# Patient Record
Sex: FEMALE | Race: WHITE | HISPANIC OR LATINO | Employment: FULL TIME | URBAN - METROPOLITAN AREA
[De-identification: names, ages, dates, MRNs, and addresses within clinical notes are randomized per-mention and may not be internally consistent; named-entity substitution may affect disease eponyms.]

---

## 2017-01-06 ENCOUNTER — HOSPITAL ENCOUNTER (EMERGENCY)
Facility: MEDICAL CENTER | Age: 42
End: 2017-01-06
Attending: EMERGENCY MEDICINE
Payer: MEDICAID

## 2017-01-06 ENCOUNTER — APPOINTMENT (OUTPATIENT)
Dept: RADIOLOGY | Facility: MEDICAL CENTER | Age: 42
End: 2017-01-06
Attending: EMERGENCY MEDICINE
Payer: MEDICAID

## 2017-01-06 VITALS
OXYGEN SATURATION: 98 % | TEMPERATURE: 96.8 F | WEIGHT: 293 LBS | SYSTOLIC BLOOD PRESSURE: 125 MMHG | DIASTOLIC BLOOD PRESSURE: 67 MMHG | BODY MASS INDEX: 50.02 KG/M2 | RESPIRATION RATE: 16 BRPM | HEART RATE: 78 BPM | HEIGHT: 64 IN

## 2017-01-06 DIAGNOSIS — M54.42 CHRONIC BILATERAL LOW BACK PAIN WITH BILATERAL SCIATICA: ICD-10-CM

## 2017-01-06 DIAGNOSIS — R10.32 LEFT LOWER QUADRANT PAIN: ICD-10-CM

## 2017-01-06 DIAGNOSIS — M54.41 CHRONIC BILATERAL LOW BACK PAIN WITH BILATERAL SCIATICA: ICD-10-CM

## 2017-01-06 DIAGNOSIS — G89.29 CHRONIC BILATERAL LOW BACK PAIN WITH BILATERAL SCIATICA: ICD-10-CM

## 2017-01-06 LAB
ALBUMIN SERPL BCP-MCNC: 3.9 G/DL (ref 3.2–4.9)
ALBUMIN/GLOB SERPL: 1 G/DL
ALP SERPL-CCNC: 104 U/L (ref 30–99)
ALT SERPL-CCNC: 13 U/L (ref 2–50)
ANION GAP SERPL CALC-SCNC: 7 MMOL/L (ref 0–11.9)
APPEARANCE UR: CLEAR
AST SERPL-CCNC: 11 U/L (ref 12–45)
BACTERIA GENITAL QL WET PREP: NORMAL
BASOPHILS # BLD AUTO: 0.7 % (ref 0–1.8)
BASOPHILS # BLD: 0.08 K/UL (ref 0–0.12)
BILIRUB SERPL-MCNC: 0.5 MG/DL (ref 0.1–1.5)
BUN SERPL-MCNC: 10 MG/DL (ref 8–22)
CALCIUM SERPL-MCNC: 9.4 MG/DL (ref 8.5–10.5)
CHLORIDE SERPL-SCNC: 101 MMOL/L (ref 96–112)
CO2 SERPL-SCNC: 29 MMOL/L (ref 20–33)
COLOR UR AUTO: YELLOW
CREAT SERPL-MCNC: 0.59 MG/DL (ref 0.5–1.4)
EOSINOPHIL # BLD AUTO: 0.23 K/UL (ref 0–0.51)
EOSINOPHIL NFR BLD: 2 % (ref 0–6.9)
ERYTHROCYTE [DISTWIDTH] IN BLOOD BY AUTOMATED COUNT: 43.4 FL (ref 35.9–50)
GFR SERPL CREATININE-BSD FRML MDRD: >60 ML/MIN/1.73 M 2
GLOBULIN SER CALC-MCNC: 3.8 G/DL (ref 1.9–3.5)
GLUCOSE SERPL-MCNC: 161 MG/DL (ref 65–99)
GLUCOSE UR QL STRIP.AUTO: NEGATIVE MG/DL
HCG UR QL: NEGATIVE
HCT VFR BLD AUTO: 44.9 % (ref 37–47)
HGB BLD-MCNC: 14 G/DL (ref 12–16)
IMM GRANULOCYTES # BLD AUTO: 0.05 K/UL (ref 0–0.11)
IMM GRANULOCYTES NFR BLD AUTO: 0.4 % (ref 0–0.9)
KETONES UR QL STRIP.AUTO: NEGATIVE MG/DL
LEUKOCYTE ESTERASE UR QL STRIP.AUTO: NEGATIVE
LIPASE SERPL-CCNC: 21 U/L (ref 11–82)
LYMPHOCYTES # BLD AUTO: 2.33 K/UL (ref 1–4.8)
LYMPHOCYTES NFR BLD: 20 % (ref 22–41)
MCH RBC QN AUTO: 25.8 PG (ref 27–33)
MCHC RBC AUTO-ENTMCNC: 31.2 G/DL (ref 33.6–35)
MCV RBC AUTO: 82.8 FL (ref 81.4–97.8)
MONOCYTES # BLD AUTO: 0.55 K/UL (ref 0–0.85)
MONOCYTES NFR BLD AUTO: 4.7 % (ref 0–13.4)
NEUTROPHILS # BLD AUTO: 8.42 K/UL (ref 2–7.15)
NEUTROPHILS NFR BLD: 72.2 % (ref 44–72)
NITRITE UR QL STRIP.AUTO: NEGATIVE
NRBC # BLD AUTO: 0 K/UL
NRBC BLD AUTO-RTO: 0 /100 WBC
PH UR STRIP.AUTO: 6 [PH]
PLATELET # BLD AUTO: 226 K/UL (ref 164–446)
PMV BLD AUTO: 11.4 FL (ref 9–12.9)
POTASSIUM SERPL-SCNC: 4.2 MMOL/L (ref 3.6–5.5)
PROT SERPL-MCNC: 7.7 G/DL (ref 6–8.2)
PROT UR QL STRIP: NEGATIVE MG/DL
RBC # BLD AUTO: 5.42 M/UL (ref 4.2–5.4)
RBC UR QL AUTO: NEGATIVE
SIGNIFICANT IND 70042: NORMAL
SITE SITE: NORMAL
SODIUM SERPL-SCNC: 137 MMOL/L (ref 135–145)
SOURCE SOURCE: NORMAL
SP GR UR: 1.02
WBC # BLD AUTO: 11.7 K/UL (ref 4.8–10.8)

## 2017-01-06 PROCEDURE — 700105 HCHG RX REV CODE 258: Performed by: EMERGENCY MEDICINE

## 2017-01-06 PROCEDURE — 83690 ASSAY OF LIPASE: CPT

## 2017-01-06 PROCEDURE — 81002 URINALYSIS NONAUTO W/O SCOPE: CPT

## 2017-01-06 PROCEDURE — 700117 HCHG RX CONTRAST REV CODE 255: Performed by: EMERGENCY MEDICINE

## 2017-01-06 PROCEDURE — 36415 COLL VENOUS BLD VENIPUNCTURE: CPT

## 2017-01-06 PROCEDURE — 96376 TX/PRO/DX INJ SAME DRUG ADON: CPT

## 2017-01-06 PROCEDURE — 96374 THER/PROPH/DIAG INJ IV PUSH: CPT

## 2017-01-06 PROCEDURE — 87591 N.GONORRHOEAE DNA AMP PROB: CPT

## 2017-01-06 PROCEDURE — 85025 COMPLETE CBC W/AUTO DIFF WBC: CPT

## 2017-01-06 PROCEDURE — 96375 TX/PRO/DX INJ NEW DRUG ADDON: CPT

## 2017-01-06 PROCEDURE — 99284 EMERGENCY DEPT VISIT MOD MDM: CPT

## 2017-01-06 PROCEDURE — 700111 HCHG RX REV CODE 636 W/ 250 OVERRIDE (IP): Performed by: EMERGENCY MEDICINE

## 2017-01-06 PROCEDURE — 81025 URINE PREGNANCY TEST: CPT

## 2017-01-06 PROCEDURE — 87491 CHLMYD TRACH DNA AMP PROBE: CPT

## 2017-01-06 PROCEDURE — 74177 CT ABD & PELVIS W/CONTRAST: CPT

## 2017-01-06 PROCEDURE — 80053 COMPREHEN METABOLIC PANEL: CPT

## 2017-01-06 PROCEDURE — 96361 HYDRATE IV INFUSION ADD-ON: CPT

## 2017-01-06 RX ORDER — MORPHINE SULFATE 4 MG/ML
4 INJECTION, SOLUTION INTRAMUSCULAR; INTRAVENOUS ONCE
Status: COMPLETED | OUTPATIENT
Start: 2017-01-06 | End: 2017-01-06

## 2017-01-06 RX ORDER — LEVOTHYROXINE SODIUM 13 UG/1
150 CAPSULE ORAL DAILY
COMMUNITY
End: 2018-08-15

## 2017-01-06 RX ORDER — SODIUM CHLORIDE 9 MG/ML
INJECTION, SOLUTION INTRAVENOUS CONTINUOUS
Status: DISCONTINUED | OUTPATIENT
Start: 2017-01-06 | End: 2017-01-06 | Stop reason: HOSPADM

## 2017-01-06 RX ORDER — ONDANSETRON 4 MG/1
4 TABLET, FILM COATED ORAL EVERY 8 HOURS PRN
Qty: 6 EACH | Refills: 2 | Status: ON HOLD | OUTPATIENT
Start: 2017-01-06 | End: 2017-05-20

## 2017-01-06 RX ORDER — HYDROCODONE BITARTRATE AND ACETAMINOPHEN 5; 325 MG/1; MG/1
1-2 TABLET ORAL EVERY 6 HOURS PRN
Qty: 10 TAB | Refills: 0 | Status: ON HOLD | OUTPATIENT
Start: 2017-01-06 | End: 2017-05-20

## 2017-01-06 RX ORDER — GLIMEPIRIDE 4 MG/1
2 TABLET ORAL
COMMUNITY
End: 2018-08-15

## 2017-01-06 RX ORDER — ONDANSETRON 2 MG/ML
4 INJECTION INTRAMUSCULAR; INTRAVENOUS ONCE
Status: COMPLETED | OUTPATIENT
Start: 2017-01-06 | End: 2017-01-06

## 2017-01-06 RX ADMIN — MORPHINE SULFATE 4 MG: 4 INJECTION INTRAVENOUS at 09:14

## 2017-01-06 RX ADMIN — IOHEXOL 50 ML: 240 INJECTION, SOLUTION INTRATHECAL; INTRAVASCULAR; INTRAVENOUS; ORAL at 12:00

## 2017-01-06 RX ADMIN — ONDANSETRON 4 MG: 2 INJECTION, SOLUTION INTRAMUSCULAR; INTRAVENOUS at 10:16

## 2017-01-06 RX ADMIN — IOHEXOL 100 ML: 350 INJECTION, SOLUTION INTRAVENOUS at 11:41

## 2017-01-06 RX ADMIN — ONDANSETRON 4 MG: 2 INJECTION, SOLUTION INTRAMUSCULAR; INTRAVENOUS at 09:14

## 2017-01-06 RX ADMIN — SODIUM CHLORIDE: 9 INJECTION, SOLUTION INTRAVENOUS at 09:14

## 2017-01-06 ASSESSMENT — PAIN SCALES - GENERAL
PAINLEVEL_OUTOF10: 3
PAINLEVEL_OUTOF10: 6
PAINLEVEL_OUTOF10: 7

## 2017-01-06 NOTE — ED AVS SNAPSHOT
Avectra Access Code: [email or web address]  Expires: 2/5/2017 12:09 PM    Your email address is not on file at [facility].  Email Addresses are required for you to sign up for Avectra, please contact [phone] to verify your personal information and to provide your email address prior to attempting to register for Avectra.    Chanelle Ortiz  [address] [place] [address]    Fetchmobt  A secure, online tool to manage your health information     [employer]’s Avectra® is a secure, online tool that connects you to your personalized health information from the privacy of your home -- day or night - making it very easy for you to manage your healthcare. Once the activation process is completed, you can even access your medical information using the Avectra lauren, which is available for free in the Apple Lauren store or Google Play store.     To learn more about Avectra, visit [email or web address]    There are two levels of access available (as shown below):   My Chart Features  [facility] Primary Care Doctor [facility]  Specialists [facility]  Urgent  Care Non-[facility] Primary Care Doctor   Email your healthcare team securely and privately 24/7 X X X    Manage appointments: schedule your next appointment; view details of past/upcoming appointments X      Request prescription refills. X      View recent personal medical records, including lab and immunizations X X X X   View health record, including health history, allergies, medications X X X X   Read reports about your outpatient visits, procedures, consult and ER notes X X X X   See your discharge summary, which is a recap of your hospital and/or ER visit that includes your diagnosis, lab results, and care plan X X  X     How to register for Avectra:  Once your e-mail address has been verified, follow the following steps to sign up for Avectra.     1. Go to  [email or web address]  2. Click on the Sign Up Now box, which takes you to the New Member Sign Up page.  You will need to provide the following information:  a. Enter your [employer] Access Code exactly as it appears at the top of this page. (You will not need to use this code after you’ve completed the sign-up process. If you do not sign up before the expiration date, you must request a new code.)   b. Enter your date of birth.   c. Enter your home email address.   d. Click Submit, and follow the next screen’s instructions.  3. Create a [employer]Chimet ID. This will be your [employer] login ID and cannot be changed, so think of one that is secure and easy to remember.  4. Create a [employer] password. You can change your password at any time.  5. Enter your Password Reset Question and Answer. This can be used at a later time if you forget your password.   6. Enter your e-mail address. This allows you to receive e-mail notifications when new information is available in [employer].  7. Click Sign Up. You can now view your health information.    For assistance activating your [employer] account, call [phone]

## 2017-01-06 NOTE — ED NOTES
Patient given discharge instructions, follow up information, and prescriptions x 2, instructed not to drive home or while taking narcotics, verbalized understanding, discharged to father.

## 2017-01-06 NOTE — DISCHARGE INSTRUCTIONS
Abdominal Pain (Nonspecific)  Your exam might not show the exact reason you have abdominal pain. Since there are many different causes of abdominal pain, another checkup and more tests may be needed. It is very important to follow up for lasting (persistent) or worsening symptoms. A possible cause of abdominal pain in any person who still has his or her appendix is acute appendicitis. Appendicitis is often hard to diagnose. Normal blood tests, urine tests, ultrasound, and CT scans do not completely rule out early appendicitis or other causes of abdominal pain. Sometimes, only the changes that happen over time will allow appendicitis and other causes of abdominal pain to be determined. Other potential problems that may require surgery may also take time to become more apparent. Because of this, it is important that you follow all of the instructions below.  HOME CARE INSTRUCTIONS   · Rest as much as possible.   · Do not eat solid food until your pain is gone.   · While adults or children have pain: A diet of water, weak decaffeinated tea, broth or bouillon, gelatin, oral rehydration solutions (ORS), frozen ice pops, or ice chips may be helpful.   · When pain is gone in adults or children: Start a light diet (dry toast, crackers, applesauce, or white rice). Increase the diet slowly as long as it does not bother you. Eat no dairy products (including cheese and eggs) and no spicy, fatty, fried, or high-fiber foods.   · Use no alcohol, caffeine, or cigarettes.   · Take your regular medicines unless your caregiver told you not to.   · Take any prescribed medicine as directed.   · Only take over-the-counter or prescription medicines for pain, discomfort, or fever as directed by your caregiver. Do not give aspirin to children.   If your caregiver has given you a follow-up appointment, it is very important to keep that appointment. Not keeping the appointment could result in a permanent injury and/or lasting (chronic) pain  and/or disability. If there is any problem keeping the appointment, you must call to reschedule.   SEEK IMMEDIATE MEDICAL CARE IF:   · Your pain is not gone in 24 hours.   · Your pain becomes worse, changes location, or feels different.   · You or your child has an oral temperature above 102° F (38.9° C), not controlled by medicine.   · Your baby is older than 3 months with a rectal temperature of 102° F (38.9° C) or higher.   · Your baby is 3 months old or younger with a rectal temperature of 100.4° F (38° C) or higher.   · You have shaking chills.   · You keep throwing up (vomiting) or cannot drink liquids.   · There is blood in your vomit or you see blood in your bowel movements.   · Your bowel movements become dark or black.   · You have frequent bowel movements.   · Your bowel movements stop (become blocked) or you cannot pass gas.   · You have bloody, frequent, or painful urination.   · You have yellow discoloration in the skin or whites of the eyes.   · Your stomach becomes bloated or bigger.   · You have dizziness or fainting.   · You have chest or back pain.   MAKE SURE YOU:   · Understand these instructions.   · Will watch your condition.   · Will get help right away if you are not doing well or get worse.   Document Released: 12/18/2006 Document Revised: 03/11/2013 Document Reviewed: 11/15/2010  ExitCare® Patient Information ©2013 [employer].  Abdominal Pain, Women  Abdominal (stomach, pelvic, or belly) pain can be caused by many things. It is important to tell your doctor:  · The location of the pain.  · Does it come and go or is it present all the time?  · Are there things that start the pain (eating certain foods, exercise)?  · Are there other symptoms associated with the pain (fever, nausea, vomiting, diarrhea)?  All of this is helpful to know when trying to find the cause of the pain.  CAUSES   · Stomach: virus or bacteria infection, or ulcer.  · Intestine: appendicitis (inflamed appendix),  regional ileitis (Crohn's disease), ulcerative colitis (inflamed colon), irritable bowel syndrome, diverticulitis (inflamed diverticulum of the colon), or cancer of the stomach or intestine.  · Gallbladder disease or stones in the gallbladder.  · Kidney disease, kidney stones, or infection.  · Pancreas infection or cancer.  · Fibromyalgia (pain disorder).  · Diseases of the female organs:  · Uterus: fibroid (non-cancerous) tumors or infection.  · Fallopian tubes: infection or tubal pregnancy.  · Ovary: cysts or tumors.  · Pelvic adhesions (scar tissue).  · Endometriosis (uterus lining tissue growing in the pelvis and on the pelvic organs).  · Pelvic congestion syndrome (female organs filling up with blood just before the menstrual period).  · Pain with the menstrual period.  · Pain with ovulation (producing an egg).  · Pain with an IUD (intrauterine device, birth control) in the uterus.  · Cancer of the female organs.  · Functional pain (pain not caused by a disease, may improve without treatment).  · Psychological pain.  · Depression.  DIAGNOSIS   Your doctor will decide the seriousness of your pain by doing an examination.  · Blood tests.  · X-rays.  · Ultrasound.  · CT scan (computed tomography, special type of X-ray).  · MRI (magnetic resonance imaging).  · Cultures, for infection.  · Barium enema (dye inserted in the large intestine, to better view it with X-rays).  · Colonoscopy (looking in intestine with a lighted tube).  · Laparoscopy (minor surgery, looking in abdomen with a lighted tube).  · Major abdominal exploratory surgery (looking in abdomen with a large incision).  TREATMENT   The treatment will depend on the cause of the pain.   · Many cases can be observed and treated at home.  · Over-the-counter medicines recommended by your caregiver.  · Prescription medicine.  · Antibiotics, for infection.  · Birth control pills, for painful periods or for ovulation pain.  · Hormone treatment, for  endometriosis.  · Nerve blocking injections.  · Physical therapy.  · Antidepressants.  · Counseling with a psychologist or psychiatrist.  · Minor or major surgery.  HOME CARE INSTRUCTIONS   · Do not take laxatives, unless directed by your caregiver.  · Take over-the-counter pain medicine only if ordered by your caregiver. Do not take aspirin because it can cause an upset stomach or bleeding.  · Try a clear liquid diet (broth or water) as ordered by your caregiver. Slowly move to a bland diet, as tolerated, if the pain is related to the stomach or intestine.  · Have a thermometer and take your temperature several times a day, and record it.  · Bed rest and sleep, if it helps the pain.  · Avoid sexual intercourse, if it causes pain.  · Avoid stressful situations.  · Keep your follow-up appointments and tests, as your caregiver orders.  · If the pain does not go away with medicine or surgery, you may try:  · Acupuncture.  · Relaxation exercises (yoga, meditation).  · Group therapy.  · Counseling.  SEEK MEDICAL CARE IF:   · You notice certain foods cause stomach pain.  · Your home care treatment is not helping your pain.  · You need stronger pain medicine.  · You want your IUD removed.  · You feel faint or lightheaded.  · You develop nausea and vomiting.  · You develop a rash.  · You are having side effects or an allergy to your medicine.  SEEK IMMEDIATE MEDICAL CARE IF:   · Your pain does not go away or gets worse.  · You have a fever.  · Your pain is felt only in portions of the abdomen. The right side could possibly be appendicitis. The left lower portion of the abdomen could be colitis or diverticulitis.  · You are passing blood in your stools (bright red or black tarry stools, with or without vomiting).  · You have blood in your urine.  · You develop chills, with or without a fever.  · You pass out.  MAKE SURE YOU:   · Understand these instructions.  · Will watch your condition.  · Will get help right away if you  are not doing well or get worse.  Document Released: 10/14/2008 Document Revised: 03/11/2013 Document Reviewed: 11/04/2010  ExitCare® Patient Information ©2014 [employer].    Chronic Back Pain   When back pain lasts longer than 3 months, it is called chronic back pain. People with chronic back pain often go through certain periods that are more intense (flare-ups).   CAUSES  Chronic back pain can be caused by wear and tear (degeneration) on different structures in your back. These structures include:  · The bones of your spine (vertebrae) and the joints surrounding your spinal cord and nerve roots (facets).  · The strong, fibrous tissues that connect your vertebrae (ligaments).  Degeneration of these structures may result in pressure on your nerves. This can lead to constant pain.  HOME CARE INSTRUCTIONS  · Avoid bending, heavy lifting, prolonged sitting, and activities which make the problem worse.  · Take brief periods of rest throughout the day to reduce your pain. Lying down or standing usually is better than sitting while you are resting.  · Take over-the-counter or prescription medicines only as directed by your caregiver.  SEEK IMMEDIATE MEDICAL CARE IF:   · You have weakness or numbness in one of your legs or feet.  · You have trouble controlling your bladder or bowels.  · You have nausea, vomiting, abdominal pain, shortness of breath, or fainting.     This information is not intended to replace advice given to you by your health care provider. Make sure you discuss any questions you have with your health care provider.     Document Released: 01/25/2006 Document Revised: 03/11/2013 Document Reviewed: 12/01/2012  [employer] Interactive Patient Education ©2016 [employer]  Return if fever, vomiting or if no better in 12 hours.

## 2017-01-06 NOTE — ED PROVIDER NOTES
ED Provider Note    CHIEF COMPLAINT  Chief Complaint   Patient presents with   • Abdominal Pain     7/10 generalized abdominal pain x 3 weeks   • Back Pain     7/10 low back pain x 3 weeks   • Head Ache     5/10 Headache       HPI  Chanelle Ortiz is a 41 y.o. female who presents with low back pain, for the last 3 weeks, worse today, pain severe dull gradual in onset and radiates to both legs. No bowel or bladder problems no IV drug use no fever no chills. Pain severe worse with motion with radiation. Long history of low back pain. Evidently he has been in [facility] FOR THE SAME PROBLEM. TODAY SHE HAS AN ADDITIONAL COMPLAINT OF ABDOMINAL PAIN, LEFT LOWER QUADRANT PAIN FOR THE LAST WEEK, THOUGH MODERATE GRADUAL NO DIARRHEA NO FEVER NO CHILLS NO NAUSEA NO VOMITING NO DYSURIA URGENCY OR FREQUENCY NO OTHER MODIFIERS.    REVIEW OF SYSTEMS  See HPI for further details. History of diabetes and obesity depression cardiac arrhythmia and hypertension for thyroidism Denies other G.I., G.U.. endrocine, cardiovascular, respriatory or neurological problems. All other systems are negative.     PAST MEDICAL HISTORY  Past Medical History   Diagnosis Date   • Diabetes      5 YEARS AGO, DIET CONTROLLED   • Psychiatric problem    • Arrhythmia    • Bronchitis    • Hypertension    • Hyperthyroidism      Treated with Radioactive iodine last year       FAMILY HISTORY  Family History   Problem Relation Age of Onset   • Other Mother    • Heart Failure Sister    • Heart Attack Maternal Grandfather        SOCIAL HISTORY  Social History     Social History   • Marital Status: Legally      Spouse Name: N/A   • Number of Children: N/A   • Years of Education: N/A     Social History Main Topics   • Smoking status: Never Smoker    • Smokeless tobacco: None   • Alcohol Use: No   • Drug Use: No   • Sexual Activity: Not Asked     Other Topics Concern   • None     Social History Narrative       SURGICAL HISTORY  Past Surgical  "History   Procedure Laterality Date   • Other abdominal surgery  2002     cholesystectomy   • Gyn surgery       ovarian cyst and c sections   • Other       tonsillectomy       CURRENT MEDICATIONS  Home Medications     Reviewed by Toya Zapata R.N. (Registered Nurse) on 01/06/17 at 0813  Med List Status: Partial    Medication Last Dose Status    glimepiride (AMARYL) 4 MG Tab ran out Active    Levothyroxine Sodium 150 MCG Cap 1/5/2017 Active                ALLERGIES  No Known Allergies    PHYSICAL EXAM  VITAL SIGNS: /67 mmHg  Pulse 77  Temp(Src) 36 °C (96.8 °F)  Resp 16  Ht 1.626 m (5' 4\")  Wt 139.254 kg (307 lb)  BMI 52.67 kg/m2  SpO2 97%  LMP  (Within Weeks)  Constitutional: Well developed, Well nourished massively obese, No acute distress, Non-toxic appearance.   HENT: Normocephalic, Atraumatic, Bilateral external ears normal, Oropharynx moist, No oral exudates, Nose normal.   Eyes: PERRL, EOMI, Conjunctiva normal, No discharge.   Neck: Normal range of motion, No tenderness, Supple, No stridor.   Lymphatic: No lymphadenopathy noted.   Cardiovascular: Normal heart rate, Normal rhythm, No murmurs, No rubs, No gallops.   Thorax & Lungs: Normal breath sounds, No respiratory distress, No wheezing, No chest tenderness.   Abdomen: Tender left lower quadrant, no guarding no rigidity and the abdomen is soft.  No masses, No pulsatile masses.  Skin: Warm, Dry, No erythema, No rash.   Back: NExamination of the back reveals slight tenderness lower lumbar. Straight leg raise is positive bilateral 30°. Deep tendon reflexes equal bilaterally. Toe and heel flexion and extension are normal. Motor sensory exam of the lower legs is normal   Extremities: Intact distal pulses, No edema, No tenderness, No cyanosis, No clubbing.   Musculoskeletal: Good range of motion in all major joints. No tenderness to palpation or major deformities noted.   Neurologic: Alert & oriented x 3, Normal motor function, Normal sensory " function, No focal deficits noted.   Psychiatric: Affect normal, Judgment normal, Mood normal. Appears depressed  Pelvic exam:. White discharge no tenderness no bleeding no uterine enlargement no masses pelvic exam done by resident.    RADIOLOGY/PROCEDURES  CT-ABDOMEN-PELVIS WITH   Final Result      1.  Normal appendix.   2.  Minimal pelvic fluid, likely physiologic.   3.  No bowel obstruction or pneumoperitoneum.            COURSE & MEDICAL DECISION MAKING  Pertinent Labs & Imaging studies reviewed. (See chart for details) electrolytes normal, renal function, liver function normal lipase normal. Pregnancy test negative. White count elevated 11.7 hematocrit and platelets are normal there is no shift.        She is having multiple complaints, low back pain, left lower quadrant pain. Long history of low back problems in the past they've been evaluated at another hospital.. Here in the emergency department she was given IV normal saline and morphine and Zofran  She has had an extensive workup here including blood tests urine test pelvic exam CAT scan. At this point I am not finding any acute pathology.This patient understands that abdominal pain may be very elusive. At this point there is no evidence of an acute abdominal emergency. However if the pain returns or is no better in 12 hours or any vomiting they should return to the emergency room immediately. Just because the lab and x-rays are normal does not rule out a severe abdominal emergency  Discharge her with Norco. I have checked with PMD  FINAL IMPRESSION  1.   1. Left lower quadrant pain    2. Chronic bilateral low back pain with bilateral sciatica        2.   3.     Disposition  Discharge instructions are understood. This patient is to return if fever vomiting or no better in 12 hours. Follow up with the [facility] clinic or private physician. Information sheets on low back pain and abdominal pain Electronically signed by: Jonnie Ashton, 1/6/2017 8:28 AM

## 2017-01-06 NOTE — ED AVS SNAPSHOT
After Visit Summary                                                                                                                Chanelle Ortiz   MRN: [id number]    Department:  [facility], Emergency Dept   Date of Visit:  1/6/2017            [facility], Emergency Dept    [address] [address]    Phone:  [phone]      You were seen by     Jonnie Ashton M.D.      Your Diagnosis Was     Left lower quadrant pain     R10.32       These are the medications you received during your hospitalization from 01/06/2017 0802 to 01/06/2017 1210     Date/Time Order Dose Route Action    01/06/2017 0914 NS infusion   Intravenous New Bag    01/06/2017 0914 morphine (pf) 4 mg/ml injection 4 mg 4 mg Intravenous Given    01/06/2017 0914 ondansetron (ZOFRAN) syringe/vial injection 4 mg 4 mg Intravenous Given    01/06/2017 1016 ondansetron (ZOFRAN) syringe/vial injection 4 mg 4 mg Intravenous Given    01/06/2017 1141 iohexol (OMNIPAQUE) 350 mg/mL 100 mL Intravenous Given    01/06/2017 1200 iohexol (OMNIPAQUE) 240 mg/mL 50 mL Oral Given      Follow-up Information     1. Follow up with CHANCE Pate. Schedule an appointment as soon as possible for a visit today.    Specialty:  Family Medicine    Contact information    [facility] [address] [address] [phone]        Medication Information     Review all of your home medications and newly ordered medications with your primary doctor and/or pharmacist as soon as possible. Follow medication instructions as directed by your doctor and/or pharmacist.     Please keep your complete medication list with you and share with your physician. Update the information when medications are discontinued, doses are changed, or new medications (including over-the-counter products) are added; and carry medication information at all times in the event of emergency situations.               Medication List         START taking these medications        Instructions    ondansetron 4 MG Tabs tablet   Commonly known as:  ZOFRAN    Take 1 Tab by mouth every 8 hours as needed (FOR NAUSEA OR VIMITING) for up to 6 doses.   Dose:  4 mg         ASK your doctor about these medications        Instructions    glimepiride 4 MG Tabs   Commonly known as:  AMARYL    Take 4 mg by mouth every bedtime.   Dose:  4 mg       Levothyroxine Sodium 150 MCG Caps    Take  by mouth.               Procedures and tests performed during your visit     CBC WITH DIFFERENTIAL    CHLAMYDIA & GC BY PCR    COMP METABOLIC PANEL    CONSENT FOR CONTRAST INJECTION    CT-ABDOMEN-PELVIS WITH    ESTIMATED GFR    LIPASE    POC UA    POC URINE PREGNANCY    Set Up for Pelvic Exam    WET PREP        Discharge Instructions       Abdominal Pain (Nonspecific)  Your exam might not show the exact reason you have abdominal pain. Since there are many different causes of abdominal pain, another checkup and more tests may be needed. It is very important to follow up for lasting (persistent) or worsening symptoms. A possible cause of abdominal pain in any person who still has his or her appendix is acute appendicitis. Appendicitis is often hard to diagnose. Normal blood tests, urine tests, ultrasound, and CT scans do not completely rule out early appendicitis or other causes of abdominal pain. Sometimes, only the changes that happen over time will allow appendicitis and other causes of abdominal pain to be determined. Other potential problems that may require surgery may also take time to become more apparent. Because of this, it is important that you follow all of the instructions below.  HOME CARE INSTRUCTIONS   · Rest as much as possible.   · Do not eat solid food until your pain is gone.   · While adults or children have pain: A diet of water, weak decaffeinated tea, broth or bouillon, gelatin, oral rehydration solutions (ORS), frozen ice pops, or ice chips may be helpful.    · When pain is gone in adults or children: Start a light diet (dry toast, crackers, applesauce, or white rice). Increase the diet slowly as long as it does not bother you. Eat no dairy products (including cheese and eggs) and no spicy, fatty, fried, or high-fiber foods.   · Use no alcohol, caffeine, or cigarettes.   · Take your regular medicines unless your caregiver told you not to.   · Take any prescribed medicine as directed.   · Only take over-the-counter or prescription medicines for pain, discomfort, or fever as directed by your caregiver. Do not give aspirin to children.   If your caregiver has given you a follow-up appointment, it is very important to keep that appointment. Not keeping the appointment could result in a permanent injury and/or lasting (chronic) pain and/or disability. If there is any problem keeping the appointment, you must call to reschedule.   SEEK IMMEDIATE MEDICAL CARE IF:   · Your pain is not gone in 24 hours.   · Your pain becomes worse, changes location, or feels different.   · You or your child has an oral temperature above 102° F (38.9° C), not controlled by medicine.   · Your baby is older than 3 months with a rectal temperature of 102° F (38.9° C) or higher.   · Your baby is 3 months old or younger with a rectal temperature of 100.4° F (38° C) or higher.   · You have shaking chills.   · You keep throwing up (vomiting) or cannot drink liquids.   · There is blood in your vomit or you see blood in your bowel movements.   · Your bowel movements become dark or black.   · You have frequent bowel movements.   · Your bowel movements stop (become blocked) or you cannot pass gas.   · You have bloody, frequent, or painful urination.   · You have yellow discoloration in the skin or whites of the eyes.   · Your stomach becomes bloated or bigger.   · You have dizziness or fainting.   · You have chest or back pain.   MAKE SURE YOU:   · Understand these instructions.   · Will watch your  condition.   · Will get help right away if you are not doing well or get worse.   Document Released: 12/18/2006 Document Revised: 03/11/2013 Document Reviewed: 11/15/2010  [employer]® Patient Information ©2013 [employer].  Abdominal Pain, Women  Abdominal (stomach, pelvic, or belly) pain can be caused by many things. It is important to tell your doctor:  · The location of the pain.  · Does it come and go or is it present all the time?  · Are there things that start the pain (eating certain foods, exercise)?  · Are there other symptoms associated with the pain (fever, nausea, vomiting, diarrhea)?  All of this is helpful to know when trying to find the cause of the pain.  CAUSES   · Stomach: virus or bacteria infection, or ulcer.  · Intestine: appendicitis (inflamed appendix), regional ileitis (Crohn's disease), ulcerative colitis (inflamed colon), irritable bowel syndrome, diverticulitis (inflamed diverticulum of the colon), or cancer of the stomach or intestine.  · Gallbladder disease or stones in the gallbladder.  · Kidney disease, kidney stones, or infection.  · Pancreas infection or cancer.  · Fibromyalgia (pain disorder).  · Diseases of the female organs:  · Uterus: fibroid (non-cancerous) tumors or infection.  · Fallopian tubes: infection or tubal pregnancy.  · Ovary: cysts or tumors.  · Pelvic adhesions (scar tissue).  · Endometriosis (uterus lining tissue growing in the pelvis and on the pelvic organs).  · Pelvic congestion syndrome (female organs filling up with blood just before the menstrual period).  · Pain with the menstrual period.  · Pain with ovulation (producing an egg).  · Pain with an IUD (intrauterine device, birth control) in the uterus.  · Cancer of the female organs.  · Functional pain (pain not caused by a disease, may improve without treatment).  · Psychological pain.  · Depression.  DIAGNOSIS   Your doctor will decide the seriousness of your pain by doing an examination.  · Blood  tests.  · X-rays.  · Ultrasound.  · CT scan (computed tomography, special type of X-ray).  · MRI (magnetic resonance imaging).  · Cultures, for infection.  · Barium enema (dye inserted in the large intestine, to better view it with X-rays).  · Colonoscopy (looking in intestine with a lighted tube).  · Laparoscopy (minor surgery, looking in abdomen with a lighted tube).  · Major abdominal exploratory surgery (looking in abdomen with a large incision).  TREATMENT   The treatment will depend on the cause of the pain.   · Many cases can be observed and treated at home.  · Over-the-counter medicines recommended by your caregiver.  · Prescription medicine.  · Antibiotics, for infection.  · Birth control pills, for painful periods or for ovulation pain.  · Hormone treatment, for endometriosis.  · Nerve blocking injections.  · Physical therapy.  · Antidepressants.  · Counseling with a psychologist or psychiatrist.  · Minor or major surgery.  HOME CARE INSTRUCTIONS   · Do not take laxatives, unless directed by your caregiver.  · Take over-the-counter pain medicine only if ordered by your caregiver. Do not take aspirin because it can cause an upset stomach or bleeding.  · Try a clear liquid diet (broth or water) as ordered by your caregiver. Slowly move to a bland diet, as tolerated, if the pain is related to the stomach or intestine.  · Have a thermometer and take your temperature several times a day, and record it.  · Bed rest and sleep, if it helps the pain.  · Avoid sexual intercourse, if it causes pain.  · Avoid stressful situations.  · Keep your follow-up appointments and tests, as your caregiver orders.  · If the pain does not go away with medicine or surgery, you may try:  · Acupuncture.  · Relaxation exercises (yoga, meditation).  · Group therapy.  · Counseling.  SEEK MEDICAL CARE IF:   · You notice certain foods cause stomach pain.  · Your home care treatment is not helping your pain.  · You need stronger pain  medicine.  · You want your IUD removed.  · You feel faint or lightheaded.  · You develop nausea and vomiting.  · You develop a rash.  · You are having side effects or an allergy to your medicine.  SEEK IMMEDIATE MEDICAL CARE IF:   · Your pain does not go away or gets worse.  · You have a fever.  · Your pain is felt only in portions of the abdomen. The right side could possibly be appendicitis. The left lower portion of the abdomen could be colitis or diverticulitis.  · You are passing blood in your stools (bright red or black tarry stools, with or without vomiting).  · You have blood in your urine.  · You develop chills, with or without a fever.  · You pass out.  MAKE SURE YOU:   · Understand these instructions.  · Will watch your condition.  · Will get help right away if you are not doing well or get worse.  Document Released: 10/14/2008 Document Revised: 03/11/2013 Document Reviewed: 11/04/2010  [employer]® Patient Information ©2014 [employer].    Chronic Back Pain   When back pain lasts longer than 3 months, it is called chronic back pain. People with chronic back pain often go through certain periods that are more intense (flare-ups).   CAUSES  Chronic back pain can be caused by wear and tear (degeneration) on different structures in your back. These structures include:  · The bones of your spine (vertebrae) and the joints surrounding your spinal cord and nerve roots (facets).  · The strong, fibrous tissues that connect your vertebrae (ligaments).  Degeneration of these structures may result in pressure on your nerves. This can lead to constant pain.  HOME CARE INSTRUCTIONS  · Avoid bending, heavy lifting, prolonged sitting, and activities which make the problem worse.  · Take brief periods of rest throughout the day to reduce your pain. Lying down or standing usually is better than sitting while you are resting.  · Take over-the-counter or prescription medicines only as directed by your caregiver.  SEEK  IMMEDIATE MEDICAL CARE IF:   · You have weakness or numbness in one of your legs or feet.  · You have trouble controlling your bladder or bowels.  · You have nausea, vomiting, abdominal pain, shortness of breath, or fainting.     This information is not intended to replace advice given to you by your health care provider. Make sure you discuss any questions you have with your health care provider.     Document Released: 01/25/2006 Document Revised: 03/11/2013 Document Reviewed: 12/01/2012  [employer] Interactive Patient Education ©2016 [employer]  Return if fever, vomiting or if no better in 12 hours.          Patient Information     Patient Information    Following emergency treatment: all patient requiring follow-up care must return either to a private physician or a clinic if your condition worsens before you are able to obtain further medical attention, please return to the emergency room.     Billing Information    At [facility], we work to make the billing process streamlined for our patients.  Our Representatives are here to answer any questions you may have regarding your hospital bill.  If you have insurance coverage and have supplied your insurance information to us, we will submit a claim to your insurer on your behalf.  Should you have any questions regarding your bill, we can be reached online or by phone as follows:  Online: You are able pay your bills online or live chat with our representatives about any billing questions you may have. We are here to help Monday - Friday from 8:00am to 7:30pm and 9:00am - 12:00pm on Saturdays.  Please visit [email or web address]  for more information.   Phone:  [phone] or [phone]    Please note that your emergency physician, surgeon, pathologist, radiologist, anesthesiologist, and other specialists are not employed by [facility] and will therefore bill separately for their services.  Please contact them directly for any  questions concerning their bills at the numbers below:     Emergency Physician Services:  [phone]  [facility]:  [phone]  [facility]:  [phone]  [facility]:  [phone]    1. Your final bill may vary from the amount quoted upon discharge if all procedures are not complete at that time, or if your doctor has additional procedures of which we are not aware. You will receive an additional bill if you return to the Emergency Department at [facility] for suture removal regardless of the facility of which the sutures were placed.     2. Please arrange for settlement of this account at the emergency registration.    3. All self-pay accounts are due in full at the time of treatment.  If you are unable to meet this obligation then payment is expected within 4-5 days.     4. If you have had radiology studies (CT, X-ray, Ultrasound, MRI), you have received a preliminary result during your emergency department visit. Please contact the radiology department [phone] to receive a copy of your final result. Please discuss the Final result with your primary physician or with the follow up physician provided.     Crisis Hotline:  [place] Crisis Hotline:  9-563-QIRNUHH or 1-771.589.6963  [place] Crisis Hotline:    1-307.259.4658 or 148-414-7785         ED Discharge Follow Up Questions    1. In order to provide you with very good care, we would like to follow up with a phone call in the next few days.  May we have your permission to contact you?     YES /  NO    2. What is the best phone number to call you? (       )_____-__________    3. What is the best time to call you?      Morning  /  Afternoon  /  Evening                   Patient Signature:  ____________________________________________________________    Date:  ____________________________________________________________

## 2017-01-06 NOTE — ED AVS SNAPSHOT
1/6/2017          Chanelle Ortiz  [address]  [place] [address]    Dear Chanelle:    [facility] wants to ensure your discharge home is safe and you or your loved ones have had all your questions answered regarding your care after you leave the hospital.    You may receive a telephone call within two days of your discharge.  This call is to make certain you understand your discharge instructions as well as ensure we provided you with the best care possible during your stay with us.     The call will only last approximately 3-5 minutes and will be done by a nurse.    Once again, we want to ensure your discharge home is safe and that you have a clear understanding of any next steps in your care.  If you have any questions or concerns, please do not hesitate to contact us, we are here for you.  Thank you for choosing [facility] for your healthcare needs.    Sincerely,    Matias Anderson    [facility]

## 2017-01-06 NOTE — ED NOTES
".  Chief Complaint   Patient presents with   • Abdominal Pain     7/10 generalized abdominal pain x 3 weeks   • Back Pain     7/10 low back pain x 3 weeks   • Head Ache     5/10 Headache     ./67 mmHg  Pulse 76  Temp(Src) 36 °C (96.8 °F)  Resp 16  Ht 1.626 m (5' 4\")  Wt 139.254 kg (307 lb)  BMI 52.67 kg/m2    BIB EMS with above complaints x 3 weeks  "

## 2017-01-07 LAB
C TRACH DNA SPEC QL NAA+PROBE: NEGATIVE
N GONORRHOEA DNA SPEC QL NAA+PROBE: NEGATIVE
SPECIMEN SOURCE: NORMAL

## 2017-05-19 ENCOUNTER — HOSPITAL ENCOUNTER (OUTPATIENT)
Facility: MEDICAL CENTER | Age: 42
End: 2017-05-20
Attending: EMERGENCY MEDICINE | Admitting: INTERNAL MEDICINE
Payer: MEDICAID

## 2017-05-19 DIAGNOSIS — R07.9 CHEST PAIN IN ADULT: ICD-10-CM

## 2017-05-19 LAB
BASOPHILS # BLD AUTO: 0.9 % (ref 0–1.8)
BASOPHILS # BLD: 0.09 K/UL (ref 0–0.12)
EOSINOPHIL # BLD AUTO: 0.14 K/UL (ref 0–0.51)
EOSINOPHIL NFR BLD: 1.4 % (ref 0–6.9)
ERYTHROCYTE [DISTWIDTH] IN BLOOD BY AUTOMATED COUNT: 42.4 FL (ref 35.9–50)
HCT VFR BLD AUTO: 38.6 % (ref 37–47)
HGB BLD-MCNC: 11.9 G/DL (ref 12–16)
IMM GRANULOCYTES # BLD AUTO: 0.04 K/UL (ref 0–0.11)
IMM GRANULOCYTES NFR BLD AUTO: 0.4 % (ref 0–0.9)
LYMPHOCYTES # BLD AUTO: 2.88 K/UL (ref 1–4.8)
LYMPHOCYTES NFR BLD: 28 % (ref 22–41)
MCH RBC QN AUTO: 25.3 PG (ref 27–33)
MCHC RBC AUTO-ENTMCNC: 30.8 G/DL (ref 33.6–35)
MCV RBC AUTO: 82.1 FL (ref 81.4–97.8)
MONOCYTES # BLD AUTO: 0.66 K/UL (ref 0–0.85)
MONOCYTES NFR BLD AUTO: 6.4 % (ref 0–13.4)
NEUTROPHILS # BLD AUTO: 6.46 K/UL (ref 2–7.15)
NEUTROPHILS NFR BLD: 62.9 % (ref 44–72)
NRBC # BLD AUTO: 0 K/UL
NRBC BLD AUTO-RTO: 0 /100 WBC
PLATELET # BLD AUTO: 219 K/UL (ref 164–446)
PMV BLD AUTO: 11.2 FL (ref 9–12.9)
RBC # BLD AUTO: 4.7 M/UL (ref 4.2–5.4)
WBC # BLD AUTO: 10.3 K/UL (ref 4.8–10.8)

## 2017-05-19 PROCEDURE — 85025 COMPLETE CBC W/AUTO DIFF WBC: CPT

## 2017-05-19 PROCEDURE — 85379 FIBRIN DEGRADATION QUANT: CPT

## 2017-05-19 PROCEDURE — 80053 COMPREHEN METABOLIC PANEL: CPT

## 2017-05-19 PROCEDURE — 84484 ASSAY OF TROPONIN QUANT: CPT

## 2017-05-19 PROCEDURE — 700102 HCHG RX REV CODE 250 W/ 637 OVERRIDE(OP): Performed by: EMERGENCY MEDICINE

## 2017-05-19 PROCEDURE — A9270 NON-COVERED ITEM OR SERVICE: HCPCS | Performed by: EMERGENCY MEDICINE

## 2017-05-19 PROCEDURE — 99285 EMERGENCY DEPT VISIT HI MDM: CPT

## 2017-05-19 PROCEDURE — 93005 ELECTROCARDIOGRAM TRACING: CPT | Performed by: EMERGENCY MEDICINE

## 2017-05-19 RX ORDER — ASPIRIN 81 MG/1
324 TABLET, CHEWABLE ORAL ONCE
Status: DISCONTINUED | OUTPATIENT
Start: 2017-05-20 | End: 2017-05-20 | Stop reason: HOSPADM

## 2017-05-19 ASSESSMENT — PAIN SCALES - GENERAL: PAINLEVEL_OUTOF10: 5

## 2017-05-19 ASSESSMENT — LIFESTYLE VARIABLES: DO YOU DRINK ALCOHOL: NO

## 2017-05-19 NOTE — IP AVS SNAPSHOT
" Home Care Instructions                                                                                                                  Name:Chanelle Ortiz  Medical Record Number:[id number]  CSN: [id number]    YOB: 1975   Age: 41 y.o.  Sex: female  HT:1.626 m (5' 4\") WT: 134.8 kg (297 lb 2.9 oz)          Admit Date: 5/19/2017     Discharge Date:   Today's Date: 5/20/2017  Attending Doctor:  Robles Holman D.O.                  Allergies:  Review of patient's allergies indicates no known allergies.            Discharge Instructions       Nonspecific Chest Pain   Chest pain can be caused by many different conditions. There is always a chance that your pain could be related to something serious, such as a heart attack or a blood clot in your lungs. Chest pain can also be caused by conditions that are not life-threatening. If you have chest pain, it is very important to follow up with your health care provider.  CAUSES   Chest pain can be caused by:  · Heartburn.  · Pneumonia or bronchitis.  · Anxiety or stress.  · Inflammation around your heart (pericarditis) or lung (pleuritis or pleurisy).  · A blood clot in your lung.  · A collapsed lung (pneumothorax). It can develop suddenly on its own (spontaneous pneumothorax) or from trauma to the chest.  · Shingles infection (varicella-zoster virus).  · Heart attack.  · Damage to the bones, muscles, and cartilage that make up your chest wall. This can include:  ¨ Bruised bones due to injury.  ¨ Strained muscles or cartilage due to frequent or repeated coughing or overwork.  ¨ Fracture to one or more ribs.  ¨ Sore cartilage due to inflammation (costochondritis).  RISK FACTORS   Risk factors for chest pain may include:  1. Activities that increase your risk for trauma or injury to your chest.  2. Respiratory infections or conditions that cause frequent coughing.  3. Medical conditions or overeating that can cause heartburn.  4. Heart disease or family history " of heart disease.  5. Conditions or health behaviors that increase your risk of developing a blood clot.  6. Having had chicken pox (varicella zoster).  SIGNS AND SYMPTOMS  Chest pain can feel like:  1. Burning or tingling on the surface of your chest or deep in your chest.  2. Crushing, pressure, aching, or squeezing pain.  3. Dull or sharp pain that is worse when you move, cough, or take a deep breath.  4. Pain that is also felt in your back, neck, shoulder, or arm, or pain that spreads to any of these areas.  Your chest pain may come and go, or it may stay constant.  DIAGNOSIS  Lab tests or other studies may be needed to find the cause of your pain. Your health care provider may have you take a test called an ambulatory ECG (electrocardiogram). An ECG records your heartbeat patterns at the time the test is performed. You may also have other tests, such as:  1. Transthoracic echocardiogram (TTE). During echocardiography, sound waves are used to create a picture of all of the heart structures and to look at how blood flows through your heart.  2. Transesophageal echocardiogram (ROYER). This is a more advanced imaging test that obtains images from inside your body. It allows your health care provider to see your heart in finer detail.  3. Cardiac monitoring. This allows your health care provider to monitor your heart rate and rhythm in real time.  4. Holter monitor. This is a portable device that records your heartbeat and can help to diagnose abnormal heartbeats. It allows your health care provider to track your heart activity for several days, if needed.  5. Stress tests. These can be done through exercise or by taking medicine that makes your heart beat more quickly.  6. Blood tests.  7. Imaging tests.  TREATMENT   Your treatment depends on what is causing your chest pain. Treatment may include:  1. Medicines. These may include:  1. Acid blockers for heartburn.  2. Anti-inflammatory medicine.  3. Pain medicine for  inflammatory conditions.  4. Antibiotic medicine, if an infection is present.  5. Medicines to dissolve blood clots.  6. Medicines to treat coronary artery disease.  2. Supportive care for conditions that do not require medicines. This may include:  1. Resting.  2. Applying heat or cold packs to injured areas.  3. Limiting activities until pain decreases.  HOME CARE INSTRUCTIONS  · If you were prescribed an antibiotic medicine, finish it all even if you start to feel better.  · Avoid any activities that bring on chest pain.  · Do not use any tobacco products, including cigarettes, chewing tobacco, or electronic cigarettes. If you need help quitting, ask your health care provider.  · Do not drink alcohol.  · Take medicines only as directed by your health care provider.  · Keep all follow-up visits as directed by your health care provider. This is important. This includes any further testing if your chest pain does not go away.  · If heartburn is the cause for your chest pain, you may be told to keep your head raised (elevated) while sleeping. This reduces the chance that acid will go from your stomach into your esophagus.  · Make lifestyle changes as directed by your health care provider. These may include:  ¨ Getting regular exercise. Ask your health care provider to suggest some activities that are safe for you.  ¨ Eating a heart-healthy diet. A registered dietitian can help you to learn healthy eating options.  ¨ Maintaining a healthy weight.  ¨ Managing diabetes, if necessary.  ¨ Reducing stress.  SEEK MEDICAL CARE IF:  · Your chest pain does not go away after treatment.  · You have a rash with blisters on your chest.  · You have a fever.  SEEK IMMEDIATE MEDICAL CARE IF:   · Your chest pain is worse.  · You have an increasing cough, or you cough up blood.  · You have severe abdominal pain.  · You have severe weakness.  · You faint.  · You have chills.  · You have sudden, unexplained chest discomfort.  · You have  sudden, unexplained discomfort in your arms, back, neck, or jaw.  · You have shortness of breath at any time.  · You suddenly start to sweat, or your skin gets clammy.  · You feel nauseous or you vomit.  · You suddenly feel light-headed or dizzy.  · Your heart begins to beat quickly, or it feels like it is skipping beats.  These symptoms may represent a serious problem that is an emergency. Do not wait to see if the symptoms will go away. Get medical help right away. Call your local emergency services (911 in the [place] Do not drive yourself to the hospital.     This information is not intended to replace advice given to you by your health care provider. Make sure you discuss any questions you have with your health care provider.     Document Released: 09/27/2006 Document Revised: 01/08/2016 Document Reviewed: 07/24/2015  [employer] Interactive Patient Education ©2016 [employer]    Discharge Instructions    Discharged to home by car with relative. Discharged via walking, hospital escort: Yes.  Special equipment needed: Not Applicable    Be sure to schedule a follow-up appointment with your primary care doctor or any specialists as instructed.     Discharge Plan:   Diet Plan: Discussed  Activity Level: Discussed  Confirmed Follow up Appointment: Patient to Call and Schedule Appointment  Confirmed Symptoms Management: Discussed  Medication Reconciliation Updated: Yes  Influenza Vaccine Indication: Indicated: Not available from distributor/    I understand that a diet low in cholesterol, fat, and sodium is recommended for good health. Unless I have been given specific instructions below for another diet, I accept this instruction as my diet prescription.   Other diet: Regular    Special Instructions: None    · Is patient discharged on Warfarin / Coumadin?   No     · Is patient Post Blood Transfusion?  No    Depression / Suicide Risk    As you are discharged from this [facility] facility, it is important  to learn how to keep safe from harming yourself.    Recognize the warning signs:  · Abrupt changes in personality, positive or negative- including increase in energy   · Giving away possessions  · Change in eating patterns- significant weight changes-  positive or negative  · Change in sleeping patterns- unable to sleep or sleeping all the time   · Unwillingness or inability to communicate  · Depression  · Unusual sadness, discouragement and loneliness  · Talk of wanting to die  · Neglect of personal appearance   · Rebelliousness- reckless behavior  · Withdrawal from people/activities they love  · Confusion- inability to concentrate     If you or a loved one observes any of these behaviors or has concerns about self-harm, here's what you can do:  · Talk about it- your feelings and reasons for harming yourself  · Remove any means that you might use to hurt yourself (examples: pills, rope, extension cords, firearm)  · Get professional help from the community (Mental Health, Substance Abuse, psychological counseling)  · Do not be alone:Call your Safe Contact- someone whom you trust who will be there for you.  · Call your local CRISIS HOTLINE 119-9350 or 304-622-6253  · Call your local Children's Mobile Crisis Response Team [place] (511) 540-0755 or www.[employer]  · Call the toll free National Suicide Prevention Hotlines   · National Suicide Prevention Lifeline 870-138-QRNR (3480)  · National Hope Line Network 800-SUICIDE (780-6570)           Discharge Medication Instructions:    Below are the medications your physician expects you to take upon discharge:    Review all your home medications and newly ordered medications with your doctor and/or pharmacist. Follow medication instructions as directed by your doctor and/or pharmacist.    Please keep your medication list with you and share with your physician.               Medication List      START taking these medications        Instructions    Morning  Afternoon Evening Bedtime    alprazolam 0.25 MG Tabs   Commonly known as:  XANAX        Take 1 Tab by mouth 2 times a day as needed for Anxiety.   Dose:  0.25 mg                        omeprazole 20 MG delayed-release capsule   Last time this was given:  20 mg on 5/20/2017  8:47 AM   Commonly known as:  PRILOSEC        Take 1 Cap by mouth 2 Times a Day.   Dose:  20 mg                          CONTINUE taking these medications        Instructions    Morning Afternoon Evening Bedtime    glimepiride 4 MG Tabs   Commonly known as:  AMARYL        Take 4 mg by mouth every bedtime.   Dose:  4 mg                        hydrocodone-acetaminophen 5-325 MG Tabs per tablet   Commonly known as:  NORCO        Take 1-2 Tabs by mouth every 6 hours as needed.   Dose:  1-2 Tab                        Levothyroxine Sodium 150 MCG Caps        Take  by mouth.                        ondansetron 4 MG Tabs tablet   Commonly known as:  ZOFRAN        Take 1 Tab by mouth every 8 hours as needed (FOR NAUSEA OR VIMITING) for up to 6 doses.   Dose:  4 mg                             Where to Get Your Medications      Information about where to get these medications is not yet available     ! Ask your nurse or doctor about these medications    - alprazolam 0.25 MG Tabs  - omeprazole 20 MG delayed-release capsule            Instructions           Diet / Nutrition:    Follow any diet instructions given to you by your doctor or the dietician, including how much salt (sodium) you are allowed each day.    If you are overweight, talk to your doctor about a weight reduction plan.    Activity:    Remain physically active following your doctor's instructions about exercise and activity.    Rest often.     Any time you become even a little tired or short of breath, SIT DOWN and rest.    Worsening Symptoms:    Report any of the following signs and symptoms to the doctor's office immediately:    *Pain of jaw, arm, or neck  *Chest pain not relieved by  medication                               *Dizziness or loss of consciousness  *Difficulty breathing even when at rest   *More tired than usual                                       *Bleeding drainage or swelling of surgical site  *Swelling of feet, ankles, legs or stomach                 *Fever (>100ºF)  *Pink or blood tinged sputum  *Weight gain (3lbs/day or 5lbs /week)           *Shock from internal defibrillator (if applicable)  *Palpitations or irregular heartbeats                *Cool and/or numb extremities    Stroke Awareness    Common Risk Factors for Stroke include:    Age  Atrial Fibrillation  Carotid Artery Stenosis  Diabetes Mellitus  Excessive alcohol consumption  High blood pressure  Overweight   Physical inactivity  Smoking    Warning signs and symptoms of a stroke include:    *Sudden numbness or weakness of the face, arm or leg (especially on one side of the body).  *Sudden confusion, trouble speaking or understanding.  *Sudden trouble seeing in one or both eyes.  *Sudden trouble walking, dizziness, loss of balance or coordination.Sudden severe headache with no known cause.    It is very important to get treatment quickly when a stroke occurs. If you experience any of the above warning signs, call 911 immediately.                   Disclaimer         Quit Smoking / Tobacco Use:    I understand the use of any tobacco products increases my chance of suffering from future heart disease or stroke and could cause other illnesses which may shorten my life. Quitting the use of tobacco products is the single most important thing I can do to improve my health. For further information on smoking / tobacco cessation call a Toll Free Quit Line at 1-316.570.4400 (*[employer] [place]) or 1-171.583.9407 (American Lung Association) or you can access the web based program at www.lungusa.org.    [place] Tobacco Users Help Line:  (948) 675-4900       Toll Free: 1-332.910.5929  Quit Tobacco Program Critical access hospital  Management Services (986)517-3918    Crisis Hotline:    [facility] Crisis Hotline:  6-309-MYLSHFH or 1-708.133.4581    [place] Crisis Hotline:    1-481.614.3723 or 058-016-1045    Discharge Survey:   Thank you for choosing [facility]. We hope we did everything we could to make your hospital stay a pleasant one. You may be receiving a phone survey and we would appreciate your time and participation in answering the questions. Your input is very valuable to us in our efforts to improve our service to our patients and their families.        My signature on this form indicates that:    1. I have reviewed and understand the above information.  2. My questions regarding this information have been answered to my satisfaction.  3. I have formulated a plan with my discharge nurse to obtain my prescribed medications for home.                  Disclaimer         __________________________________                     __________       ________                       Patient Signature                                                 Date                    Time

## 2017-05-20 ENCOUNTER — HOSPITAL ENCOUNTER (OUTPATIENT)
Dept: RADIOLOGY | Facility: MEDICAL CENTER | Age: 42
End: 2017-05-20
Attending: EMERGENCY MEDICINE
Payer: MEDICAID

## 2017-05-20 ENCOUNTER — RESOLUTE PROFESSIONAL BILLING HOSPITAL PROF FEE (OUTPATIENT)
Dept: HOSPITALIST | Facility: MEDICAL CENTER | Age: 42
End: 2017-05-20
Payer: MEDICAID

## 2017-05-20 ENCOUNTER — PATIENT OUTREACH (OUTPATIENT)
Dept: HEALTH INFORMATION MANAGEMENT | Facility: OTHER | Age: 42
End: 2017-05-20

## 2017-05-20 ENCOUNTER — APPOINTMENT (OUTPATIENT)
Dept: RADIOLOGY | Facility: MEDICAL CENTER | Age: 42
End: 2017-05-20
Attending: INTERNAL MEDICINE
Payer: MEDICAID

## 2017-05-20 VITALS
HEART RATE: 65 BPM | TEMPERATURE: 97.8 F | BODY MASS INDEX: 50.02 KG/M2 | DIASTOLIC BLOOD PRESSURE: 74 MMHG | RESPIRATION RATE: 15 BRPM | HEIGHT: 64 IN | OXYGEN SATURATION: 99 % | WEIGHT: 293 LBS | SYSTOLIC BLOOD PRESSURE: 130 MMHG

## 2017-05-20 PROBLEM — E03.9 HYPOTHYROID: Status: ACTIVE | Noted: 2017-05-20

## 2017-05-20 PROBLEM — E11.9 DIABETES (HCC): Status: ACTIVE | Noted: 2017-05-20

## 2017-05-20 PROBLEM — R07.9 CHEST PAIN: Status: RESOLVED | Noted: 2017-05-20 | Resolved: 2017-05-20

## 2017-05-20 PROBLEM — R07.9 CHEST PAIN: Status: ACTIVE | Noted: 2017-05-20

## 2017-05-20 LAB
ALBUMIN SERPL BCP-MCNC: 3.4 G/DL (ref 3.2–4.9)
ALBUMIN/GLOB SERPL: 0.9 G/DL
ALP SERPL-CCNC: 91 U/L (ref 30–99)
ALT SERPL-CCNC: 22 U/L (ref 2–50)
ANION GAP SERPL CALC-SCNC: 6 MMOL/L (ref 0–11.9)
AST SERPL-CCNC: 17 U/L (ref 12–45)
BILIRUB SERPL-MCNC: 0.3 MG/DL (ref 0.1–1.5)
BUN SERPL-MCNC: 15 MG/DL (ref 8–22)
CALCIUM SERPL-MCNC: 8.5 MG/DL (ref 8.5–10.5)
CHLORIDE SERPL-SCNC: 103 MMOL/L (ref 96–112)
CO2 SERPL-SCNC: 28 MMOL/L (ref 20–33)
CREAT SERPL-MCNC: 0.84 MG/DL (ref 0.5–1.4)
DEPRECATED D DIMER PPP IA-ACNC: 202 NG/ML(D-DU)
EKG IMPRESSION: NORMAL
EKG IMPRESSION: NORMAL
EST. AVERAGE GLUCOSE BLD GHB EST-MCNC: 131 MG/DL
GFR SERPL CREATININE-BSD FRML MDRD: >60 ML/MIN/1.73 M 2
GLOBULIN SER CALC-MCNC: 3.6 G/DL (ref 1.9–3.5)
GLUCOSE BLD-MCNC: 107 MG/DL (ref 65–99)
GLUCOSE BLD-MCNC: 125 MG/DL (ref 65–99)
GLUCOSE SERPL-MCNC: 124 MG/DL (ref 65–99)
HBA1C MFR BLD: 6.2 % (ref 0–5.6)
LV EJECT FRACT  99904: 55
LV EJECT FRACT MOD 2C 99903: 68.93
LV EJECT FRACT MOD 4C 99902: 54.17
LV EJECT FRACT MOD BP 99901: 61.13
POTASSIUM SERPL-SCNC: 3.6 MMOL/L (ref 3.6–5.5)
PROT SERPL-MCNC: 7 G/DL (ref 6–8.2)
SODIUM SERPL-SCNC: 137 MMOL/L (ref 135–145)
TROPONIN I SERPL-MCNC: <0.01 NG/ML (ref 0–0.04)
TSH SERPL DL<=0.005 MIU/L-ACNC: 2.46 UIU/ML (ref 0.3–3.7)

## 2017-05-20 PROCEDURE — 96372 THER/PROPH/DIAG INJ SC/IM: CPT

## 2017-05-20 PROCEDURE — 700102 HCHG RX REV CODE 250 W/ 637 OVERRIDE(OP): Performed by: INTERNAL MEDICINE

## 2017-05-20 PROCEDURE — A9270 NON-COVERED ITEM OR SERVICE: HCPCS | Performed by: INTERNAL MEDICINE

## 2017-05-20 PROCEDURE — G0378 HOSPITAL OBSERVATION PER HR: HCPCS

## 2017-05-20 PROCEDURE — 700111 HCHG RX REV CODE 636 W/ 250 OVERRIDE (IP)

## 2017-05-20 PROCEDURE — 84443 ASSAY THYROID STIM HORMONE: CPT

## 2017-05-20 PROCEDURE — 99236 HOSP IP/OBS SAME DATE HI 85: CPT | Performed by: INTERNAL MEDICINE

## 2017-05-20 PROCEDURE — 71020 DX-CHEST-2 VIEWS: CPT

## 2017-05-20 PROCEDURE — 93306 TTE W/DOPPLER COMPLETE: CPT

## 2017-05-20 PROCEDURE — 700111 HCHG RX REV CODE 636 W/ 250 OVERRIDE (IP): Performed by: INTERNAL MEDICINE

## 2017-05-20 PROCEDURE — 83036 HEMOGLOBIN GLYCOSYLATED A1C: CPT

## 2017-05-20 PROCEDURE — 82962 GLUCOSE BLOOD TEST: CPT

## 2017-05-20 PROCEDURE — 93005 ELECTROCARDIOGRAM TRACING: CPT | Performed by: INTERNAL MEDICINE

## 2017-05-20 PROCEDURE — 93010 ELECTROCARDIOGRAM REPORT: CPT | Mod: 76 | Performed by: INTERNAL MEDICINE

## 2017-05-20 PROCEDURE — 84484 ASSAY OF TROPONIN QUANT: CPT

## 2017-05-20 PROCEDURE — 700105 HCHG RX REV CODE 258: Performed by: INTERNAL MEDICINE

## 2017-05-20 PROCEDURE — 93306 TTE W/DOPPLER COMPLETE: CPT | Mod: 26 | Performed by: INTERNAL MEDICINE

## 2017-05-20 PROCEDURE — 96361 HYDRATE IV INFUSION ADD-ON: CPT

## 2017-05-20 PROCEDURE — 96360 HYDRATION IV INFUSION INIT: CPT

## 2017-05-20 PROCEDURE — 93005 ELECTROCARDIOGRAM TRACING: CPT | Mod: XU | Performed by: INTERNAL MEDICINE

## 2017-05-20 PROCEDURE — 36415 COLL VENOUS BLD VENIPUNCTURE: CPT

## 2017-05-20 PROCEDURE — A9502 TC99M TETROFOSMIN: HCPCS

## 2017-05-20 RX ORDER — BISACODYL 10 MG
10 SUPPOSITORY, RECTAL RECTAL
Status: DISCONTINUED | OUTPATIENT
Start: 2017-05-20 | End: 2017-05-20 | Stop reason: HOSPADM

## 2017-05-20 RX ORDER — OMEPRAZOLE 20 MG/1
20 CAPSULE, DELAYED RELEASE ORAL 2 TIMES DAILY
Status: DISCONTINUED | OUTPATIENT
Start: 2017-05-20 | End: 2017-05-20 | Stop reason: HOSPADM

## 2017-05-20 RX ORDER — REGADENOSON 0.08 MG/ML
INJECTION, SOLUTION INTRAVENOUS
Status: COMPLETED
Start: 2017-05-20 | End: 2017-05-20

## 2017-05-20 RX ORDER — DEXTROSE MONOHYDRATE 25 G/50ML
25 INJECTION, SOLUTION INTRAVENOUS
Status: DISCONTINUED | OUTPATIENT
Start: 2017-05-20 | End: 2017-05-20 | Stop reason: HOSPADM

## 2017-05-20 RX ORDER — POLYETHYLENE GLYCOL 3350 17 G/17G
1 POWDER, FOR SOLUTION ORAL
Status: DISCONTINUED | OUTPATIENT
Start: 2017-05-20 | End: 2017-05-20 | Stop reason: HOSPADM

## 2017-05-20 RX ORDER — ONDANSETRON 2 MG/ML
4 INJECTION INTRAMUSCULAR; INTRAVENOUS EVERY 4 HOURS PRN
Status: DISCONTINUED | OUTPATIENT
Start: 2017-05-20 | End: 2017-05-20 | Stop reason: HOSPADM

## 2017-05-20 RX ORDER — ACETAMINOPHEN 325 MG/1
650 TABLET ORAL EVERY 6 HOURS PRN
Status: DISCONTINUED | OUTPATIENT
Start: 2017-05-20 | End: 2017-05-20 | Stop reason: HOSPADM

## 2017-05-20 RX ORDER — OXYCODONE HYDROCHLORIDE 5 MG/1
5 TABLET ORAL
Status: DISCONTINUED | OUTPATIENT
Start: 2017-05-20 | End: 2017-05-20 | Stop reason: HOSPADM

## 2017-05-20 RX ORDER — AMOXICILLIN 250 MG
2 CAPSULE ORAL 2 TIMES DAILY
Status: DISCONTINUED | OUTPATIENT
Start: 2017-05-20 | End: 2017-05-20 | Stop reason: HOSPADM

## 2017-05-20 RX ORDER — LEVOTHYROXINE SODIUM 0.15 MG/1
150 TABLET ORAL DAILY
Status: DISCONTINUED | OUTPATIENT
Start: 2017-05-20 | End: 2017-05-20 | Stop reason: HOSPADM

## 2017-05-20 RX ORDER — OMEPRAZOLE 20 MG/1
20 CAPSULE, DELAYED RELEASE ORAL 2 TIMES DAILY
Qty: 30 CAP | Refills: 0 | Status: SHIPPED | OUTPATIENT
Start: 2017-05-20 | End: 2017-12-22

## 2017-05-20 RX ORDER — ONDANSETRON 4 MG/1
4 TABLET, ORALLY DISINTEGRATING ORAL EVERY 4 HOURS PRN
Status: DISCONTINUED | OUTPATIENT
Start: 2017-05-20 | End: 2017-05-20 | Stop reason: HOSPADM

## 2017-05-20 RX ORDER — OXYCODONE HYDROCHLORIDE 10 MG/1
10 TABLET ORAL
Status: DISCONTINUED | OUTPATIENT
Start: 2017-05-20 | End: 2017-05-20 | Stop reason: HOSPADM

## 2017-05-20 RX ORDER — PROMETHAZINE HYDROCHLORIDE 25 MG/1
12.5-25 SUPPOSITORY RECTAL EVERY 4 HOURS PRN
Status: DISCONTINUED | OUTPATIENT
Start: 2017-05-20 | End: 2017-05-20 | Stop reason: HOSPADM

## 2017-05-20 RX ORDER — MORPHINE SULFATE 4 MG/ML
4 INJECTION, SOLUTION INTRAMUSCULAR; INTRAVENOUS
Status: DISCONTINUED | OUTPATIENT
Start: 2017-05-20 | End: 2017-05-20 | Stop reason: HOSPADM

## 2017-05-20 RX ORDER — PROMETHAZINE HYDROCHLORIDE 25 MG/1
12.5-25 TABLET ORAL EVERY 4 HOURS PRN
Status: DISCONTINUED | OUTPATIENT
Start: 2017-05-20 | End: 2017-05-20 | Stop reason: HOSPADM

## 2017-05-20 RX ORDER — ALPRAZOLAM 0.25 MG/1
0.25 TABLET ORAL 2 TIMES DAILY PRN
Qty: 15 TAB | Refills: 0 | Status: SHIPPED | OUTPATIENT
Start: 2017-05-20 | End: 2017-12-22

## 2017-05-20 RX ORDER — HYDROCODONE BITARTRATE AND ACETAMINOPHEN 5; 325 MG/1; MG/1
1-2 TABLET ORAL EVERY 6 HOURS PRN
Status: DISCONTINUED | OUTPATIENT
Start: 2017-05-20 | End: 2017-05-20

## 2017-05-20 RX ORDER — SODIUM CHLORIDE 9 MG/ML
INJECTION, SOLUTION INTRAVENOUS CONTINUOUS
Status: DISCONTINUED | OUTPATIENT
Start: 2017-05-20 | End: 2017-05-20 | Stop reason: HOSPADM

## 2017-05-20 RX ADMIN — ENOXAPARIN SODIUM 40 MG: 100 INJECTION SUBCUTANEOUS at 08:47

## 2017-05-20 RX ADMIN — OMEPRAZOLE 20 MG: 20 CAPSULE, DELAYED RELEASE ORAL at 08:47

## 2017-05-20 RX ADMIN — LEVOTHYROXINE SODIUM 150 MCG: 150 TABLET ORAL at 06:45

## 2017-05-20 RX ADMIN — OMEPRAZOLE 20 MG: 20 CAPSULE, DELAYED RELEASE ORAL at 03:43

## 2017-05-20 RX ADMIN — STANDARDIZED SENNA CONCENTRATE AND DOCUSATE SODIUM 2 TABLET: 8.6; 5 TABLET, FILM COATED ORAL at 08:47

## 2017-05-20 RX ADMIN — REGADENOSON 0.4 MG: 0.08 INJECTION, SOLUTION INTRAVENOUS at 12:27

## 2017-05-20 RX ADMIN — SODIUM CHLORIDE: 9 INJECTION, SOLUTION INTRAVENOUS at 03:43

## 2017-05-20 ASSESSMENT — LIFESTYLE VARIABLES
ALCOHOL_USE: NO
EVER_SMOKED: NEVER
REASON UNABLE TO ASSESS: N
EVER_SMOKED: NEVER

## 2017-05-20 ASSESSMENT — PAIN SCALES - GENERAL
PAINLEVEL_OUTOF10: 0
PAINLEVEL_OUTOF10: 0

## 2017-05-20 ASSESSMENT — COPD QUESTIONNAIRES
HAVE YOU SMOKED AT LEAST 100 CIGARETTES IN YOUR ENTIRE LIFE: NO/DON'T KNOW
DURING THE PAST 4 WEEKS HOW MUCH DID YOU FEEL SHORT OF BREATH: NONE/LITTLE OF THE TIME
DO YOU EVER COUGH UP ANY MUCUS OR PHLEGM?: NO/ONLY WITH OCCASIONAL COLDS OR INFECTIONS
COPD SCREENING SCORE: 0

## 2017-05-20 NOTE — H&P
DATE OF ADMISSION:  2017    PRIMARY CARE PHYSICIAN:  At the [facility].    CHIEF COMPLAINT ON ADMISSION:  Chest pain with palpitations.    HISTORY OF PRESENT ILLNESS:  Patient is a 41-year-old obese female with known   history of diabetes and hyperthyroidism status post radiation x1 year ago, now   is on Synthroid presents with complaints of chest discomfort describes   left-sided chest discomfort, worse with radiation down her right upper   extremity with associated numbness.  Patient denies any associated nausea,   vomiting or diaphoresis.  Patient denies any associated shortness of breath.    Patient does report the pain is worse when lying down, with palpitations.  The   patient reports these symptoms are worse over the last 3 weeks.  The patient   notes these symptoms are worse when the patient eats fatty food or fried food   or cheesy food.  Patient denies any prior history of acid reflux.  The patient   states that she was to follow up with her primary care provider and plan was   to have Holter monitoring; however, plan was to complete this in the next   week.  Patient was planned to get a prior approval; however, patient presented   to the emergency room for above symptoms.  She denied any prior history of   cardiac disease.  The patient reports ongoing chest discomfort.  Pain level is   about 5/10.    REVIEW OF SYSTEMS:  As per HPI.  All other systems are reviewed and negative.    PAST MEDICAL HISTORY:  Includes obesity, type 2 diabetes mellitus, on oral   hypoglycemics, hyperthyroidism status post radiation, now on Synthroid,   hypertension, history of arrhythmia, and psychiatric problem.    PAST SURGICAL HISTORY:  Total abdominal hysterectomy, bilateral   salpingo-oophorectomy, cholecystectomy and .    ALLERGIES:  No known drug allergies.    SOCIAL HISTORY:  Patient lives with her family, is currently not smoking.    Denies any tobacco, alcohol or drug use.  Patient is functional with  ADLs and   IADLs.    CODE STATUS:  Full code.    FAMILY HISTORY:  Significant for coronary artery disease as well as diabetes,   and hypertension.    CURRENT HOME MEDICATIONS:  Include glimepiride 4 mg daily, Norco p.r.n. pain,   Synthroid 150 mcg daily, Zofran p.r.n. nausea and vomiting.    PHYSICAL EXAMINATION:  VITAL SIGNS:  On admission, temperature is 36.7, pulse 69, respirations 16,   satting 96% on room air, blood pressure is 104/55.  GENERAL:  Patient is alert and oriented x4 in no acute distress.  HEENT:  Normocephalic, atraumatic.  Mucous membranes are moist.  Extraocular   muscles intact.  NECK:  No JVD.  No thyromegaly.  CARDIOVASCULAR:  S1, S2 is regular.  No murmurs noted.  RESPIRATORY:  Lungs are clear to auscultation bilaterally.  No crackles,   wheezes, or rales.  CHEST WALL:  Nontender to palpation.  BACK:  No flank tenderness to palpation.  ABDOMEN:  Bowel sounds are positive, soft, nontender and nondistended.  EXTREMITIES:  No lower extremity edema.  Distal pulses palpable bilaterally.    No calf tenderness to palpation.  SKIN:  No ecchymosis or purpura.  PSYCHIATRIC:  Does not appear anxious or depressed.  NEUROLOGIC:  Cranial nerves grossly intact.  Moving all extremities   spontaneously.  Muscle strength was 5/5 bilateral upper extremity and lower   extremity.  Sensation is intact.    LABORATORY DATA:  On admission, white count of 10.3, hemoglobin 11.9, MCV of   82%, platelet count of 219, segs of 62%.  Sodium 137, potassium 3.6, CO2 of   28, anion gap 6, glucose 124, BUN 15, creatinine 0.84.  LFTs are within normal   limits.  Troponins less than 0.01.  D-dimer of 202.  Chest x-ray shows no   acute cardiopulmonary disease.  EKG with sinus rhythm, nonspecific ST-T   changes.    ASSESSMENT AND PLAN:  The patient is a 41-year-old female with multiple   comorbidities presents with complaints of chest pain.  1.  Chest pain.  We will rule out acute coronary syndrome.  Patient does have   several  risk factors for cardiac disease.  We will admit to the telemetry   observation unit.  We will continue monitoring troponins.  Patient will be   kept n.p.o. for stress test in the a.m. as well as an echocardiogram.  We will   continue aspirin, blood pressure control.  We will check a lipid profile.  2.  Hyperglycemia.  3.  Type 2 diabetes mellitus.  We will hold glimepiride.  We will place her on   sliding scale insulin.  We will check an A1c.  4.  For #1, differential includes acute coronary syndrome versus   hyperthyroidism versus gastroesophageal reflux disease.  We will follow up TSH   as well as free T4.  5.  Gastroesophageal reflux disease.  We will place patient on Prilosec b.i.d.  6.  Obesity.  Advised diet and exercise.  Patient states that she did have a   50-pound weight loss in the last year secondary to diet and exercise.  We will   encourage ongoing diet and exercise.  Patient denies taking any additional   supplements for weight loss.  7.  History of hypertension.  Continue to monitor.  8.  History of psychiatric disorder.  9.  Gastrointestinal and deep venous thrombosis prophylaxis.  Patient is on   Prilosec as well as Lovenox.  10.  Code status:  Full code.    Patient will be admitted to the telemetry observation unit.  We anticipate   less than 2 midnight stay.    Total admission time is 50 minutes.       ____________________________________     JERRY KILLIAN DO    EN / [facility]    DD:  05/20/2017 02:07:11  DT:  05/20/2017 03:40:42    D#:  [id number]  Job#:  [id number]

## 2017-05-20 NOTE — DISCHARGE SUMMARY
CHIEF COMPLAINT ON ADMISSION  Chest pain.    HPI & HOSPITAL COURSE  For a complete history and physical, refer to the note posted by Dr. Hernandez on 05/20/2017.  In summary, this is a 41 year old female here with complaints of left-sided chest pain with right upper extremity numbness.  On admission, initial laboratory data was unremarkable, including a troponin less than 0.01.  Chest x-ray was negative for any acute cardiopulmonary processes.  EKG was negative for ST or T-wave changes.  Cardiac stress test was negative for infarction or inducible ischemia.  Echocardiogram revealed a left ventricular ejection fraction of 55% and was otherwise stable.  The patient's vital signs remained stable over admission and her chest pain completely resolved.  Her pain is likely originating from acid reflux.  As she has remained stable and there is no evidence for acute coronary syndrome, the patient will be discharged home at this time.    DISCHARGE PROBLEM LIST  1.  Chest pain - resolved.  2.  Hypothyroidism.  3.  Diabetes.  4.  Gastroesophageal reflux disease.  5.  Anxiety.    FOLLOW UP  The patient will follow up with her PCP in 1-2 weeks after discharge.    MEDICATIONS ON DISCHARGE   Chanelle Ortiz   Home Medication Instructions REEMA:[id number]    Printed on:05/20/17 1243   Medication Information                      glimepiride (AMARYL) 4 MG Tab  Take 4 mg by mouth every bedtime.             hydrocodone-acetaminophen (NORCO) 5-325 MG Tab per tablet  Take 1-2 Tabs by mouth every 6 hours as needed.             Levothyroxine Sodium 150 MCG Cap  Take  by mouth.             ondansetron (ZOFRAN) 4 MG Tab tablet  Take 1 Tab by mouth every 8 hours as needed (FOR NAUSEA OR VIMITING) for up to 6 doses.             Omeprazole (PRILOSEC) 20 mg Cap.  Take 1 cap by mouth 2 times daily.    Alprazolam (XANAX) 0.25 mg tab  Take 1 tab by mouth 2 times daily as needed for anxiety.    DIET  Diabetic diet.    ACTIVITY  As  tolerated.      LABORATORY  Lab Results   Component Value Date/Time    SODIUM 137 05/19/2017 11:48 PM    POTASSIUM 3.6 05/19/2017 11:48 PM    CHLORIDE 103 05/19/2017 11:48 PM    CO2 28 05/19/2017 11:48 PM    GLUCOSE 124* 05/19/2017 11:48 PM    BUN 15 05/19/2017 11:48 PM    CREATININE 0.84 05/19/2017 11:48 PM        Lab Results   Component Value Date/Time    WBC 10.3 05/19/2017 11:48 PM    HEMOGLOBIN 11.9* 05/19/2017 11:48 PM    HEMATOCRIT 38.6 05/19/2017 11:48 PM    PLATELET COUNT 219 05/19/2017 11:48 PM        Total time of the discharge process was 36 minutes.

## 2017-05-20 NOTE — PROGRESS NOTES
Pt up on the floor via robertrjessica, measured wt on scale.    Received report from ER Rn.  Reported to SANGEETA Myers

## 2017-05-20 NOTE — PROGRESS NOTES
Pt in bed,a&ox4,v/s wnl,on tele with SB,on oxygen 2l/nc,o2 sat 95%,no c/o chest pain but pt states she feels very weak,poc explained,blood collected and sent to lab.

## 2017-05-20 NOTE — DISCHARGE INSTRUCTIONS
Nonspecific Chest Pain   Chest pain can be caused by many different conditions. There is always a chance that your pain could be related to something serious, such as a heart attack or a blood clot in your lungs. Chest pain can also be caused by conditions that are not life-threatening. If you have chest pain, it is very important to follow up with your health care provider.  CAUSES   Chest pain can be caused by:  · Heartburn.  · Pneumonia or bronchitis.  · Anxiety or stress.  · Inflammation around your heart (pericarditis) or lung (pleuritis or pleurisy).  · A blood clot in your lung.  · A collapsed lung (pneumothorax). It can develop suddenly on its own (spontaneous pneumothorax) or from trauma to the chest.  · Shingles infection (varicella-zoster virus).  · Heart attack.  · Damage to the bones, muscles, and cartilage that make up your chest wall. This can include:  ¨ Bruised bones due to injury.  ¨ Strained muscles or cartilage due to frequent or repeated coughing or overwork.  ¨ Fracture to one or more ribs.  ¨ Sore cartilage due to inflammation (costochondritis).  RISK FACTORS   Risk factors for chest pain may include:  1. Activities that increase your risk for trauma or injury to your chest.  2. Respiratory infections or conditions that cause frequent coughing.  3. Medical conditions or overeating that can cause heartburn.  4. Heart disease or family history of heart disease.  5. Conditions or health behaviors that increase your risk of developing a blood clot.  6. Having had chicken pox (varicella zoster).  SIGNS AND SYMPTOMS  Chest pain can feel like:  1. Burning or tingling on the surface of your chest or deep in your chest.  2. Crushing, pressure, aching, or squeezing pain.  3. Dull or sharp pain that is worse when you move, cough, or take a deep breath.  4. Pain that is also felt in your back, neck, shoulder, or arm, or pain that spreads to any of these areas.  Your chest pain may come and go, or it may  stay constant.  DIAGNOSIS  Lab tests or other studies may be needed to find the cause of your pain. Your health care provider may have you take a test called an ambulatory ECG (electrocardiogram). An ECG records your heartbeat patterns at the time the test is performed. You may also have other tests, such as:  1. Transthoracic echocardiogram (TTE). During echocardiography, sound waves are used to create a picture of all of the heart structures and to look at how blood flows through your heart.  2. Transesophageal echocardiogram (ROYER). This is a more advanced imaging test that obtains images from inside your body. It allows your health care provider to see your heart in finer detail.  3. Cardiac monitoring. This allows your health care provider to monitor your heart rate and rhythm in real time.  4. Holter monitor. This is a portable device that records your heartbeat and can help to diagnose abnormal heartbeats. It allows your health care provider to track your heart activity for several days, if needed.  5. Stress tests. These can be done through exercise or by taking medicine that makes your heart beat more quickly.  6. Blood tests.  7. Imaging tests.  TREATMENT   Your treatment depends on what is causing your chest pain. Treatment may include:  1. Medicines. These may include:  1. Acid blockers for heartburn.  2. Anti-inflammatory medicine.  3. Pain medicine for inflammatory conditions.  4. Antibiotic medicine, if an infection is present.  5. Medicines to dissolve blood clots.  6. Medicines to treat coronary artery disease.  2. Supportive care for conditions that do not require medicines. This may include:  1. Resting.  2. Applying heat or cold packs to injured areas.  3. Limiting activities until pain decreases.  HOME CARE INSTRUCTIONS  · If you were prescribed an antibiotic medicine, finish it all even if you start to feel better.  · Avoid any activities that bring on chest pain.  · Do not use any tobacco  products, including cigarettes, chewing tobacco, or electronic cigarettes. If you need help quitting, ask your health care provider.  · Do not drink alcohol.  · Take medicines only as directed by your health care provider.  · Keep all follow-up visits as directed by your health care provider. This is important. This includes any further testing if your chest pain does not go away.  · If heartburn is the cause for your chest pain, you may be told to keep your head raised (elevated) while sleeping. This reduces the chance that acid will go from your stomach into your esophagus.  · Make lifestyle changes as directed by your health care provider. These may include:  ¨ Getting regular exercise. Ask your health care provider to suggest some activities that are safe for you.  ¨ Eating a heart-healthy diet. A registered dietitian can help you to learn healthy eating options.  ¨ Maintaining a healthy weight.  ¨ Managing diabetes, if necessary.  ¨ Reducing stress.  SEEK MEDICAL CARE IF:  · Your chest pain does not go away after treatment.  · You have a rash with blisters on your chest.  · You have a fever.  SEEK IMMEDIATE MEDICAL CARE IF:   · Your chest pain is worse.  · You have an increasing cough, or you cough up blood.  · You have severe abdominal pain.  · You have severe weakness.  · You faint.  · You have chills.  · You have sudden, unexplained chest discomfort.  · You have sudden, unexplained discomfort in your arms, back, neck, or jaw.  · You have shortness of breath at any time.  · You suddenly start to sweat, or your skin gets clammy.  · You feel nauseous or you vomit.  · You suddenly feel light-headed or dizzy.  · Your heart begins to beat quickly, or it feels like it is skipping beats.  These symptoms may represent a serious problem that is an emergency. Do not wait to see if the symptoms will go away. Get medical help right away. Call your local emergency services (911 in the [place] Do not drive yourself to the  \[facility] [place] [facility]\"".     This information is not intended to replace advice given to you by your health care provider. Make sure you discuss any questions you have with your health care provider.     Document Released: 09/27/2006 Document Revised: 01/08/2016 Document Reviewed: 07/24/2015  [employer] Interactive Patient Education ©2016 [employer].    Discharge Instructions    Discharged to home by car with relative. Discharged via walking, hospital escort: Yes.  Special equipment needed: Not Applicable    Be sure to schedule a follow-up appointment with your primary care doctor or any specialists as instructed.     Discharge Plan:   Diet Plan: Discussed  Activity Level: Discussed  Confirmed Follow up Appointment: Patient to Call and Schedule Appointment  Confirmed Symptoms Management: Discussed  Medication Reconciliation Updated: Yes  Influenza Vaccine Indication: Indicated: Not available from distributor/    I understand that a diet low in cholesterol, fat, and sodium is recommended for good health. Unless I have been given specific instructions below for another diet, I accept this instruction as my diet prescription.   Other diet: Regular    Special Instructions: None    · Is patient discharged on Warfarin / Coumadin?   No     · Is patient Post Blood Transfusion?  No     Depression / Suicide Risk    As you are discharged from this [facility] facility, it is important to learn how to keep safe from harming yourself.    Recognize the warning signs:  · Abrupt changes in personality, positive or negative- including increase in energy   · Giving away possessions  · Change in eating patterns- significant weight changes-  positive or negative  · Change in sleeping patterns- unable to sleep or sleeping all the time   · Unwillingness or inability to communicate  · Depression  · Unusual sadness, discouragement and loneliness  · Talk of wanting to die  · Neglect of personal appearance   · Rebelliousness- reckless  behavior  · Withdrawal from people/activities they love  · Confusion- inability to concentrate     If you or a loved one observes any of these behaviors or has concerns about self-harm, here's what you can do:  · Talk about it- your feelings and reasons for harming yourself  · Remove any means that you might use to hurt yourself (examples: pills, rope, extension cords, firearm)  · Get professional help from the community (Mental Health, Substance Abuse, psychological counseling)  · Do not be alone:Call your Safe Contact- someone whom you trust who will be there for you.  · Call your local CRISIS HOTLINE 784-0335 or 695-726-8149  · Call your local Children's Mobile Crisis Response Team [place] (042) 174-4404 or www.PicksPal  · Call the toll free National Suicide Prevention Hotlines   · National Suicide Prevention Lifeline 999-575-WKCU (1965)  · National Hope Line Network 800-SUICIDE (720-2861)

## 2017-05-20 NOTE — ED NOTES
Pt has c/o chest pain that started the past hour, sleeping when episode took place, pt has been seeing PCP for palpitations suppose to be wearing holter monitor but has not yet went back to start the process. EMS gave pt 324 of asa, 2nitro, 4 zofran, pain is now 6/10.  Substernal that radiates to left back, non tender to palp.  Denies n/v

## 2017-05-20 NOTE — PROGRESS NOTES
Report received from Tesha RUTHERFORD. Pt arrived to  T204. Able to ambulate to bed. Family at bedside. Assessed pt. A&O x4. C/o tingling in bilateral toes, and mile headache, denies chest pain at this time. Tele monitoring in place. VSS. Oriented to  and unit. POC discussed with pt. Bed in low position, call light within reach. Hourly rounding in place.

## 2017-05-20 NOTE — ED PROVIDER NOTES
"ED Provider Note    Scribed for Robert Cobb D.O. by Chayito Felix. 5/19/2017  11:48 PM    Primary care provider: CHANCE Pate   History obtained from: Patient   History limited by: None     CHIEF COMPLAINT  Chief Complaint   Patient presents with   • Chest Pain   • Palpitations        HPI    Chanelle Ortiz is a 41 y.o. female who presents to the ED for intermittent left sided chest pain onset 3 days. She describes her chest pain as sharp in nature and states that it lasts for approximately 15-20 minutes. Per patient, her chest pain is exacerbated when she sleeps. She notes whenever she goes to sleep she feels like her heart beats faster than normal and wakes her up. She believes her chest pain may be due to the way she sleeps stating \"I sleep on my arms and stomach.\" Per patient, she saw her PCP this morning for palpitations and was instructed to wear a holter monitor but was told she can't start until next week.  Associated symptoms include numbness to right arm and leg, headache described as feeling like \"there is a weight on my head\", slight cough, nausea, difficulty breathing. Denies taking any medication for her symptoms. Patient does not currently feel numb in her arms or legs, but slightly in her chest. She does currently have a slight headache. Denies past heart problems. Denies having these symptoms in the past. Denies pregnancy, fever, diarrhea, constipation, difficulty urinating, emesis, abdominal pain.         REVIEW OF SYSTEMS  Please see HPI for pertinent positives/negatives.  All other systems reviewed and are negative.     PAST MEDICAL HISTORY  Past Medical History   Diagnosis Date   • Diabetes      5 YEARS AGO, DIET CONTROLLED   • Psychiatric problem    • Arrhythmia    • Bronchitis    • Hypertension    • Hyperthyroidism      Treated with Radioactive iodine last year        SURGICAL HISTORY  Past Surgical History   Procedure Laterality Date   • Other abdominal surgery  2002     " "cholesystectomy   • Gyn surgery       ovarian cyst and c sections   • Other       tonsillectomy        SOCIAL HISTORY  Social History     Social History Main Topics   • Smoking status: Never Smoker    • Alcohol Use: No   • Drug Use: No        FAMILY HISTORY  Family History   Problem Relation Age of Onset   • Other Mother    • Heart Failure Sister    • Heart Attack Maternal Grandfather         CURRENT MEDICATIONS  Home Medications     Reviewed by Shanel Ramirez R.N. (Registered Nurse) on 05/19/17 at 2343  Med List Status: Partial    Medication Last Dose Status    glimepiride (AMARYL) 4 MG Tab 5/19/2017 Active    hydrocodone-acetaminophen (NORCO) 5-325 MG Tab per tablet  Active    Levothyroxine Sodium 150 MCG Cap 5/19/2017 Active    ondansetron (ZOFRAN) 4 MG Tab tablet  Active                 ALLERGIES  No Known Allergies     PHYSICAL EXAM  VITAL SIGNS: /55 mmHg  Pulse 71  Temp(Src) 36.7 °C (98.1 °F)  Resp 16  Ht 1.626 m (5' 4.02\")  Wt 132.904 kg (293 lb)  BMI 50.27 kg/m2  SpO2 97%  LMP 04/25/2017 @MARTY[[id number]::@     Pulse ox interpretation: 97% I interpret this pulse ox as normal     Constitutional: Well developed, well nourished, alert in no apparent distress, nontoxic appearance    HENT: No external signs of trauma, normocephalic, bilateral external ears normal, oropharynx moist and clear, nose normal    Eyes: PERRL, conjunctiva without erythema, no discharge, no icterus    Neck: Soft and supple, trachea midline, no stridor, no tenderness, no LAD, no JVD, good ROM    Cardiovascular: Regular rate and rhythm, no murmurs/rubs/gallops, strong distal pulses and good perfusion    Thorax & Lungs: No respiratory distress, CTAB, no chest tenderness    Abdomen: Soft, nontender, nondistended, no guarding, no rebound, normal BS    Back: No CVAT    Extremities: No clubbing, no cyanosis, no edema, no gross deformity, good ROM, no tenderness, intact distal pulses with brisk cap refill    Skin: Warm, dry, no " pallor/cyanosis, no rash noted    Lymphatic: No lymphadenopathy noted    Neuro: A/O times 3, no focal deficits noted    Psychiatric: Cooperative          DIAGNOSTIC STUDIES / PROCEDURES    EKG  12 Lead EKG obtained at 2336 and interpreted by me:   Rate: 71   Rhythm: Sinus rhythm   Ectopy: None  Intervals: Normal   Axis: Normal   Q Waves: None   QRS: Normal   ST segments: Normal  T Waves: Inverted T-wave anterior leads    Clinical Impression: Sinus rhythm with nonspecific T-wave changes      LABS  All labs reviewed by me.     Results for orders placed or performed during the hospital encounter of 05/19/17   CBC WITH DIFFERENTIAL   Result Value Ref Range    WBC 10.3 4.8 - 10.8 K/uL    RBC 4.70 4.20 - 5.40 M/uL    Hemoglobin 11.9 (L) 12.0 - 16.0 g/dL    Hematocrit 38.6 37.0 - 47.0 %    MCV 82.1 81.4 - 97.8 fL    MCH 25.3 (L) 27.0 - 33.0 pg    MCHC 30.8 (L) 33.6 - 35.0 g/dL    RDW 42.4 35.9 - 50.0 fL    Platelet Count 219 164 - 446 K/uL    MPV 11.2 9.0 - 12.9 fL    Neutrophils-Polys 62.90 44.00 - 72.00 %    Lymphocytes 28.00 22.00 - 41.00 %    Monocytes 6.40 0.00 - 13.40 %    Eosinophils 1.40 0.00 - 6.90 %    Basophils 0.90 0.00 - 1.80 %    Immature Granulocytes 0.40 0.00 - 0.90 %    Nucleated RBC 0.00 /100 WBC    Neutrophils (Absolute) 6.46 2.00 - 7.15 K/uL    Lymphs (Absolute) 2.88 1.00 - 4.80 K/uL    Monos (Absolute) 0.66 0.00 - 0.85 K/uL    Eos (Absolute) 0.14 0.00 - 0.51 K/uL    Baso (Absolute) 0.09 0.00 - 0.12 K/uL    Immature Granulocytes (abs) 0.04 0.00 - 0.11 K/uL    NRBC (Absolute) 0.00 K/uL   COMP METABOLIC PANEL   Result Value Ref Range    Sodium 137 135 - 145 mmol/L    Potassium 3.6 3.6 - 5.5 mmol/L    Chloride 103 96 - 112 mmol/L    Co2 28 20 - 33 mmol/L    Anion Gap 6.0 0.0 - 11.9    Glucose 124 (H) 65 - 99 mg/dL    Bun 15 8 - 22 mg/dL    Creatinine 0.84 0.50 - 1.40 mg/dL    Calcium 8.5 8.5 - 10.5 mg/dL    AST(SGOT) 17 12 - 45 U/L    ALT(SGPT) 22 2 - 50 U/L    Alkaline Phosphatase 91 30 - 99 U/L    Total  Bilirubin 0.3 0.1 - 1.5 mg/dL    Albumin 3.4 3.2 - 4.9 g/dL    Total Protein 7.0 6.0 - 8.2 g/dL    Globulin 3.6 (H) 1.9 - 3.5 g/dL    A-G Ratio 0.9 g/dL   TROPONIN   Result Value Ref Range    Troponin I <0.01 0.00 - 0.04 ng/mL   D-DIMER   Result Value Ref Range    D-Dimer Screen 202 <250 ng/mL(D-DU)   ESTIMATED GFR   Result Value Ref Range    GFR If African American >60 >60 mL/min/1.73 m 2    GFR If Non African American >60 >60 mL/min/1.73 m 2   EKG (NOW)   Result Value Ref Range    Report       [facility] Emergency Dept.    Test Date:  2017  Pt Name:    PRATIBHA CUELLAR              Department: ER  MRN:        [id number]                      Room:       BL 15  Gender:     F                            Technician: [id number]  :        1975                   Requested By:BOGDAN BONILLA  Order #:    [id number]                    Reading MD:    Measurements  Intervals                                Axis  Rate:       71                           P:          47  OR:         164                          QRS:        67  QRSD:       86                           T:          31  QT:         384  QTc:        418    Interpretive Statements  SINUS RHYTHM  BORDERLINE T ABNORMALITIES, ANTERIOR LEADS  Compared to ECG 2010 22:10:57  T-wave abnormality now present  Sinus tachycardia no longer present          RADIOLOGY  The radiologist's interpretation of all radiological studies have been reviewed by me.     DX-CHEST-2 VIEWS   Final Result      No active disease.             COURSE & MEDICAL DECISION MAKING  Nursing notes, VS, PMSFHx reviewed in chart.     Differential diagnoses considered include but are not limited to: AMI, dissection, PE, pneumothorax, pneumomediastinum, CHF/pulm edema, pericardial effusion/tamponade, pericarditis, pneumonia, pleural effusion, pleurisy, costochondritis, mediastinitis, esophageal rupture, esophageal spasm, GERD, gastritis, PUD, hiatal hernia, pancreatitis,  "cholecystitis/ascending cholangitis, gallstone/biliary colic, herpes zoster, muscle strain, neuropathy       11:50 PM - Patient was evaluated at bedside. DX-chest, CBC with differential, CMP, troponin, D-dimer, EKG was ordere.  Patient was treated with  mg.     Patient brought by EMS to the ED with above complaints. EKG, chest x-ray and labs were ordered and essentially unremarkable. Patient was hemodynamically stable during her ED stay. Findings discussed with the patient. She reports that she had a stress test \"many years ago\" that did not show anything abnormal. Patient agrees to admission. Discussed with Dr. Hernandez, hospitalist, who agreed to admit patient for further care.    0105: D/W Dr. Hernandez, hospitalist, who agreed to admit patient.      FINAL IMPRESSION  1. Chest pain in adult           DISPOSITION  Patient will be admitted to hospitalist for further care.       Chayito FRANCO (Dede), am scribing for, and in the presence of, Robert Cobb D.O..    Electronically signed by: Chayito Felix (Dede), 5/19/2017    Robert FRANCO D.O. personally performed the services described in this documentation, as scribed by Chayito Felix in my presence, and it is both accurate and complete.      Portions of this record were made with voice recognition software and by scribes.  Despite my review, spelling/grammar/context errors may still remain.  Interpretation of this chart should be taken in this context.    "

## 2017-07-10 ENCOUNTER — NON-PROVIDER VISIT (OUTPATIENT)
Dept: CARDIOLOGY | Facility: MEDICAL CENTER | Age: 42
End: 2017-07-10
Payer: MEDICAID

## 2017-07-10 DIAGNOSIS — I49.1 PREMATURE ATRIAL COMPLEX: ICD-10-CM

## 2017-07-10 DIAGNOSIS — I49.3 PVC (PREMATURE VENTRICULAR CONTRACTION): ICD-10-CM

## 2017-07-10 DIAGNOSIS — R00.2 PALPITATIONS: ICD-10-CM

## 2017-07-10 DIAGNOSIS — R00.0 SINUS TACHYCARDIA: ICD-10-CM

## 2017-07-12 DIAGNOSIS — R00.2 PALPITATIONS: ICD-10-CM

## 2017-07-12 LAB — EKG IMPRESSION: NORMAL

## 2017-07-12 PROCEDURE — 93224 XTRNL ECG REC UP TO 48 HRS: CPT | Performed by: INTERNAL MEDICINE

## 2017-08-14 ENCOUNTER — HOSPITAL ENCOUNTER (EMERGENCY)
Facility: MEDICAL CENTER | Age: 42
End: 2017-08-15
Attending: EMERGENCY MEDICINE
Payer: MEDICAID

## 2017-08-14 ENCOUNTER — APPOINTMENT (OUTPATIENT)
Dept: RADIOLOGY | Facility: MEDICAL CENTER | Age: 42
End: 2017-08-14
Attending: EMERGENCY MEDICINE
Payer: MEDICAID

## 2017-08-14 DIAGNOSIS — R53.83 FATIGUE, UNSPECIFIED TYPE: Primary | ICD-10-CM

## 2017-08-14 DIAGNOSIS — R42 LIGHTHEADEDNESS: ICD-10-CM

## 2017-08-14 LAB
ALBUMIN SERPL BCP-MCNC: 3.6 G/DL (ref 3.2–4.9)
ALBUMIN/GLOB SERPL: 0.9 G/DL
ALP SERPL-CCNC: 85 U/L (ref 30–99)
ALT SERPL-CCNC: 29 U/L (ref 2–50)
ANION GAP SERPL CALC-SCNC: 10 MMOL/L (ref 0–11.9)
AST SERPL-CCNC: 28 U/L (ref 12–45)
BILIRUB SERPL-MCNC: 0.3 MG/DL (ref 0.1–1.5)
BUN SERPL-MCNC: 15 MG/DL (ref 8–22)
CALCIUM SERPL-MCNC: 9.1 MG/DL (ref 8.5–10.5)
CHLORIDE SERPL-SCNC: 106 MMOL/L (ref 96–112)
CO2 SERPL-SCNC: 22 MMOL/L (ref 20–33)
CREAT SERPL-MCNC: 0.58 MG/DL (ref 0.5–1.4)
EKG IMPRESSION: NORMAL
GFR SERPL CREATININE-BSD FRML MDRD: >60 ML/MIN/1.73 M 2
GLOBULIN SER CALC-MCNC: 3.8 G/DL (ref 1.9–3.5)
GLUCOSE SERPL-MCNC: 91 MG/DL (ref 65–99)
LIPASE SERPL-CCNC: 24 U/L (ref 11–82)
POTASSIUM SERPL-SCNC: 3.9 MMOL/L (ref 3.6–5.5)
PROT SERPL-MCNC: 7.4 G/DL (ref 6–8.2)
SODIUM SERPL-SCNC: 138 MMOL/L (ref 135–145)
TROPONIN I SERPL-MCNC: <0.01 NG/ML (ref 0–0.04)

## 2017-08-14 PROCEDURE — 71010 DX-CHEST-LIMITED (1 VIEW): CPT

## 2017-08-14 PROCEDURE — 80053 COMPREHEN METABOLIC PANEL: CPT

## 2017-08-14 PROCEDURE — 93005 ELECTROCARDIOGRAM TRACING: CPT

## 2017-08-14 PROCEDURE — 36415 COLL VENOUS BLD VENIPUNCTURE: CPT

## 2017-08-14 PROCEDURE — 84484 ASSAY OF TROPONIN QUANT: CPT

## 2017-08-14 PROCEDURE — 83690 ASSAY OF LIPASE: CPT

## 2017-08-14 PROCEDURE — 99284 EMERGENCY DEPT VISIT MOD MDM: CPT

## 2017-08-14 ASSESSMENT — ENCOUNTER SYMPTOMS
NAUSEA: 0
FEVER: 0
CHILLS: 0
ABDOMINAL PAIN: 1
DIARRHEA: 0
DIZZINESS: 1
VOMITING: 0

## 2017-08-14 ASSESSMENT — LIFESTYLE VARIABLES: DO YOU DRINK ALCOHOL: NO

## 2017-08-14 ASSESSMENT — PAIN SCALES - GENERAL: PAINLEVEL_OUTOF10: 6

## 2017-08-14 NOTE — ED AVS SNAPSHOT
imageloop Access Code: [email or web address]  Expires: 9/14/2017  1:55 AM    Your email address is not on file at [facility].  Email Addresses are required for you to sign up for imageloop, please contact [phone] to verify your personal information and to provide your email address prior to attempting to register for imageloop.    Chanelle Ortiz  [address]  [place] [address]    imageloop  A secure, online tool to manage your health information     [facility]’s imageloop® is a secure, online tool that connects you to your personalized health information from the privacy of your home -- day or night - making it very easy for you to manage your healthcare. Once the activation process is completed, you can even access your medical information using the imageloop lauren, which is available for free in the Apple Lauren store or Google Play store.     To learn more about imageloop, visit [email or web address]    There are two levels of access available (as shown below):   My Chart Features  [facility] Primary Care Doctor [facility]  Specialists [facility]  Urgent  Care Non-[facility] Primary Care Doctor   Email your healthcare team securely and privately 24/7 X X X    Manage appointments: schedule your next appointment; view details of past/upcoming appointments X      Request prescription refills. X      View recent personal medical records, including lab and immunizations X X X X   View health record, including health history, allergies, medications X X X X   Read reports about your outpatient visits, procedures, consult and ER notes X X X X   See your discharge summary, which is a recap of your hospital and/or ER visit that includes your diagnosis, lab results, and care plan X X  X     How to register for imageloop:  Once your e-mail address has been verified, follow the following steps to sign up for imageloop.     1. Go to  [email or web address]  2. Click on the Sign Up Now box, which takes you to the New Member Sign Up page.  You will need to provide the following information:  a. Enter your [employer] Access Code exactly as it appears at the top of this page. (You will not need to use this code after you’ve completed the sign-up process. If you do not sign up before the expiration date, you must request a new code.)   b. Enter your date of birth.   c. Enter your home email address.   d. Click Submit, and follow the next screen’s instructions.  3. Create a Mapplast ID. This will be your [employer] login ID and cannot be changed, so think of one that is secure and easy to remember.  4. Create a [employer] password. You can change your password at any time.  5. Enter your Password Reset Question and Answer. This can be used at a later time if you forget your password.   6. Enter your e-mail address. This allows you to receive e-mail notifications when new information is available in [employer].  7. Click Sign Up. You can now view your health information.    For assistance activating your [employer] account, call [phone]

## 2017-08-14 NOTE — ED AVS SNAPSHOT
8/15/2017    Chanelle Ortiz  [address]  [place] [address]    Dear Chanelle:    [facility] wants to ensure your discharge home is safe and you or your loved ones have had all of your questions answered regarding your care after you leave the hospital.    Below is a list of resources and contact information should you have any questions regarding your hospital stay, follow-up instructions, or active medical symptoms.    Questions or Concerns Regarding… Contact   Medical Questions Related to Your Discharge  (7 days a week, 8am-5pm) Contact a Nurse Care Coordinator   [phone]   Medical Questions Not Related to Your Discharge  (24 hours a day / 7 days a week)  Contact the Nurse Health Line   [phone]    Medications or Discharge Instructions Refer to your discharge packet   or contact your [facility] Primary Care Provider   [phone]   Follow-up Appointment(s) Schedule your appointment via TeamSnap   or contact Scheduling [phone]   Billing Review your statement via TeamSnap  or contact Billing [phone]   Medical Records Review your records via TeamSnap   or contact Medical Records [phone]     You may receive a telephone call within two days of discharge. This call is to make certain you understand your discharge instructions and have the opportunity to have any questions answered. You can also easily access your medical information, test results and upcoming appointments via the TeamSnap free online health management tool. You can learn more and sign up at [employer] App/TeamSnap. For assistance setting up your TeamSnap account, please call [phone].    Once again, we want to ensure your discharge home is safe and that you have a clear understanding of any next steps in your care. If you have any questions or concerns, please do not hesitate to contact us, we are here for you. Thank you for choosing [facility] for your healthcare needs.    Sincerely,    Your [facility] Healthcare Team

## 2017-08-14 NOTE — ED AVS SNAPSHOT
Home Care Instructions                                                                                                                Chanelle Ortiz   MRN: [id number]    Department:  [facility], Emergency Dept   Date of Visit:  8/14/2017            [facility], Emergency Dept    [address] [place] [id number]    Phone:  [phone]      You were seen by     Jake Fortune II, M.D.      Your Diagnosis Was     Lightheadedness     R42       Follow-up Information     1. Follow up with [facility]. Call in 1 day.    Specialty:  Family Medicine    Why:  to obtain follow up ASAP for evaluation of symptoms, work up for sleep apnea    Contact information    [address]  [address]  [facility] [address]  [phone]          2. Follow up with [facility], Emergency Dept.    Specialty:  Emergency Medicine    Why:  If symptoms worsen, chest pain, shortness of breath    Contact information    [address] [facility] [address] [phone]      Medication Information     Review all of your home medications and newly ordered medications with your primary doctor and/or pharmacist as soon as possible. Follow medication instructions as directed by your doctor and/or pharmacist.     Please keep your complete medication list with you and share with your physician. Update the information when medications are discontinued, doses are changed, or new medications (including over-the-counter products) are added; and carry medication information at all times in the event of emergency situations.               Medication List      ASK your doctor about these medications        Instructions    Morning Afternoon Evening Bedtime    alprazolam 0.25 MG Tabs   Commonly known as:  XANAX        Take 1 Tab by mouth 2 times a day as needed for Anxiety.   Dose:  0.25 mg                        glimepiride 4 MG Tabs   Commonly known as:  AMARYL        Take  4 mg by mouth every bedtime.   Dose:  4 mg                        Levothyroxine Sodium 150 MCG Caps        Take 150 mcg by mouth every day.   Dose:  150 mcg                        omeprazole 20 MG delayed-release capsule   Commonly known as:  PRILOSEC        Take 1 Cap by mouth 2 Times a Day.   Dose:  20 mg                                Procedures and tests performed during your visit     Cardiac Monitoring    Complete Metabolic Panel (CMP)    DX-CHEST-LIMITED (1 VIEW)    EKG (ER)    ESTIMATED GFR    FREE THYROXINE    Lipase    Maintain O2 sats greater than 94%    Oxygen Therapy per Protocol    Saline Lock    TSH    Troponin        Discharge Instructions       Dizziness  Dizziness is a common problem. It is a feeling of unsteadiness or light-headedness. You may feel like you are about to faint. Dizziness can lead to injury if you stumble or fall. Anyone can become dizzy, but dizziness is more common in older adults. This condition can be caused by a number of things, including medicines, dehydration, or illness.  HOME CARE INSTRUCTIONS  Taking these steps may help with your condition:  Eating and Drinking  · Drink enough fluid to keep your urine clear or pale yellow. This helps to keep you from becoming dehydrated. Try to drink more clear fluids, such as water.  · Do not drink alcohol.  · Limit your caffeine intake if directed by your health care provider.  · Limit your salt intake if directed by your health care provider.  Activity  1. Avoid making quick movements.  1. Rise slowly from chairs and steady yourself until you feel okay.  2. In the morning, first sit up on the side of the bed. When you feel okay, stand slowly while you hold onto something until you know that your balance is fine.  2. Move your legs often if you need to  one place for a long time. Tighten and relax your muscles in your legs while you are standing.  3. Do not drive or operate heavy machinery if you feel dizzy.  4. Avoid bending  down if you feel dizzy. Place items in your home so that they are easy for you to reach without leaning over.  Lifestyle  · Do not use any tobacco products, including cigarettes, chewing tobacco, or electronic cigarettes. If you need help quitting, ask your health care provider.  · Try to reduce your stress level, such as with yoga or meditation. Talk with your health care provider if you need help.  General Instructions  · Watch your dizziness for any changes.  · Take medicines only as directed by your health care provider. Talk with your health care provider if you think that your dizziness is caused by a medicine that you are taking.  · Tell a friend or a family member that you are feeling dizzy. If he or she notices any changes in your behavior, have this person call your health care provider.  · Keep all follow-up visits as directed by your health care provider. This is important.  SEEK MEDICAL CARE IF:  · Your dizziness does not go away.  · Your dizziness or light-headedness gets worse.  · You feel nauseous.  · You have reduced hearing.  · You have new symptoms.  · You are unsteady on your feet or you feel like the room is spinning.  SEEK IMMEDIATE MEDICAL CARE IF:  · You vomit or have diarrhea and are unable to eat or drink anything.  · You have problems talking, walking, swallowing, or using your arms, hands, or legs.  · You feel generally weak.  · You are not thinking clearly or you have trouble forming sentences. It may take a friend or family member to notice this.  · You have chest pain, abdominal pain, shortness of breath, or sweating.  · Your vision changes.  · You notice any bleeding.  · You have a headache.  · You have neck pain or a stiff neck.  · You have a fever.     This information is not intended to replace advice given to you by your health care provider. Make sure you discuss any questions you have with your health care provider.     Document Released: 06/13/2002 Document Revised: 05/03/2016  Document Reviewed: 12/14/2015  [employer] Interactive Patient Education ©2016 [employer]  Sleep Apnea   Sleep apnea is a sleep disorder characterized by abnormal pauses in breathing while you sleep. When your breathing pauses, the level of oxygen in your blood decreases. This causes you to move out of deep sleep and into light sleep. As a result, your quality of sleep is poor, and the system that carries your blood throughout your body (cardiovascular system) experiences stress. If sleep apnea remains untreated, the following conditions can develop:  · High blood pressure (hypertension).  · Coronary artery disease.  · Inability to achieve or maintain an erection (impotence).  · Impairment of your thought process (cognitive dysfunction).  There are three types of sleep apnea:  5. Obstructive sleep apnea--Pauses in breathing during sleep because of a blocked airway.  6. Central sleep apnea--Pauses in breathing during sleep because the area of the brain that controls your breathing does not send the correct signals to the muscles that control breathing.  7. Mixed sleep apnea--A combination of both obstructive and central sleep apnea.  RISK FACTORS  The following risk factors can increase your risk of developing sleep apnea:  · Being overweight.  · Smoking.  · Having narrow passages in your nose and throat.  · Being of older age.  · Being male.  · Alcohol use.  · Sedative and tranquilizer use.  · Ethnicity. Among individuals younger than 35 years,  Americans are at increased risk of sleep apnea.  SYMPTOMS   · Difficulty staying asleep.  · Daytime sleepiness and fatigue.  · Loss of energy.  · Irritability.  · Loud, heavy snoring.  · Morning headaches.  · Trouble concentrating.  · Forgetfulness.  · Decreased interest in sex.  · Unexplained sleepiness.  DIAGNOSIS   In order to diagnose sleep apnea, your caregiver will perform a physical examination. A sleep study done in the comfort of your own home may be  "appropriate if you are otherwise healthy. Your caregiver may also recommend that you spend the night in a sleep lab. In the sleep lab, several monitors record information about your heart, lungs, and brain while you sleep. Your leg and arm movements and blood oxygen level are also recorded.  TREATMENT  The following actions may help to resolve mild sleep apnea:  · Sleeping on your side.    · Using a decongestant if you have nasal congestion.    · Avoiding the use of depressants, including alcohol, sedatives, and narcotics.    · Losing weight and modifying your diet if you are overweight.  There also are devices and treatments to help open your airway:  · Oral appliances. These are custom-made mouthpieces that shift your lower jaw forward and slightly open your bite. This opens your airway.  · Devices that create positive airway pressure. This positive pressure \"splints\" your airway open to help you breathe better during sleep. The following devices create positive airway pressure:  ¨ Continuous positive airway pressure (CPAP) device. The CPAP device creates a continuous level of air pressure with an air pump. The air is delivered to your airway through a mask while you sleep. This continuous pressure keeps your airway open.  ¨ Nasal expiratory positive airway pressure (EPAP) device. The EPAP device creates positive air pressure as you exhale. The device consists of single-use valves, which are inserted into each nostril and held in place by adhesive. The valves create very little resistance when you inhale but create much more resistance when you exhale. That increased resistance creates the positive airway pressure. This positive pressure while you exhale keeps your airway open, making it easier to breath when you inhale again.  ¨ Bilevel positive airway pressure (BPAP) device. The BPAP device is used mainly in patients with central sleep apnea. This device is similar to the CPAP device because it also uses an air " pump to deliver continuous air pressure through a mask. However, with the BPAP machine, the pressure is set at two different levels. The pressure when you exhale is lower than the pressure when you inhale.  · Surgery. Typically, surgery is only done if you cannot comply with less invasive treatments or if the less invasive treatments do not improve your condition. Surgery involves removing excess tissue in your airway to create a wider passage way.     This information is not intended to replace advice given to you by your health care provider. Make sure you discuss any questions you have with your health care provider.     Document Released: 12/08/2003 Document Revised: 01/08/2016 Document Reviewed: 04/25/2013  [employer] Interactive Patient Education ©2016 [employer]            Patient Information     Patient Information    Following emergency treatment: all patient requiring follow-up care must return either to a private physician or a clinic if your condition worsens before you are able to obtain further medical attention, please return to the emergency room.     Billing Information    At [facility], we work to make the billing process streamlined for our patients.  Our Representatives are here to answer any questions you may have regarding your hospital bill.  If you have insurance coverage and have supplied your insurance information to us, we will submit a claim to your insurer on your behalf.  Should you have any questions regarding your bill, we can be reached online or by phone as follows:  Online: You are able pay your bills online or live chat with our representatives about any billing questions you may have. We are here to help Monday - Friday from 8:00am to 7:30pm and 9:00am - 12:00pm on Saturdays.  Please visit [email or web address]  for more information.   Phone:  [phone] or [phone]    Please note that your emergency physician, surgeon, pathologist,  radiologist, anesthesiologist, and other specialists are not employed by [facility] and will therefore bill separately for their services.  Please contact them directly for any questions concerning their bills at the numbers below:     Emergency Physician Services:  [phone]  [facility]:  [phone]  [facility]:  [phone]  [facility]:  [phone]    1. Your final bill may vary from the amount quoted upon discharge if all procedures are not complete at that time, or if your doctor has additional procedures of which we are not aware. You will receive an additional bill if you return to the Emergency Department at [facility] for suture removal regardless of the facility of which the sutures were placed.     2. Please arrange for settlement of this account at the emergency registration.    3. All self-pay accounts are due in full at the time of treatment.  If you are unable to meet this obligation then payment is expected within 4-5 days.     4. If you have had radiology studies (CT, X-ray, Ultrasound, MRI), you have received a preliminary result during your emergency department visit. Please contact the radiology department [phone] to receive a copy of your final result. Please discuss the Final result with your primary physician or with the follow up physician provided.     Crisis Hotline:  [place] Crisis Hotline:  5-174-WYIQCUJ or 1-547.698.8949  [place] Crisis Hotline:    1-886.890.4088 or 756-591-3914         ED Discharge Follow Up Questions    1. In order to provide you with very good care, we would like to follow up with a phone call in the next few days.  May we have your permission to contact you?     YES /  NO    2. What is the best phone number to call you? (       )_____-__________    3. What is the best time to call you?      Morning  /  Afternoon  /  Evening                   Patient Signature:   ____________________________________________________________    Date:  ____________________________________________________________

## 2017-08-15 VITALS
RESPIRATION RATE: 16 BRPM | HEIGHT: 63 IN | SYSTOLIC BLOOD PRESSURE: 131 MMHG | DIASTOLIC BLOOD PRESSURE: 87 MMHG | BODY MASS INDEX: 50.7 KG/M2 | HEART RATE: 63 BPM | TEMPERATURE: 98.1 F | OXYGEN SATURATION: 97 % | WEIGHT: 286.16 LBS

## 2017-08-15 LAB
T4 FREE SERPL-MCNC: 1.04 NG/DL (ref 0.53–1.43)
TSH SERPL DL<=0.005 MIU/L-ACNC: 2.14 UIU/ML (ref 0.3–3.7)

## 2017-08-15 PROCEDURE — 84439 ASSAY OF FREE THYROXINE: CPT

## 2017-08-15 PROCEDURE — 84443 ASSAY THYROID STIM HORMONE: CPT

## 2017-08-15 ASSESSMENT — ENCOUNTER SYMPTOMS
WHEEZING: 0
LOSS OF CONSCIOUSNESS: 0
SEIZURES: 0
SPEECH CHANGE: 0
PHOTOPHOBIA: 0
BLURRED VISION: 0
DOUBLE VISION: 0
COUGH: 0
HEADACHES: 0
SENSORY CHANGE: 0
FOCAL WEAKNESS: 0
TINGLING: 0

## 2017-08-15 NOTE — ED NOTES
Pt lying in bed with significant other at bedside, monitors in place, VSS, call bell within reach, will continue to monitor.

## 2017-08-15 NOTE — DISCHARGE INSTRUCTIONS
Dizziness  Dizziness is a common problem. It is a feeling of unsteadiness or light-headedness. You may feel like you are about to faint. Dizziness can lead to injury if you stumble or fall. Anyone can become dizzy, but dizziness is more common in older adults. This condition can be caused by a number of things, including medicines, dehydration, or illness.  HOME CARE INSTRUCTIONS  Taking these steps may help with your condition:  Eating and Drinking  · Drink enough fluid to keep your urine clear or pale yellow. This helps to keep you from becoming dehydrated. Try to drink more clear fluids, such as water.  · Do not drink alcohol.  · Limit your caffeine intake if directed by your health care provider.  · Limit your salt intake if directed by your health care provider.  Activity  1. Avoid making quick movements.  1. Rise slowly from chairs and steady yourself until you feel okay.  2. In the morning, first sit up on the side of the bed. When you feel okay, stand slowly while you hold onto something until you know that your balance is fine.  2. Move your legs often if you need to  one place for a long time. Tighten and relax your muscles in your legs while you are standing.  3. Do not drive or operate heavy machinery if you feel dizzy.  4. Avoid bending down if you feel dizzy. Place items in your home so that they are easy for you to reach without leaning over.  Lifestyle  · Do not use any tobacco products, including cigarettes, chewing tobacco, or electronic cigarettes. If you need help quitting, ask your health care provider.  · Try to reduce your stress level, such as with yoga or meditation. Talk with your health care provider if you need help.  General Instructions  · Watch your dizziness for any changes.  · Take medicines only as directed by your health care provider. Talk with your health care provider if you think that your dizziness is caused by a medicine that you are taking.  · Tell a friend or a  family member that you are feeling dizzy. If he or she notices any changes in your behavior, have this person call your health care provider.  · Keep all follow-up visits as directed by your health care provider. This is important.  SEEK MEDICAL CARE IF:  · Your dizziness does not go away.  · Your dizziness or light-headedness gets worse.  · You feel nauseous.  · You have reduced hearing.  · You have new symptoms.  · You are unsteady on your feet or you feel like the room is spinning.  SEEK IMMEDIATE MEDICAL CARE IF:  · You vomit or have diarrhea and are unable to eat or drink anything.  · You have problems talking, walking, swallowing, or using your arms, hands, or legs.  · You feel generally weak.  · You are not thinking clearly or you have trouble forming sentences. It may take a friend or family member to notice this.  · You have chest pain, abdominal pain, shortness of breath, or sweating.  · Your vision changes.  · You notice any bleeding.  · You have a headache.  · You have neck pain or a stiff neck.  · You have a fever.     This information is not intended to replace advice given to you by your health care provider. Make sure you discuss any questions you have with your health care provider.     Document Released: 06/13/2002 Document Revised: 05/03/2016 Document Reviewed: 12/14/2015  [employer] Interactive Patient Education ©2016 [employer]  Sleep Apnea   Sleep apnea is a sleep disorder characterized by abnormal pauses in breathing while you sleep. When your breathing pauses, the level of oxygen in your blood decreases. This causes you to move out of deep sleep and into light sleep. As a result, your quality of sleep is poor, and the system that carries your blood throughout your body (cardiovascular system) experiences stress. If sleep apnea remains untreated, the following conditions can develop:  · High blood pressure (hypertension).  · Coronary artery disease.  · Inability to achieve or maintain an  erection (impotence).  · Impairment of your thought process (cognitive dysfunction).  There are three types of sleep apnea:  5. Obstructive sleep apnea--Pauses in breathing during sleep because of a blocked airway.  6. Central sleep apnea--Pauses in breathing during sleep because the area of the brain that controls your breathing does not send the correct signals to the muscles that control breathing.  7. Mixed sleep apnea--A combination of both obstructive and central sleep apnea.  RISK FACTORS  The following risk factors can increase your risk of developing sleep apnea:  · Being overweight.  · Smoking.  · Having narrow passages in your nose and throat.  · Being of older age.  · Being male.  · Alcohol use.  · Sedative and tranquilizer use.  · Ethnicity. Among individuals younger than 35 years,  Americans are at increased risk of sleep apnea.  SYMPTOMS   · Difficulty staying asleep.  · Daytime sleepiness and fatigue.  · Loss of energy.  · Irritability.  · Loud, heavy snoring.  · Morning headaches.  · Trouble concentrating.  · Forgetfulness.  · Decreased interest in sex.  · Unexplained sleepiness.  DIAGNOSIS   In order to diagnose sleep apnea, your caregiver will perform a physical examination. A sleep study done in the comfort of your own home may be appropriate if you are otherwise healthy. Your caregiver may also recommend that you spend the night in a sleep lab. In the sleep lab, several monitors record information about your heart, lungs, and brain while you sleep. Your leg and arm movements and blood oxygen level are also recorded.  TREATMENT  The following actions may help to resolve mild sleep apnea:  · Sleeping on your side.    · Using a decongestant if you have nasal congestion.    · Avoiding the use of depressants, including alcohol, sedatives, and narcotics.    · Losing weight and modifying your diet if you are overweight.  There also are devices and treatments to help open your airway:  · Oral  "appliances. These are custom-made mouthpieces that shift your lower jaw forward and slightly open your bite. This opens your airway.  · Devices that create positive airway pressure. This positive pressure \"splints\" your airway open to help you breathe better during sleep. The following devices create positive airway pressure:  ¨ Continuous positive airway pressure (CPAP) device. The CPAP device creates a continuous level of air pressure with an air pump. The air is delivered to your airway through a mask while you sleep. This continuous pressure keeps your airway open.  ¨ Nasal expiratory positive airway pressure (EPAP) device. The EPAP device creates positive air pressure as you exhale. The device consists of single-use valves, which are inserted into each nostril and held in place by adhesive. The valves create very little resistance when you inhale but create much more resistance when you exhale. That increased resistance creates the positive airway pressure. This positive pressure while you exhale keeps your airway open, making it easier to breath when you inhale again.  ¨ Bilevel positive airway pressure (BPAP) device. The BPAP device is used mainly in patients with central sleep apnea. This device is similar to the CPAP device because it also uses an air pump to deliver continuous air pressure through a mask. However, with the BPAP machine, the pressure is set at two different levels. The pressure when you exhale is lower than the pressure when you inhale.  · Surgery. Typically, surgery is only done if you cannot comply with less invasive treatments or if the less invasive treatments do not improve your condition. Surgery involves removing excess tissue in your airway to create a wider passage way.     This information is not intended to replace advice given to you by your health care provider. Make sure you discuss any questions you have with your health care provider.     Document Released: 12/08/2003 " Document Revised: 01/08/2016 Document Reviewed: 04/25/2013  [employer] Interactive Patient Education ©2016 [employer]

## 2017-08-15 NOTE — ED NOTES
"Patient bib EMS from home:  Chief Complaint   Patient presents with   • Dizziness     x3 weeks, intermittent   • Near Syncopal     \"barely ate today, and been doing too much exercise\".   • Nausea     x2 hours   • Chest Pain     x3 weeks while exercising only     Patient Explained wait time and triage process to pt. Pt placed back out in lobby, told to notify ED tech or triage RN of any changes, verbalized understanding.    "

## 2017-08-15 NOTE — ED NOTES
Pt Given discharge instructions/ home care instructions, Pt verbalized understanding of instructions given, pt ambulatory to [facility], given [employer] flyer for ride home. Pt called [employer] for a ride home and sat in [facility] awaiting its arrival.

## 2017-08-15 NOTE — ED NOTES
Pt resting comfortably in bed. Monitors in place VSS. No s/s of distress noted. Call bell within reach will continue to monitor.

## 2017-08-15 NOTE — ED NOTES
Pt alseep in bed, monitors in places, vss, no new needs identified at this time, will continue to monitor,

## 2017-08-15 NOTE — ED NOTES
Pt in room, monitors in place, VSS, Triage complete. No s/s of distress noted will continue to monitor.

## 2017-10-31 ENCOUNTER — HOSPITAL ENCOUNTER (EMERGENCY)
Facility: MEDICAL CENTER | Age: 42
End: 2017-10-31
Attending: EMERGENCY MEDICINE
Payer: MEDICAID

## 2017-10-31 ENCOUNTER — APPOINTMENT (OUTPATIENT)
Dept: RADIOLOGY | Facility: MEDICAL CENTER | Age: 42
End: 2017-10-31
Attending: EMERGENCY MEDICINE
Payer: MEDICAID

## 2017-10-31 VITALS
SYSTOLIC BLOOD PRESSURE: 128 MMHG | OXYGEN SATURATION: 100 % | BODY MASS INDEX: 50.66 KG/M2 | TEMPERATURE: 98.1 F | DIASTOLIC BLOOD PRESSURE: 80 MMHG | WEIGHT: 286 LBS | HEART RATE: 70 BPM | RESPIRATION RATE: 16 BRPM

## 2017-10-31 DIAGNOSIS — R10.30 LOWER ABDOMINAL PAIN: ICD-10-CM

## 2017-10-31 DIAGNOSIS — R19.7 DIARRHEA, UNSPECIFIED TYPE: ICD-10-CM

## 2017-10-31 DIAGNOSIS — R11.0 NAUSEA: ICD-10-CM

## 2017-10-31 LAB
ALBUMIN SERPL BCP-MCNC: 3.9 G/DL (ref 3.2–4.9)
ALBUMIN/GLOB SERPL: 1 G/DL
ALP SERPL-CCNC: 97 U/L (ref 30–99)
ALT SERPL-CCNC: 14 U/L (ref 2–50)
ANION GAP SERPL CALC-SCNC: 9 MMOL/L (ref 0–11.9)
APPEARANCE UR: CLEAR
AST SERPL-CCNC: 13 U/L (ref 12–45)
BASOPHILS # BLD AUTO: 0.7 % (ref 0–1.8)
BASOPHILS # BLD: 0.06 K/UL (ref 0–0.12)
BILIRUB SERPL-MCNC: 0.5 MG/DL (ref 0.1–1.5)
BUN SERPL-MCNC: 14 MG/DL (ref 8–22)
CALCIUM SERPL-MCNC: 8.7 MG/DL (ref 8.5–10.5)
CHLORIDE SERPL-SCNC: 103 MMOL/L (ref 96–112)
CO2 SERPL-SCNC: 24 MMOL/L (ref 20–33)
COLOR UR AUTO: YELLOW
CREAT SERPL-MCNC: 0.66 MG/DL (ref 0.5–1.4)
EOSINOPHIL # BLD AUTO: 0.1 K/UL (ref 0–0.51)
EOSINOPHIL NFR BLD: 1.1 % (ref 0–6.9)
ERYTHROCYTE [DISTWIDTH] IN BLOOD BY AUTOMATED COUNT: 43.6 FL (ref 35.9–50)
GFR SERPL CREATININE-BSD FRML MDRD: >60 ML/MIN/1.73 M 2
GLOBULIN SER CALC-MCNC: 4 G/DL (ref 1.9–3.5)
GLUCOSE SERPL-MCNC: 89 MG/DL (ref 65–99)
GLUCOSE UR QL STRIP.AUTO: NEGATIVE MG/DL
HCT VFR BLD AUTO: 44.5 % (ref 37–47)
HGB BLD-MCNC: 13.5 G/DL (ref 12–16)
IMM GRANULOCYTES # BLD AUTO: 0.03 K/UL (ref 0–0.11)
IMM GRANULOCYTES NFR BLD AUTO: 0.3 % (ref 0–0.9)
KETONES UR QL STRIP.AUTO: NEGATIVE MG/DL
LEUKOCYTE ESTERASE UR QL STRIP.AUTO: NEGATIVE
LIPASE SERPL-CCNC: 17 U/L (ref 11–82)
LYMPHOCYTES # BLD AUTO: 1.23 K/UL (ref 1–4.8)
LYMPHOCYTES NFR BLD: 13.5 % (ref 22–41)
MCH RBC QN AUTO: 23.7 PG (ref 27–33)
MCHC RBC AUTO-ENTMCNC: 30.3 G/DL (ref 33.6–35)
MCV RBC AUTO: 78.2 FL (ref 81.4–97.8)
MONOCYTES # BLD AUTO: 0.51 K/UL (ref 0–0.85)
MONOCYTES NFR BLD AUTO: 5.6 % (ref 0–13.4)
NEUTROPHILS # BLD AUTO: 7.21 K/UL (ref 2–7.15)
NEUTROPHILS NFR BLD: 78.8 % (ref 44–72)
NITRITE UR QL STRIP.AUTO: NEGATIVE
NRBC # BLD AUTO: 0 K/UL
NRBC BLD AUTO-RTO: 0 /100 WBC
PH UR STRIP.AUTO: 5 [PH]
PLATELET # BLD AUTO: 222 K/UL (ref 164–446)
PMV BLD AUTO: 10.8 FL (ref 9–12.9)
POTASSIUM SERPL-SCNC: 4 MMOL/L (ref 3.6–5.5)
PROT SERPL-MCNC: 7.9 G/DL (ref 6–8.2)
PROT UR QL STRIP: NEGATIVE MG/DL
RBC # BLD AUTO: 5.69 M/UL (ref 4.2–5.4)
RBC UR QL AUTO: NEGATIVE
SODIUM SERPL-SCNC: 136 MMOL/L (ref 135–145)
SP GR UR: 1.02
WBC # BLD AUTO: 9.1 K/UL (ref 4.8–10.8)

## 2017-10-31 PROCEDURE — 700111 HCHG RX REV CODE 636 W/ 250 OVERRIDE (IP): Performed by: EMERGENCY MEDICINE

## 2017-10-31 PROCEDURE — 36415 COLL VENOUS BLD VENIPUNCTURE: CPT

## 2017-10-31 PROCEDURE — 96375 TX/PRO/DX INJ NEW DRUG ADDON: CPT

## 2017-10-31 PROCEDURE — 81025 URINE PREGNANCY TEST: CPT

## 2017-10-31 PROCEDURE — 80053 COMPREHEN METABOLIC PANEL: CPT

## 2017-10-31 PROCEDURE — 96374 THER/PROPH/DIAG INJ IV PUSH: CPT

## 2017-10-31 PROCEDURE — 700117 HCHG RX CONTRAST REV CODE 255: Performed by: EMERGENCY MEDICINE

## 2017-10-31 PROCEDURE — 83690 ASSAY OF LIPASE: CPT

## 2017-10-31 PROCEDURE — 99285 EMERGENCY DEPT VISIT HI MDM: CPT

## 2017-10-31 PROCEDURE — 700105 HCHG RX REV CODE 258: Performed by: EMERGENCY MEDICINE

## 2017-10-31 PROCEDURE — 85025 COMPLETE CBC W/AUTO DIFF WBC: CPT

## 2017-10-31 PROCEDURE — 74177 CT ABD & PELVIS W/CONTRAST: CPT

## 2017-10-31 PROCEDURE — 96361 HYDRATE IV INFUSION ADD-ON: CPT

## 2017-10-31 PROCEDURE — 81002 URINALYSIS NONAUTO W/O SCOPE: CPT

## 2017-10-31 RX ORDER — SODIUM CHLORIDE 9 MG/ML
1000 INJECTION, SOLUTION INTRAVENOUS ONCE
Status: COMPLETED | OUTPATIENT
Start: 2017-10-31 | End: 2017-10-31

## 2017-10-31 RX ORDER — MORPHINE SULFATE 4 MG/ML
4 INJECTION, SOLUTION INTRAMUSCULAR; INTRAVENOUS ONCE
Status: COMPLETED | OUTPATIENT
Start: 2017-10-31 | End: 2017-10-31

## 2017-10-31 RX ORDER — ONDANSETRON 4 MG/1
4 TABLET, ORALLY DISINTEGRATING ORAL EVERY 8 HOURS PRN
Qty: 10 TAB | Refills: 0 | Status: SHIPPED | OUTPATIENT
Start: 2017-10-31 | End: 2017-12-22

## 2017-10-31 RX ORDER — ONDANSETRON 2 MG/ML
4 INJECTION INTRAMUSCULAR; INTRAVENOUS ONCE
Status: COMPLETED | OUTPATIENT
Start: 2017-10-31 | End: 2017-10-31

## 2017-10-31 RX ORDER — NAPROXEN 500 MG/1
500 TABLET ORAL 2 TIMES DAILY WITH MEALS
Qty: 20 TAB | Refills: 0 | Status: SHIPPED | OUTPATIENT
Start: 2017-10-31 | End: 2017-12-22

## 2017-10-31 RX ADMIN — IOHEXOL 100 ML: 350 INJECTION, SOLUTION INTRAVENOUS at 21:45

## 2017-10-31 RX ADMIN — ONDANSETRON 4 MG: 2 INJECTION, SOLUTION INTRAMUSCULAR; INTRAVENOUS at 20:06

## 2017-10-31 RX ADMIN — SODIUM CHLORIDE 1000 ML: 9 INJECTION, SOLUTION INTRAVENOUS at 20:40

## 2017-10-31 RX ADMIN — MORPHINE SULFATE 4 MG: 4 INJECTION INTRAVENOUS at 20:06

## 2017-10-31 ASSESSMENT — PAIN SCALES - GENERAL: PAINLEVEL_OUTOF10: 7

## 2017-11-01 LAB — HCG UR QL: NEGATIVE

## 2017-11-01 NOTE — DISCHARGE INSTRUCTIONS
Abdominal Pain (Nonspecific)  Your exam might not show the exact reason you have abdominal pain. Since there are many different causes of abdominal pain, another checkup and more tests may be needed. It is very important to follow up for lasting (persistent) or worsening symptoms. A possible cause of abdominal pain in any person who still has his or her appendix is acute appendicitis. Appendicitis is often hard to diagnose. Normal blood tests, urine tests, ultrasound, and CT scans do not completely rule out early appendicitis or other causes of abdominal pain. Sometimes, only the changes that happen over time will allow appendicitis and other causes of abdominal pain to be determined. Other potential problems that may require surgery may also take time to become more apparent. Because of this, it is important that you follow all of the instructions below.  HOME CARE INSTRUCTIONS   · Rest as much as possible.   · Do not eat solid food until your pain is gone.   · While adults or children have pain: A diet of water, weak decaffeinated tea, broth or bouillon, gelatin, oral rehydration solutions (ORS), frozen ice pops, or ice chips may be helpful.   · When pain is gone in adults or children: Start a light diet (dry toast, crackers, applesauce, or white rice). Increase the diet slowly as long as it does not bother you. Eat no dairy products (including cheese and eggs) and no spicy, fatty, fried, or high-fiber foods.   · Use no alcohol, caffeine, or cigarettes.   · Take your regular medicines unless your caregiver told you not to.   · Take any prescribed medicine as directed.   · Only take over-the-counter or prescription medicines for pain, discomfort, or fever as directed by your caregiver. Do not give aspirin to children.   If your caregiver has given you a follow-up appointment, it is very important to keep that appointment. Not keeping the appointment could result in a permanent injury and/or lasting (chronic) pain  and/or disability. If there is any problem keeping the appointment, you must call to reschedule.   SEEK IMMEDIATE MEDICAL CARE IF:   · Your pain is not gone in 24 hours.   · Your pain becomes worse, changes location, or feels different.   · You or your child has an oral temperature above 102° F (38.9° C), not controlled by medicine.   · Your baby is older than 3 months with a rectal temperature of 102° F (38.9° C) or higher.   · Your baby is 3 months old or younger with a rectal temperature of 100.4° F (38° C) or higher.   · You have shaking chills.   · You keep throwing up (vomiting) or cannot drink liquids.   · There is blood in your vomit or you see blood in your bowel movements.   · Your bowel movements become dark or black.   · You have frequent bowel movements.   · Your bowel movements stop (become blocked) or you cannot pass gas.   · You have bloody, frequent, or painful urination.   · You have yellow discoloration in the skin or whites of the eyes.   · Your stomach becomes bloated or bigger.   · You have dizziness or fainting.   · You have chest or back pain.   MAKE SURE YOU:   · Understand these instructions.   · Will watch your condition.   · Will get help right away if you are not doing well or get worse.   Document Released: 12/18/2006 Document Revised: 03/11/2013 Document Reviewed: 11/15/2010  [employer]® Patient Information ©2013 [employer].

## 2017-11-01 NOTE — ED NOTES
PIV established, blood drawn and sent to lab.   Pt w/c to restroom. Clean catch instructions given, pt verbalized understanding.

## 2017-11-01 NOTE — ED NOTES
Pt c/o increased pain after medications. Pt is noted to be diaphoretic and skin is cold to touch.   Dr Arguelles aware, NS bolus ordered.

## 2017-11-01 NOTE — ED NOTES
Pt discharged home. Explained discharge and medication instructions. Questions and comments addressed. Pt verbalized understanding of instructions. Pt advised to follow-up with PCP or return to ED for any new or worsening of symptoms. Pt is ambulating well and steady on feet. VS stable. Pt w/c to lobby now, pt will be using [employer] services for ride home.

## 2017-11-01 NOTE — ED PROVIDER NOTES
ED Provider Note    CHIEF COMPLAINT  Chief Complaint   Patient presents with   • Abdominal Pain       HPI  Chanelle Ortiz is a 42 y.o. female who presentsFor evaluation of abdominal pain. She is complaining of left lower quadrant abdominal pain. Symptoms started last night. She's had nausea but no vomiting. She's had no fevers. She states she has had diarrhea. She denies any urinary symptoms. Previous abdominal surgeries include  and cholecystectomy.    REVIEW OF SYSTEMS  See HPI for further details. All other systems are negative.     PAST MEDICAL HISTORY  Past Medical History:   Diagnosis Date   • Arrhythmia    • Bronchitis    • Diabetes     5 YEARS AGO, DIET CONTROLLED   • Hypertension    • Hyperthyroidism     Treated with Radioactive iodine last year   • Psychiatric problem        FAMILY HISTORY  Family History   Problem Relation Age of Onset   • Other Mother    • Heart Failure Sister    • Heart Attack Maternal Grandfather        SOCIAL HISTORY  Social History     Social History   • Marital status: Legally      Spouse name: N/A   • Number of children: N/A   • Years of education: N/A     Social History Main Topics   • Smoking status: Never Smoker   • Smokeless tobacco: Not on file   • Alcohol use No   • Drug use: No   • Sexual activity: Not on file     Other Topics Concern   • Not on file     Social History Narrative   • No narrative on file       SURGICAL HISTORY  Past Surgical History:   Procedure Laterality Date   • OTHER ABDOMINAL SURGERY      cholesystectomy   • GYN SURGERY      ovarian cyst and c sections   • OTHER      tonsillectomy       CURRENT MEDICATIONS  Home Medications    **Home medications have not yet been reviewed for this encounter**         ALLERGIES  No Known Allergies    PHYSICAL EXAM  VITAL SIGNS: /80   Pulse 94   Temp 36.7 °C (98.1 °F)   Resp 16   Wt (!) 129.7 kg (286 lb)   SpO2 98%   BMI 50.66 kg/m²     Constitutional: Well developed, Well  nourished, No acute distress, Non-toxic appearance.   HENT: Normocephalic, Atraumatic.   Eyes:  EOMI, Conjunctiva normal, No discharge.   Cardiovascular: Normal heart rate.   Thorax & Lungs:No respiratory distress.   Abdomen:  Soft, slight left lower quadrant tenderness, No masses, No guarding and no rebound.  Skin: Warm, Dry.   Musculoskeletal: Good range of motion in all major joints.  Neurologic: Awake alert.    RADIOLOGY/PROCEDURES  CT-ABDOMEN-PELVIS WITH   Final Result      1.  No secondary signs of acute appendicitis, however the appendix is partially visualized.   2.  No bowel obstruction or pneumoperitoneum.   3.  Prior cholecystectomy.   4.  No focal mesenteric inflammatory process.               COURSE & MEDICAL DECISION MAKING  Pertinent Labs & Imaging studies reviewed. (See chart for details)  This is a 42-year-old here for evaluation of abdominal pain and diarrhea. She is afebrile. She does have some mild diffuse lower abdominal tenderness without guarding or rebound. An IV is established and laboratories are obtained. This includes urinalysis which shows no evidence of infection. Her urine pregnancy test is negative. Chemistries are normal to include liver function studies and lipase. CBC shows normal white count with a differential of 78 polys and 13 lymphocytes. The patient was treated with 4 mg of morphine and 4 mg of Zofran. The patient did not tolerate the morphine and became mildly hypotensive therefore she is given a liter of IV fluid. CT scan of the abdomen and pelvis is obtained and it is an unremarkable study. Upon repeat evaluation the patient is sitting up and states she is feeling greatly improved at this time. Her blood pressures normalized. She states that she's had problems with pain medications in the past. At this point I do not think she requires acute hospitalization or further evaluation the emergency department. I will provide her a prescription for Zofran and Naprosyn. I will  have her contact her primary care provider for follow-up. If she isn't feeling greatly improved tomorrow I would like her to get reevaluated by her doctor or here in the emergency department. I've explained to her that I suspect she has a viral illness causing her symptoms and that there is no specific treatment. She is given a discharge instruction sheet on abdominal pain. She is discharged home in stable condition.    FINAL IMPRESSION  1. Abdominal pain  2. Diarrhea  3. Nausea         Electronically signed by: Demetrius Arguelles, 10/31/2017 7:07 PM

## 2017-11-10 LAB — EKG IMPRESSION: NORMAL

## 2017-12-22 ENCOUNTER — HOSPITAL ENCOUNTER (EMERGENCY)
Facility: MEDICAL CENTER | Age: 42
End: 2017-12-22
Attending: EMERGENCY MEDICINE
Payer: MEDICAID

## 2017-12-22 VITALS
TEMPERATURE: 97.5 F | HEIGHT: 64 IN | RESPIRATION RATE: 18 BRPM | OXYGEN SATURATION: 97 % | DIASTOLIC BLOOD PRESSURE: 67 MMHG | BODY MASS INDEX: 48.55 KG/M2 | HEART RATE: 71 BPM | WEIGHT: 284.39 LBS | SYSTOLIC BLOOD PRESSURE: 129 MMHG

## 2017-12-22 DIAGNOSIS — R09.81 NASAL CONGESTION: ICD-10-CM

## 2017-12-22 DIAGNOSIS — J10.1 INFLUENZA A: ICD-10-CM

## 2017-12-22 LAB
FLUAV RNA SPEC QL NAA+PROBE: POSITIVE
FLUBV RNA SPEC QL NAA+PROBE: NEGATIVE

## 2017-12-22 PROCEDURE — 99284 EMERGENCY DEPT VISIT MOD MDM: CPT

## 2017-12-22 PROCEDURE — 87502 INFLUENZA DNA AMP PROBE: CPT

## 2017-12-22 RX ORDER — OSELTAMIVIR PHOSPHATE 75 MG/1
75 CAPSULE ORAL 2 TIMES DAILY
Qty: 10 CAP | Refills: 0 | Status: SHIPPED | OUTPATIENT
Start: 2017-12-22 | End: 2017-12-27

## 2017-12-22 RX ORDER — OXYMETAZOLINE HYDROCHLORIDE 0.05 G/100ML
2 SPRAY NASAL 2 TIMES DAILY
Qty: 1 BOTTLE | Refills: 0 | Status: SHIPPED | OUTPATIENT
Start: 2017-12-22 | End: 2017-12-25

## 2017-12-22 RX ORDER — FEXOFENADINE HCL AND PSEUDOEPHEDRINE HCI 60; 120 MG/1; MG/1
1 TABLET, EXTENDED RELEASE ORAL 2 TIMES DAILY
Qty: 20 TAB | Refills: 0 | Status: SHIPPED | OUTPATIENT
Start: 2017-12-22 | End: 2018-08-15

## 2017-12-22 ASSESSMENT — PAIN SCALES - GENERAL: PAINLEVEL_OUTOF10: ASSUMED PAIN PRESENT

## 2017-12-22 ASSESSMENT — LIFESTYLE VARIABLES: DO YOU DRINK ALCOHOL: NO

## 2017-12-23 NOTE — ED NOTES
Discharge instructions given and understood by pt, all prescriptions given to pt, all questions answered, all belongings sent with pt, pt ambulates out of dept in stable condition.

## 2017-12-23 NOTE — ED NOTES
Pt comes in complaining of SOB, sore throat, bilateral ear pain, flu like symptoms since last night.

## 2017-12-23 NOTE — ED PROVIDER NOTES
"ED Provider Note    Scribed for Juan Galvez M.D. by Mattie Dietrich. 12/22/2017, 5:33 PM.    Primary care provider: CHANCE Pate  Means of arrival: walk in   History obtained from: Patient  History limited by: None    CHIEF COMPLAINT  Chief Complaint   Patient presents with   • Flu Like Symptoms   • Shortness of Breath     HPI  Chanelle Ortiz is a 42 y.o. female who presents to the Emergency Department for cough, shortness of breath, fevers, chills, sore throat, and body aches onset yesterday. The patient is experiencing bilateral ear pain as well.  She notes that laying flat exacerbates her shortness of breath.  She states that she \"feels miserable\". She denies any diarrhea. The patient denies anything that improves her symptoms. The patient did not receive a flu vaccine this year.     REVIEW OF SYSTEMS  Pertinent positives include cough, shortness of breath, fevers, chills, sore throat, body aches, ear pain.   Review of Systems   All other systems reviewed and are negative.      PAST MEDICAL HISTORY   has a past medical history of Arrhythmia; Bronchitis; Diabetes; Hypertension; Hyperthyroidism; and Psychiatric problem.    SURGICAL HISTORY   has a past surgical history that includes other abdominal surgery (2002); gyn surgery; and other.    SOCIAL HISTORY  Social History   Substance Use Topics   • Smoking status: Never Smoker   • Smokeless tobacco: Never Used   • Alcohol use No      History   Drug Use No     FAMILY HISTORY  Family History   Problem Relation Age of Onset   • Other Mother    • Heart Failure Sister    • Heart Attack Maternal Grandfather      CURRENT MEDICATIONS  Home Medications     Reviewed by Nelida Villalpando R.N. (Registered Nurse) on 12/22/17 at 9840  Med List Status: Complete   Medication Last Dose Status   glimepiride (AMARYL) 4 MG Tab 12/21/2017 Active   Levothyroxine Sodium 150 MCG Cap 12/22/2017 Active              ALLERGIES  Allergies   Allergen Reactions " "  • Morphine      \"I think\"     PHYSICAL EXAM  VITAL SIGNS: /68   Pulse 79   Temp 36.4 °C (97.5 °F)   Resp 16   Ht 1.626 m (5' 4\")   Wt (!) 129 kg (284 lb 6.3 oz)   LMP 12/20/2017   SpO2 97%   BMI 48.82 kg/m²     Nursing note and vitals reviewed.  Constitutional: No distress. morbidly obese  HENT: Head is atraumatic. Oropharynx is moist. Clear rhinorrhea   Eyes: Conjunctivae are normal. Pupils are equal, round, and reactive to light.   Cardiovascular: Normal peripheral perfusion. Regular rate and rhythm, no audible murmurs   Respiratory: No respiratory distress. Lungs are clear to auscultation bilaterally. No rales, rhonchi, or wheezing.  Abdomen, soft, nontender, morbidly obese.  Musculoskeletal: Normal range of motion.   Neurological: Alert. No focal deficits noted.    Skin: No rash.   Psych: Appropriate for clinical situation     DIAGNOSTIC STUDIES / PROCEDURES    LABS  Results for orders placed or performed during the hospital encounter of 10/31/17   CBC WITH DIFFERENTIAL   Result Value Ref Range    WBC 9.1 4.8 - 10.8 K/uL    RBC 5.69 (H) 4.20 - 5.40 M/uL    Hemoglobin 13.5 12.0 - 16.0 g/dL    Hematocrit 44.5 37.0 - 47.0 %    MCV 78.2 (L) 81.4 - 97.8 fL    MCH 23.7 (L) 27.0 - 33.0 pg    MCHC 30.3 (L) 33.6 - 35.0 g/dL    RDW 43.6 35.9 - 50.0 fL    Platelet Count 222 164 - 446 K/uL    MPV 10.8 9.0 - 12.9 fL    Neutrophils-Polys 78.80 (H) 44.00 - 72.00 %    Lymphocytes 13.50 (L) 22.00 - 41.00 %    Monocytes 5.60 0.00 - 13.40 %    Eosinophils 1.10 0.00 - 6.90 %    Basophils 0.70 0.00 - 1.80 %    Immature Granulocytes 0.30 0.00 - 0.90 %    Nucleated RBC 0.00 /100 WBC    Neutrophils (Absolute) 7.21 (H) 2.00 - 7.15 K/uL    Lymphs (Absolute) 1.23 1.00 - 4.80 K/uL    Monos (Absolute) 0.51 0.00 - 0.85 K/uL    Eos (Absolute) 0.10 0.00 - 0.51 K/uL    Baso (Absolute) 0.06 0.00 - 0.12 K/uL    Immature Granulocytes (abs) 0.03 0.00 - 0.11 K/uL    NRBC (Absolute) 0.00 K/uL   COMP METABOLIC PANEL   Result Value Ref " "Range    Sodium 136 135 - 145 mmol/L    Potassium 4.0 3.6 - 5.5 mmol/L    Chloride 103 96 - 112 mmol/L    Co2 24 20 - 33 mmol/L    Anion Gap 9.0 0.0 - 11.9    Glucose 89 65 - 99 mg/dL    Bun 14 8 - 22 mg/dL    Creatinine 0.66 0.50 - 1.40 mg/dL    Calcium 8.7 8.5 - 10.5 mg/dL    AST(SGOT) 13 12 - 45 U/L    ALT(SGPT) 14 2 - 50 U/L    Alkaline Phosphatase 97 30 - 99 U/L    Total Bilirubin 0.5 0.1 - 1.5 mg/dL    Albumin 3.9 3.2 - 4.9 g/dL    Total Protein 7.9 6.0 - 8.2 g/dL    Globulin 4.0 (H) 1.9 - 3.5 g/dL    A-G Ratio 1.0 g/dL   LIPASE   Result Value Ref Range    Lipase 17 11 - 82 U/L   ESTIMATED GFR   Result Value Ref Range    GFR If African American >60 >60 mL/min/1.73 m 2    GFR If Non African American >60 >60 mL/min/1.73 m 2   POC UA   Result Value Ref Range    POC Color Yellow     POC Appearance Clear     POC Glucose Negative Negative mg/dL    POC Ketones Negative Negative mg/dL    POC Specific Gravity 1.025 1.005 - 1.030    POC Blood Negative Negative    POC Urine PH 5.0 5.0 - 8.0    POC Protein Negative Negative mg/dL    POC Nitrites Negative Negative    POC Leukocyte Esterase Negative Negative   POC URINE PREGNANCY   Result Value Ref Range    POC Urine Pregnancy Test Negative Negative       All labs reviewed by me.    COURSE & MEDICAL DECISION MAKING  Nursing notes, VS, PMSFHx reviewed in chart.    Review of past medical records     5:33 PM - Patient seen and examined at bedside. Ordered influenza A/B by PCR to evaluate her symptoms.     6:43 PM  - Re-examined; The patient is resting in bed comfortbaly. I discussed her above findings and plans for discharge with a prescription for Tamiflu, afrin, allegra-d. She was given a referral to follow up with her primary care provider and instructed to return to the ED if her symptoms worsen. Patient understands and agrees. Her vitals prior to discharge are: /68   Pulse 79   Temp 36.4 °C (97.5 °F)   Resp 16   Ht 1.626 m (5' 4\")   Wt (!) 129 kg (284 lb 6.3 " oz)   LMP 12/20/2017   SpO2 97%   BMI 48.82 kg/m²      The patient presents today with signs and symptoms consistent with a viral upper respiratory infection. They have a normal pulse oximetry on room air and a normal pulmonary exam. Therefore, I feel that the likelihood of pneumonia is low. This patient does not demonstrate any clinical evidence of pneumonia, meningitis, appendicitis, or other acute medical emergency. Overall, the patient is very well appearing. I do not feel that this patient would benefit from antibiotics at this time. I have recommended Tylenol and/or ibuprofen for fever.    The patient will return for new or worsening symptoms and is stable at the time of discharge.    DISPOSITION:  Patient will be discharged home in stable condition.    FOLLOW UP:  [facility], Emergency Dept  [address] [address] [phone]    If symptoms worsen    CHANCE Pate  [address] [place] [id number]  [phone]    Schedule an appointment as soon as possible for a visit        OUTPATIENT MEDICATIONS:  New Prescriptions    FEXOFENADINE-PSEUDOEPHEDRINE (ALLEGRA-D)  MG PER TABLET    Take 1 Tab by mouth 2 times a day.    OSELTAMIVIR (TAMIFLU) 75 MG CAP    Take 1 Cap by mouth 2 times a day for 5 days.    OXYMETAZOLINE (AFRIN) 0.05 % SOLUTION    Spray 2 Sprays in nose 2 times a day for 3 days.       The patient was discharged home with an information sheet on influenza and told to return immediately for any signs or symptoms listed.  The patient agreed to the discharge precautions and follow-up plan which is documented in EPIC.    FINAL IMPRESSION  1. Nasal congestion    2. Influenza A          Mattie FRANCO (Dede), am scribing for, and in the presence of, Juan Galvez M.D..    Electronically signed by: Mattie Lyn), 12/22/2017    Juan FRANCO M.D. personally performed the services  described in this documentation, as scribed by Mattie Dietrich in my presence, and it is both accurate and complete.    The note accurately reflects work and decisions made by me.  Juan Galvez  12/22/2017  6:43 PM

## 2017-12-23 NOTE — DISCHARGE INSTRUCTIONS
"Viral Syndrome  You or your child has Viral Syndrome. It is the most common infection causing \"colds\" and infections in the nose, throat, sinuses, and breathing tubes. Sometimes the infection causes nausea, vomiting, or diarrhea. The germ that causes the infection is a virus. No antibiotic or other medicine will kill it. There are medicines that you can take to make you or your child more comfortable.   HOME CARE INSTRUCTIONS   · Rest in bed until you start to feel better.   · If you have diarrhea or vomiting, eat small amounts of crackers and toast. Soup is helpful.   · Do not give aspirin or medicine that contains aspirin to children.   · Only take over-the-counter or prescription medicines for pain, discomfort, or fever as directed by your caregiver.   SEEK IMMEDIATE MEDICAL CARE IF:   · You or your child has not improved within one week.   · You or your child has pain that is not at least partially relieved by over-the-counter medicine.   · Thick, colored mucus or blood is coughed up.   · Discharge from the nose becomes thick yellow or green.   · Diarrhea or vomiting gets worse.   · There is any major change in your or your child's condition.   · You or your child develops a skin rash, stiff neck, severe headache, or are unable to hold down food or fluid.   · You or your child has an oral temperature above 102° F (38.9° C), not controlled by medicine.   · Your baby is older than 3 months with a rectal temperature of 102° F (38.9° C) or higher.   · Your baby is 3 months old or younger with a rectal temperature of 100.4° F (38° C) or higher.   Document Released: 12/03/2007 Document Revised: 03/11/2013 Document Reviewed: 12/03/2008  [employer]® Patient Information ©2013 [employer].  "

## 2018-02-19 PROCEDURE — 99284 EMERGENCY DEPT VISIT MOD MDM: CPT

## 2018-02-19 ASSESSMENT — PAIN SCALES - GENERAL: PAINLEVEL_OUTOF10: 9

## 2018-02-20 ENCOUNTER — HOSPITAL ENCOUNTER (EMERGENCY)
Facility: MEDICAL CENTER | Age: 43
End: 2018-02-20
Attending: EMERGENCY MEDICINE
Payer: MEDICAID

## 2018-02-20 VITALS
BODY MASS INDEX: 49.8 KG/M2 | WEIGHT: 281.09 LBS | HEART RATE: 78 BPM | OXYGEN SATURATION: 99 % | SYSTOLIC BLOOD PRESSURE: 135 MMHG | TEMPERATURE: 98.6 F | RESPIRATION RATE: 20 BRPM | DIASTOLIC BLOOD PRESSURE: 78 MMHG | HEIGHT: 63 IN

## 2018-02-20 DIAGNOSIS — R10.30 LOWER ABDOMINAL PAIN: ICD-10-CM

## 2018-02-20 LAB
ALBUMIN SERPL BCP-MCNC: 4 G/DL (ref 3.2–4.9)
ALBUMIN/GLOB SERPL: 1 G/DL
ALP SERPL-CCNC: 95 U/L (ref 30–99)
ALT SERPL-CCNC: 12 U/L (ref 2–50)
ANION GAP SERPL CALC-SCNC: 7 MMOL/L (ref 0–11.9)
APPEARANCE UR: ABNORMAL
APPEARANCE UR: ABNORMAL
AST SERPL-CCNC: 11 U/L (ref 12–45)
BACTERIA #/AREA URNS HPF: NEGATIVE /HPF
BASOPHILS # BLD AUTO: 0.6 % (ref 0–1.8)
BASOPHILS # BLD: 0.07 K/UL (ref 0–0.12)
BILIRUB SERPL-MCNC: 0.2 MG/DL (ref 0.1–1.5)
BILIRUB UR QL STRIP.AUTO: NEGATIVE
BUN SERPL-MCNC: 13 MG/DL (ref 8–22)
CALCIUM SERPL-MCNC: 9.1 MG/DL (ref 8.5–10.5)
CHLORIDE SERPL-SCNC: 104 MMOL/L (ref 96–112)
CO2 SERPL-SCNC: 29 MMOL/L (ref 20–33)
COLOR UR AUTO: ABNORMAL
COLOR UR: YELLOW
CREAT SERPL-MCNC: 0.63 MG/DL (ref 0.5–1.4)
EOSINOPHIL # BLD AUTO: 0.32 K/UL (ref 0–0.51)
EOSINOPHIL NFR BLD: 2.7 % (ref 0–6.9)
EPI CELLS #/AREA URNS HPF: ABNORMAL /HPF
ERYTHROCYTE [DISTWIDTH] IN BLOOD BY AUTOMATED COUNT: 43.4 FL (ref 35.9–50)
GLOBULIN SER CALC-MCNC: 4 G/DL (ref 1.9–3.5)
GLUCOSE SERPL-MCNC: 121 MG/DL (ref 65–99)
GLUCOSE UR QL STRIP.AUTO: NEGATIVE MG/DL
GLUCOSE UR STRIP.AUTO-MCNC: NEGATIVE MG/DL
HCG UR QL: NEGATIVE
HCT VFR BLD AUTO: 40.8 % (ref 37–47)
HGB BLD-MCNC: 12.5 G/DL (ref 12–16)
HYALINE CASTS #/AREA URNS LPF: ABNORMAL /LPF
IMM GRANULOCYTES # BLD AUTO: 0.05 K/UL (ref 0–0.11)
IMM GRANULOCYTES NFR BLD AUTO: 0.4 % (ref 0–0.9)
KETONES UR QL STRIP.AUTO: NEGATIVE MG/DL
KETONES UR STRIP.AUTO-MCNC: NEGATIVE MG/DL
LEUKOCYTE ESTERASE UR QL STRIP.AUTO: NEGATIVE
LEUKOCYTE ESTERASE UR QL STRIP.AUTO: NEGATIVE
LIPASE SERPL-CCNC: 28 U/L (ref 11–82)
LYMPHOCYTES # BLD AUTO: 2.48 K/UL (ref 1–4.8)
LYMPHOCYTES NFR BLD: 20.8 % (ref 22–41)
MCH RBC QN AUTO: 24.1 PG (ref 27–33)
MCHC RBC AUTO-ENTMCNC: 30.6 G/DL (ref 33.6–35)
MCV RBC AUTO: 78.6 FL (ref 81.4–97.8)
MICRO URNS: ABNORMAL
MONOCYTES # BLD AUTO: 0.8 K/UL (ref 0–0.85)
MONOCYTES NFR BLD AUTO: 6.7 % (ref 0–13.4)
NEUTROPHILS # BLD AUTO: 8.2 K/UL (ref 2–7.15)
NEUTROPHILS NFR BLD: 68.8 % (ref 44–72)
NITRITE UR QL STRIP.AUTO: NEGATIVE
NITRITE UR QL STRIP.AUTO: NEGATIVE
NRBC # BLD AUTO: 0 K/UL
NRBC BLD-RTO: 0 /100 WBC
PH UR STRIP.AUTO: 5.5 [PH]
PH UR STRIP.AUTO: 6 [PH]
PLATELET # BLD AUTO: 242 K/UL (ref 164–446)
PMV BLD AUTO: 11.6 FL (ref 9–12.9)
POTASSIUM SERPL-SCNC: 3.9 MMOL/L (ref 3.6–5.5)
PROT SERPL-MCNC: 8 G/DL (ref 6–8.2)
PROT UR QL STRIP: NEGATIVE MG/DL
PROT UR QL STRIP: NEGATIVE MG/DL
RBC # BLD AUTO: 5.19 M/UL (ref 4.2–5.4)
RBC # URNS HPF: ABNORMAL /HPF
RBC UR QL AUTO: ABNORMAL
RBC UR QL AUTO: ABNORMAL
SODIUM SERPL-SCNC: 140 MMOL/L (ref 135–145)
SP GR UR STRIP.AUTO: 1.03
SP GR UR: >=1.03
UROBILINOGEN UR STRIP.AUTO-MCNC: 0.2 MG/DL
WBC # BLD AUTO: 11.9 K/UL (ref 4.8–10.8)
WBC #/AREA URNS HPF: ABNORMAL /HPF

## 2018-02-20 PROCEDURE — 80053 COMPREHEN METABOLIC PANEL: CPT

## 2018-02-20 PROCEDURE — 81001 URINALYSIS AUTO W/SCOPE: CPT

## 2018-02-20 PROCEDURE — 85025 COMPLETE CBC W/AUTO DIFF WBC: CPT

## 2018-02-20 PROCEDURE — 81025 URINE PREGNANCY TEST: CPT

## 2018-02-20 PROCEDURE — 81002 URINALYSIS NONAUTO W/O SCOPE: CPT

## 2018-02-20 PROCEDURE — 83690 ASSAY OF LIPASE: CPT

## 2018-02-20 ASSESSMENT — PAIN SCALES - GENERAL: PAINLEVEL_OUTOF10: 0

## 2018-02-20 NOTE — ED NOTES
Pt was straight cath as per mds orders. pts family was at bedside, as per pts wishes. Pt tolerated it well.

## 2018-02-20 NOTE — ED TRIAGE NOTES
"Chanelle Ortiz    Chief Complaint   Patient presents with   • LLQ Pain     radiates to LLE    • Dizziness       Pt biba,  ambulatory to triage with above complaint. Symptoms starting tonight. Pain described as pinching on LLQ radiating to leg.  Denies numbness/tingling, CMS intact, no pain with palpation. +dysuria  PMH: DM, bronchitis, HTN, hyperthyroidism, bladder leisons.   VSS.    Pt returned to lobby, educated on triage process, and to inform staff of any changes or concerns.    /79   Pulse 82   Temp 36.2 °C (97.2 °F)   Resp 18   Ht 1.6 m (5' 3\")   Wt (!) 127.5 kg (281 lb 1.4 oz)   SpO2 99%   BMI 49.79 kg/m²     "

## 2018-02-20 NOTE — ED NOTES
PT IS AAOX4, PT IS IN NAD,PT IS BEING D/C, PT WAS GIVEN D/C INSTRUCTIONS. PT LEFT WITH FAMILY, NAD.

## 2018-02-20 NOTE — ED PROVIDER NOTES
"ED Provider Note    CHIEF COMPLAINT  Chief Complaint   Patient presents with   • LLQ Pain     radiates to LLE    • Dizziness       HPI  Chanelle Ortiz is a 42 y.o. female who presents to the emergency department with left lower quadrant abdominal pain. The patient says she's had the pain over the last 24 hours. She states is the suprapubic and left lower quadrant region. She does have some slight dysuria. She states she is currently menstruating. She has not had any fevers or vomiting. She's had a  in the past but no other abdominal surgeries. She states the pain is mild to moderate in intensity with no known exacerbating or relieving factors. She says she is also some associated dizziness.    REVIEW OF SYSTEMS  See HPI for further details. All other systems are negative.     PAST MEDICAL HISTORY  Past Medical History:   Diagnosis Date   • Arrhythmia    • Bronchitis    • Diabetes     5 YEARS AGO, DIET CONTROLLED   • Hypertension    • Hyperthyroidism     Treated with Radioactive iodine last year   • Psychiatric problem        SOCIAL HISTORY  Social History     Social History   • Marital status: Legally      Spouse name: N/A   • Number of children: N/A   • Years of education: N/A     Social History Main Topics   • Smoking status: Never Smoker   • Smokeless tobacco: Never Used   • Alcohol use No   • Drug use: No   • Sexual activity: Not on file     Other Topics Concern   • Not on file     Social History Narrative   • No narrative on file           PHYSICAL EXAM  VITAL SIGNS: /84   Pulse 67   Temp 36.2 °C (97.2 °F)   Resp 18   Ht 1.6 m (5' 3\")   Wt (!) 127.5 kg (281 lb 1.4 oz)   SpO2 98%   BMI 49.79 kg/m²   Constitutional: Obese but no acute distress  HENT: Normocephalic, Atraumatic, tympanic membranes are intact and nonerythematous bilaterally, Oropharynx moist without exudates or erythema, Nose normal.   Eyes: PERRLA, EOMI, Conjunctiva normal.  Neck: Supple without " meningismus  Lymphatic: No lymphadenopathy noted.   Cardiovascular: Normal heart rate, Normal rhythm, No murmurs, No rubs, No gallops.   Thorax & Lungs: Normal breath sounds, No respiratory distress, No wheezing, No chest tenderness.   Abdomen: Obese, left lower quadrant tenderness to deep palpation, no rebound, no guarding, normal bowel sounds  Skin: Warm, Dry, No erythema, No rash.   Back: No tenderness, No CVA tenderness.   Extremities: Atraumatic with symmetric distal pulses, No edema, No tenderness, No cyanosis, No clubbing.   Neurologic: Alert & oriented x 3, cranial nerves II through XII are intact, Normal motor function, Normal sensory function, No focal deficits noted.   Psychiatric: Affect normal, Judgment normal, Mood normal.       COURSE & MEDICAL DECISION MAKING  Pertinent Labs & Imaging studies reviewed. (See chart for details)  This a 42-year-old female who presents with abdominal discomfort. At this time I don't have a clear source. Her abdomen is pretty much benign. Urinalysis does not show any evidence of an infectious process. She is currently menstruating and I suspect this is the reason for the blood in the urinalysis. The patient is not pregnant. She has a very slight leukocytosis but with a benign abdominal examination a surgical process would be unlikely. I cannot rule out an early surgical process while the patient return in 24-48 hours if she is not better. She'll be treated with Motrin. She is also instructed to follow with her primary care doctor in 48-72 hours for repeat examination. The patient's blood pressure slightly elevated and she'll follow up for recheck on an outpatient basis.    FINAL IMPRESSION  1. Abdominal pain  Disposition  The patient will be discharged in stable condition    Electronically signed by: Casa English, 2/20/2018 3:04 AM

## 2018-02-20 NOTE — DISCHARGE INSTRUCTIONS
Take Motrin as needed for discomfort  Follow-up with her primary care provider in 48-72 hours if not better  Return here if you're acutely worse

## 2018-05-05 ENCOUNTER — HOSPITAL ENCOUNTER (EMERGENCY)
Facility: MEDICAL CENTER | Age: 43
End: 2018-05-05
Attending: EMERGENCY MEDICINE
Payer: MEDICAID

## 2018-05-05 ENCOUNTER — APPOINTMENT (OUTPATIENT)
Dept: RADIOLOGY | Facility: MEDICAL CENTER | Age: 43
End: 2018-05-05
Attending: EMERGENCY MEDICINE
Payer: MEDICAID

## 2018-05-05 VITALS
HEART RATE: 60 BPM | HEIGHT: 63 IN | BODY MASS INDEX: 49.08 KG/M2 | WEIGHT: 277 LBS | DIASTOLIC BLOOD PRESSURE: 82 MMHG | OXYGEN SATURATION: 94 % | RESPIRATION RATE: 16 BRPM | TEMPERATURE: 97.7 F | SYSTOLIC BLOOD PRESSURE: 135 MMHG

## 2018-05-05 DIAGNOSIS — R07.89 OTHER CHEST PAIN: ICD-10-CM

## 2018-05-05 DIAGNOSIS — M54.5 ACUTE BILATERAL LOW BACK PAIN, WITH SCIATICA PRESENCE UNSPECIFIED: ICD-10-CM

## 2018-05-05 LAB
ALBUMIN SERPL BCP-MCNC: 3.6 G/DL (ref 3.2–4.9)
ALBUMIN/GLOB SERPL: 1.1 G/DL
ALP SERPL-CCNC: 80 U/L (ref 30–99)
ALT SERPL-CCNC: 11 U/L (ref 2–50)
ANION GAP SERPL CALC-SCNC: 6 MMOL/L (ref 0–11.9)
ANISOCYTOSIS BLD QL SMEAR: ABNORMAL
AST SERPL-CCNC: 12 U/L (ref 12–45)
BASOPHILS # BLD AUTO: 0 % (ref 0–1.8)
BASOPHILS # BLD: 0 K/UL (ref 0–0.12)
BILIRUB SERPL-MCNC: 0.2 MG/DL (ref 0.1–1.5)
BUN SERPL-MCNC: 18 MG/DL (ref 8–22)
CALCIUM SERPL-MCNC: 8.6 MG/DL (ref 8.5–10.5)
CHLORIDE SERPL-SCNC: 106 MMOL/L (ref 96–112)
CO2 SERPL-SCNC: 29 MMOL/L (ref 20–33)
CREAT SERPL-MCNC: 0.77 MG/DL (ref 0.5–1.4)
CRP SERPL HS-MCNC: 1.32 MG/DL (ref 0–0.75)
EKG IMPRESSION: NORMAL
EOSINOPHIL # BLD AUTO: 0.18 K/UL (ref 0–0.51)
EOSINOPHIL NFR BLD: 1.8 % (ref 0–6.9)
ERYTHROCYTE [DISTWIDTH] IN BLOOD BY AUTOMATED COUNT: 44.3 FL (ref 35.9–50)
GLOBULIN SER CALC-MCNC: 3.3 G/DL (ref 1.9–3.5)
GLUCOSE SERPL-MCNC: 89 MG/DL (ref 65–99)
HCT VFR BLD AUTO: 38 % (ref 37–47)
HGB BLD-MCNC: 11 G/DL (ref 12–16)
LYMPHOCYTES # BLD AUTO: 2.3 K/UL (ref 1–4.8)
LYMPHOCYTES NFR BLD: 22.8 % (ref 22–41)
MANUAL DIFF BLD: NORMAL
MCH RBC QN AUTO: 22.6 PG (ref 27–33)
MCHC RBC AUTO-ENTMCNC: 28.9 G/DL (ref 33.6–35)
MCV RBC AUTO: 78 FL (ref 81.4–97.8)
MICROCYTES BLD QL SMEAR: ABNORMAL
MONOCYTES # BLD AUTO: 0.62 K/UL (ref 0–0.85)
MONOCYTES NFR BLD AUTO: 6.1 % (ref 0–13.4)
MORPHOLOGY BLD-IMP: NORMAL
NEUTROPHILS # BLD AUTO: 7 K/UL (ref 2–7.15)
NEUTROPHILS NFR BLD: 68.4 % (ref 44–72)
NEUTS BAND NFR BLD MANUAL: 0.9 % (ref 0–10)
NRBC # BLD AUTO: 0 K/UL
NRBC BLD-RTO: 0 /100 WBC
OVALOCYTES BLD QL SMEAR: NORMAL
PLATELET # BLD AUTO: 216 K/UL (ref 164–446)
PLATELET BLD QL SMEAR: NORMAL
PMV BLD AUTO: 11.7 FL (ref 9–12.9)
POIKILOCYTOSIS BLD QL SMEAR: NORMAL
POTASSIUM SERPL-SCNC: 3.8 MMOL/L (ref 3.6–5.5)
PROT SERPL-MCNC: 6.9 G/DL (ref 6–8.2)
RBC # BLD AUTO: 4.87 M/UL (ref 4.2–5.4)
RBC BLD AUTO: PRESENT
SODIUM SERPL-SCNC: 141 MMOL/L (ref 135–145)
TROPONIN I SERPL-MCNC: <0.01 NG/ML (ref 0–0.04)
WBC # BLD AUTO: 10.1 K/UL (ref 4.8–10.8)

## 2018-05-05 PROCEDURE — 36415 COLL VENOUS BLD VENIPUNCTURE: CPT

## 2018-05-05 PROCEDURE — 71046 X-RAY EXAM CHEST 2 VIEWS: CPT

## 2018-05-05 PROCEDURE — 85027 COMPLETE CBC AUTOMATED: CPT

## 2018-05-05 PROCEDURE — 85007 BL SMEAR W/DIFF WBC COUNT: CPT

## 2018-05-05 PROCEDURE — 93005 ELECTROCARDIOGRAM TRACING: CPT | Performed by: EMERGENCY MEDICINE

## 2018-05-05 PROCEDURE — 84484 ASSAY OF TROPONIN QUANT: CPT

## 2018-05-05 PROCEDURE — 86140 C-REACTIVE PROTEIN: CPT

## 2018-05-05 PROCEDURE — 700102 HCHG RX REV CODE 250 W/ 637 OVERRIDE(OP): Performed by: EMERGENCY MEDICINE

## 2018-05-05 PROCEDURE — 99284 EMERGENCY DEPT VISIT MOD MDM: CPT

## 2018-05-05 PROCEDURE — 80053 COMPREHEN METABOLIC PANEL: CPT

## 2018-05-05 PROCEDURE — A9270 NON-COVERED ITEM OR SERVICE: HCPCS | Performed by: EMERGENCY MEDICINE

## 2018-05-05 RX ORDER — GABAPENTIN 300 MG/1
300 CAPSULE ORAL ONCE
Status: COMPLETED | OUTPATIENT
Start: 2018-05-05 | End: 2018-05-05

## 2018-05-05 RX ORDER — ACETAMINOPHEN 325 MG/1
650 TABLET ORAL ONCE
Status: COMPLETED | OUTPATIENT
Start: 2018-05-05 | End: 2018-05-05

## 2018-05-05 RX ORDER — METHOCARBAMOL 750 MG/1
1500 TABLET, FILM COATED ORAL ONCE
Status: COMPLETED | OUTPATIENT
Start: 2018-05-05 | End: 2018-05-05

## 2018-05-05 RX ORDER — METHOCARBAMOL 750 MG/1
1500 TABLET, FILM COATED ORAL 3 TIMES DAILY PRN
Qty: 30 TAB | Refills: 0 | Status: SHIPPED | OUTPATIENT
Start: 2018-05-05 | End: 2018-08-15

## 2018-05-05 RX ADMIN — GABAPENTIN 300 MG: 300 CAPSULE ORAL at 04:42

## 2018-05-05 RX ADMIN — ACETAMINOPHEN 650 MG: 325 TABLET, FILM COATED ORAL at 04:42

## 2018-05-05 RX ADMIN — METHOCARBAMOL 1500 MG: 750 TABLET ORAL at 04:42

## 2018-05-05 RX ADMIN — ANHYDROUS CITRIC ACID, SODIUM BICARBONATE, AND ASPIRIN 325 MG: 1000; 1985; 500 GRANULE, EFFERVESCENT ORAL at 04:42

## 2018-05-05 ASSESSMENT — ENCOUNTER SYMPTOMS
VOMITING: 0
BACK PAIN: 1
SHORTNESS OF BREATH: 0
NAUSEA: 0

## 2018-05-05 ASSESSMENT — PAIN SCALES - GENERAL
PAINLEVEL_OUTOF10: 9
PAINLEVEL_OUTOF10: 8
PAINLEVEL_OUTOF10: 7

## 2018-05-05 NOTE — DISCHARGE INSTRUCTIONS
Back Exercises  Back exercises help treat and prevent back injuries. The goal is to increase your strength in your belly (abdominal) and back muscles. These exercises can also help with flexibility. Start these exercises when told by your doctor.  HOME CARE  Back exercises include:  Pelvic Tilt.  · Lie on your back with your knees bent. Tilt your pelvis until the lower part of your back is against the floor. Hold this position 5 to 10 sec. Repeat this exercise 5 to 10 times.  Knee to Chest.  · Pull 1 knee up against your chest and hold for 20 to 30 seconds. Repeat this with the other knee. This may be done with the other leg straight or bent, whichever feels better. Then, pull both knees up against your chest.  Sit-Ups or Curl-Ups.  · Bend your knees 90 degrees. Start with tilting your pelvis, and do a partial, slow sit-up. Only lift your upper half 30 to 45 degrees off the floor. Take at least 2 to 3 seonds for each sit-up. Do not do sit-ups with your knees out straight. If partial sit-ups are difficult, simply do the above but with only tightening your belly (abdominal) muscles and holding it as told.  Hip-Lift.  · Lie on your back with your knees flexed 90 degrees. Push down with your feet and shoulders as you raise your hips 2 inches off the floor. Hold for 10 seconds, repeat 5 to 10 times.  Back Arches.  · Lie on your stomach. Prop yourself up on bent elbows. Slowly press on your hands, causing an arch in your low back. Repeat 3 to 5 times.  Shoulder-Lifts.  · Lie face down with arms beside your body. Keep hips and belly pressed to floor as you slowly lift your head and shoulders off the floor.  Do not overdo your exercises. Be careful in the beginning. Exercises may cause you some mild back discomfort. If the pain lasts for more than 15 minutes, stop the exercises until you see your doctor. Improvement with exercise for back problems is slow.      This information is not intended to replace advice given to you  by your health care provider. Make sure you discuss any questions you have with your health care provider.     Document Released: 01/20/2012 Document Revised: 03/11/2013 Document Reviewed: 02/11/2016  [employer] Interactive Patient Education ©2016 [employer]    Chest Pain, Nonspecific  It is often hard to give a specific diagnosis for the cause of chest pain. There is always a chance that your pain could be related to something serious, like a heart attack or a blood clot in the lungs. You need to follow up with your caregiver for further evaluation. More lab tests or other studies such as X-rays, electrocardiography, stress testing, or cardiac imaging may be needed to find the cause of your pain.  Most of the time, nonspecific chest pain improves within 2 to 3 days with rest and mild pain medicine. For the next few days, avoid physical exertion or activities that bring on pain. Do not smoke. Avoid drinking alcohol. Call your caregiver for routine follow-up as advised.   SEEK IMMEDIATE MEDICAL CARE IF:  · You develop increased chest pain or pain that radiates to the arm, neck, jaw, back, or abdomen.   · You develop shortness of breath, increased coughing, or you start coughing up blood.   · You have severe back or abdominal pain, nausea, or vomiting.   · You develop severe weakness, fainting, fever, or chills.   Document Released: 12/18/2006 Document Revised: 03/11/2013 Document Reviewed: 06/06/2008  [employer]® Patient Information ©2013 [employer].

## 2018-05-05 NOTE — ED TRIAGE NOTES
Chanelle Ortiz  Pt bib EMS. Pt changed into gown and placed on monitor.     Chief Complaint   Patient presents with   • Low Back Pain     Per EMS, pt c/o lower back pain that radiates to lower extremities, more severe on RLE. Pt also c/o radiation of pain to back of head, pt describes pain as burning.        Chart up and ready for ERP now.

## 2018-05-05 NOTE — ED PROVIDER NOTES
ED Provider Note    Scribed for YOSVANY Dixon II* by Abi Mancera. 5/5/2018  4:04 AM    [facility]   History obtained by: patient   Limitations: none     CHIEF COMPLAINT  Chief Complaint   Patient presents with   • Low Back Pain     Per EMS, pt c/o lower back pain that radiates to lower extremities, more severe on RLE. Pt also c/o radiation of pain to back of head, pt describes pain as burning.        HPI  Chanelle Ortiz is a 42 y.o. female with a history of diabetes and hyperthyroidism who presents via EMS for evaluation of worsening, sharp, low back pain which began over 2 days ago.  She reports associated chest pain located to the left side of her chest. Aching and non-radiating. Pain started same time as back pain. She reports recently doing some heaving lifting and states her pain began after that. Her back pain radiates to her lower extremities and is more severe in the right lower extremity. Shooting sharp pains. Chanelle's back pain is located more to the sides of her low back, as apposed to her mid back. Her pain is exacerbated with movement and palpation.  No complaints of vomiting, nausea, shortness of breath, dysuria, and bowel incontinence.     REVIEW OF SYSTEMS  Review of Systems   Respiratory: Negative for shortness of breath.    Cardiovascular: Positive for chest pain.   Gastrointestinal: Negative for nausea and vomiting.   Genitourinary: Negative for dysuria.        No bowel incontinence.    Musculoskeletal: Positive for back pain.        Lower extremity pain, worse on the right.    All other systems reviewed and are negative.  See HPI for further details.  C    PAST MEDICAL HISTORY   has a past medical history of Arrhythmia; Bronchitis; Diabetes; Hypertension; Hyperthyroidism; and Psychiatric problem.    SOCIAL HISTORY  Social History     Social History Main Topics   • Smoking status: Never Smoker   • Smokeless tobacco: Never Used   • Alcohol use No   • Drug use: No   •  "Sexual activity: None noted        SURGICAL HISTORY   has a past surgical history that includes other abdominal surgery (2002); gyn surgery; and other.    CURRENT MEDICATIONS  Current medications can be reviewed in the nurse's note.     ALLERGIES  Allergies   Allergen Reactions   • Morphine      \"I think\"       PHYSICAL EXAM  VITAL SIGNS: /82   Pulse 70   Temp 36.5 °C (97.7 °F)   Resp 16   Ht 1.6 m (5' 3\")   Wt (!) 125.6 kg (277 lb)   LMP 04/24/2018 (Exact Date)   SpO2 94%   BMI 49.07 kg/m²     Pulse ox interpretation: I interpret this pulse ox as normal.  Constitutional: Alert in no apparent distress. Overweight 42 year old female.   HENT: No signs of trauma, Bilateral external ears normal, Nose normal.   Eyes: Pupils are equal, Conjunctiva normal, Non-icteric.   Neck: Normal range of motion, No tenderness, Supple, No stridor.   Cardiovascular: Regular rate and rhythm, no murmurs. Symmetric distal pulses. No cyanosis of extremities. No peripheral edema of extremities.  Thorax & Lungs: Normal breath sounds, No respiratory distress, No wheezing, No chest tenderness.   Abdomen: Bowel sounds normal, Soft, No tenderness, No masses, No pulsatile masses. No peritoneal signs.  Skin: Warm, Dry, No erythema, No rash.   Back: No midline bony tenderness. Bilateral lumbar paraspinal tenderness. Lifting legs reproduces back pain.   Musculoskeletal: No major deformities noted. Bilateral lumbar paraspinal tenderness. Lifting legs reproduces back pain.   Neurologic: Alert , Normal motor function, Normal sensory function, No focal deficits noted. 5/5 strength at bilateral ankles.   Psychiatric: Affect normal, Judgment normal, Mood normal.       DIAGNOSTIC STUDIES / PROCEDURES    EKG  EKG Interpretation:  Interpreted by me    Rhythm:  Normal sinus rhythm   Rate: 62  Axis: normal  Ectopy: none  Conduction: normal  ST Segments: no acute change  T Waves: inversion in V1-V3  Q Waves: none  Clinical Impression: Normal EKG " with nonspecific twave changes    LABS    Pertinent Labs & Imaging studies reviewed. (See chart for details)    RADIOLOGY    Pertinent Labs & Imaging studies reviewed. (See chart for details)    COURSE & MEDICAL DECISION MAKING  Pertinent Labs & Imaging studies reviewed. (See chart for details)    4:04 AM This is a 42 y.o. female who presents with low back pain and chest pain and the differential diagnosis includes but is not limited to musculoskeletal pain (chest and back), radiculopathy, MI, spinal abscess. Not cauda equina. Ordered for DX chest, CBC, CMP, CRP quantitive, troponin, EKG to evaluate. Patient will be treated with Robaxin 1,500 mg, Neurontin 300 mg, aspirin 325 mg, Tylenol 350 mg for her symptoms.     5:23 AM- Reviewed the patient's lab and imaging results. No leukocytosis. CRP minimally elevated. Normal CMP. Troponin negative (given pain for 2 days, I do not think a second troponin is necessary, low suspicion for ACS). CXR normal. I suspect pain symptoms most likely musculoskeletal given recent physical activity. She has not only recently lifted heavy boxes but has also recently started going to the gym. On history and exam no red flag symptoms. She does have risk factors for ACS and occult infections with obesity and DM history. Pain has improved significantly since arrival. I went over results with her. No indication for further testing or treatment today. We went over reasons to return which includes weakness in lower legs, incontinence, urinary retention, fevers she should return to ER.     The patient will not drink alcohol nor drive with prescribed medications. The patient will return for worsening symptoms and is stable at the time of discharge. The patient verbalizes understanding and will comply.      FINAL IMPRESSION  1. Other chest pain    2. Acute bilateral low back pain, with sciatica presence unspecified            Abi FRANCO (Scribe), garland scribing for, and in the presence of,  EDDIE Dixon II.    Electronically signed by: Abi Mancera (Scribe), 5/5/2018    IJake II, M* personally performed the services described in this documentation, as scribed by Abi Mancera in my presence, and it is both accurate and complete.    The note accurately reflects work and decisions made by me.  Jake Fortune II  5/5/2018  6:13 AM

## 2018-05-05 NOTE — ED NOTES
D/C instructions provided to patient at bedside, verbalizes understanding and states plans for follow-up with PCP as recommended.   Scripts given.  IV removed.  All belongings accounted for, all questions answered at this time.   Tiarra called for patient.

## 2018-06-08 ENCOUNTER — HOSPITAL ENCOUNTER (EMERGENCY)
Facility: MEDICAL CENTER | Age: 43
End: 2018-06-08
Attending: EMERGENCY MEDICINE
Payer: MEDICAID

## 2018-06-08 VITALS
OXYGEN SATURATION: 96 % | HEIGHT: 63 IN | RESPIRATION RATE: 16 BRPM | HEART RATE: 68 BPM | BODY MASS INDEX: 48.55 KG/M2 | WEIGHT: 274 LBS

## 2018-06-08 DIAGNOSIS — R42 DIZZINESS: ICD-10-CM

## 2018-06-08 LAB
ALBUMIN SERPL BCP-MCNC: 3.5 G/DL (ref 3.2–4.9)
ALBUMIN/GLOB SERPL: 1.1 G/DL
ALP SERPL-CCNC: 69 U/L (ref 30–99)
ALT SERPL-CCNC: 10 U/L (ref 2–50)
ANION GAP SERPL CALC-SCNC: 5 MMOL/L (ref 0–11.9)
AST SERPL-CCNC: 12 U/L (ref 12–45)
BASOPHILS # BLD AUTO: 0.6 % (ref 0–1.8)
BASOPHILS # BLD: 0.06 K/UL (ref 0–0.12)
BILIRUB SERPL-MCNC: 0.3 MG/DL (ref 0.1–1.5)
BUN SERPL-MCNC: 12 MG/DL (ref 8–22)
CALCIUM SERPL-MCNC: 8.6 MG/DL (ref 8.5–10.5)
CHLORIDE SERPL-SCNC: 106 MMOL/L (ref 96–112)
CO2 SERPL-SCNC: 28 MMOL/L (ref 20–33)
CREAT SERPL-MCNC: 0.59 MG/DL (ref 0.5–1.4)
EOSINOPHIL # BLD AUTO: 0.4 K/UL (ref 0–0.51)
EOSINOPHIL NFR BLD: 4.3 % (ref 0–6.9)
ERYTHROCYTE [DISTWIDTH] IN BLOOD BY AUTOMATED COUNT: 42.6 FL (ref 35.9–50)
GLOBULIN SER CALC-MCNC: 3.3 G/DL (ref 1.9–3.5)
GLUCOSE SERPL-MCNC: 115 MG/DL (ref 65–99)
HCG SERPL QL: NEGATIVE
HCT VFR BLD AUTO: 35.2 % (ref 37–47)
HGB BLD-MCNC: 10.6 G/DL (ref 12–16)
IMM GRANULOCYTES # BLD AUTO: 0.03 K/UL (ref 0–0.11)
IMM GRANULOCYTES NFR BLD AUTO: 0.3 % (ref 0–0.9)
LYMPHOCYTES # BLD AUTO: 2.04 K/UL (ref 1–4.8)
LYMPHOCYTES NFR BLD: 22 % (ref 22–41)
MCH RBC QN AUTO: 23.2 PG (ref 27–33)
MCHC RBC AUTO-ENTMCNC: 30.1 G/DL (ref 33.6–35)
MCV RBC AUTO: 77 FL (ref 81.4–97.8)
MONOCYTES # BLD AUTO: 0.72 K/UL (ref 0–0.85)
MONOCYTES NFR BLD AUTO: 7.8 % (ref 0–13.4)
NEUTROPHILS # BLD AUTO: 6.02 K/UL (ref 2–7.15)
NEUTROPHILS NFR BLD: 65 % (ref 44–72)
NRBC # BLD AUTO: 0 K/UL
NRBC BLD-RTO: 0 /100 WBC
PLATELET # BLD AUTO: 194 K/UL (ref 164–446)
PMV BLD AUTO: 11.9 FL (ref 9–12.9)
POTASSIUM SERPL-SCNC: 3.8 MMOL/L (ref 3.6–5.5)
PROT SERPL-MCNC: 6.8 G/DL (ref 6–8.2)
RBC # BLD AUTO: 4.57 M/UL (ref 4.2–5.4)
SODIUM SERPL-SCNC: 139 MMOL/L (ref 135–145)
WBC # BLD AUTO: 9.3 K/UL (ref 4.8–10.8)

## 2018-06-08 PROCEDURE — 93005 ELECTROCARDIOGRAM TRACING: CPT | Performed by: EMERGENCY MEDICINE

## 2018-06-08 PROCEDURE — 85025 COMPLETE CBC W/AUTO DIFF WBC: CPT

## 2018-06-08 PROCEDURE — 80053 COMPREHEN METABOLIC PANEL: CPT

## 2018-06-08 PROCEDURE — 36415 COLL VENOUS BLD VENIPUNCTURE: CPT

## 2018-06-08 PROCEDURE — 99284 EMERGENCY DEPT VISIT MOD MDM: CPT

## 2018-06-08 PROCEDURE — 84703 CHORIONIC GONADOTROPIN ASSAY: CPT

## 2018-06-08 PROCEDURE — 82272 OCCULT BLD FECES 1-3 TESTS: CPT

## 2018-06-08 RX ORDER — PANTOPRAZOLE SODIUM 20 MG/1
20 TABLET, DELAYED RELEASE ORAL DAILY
Qty: 30 TAB | Refills: 0 | Status: SHIPPED | OUTPATIENT
Start: 2018-06-08 | End: 2018-08-15

## 2018-06-08 RX ORDER — LANOLIN ALCOHOL/MO/W.PET/CERES
325 CREAM (GRAM) TOPICAL
Qty: 90 TAB | Refills: 0 | Status: SHIPPED | OUTPATIENT
Start: 2018-06-08 | End: 2018-08-15

## 2018-06-08 ASSESSMENT — PAIN SCALES - GENERAL: PAINLEVEL_OUTOF10: 5

## 2018-06-08 NOTE — ED PROVIDER NOTES
ED Provider Note    Scribed for Malinda Combs M.D. by Zena Jacobsen. 2018, 10:23 AM.    Primary care provider: CHANCE Pate  Means of arrival: Ambulance  History obtained from: Patient  History limited by: None    CHIEF COMPLAINT  Chief Complaint   Patient presents with   • Dizziness       HPI  Chanelle Ortiz is a 42 y.o. female who presents to the Emergency Department for evaluation of dizziness from eating and drinking. She was doing chores this morning then ate half a cup of cottage cheese with a banana. She did have head aches, abdominal pain, and heart palpitations which have resolved here. No fevers or shortness of breath. Yesterday she had another bad spell with chills and diaphoresis after drinking a large bottle of water. She denies any RLQ abdominal pain. Patient has a history of cholecystectomy, abdominal cyst removal, and three  sections. She is just ending her menstrual period.    REVIEW OF SYSTEMS  Pertinent positives include dizziness, abdominal pain (resolved here), head aches (resolved here), heart palpitations (resolved here). Pertinent negatives include no fevers, shortness of breath. All other systems reviewed and negative. C.     PAST MEDICAL HISTORY   has a past medical history of Arrhythmia; Bronchitis; Diabetes; Hypertension; Hyperthyroidism; and Psychiatric problem.    SURGICAL HISTORY   has a past surgical history that includes other abdominal surgery (); gyn surgery; and other.    SOCIAL HISTORY  Social History   Substance Use Topics   • Smoking status: Never Smoker   • Smokeless tobacco: Never Used   • Alcohol use No      History   Drug Use No       FAMILY HISTORY  Family History   Problem Relation Age of Onset   • Other Mother    • Heart Failure Sister    • Heart Attack Maternal Grandfather        CURRENT MEDICATIONS  No current facility-administered medications for this encounter.     Current Outpatient Prescriptions:   •  methocarbamol  "(ROBAXIN) 750 MG Tab, Take 2 Tabs by mouth 3 times a day as needed (MUSCLE SPASM)., Disp: 30 Tab, Rfl: 0  •  fexofenadine-pseudoephedrine (ALLEGRA-D)  MG per tablet, Take 1 Tab by mouth 2 times a day., Disp: 20 Tab, Rfl: 0  •  Levothyroxine Sodium 150 MCG Cap, Take 150 mcg by mouth every day., Disp: , Rfl:   •  glimepiride (AMARYL) 4 MG Tab, Take 2 mg by mouth every bedtime., Disp: , Rfl:     ALLERGIES  Allergies   Allergen Reactions   • Morphine      \"I think\" \"I dunno what my reaction was, the nurse just said my oxygen levels were going way to low on it\"        PHYSICAL EXAM  VITAL SIGNS: Pulse 66   Resp 13   Ht 1.6 m (5' 3\")   Wt 124.3 kg (274 lb)   SpO2 97%   BMI 48.54 kg/m²     Constitutional:  Lying in bed, able to answer questions  HENT: Nose is normal in appearance without rhinorrhea, external ears are normal,  moist mucous membranes  Eyes: Anicteric,  pupils are equal round and reactive, there is no conjunctival drainage or pallor   Neck: The trachea is midline, there is no obvious mass or meningeal signs  Cardiovascular: Equal radial pulsation, regular rate and rhythm without murmurs gallops or rubs  Thorax & Lungs: Respiratory rate and effort are normal. There is normal chest excursion with respiration. No wheezes rhonchi or rales noted.  Abdomen: Abdomen is normal in appearance, normal bowel sounds, no pain with cough, nonpulsatile  Musculoskeletal: No deformities noted in all 4 extremities. Actively moves all 4 extremities  Skin: Visualized skin is warm, no erythema, no rash.  Rectal: Brown stool, hemoccult negative  Psychiatric: Normal mood and mentation      DIAGNOSTIC STUDIES / PROCEDURES    LABS  Results for orders placed or performed during the hospital encounter of 06/08/18   CBC WITH DIFFERENTIAL   Result Value Ref Range    WBC 9.3 4.8 - 10.8 K/uL    RBC 4.57 4.20 - 5.40 M/uL    Hemoglobin 10.6 (L) 12.0 - 16.0 g/dL    Hematocrit 35.2 (L) 37.0 - 47.0 %    MCV 77.0 (L) 81.4 - 97.8 fL    " MCH 23.2 (L) 27.0 - 33.0 pg    MCHC 30.1 (L) 33.6 - 35.0 g/dL    RDW 42.6 35.9 - 50.0 fL    Platelet Count 194 164 - 446 K/uL    MPV 11.9 9.0 - 12.9 fL    Neutrophils-Polys 65.00 44.00 - 72.00 %    Lymphocytes 22.00 22.00 - 41.00 %    Monocytes 7.80 0.00 - 13.40 %    Eosinophils 4.30 0.00 - 6.90 %    Basophils 0.60 0.00 - 1.80 %    Immature Granulocytes 0.30 0.00 - 0.90 %    Nucleated RBC 0.00 /100 WBC    Neutrophils (Absolute) 6.02 2.00 - 7.15 K/uL    Lymphs (Absolute) 2.04 1.00 - 4.80 K/uL    Monos (Absolute) 0.72 0.00 - 0.85 K/uL    Eos (Absolute) 0.40 0.00 - 0.51 K/uL    Baso (Absolute) 0.06 0.00 - 0.12 K/uL    Immature Granulocytes (abs) 0.03 0.00 - 0.11 K/uL    NRBC (Absolute) 0.00 K/uL   COMP METABOLIC PANEL   Result Value Ref Range    Sodium 139 135 - 145 mmol/L    Potassium 3.8 3.6 - 5.5 mmol/L    Chloride 106 96 - 112 mmol/L    Co2 28 20 - 33 mmol/L    Anion Gap 5.0 0.0 - 11.9    Glucose 115 (H) 65 - 99 mg/dL    Bun 12 8 - 22 mg/dL    Creatinine 0.59 0.50 - 1.40 mg/dL    Calcium 8.6 8.5 - 10.5 mg/dL    AST(SGOT) 12 12 - 45 U/L    ALT(SGPT) 10 2 - 50 U/L    Alkaline Phosphatase 69 30 - 99 U/L    Total Bilirubin 0.3 0.1 - 1.5 mg/dL    Albumin 3.5 3.2 - 4.9 g/dL    Total Protein 6.8 6.0 - 8.2 g/dL    Globulin 3.3 1.9 - 3.5 g/dL    A-G Ratio 1.1 g/dL   BETA-HCG QUALITATIVE SERUM   Result Value Ref Range    Beta-Hcg Qualitative Serum Negative Negative   ESTIMATED GFR   Result Value Ref Range    GFR If African American >60 >60 mL/min/1.73 m 2    GFR If Non African American >60 >60 mL/min/1.73 m 2   EKG (NOW)   Result Value Ref Range    Report       [facility] Emergency Dept.    Test Date:  2018  Pt Name:    PRATIBHA CUELLAR              Department: ER  MRN:        [id number]                      Room:       [facility]  Gender:     Female                       Technician: [id number]  :        1975                   Requested By:ER TRIAGE PROTOCOL  Order #:    [id number]                     "Reading MD:    Measurements  Intervals                                Axis  Rate:       68                           P:          89  LA:         180                          QRS:        81  QRSD:       98                           T:          36  QT:         400  QTc:        426    Interpretive Statements  SINUS RHYTHM  BORDERLINE R WAVE PROGRESSION, ANTERIOR LEADS  BORDERLINE T ABNORMALITIES, ANTERIOR LEADS  Compared to ECG 05/05/2018 05:06:05  No significant changes     All labs reviewed by me.    EKG  I interpreted this EKG myself.  This is a 12-lead study.  The rhythm is sinus rhythm with a rate of 68.  There are no ST segment nor T wave abnormalities.  Interpretation: No ST segment elevation myocardial infarction.    COURSE & MEDICAL DECISION MAKING  Nursing notes and vital signs were reviewed. (See chart for details)      The patient presents with dizziness from eating and drinking, and the differential diagnosis includes but is not limited to electrolyte abnormality hypoglycemia pregnancy dehydration or anemia.       10:23 AM Initial orders in the Emergency Department included Estimated GFR, CBC, CMP, Beta-HCG Qual, EKG.    11:48 AM Discussed lab results and EKG results as noted above. They are largely unremarkable. Patient requests evaluation and treatment of a \"metal thing\" she has had in her mouth for over 40 years. I recommended dental follow-up. Performed rectal exam. She has heavy menstrual periods, will prescribe iron supplements. Will also prescribe Protonix for postprandial discomfort.     The patient was discharged home with an information sheet on Dizziness and told to return immediately for any signs or symptoms listed.  The patient agreed to the discharge precautions and follow-up plan which is documented in EPIC. I find no acute emergent condition that I can admit her for at this time and have recommended she have ongoing workup and management by her primary care doctor    Patient has known " hypertension that is being followed by her primary care provider.        DISPOSITION:  Patient will be discharged home in stable condition.    FOLLOW UP:  CHANCE Pate  [facility] [address]  [phone]    Schedule an appointment as soon as possible for a visit in 2 days  return if blood in stool or vomit or any worsening symptosm      OUTPATIENT MEDICATIONS:  Discharge Medication List as of 6/8/2018 12:04 PM      START taking these medications    Details   ferrous sulfate 325 (65 Fe) MG EC tablet Take 1 Tab by mouth 3 times a day, with meals., Disp-90 Tab, R-0, Normal      pantoprazole (PROTONIX) 20 MG tablet Take 1 Tab by mouth every day., Disp-30 Tab, R-0, Normal             FINAL IMPRESSION  1. Dizziness          IZena (Sarahibana lilia), am scribing for, and in the presence of, Malinda Combs M.D..    Electronically signed by: Zena Jacobsen (Dede), 6/8/2018    IMalinda M.D. personally performed the services described in this documentation, as scribed by Zena Jacobsen in my presence, and it is both accurate and complete.    The note accurately reflects work and decisions made by me.  Malinda Combs  6/8/2018  4:16 PM

## 2018-06-08 NOTE — ED TRIAGE NOTES
Arrived via Ems from home reporting dizziness a 10/10. Pt reports eating food makes her dizzy. BG check 133. IV started 400mL's given in route.

## 2018-06-08 NOTE — DISCHARGE INSTRUCTIONS
Dizziness  Dizziness is a common problem. It is a feeling of unsteadiness or light-headedness. You may feel like you are about to faint. Dizziness can lead to injury if you stumble or fall. Anyone can become dizzy, but dizziness is more common in older adults. This condition can be caused by a number of things, including medicines, dehydration, or illness.  Follow these instructions at home:  Taking these steps may help with your condition:  Eating and drinking  · Drink enough fluid to keep your urine clear or pale yellow. This helps to keep you from becoming dehydrated. Try to drink more clear fluids, such as water.  · Do not drink alcohol.  · Limit your caffeine intake if directed by your health care provider.  · Limit your salt intake if directed by your health care provider.  Activity  · Avoid making quick movements.  ¨ Rise slowly from chairs and steady yourself until you feel okay.  ¨ In the morning, first sit up on the side of the bed. When you feel okay, stand slowly while you hold onto something until you know that your balance is fine.  · Move your legs often if you need to  one place for a long time. Tighten and relax your muscles in your legs while you are standing.  · Do not drive or operate heavy machinery if you feel dizzy.  · Avoid bending down if you feel dizzy. Place items in your home so that they are easy for you to reach without leaning over.  Lifestyle  · Do not use any tobacco products, including cigarettes, chewing tobacco, or electronic cigarettes. If you need help quitting, ask your health care provider.  · Try to reduce your stress level, such as with yoga or meditation. Talk with your health care provider if you need help.  General instructions  · Watch your dizziness for any changes.  · Take medicines only as directed by your health care provider. Talk with your health care provider if you think that your dizziness is caused by a medicine that you are taking.  · Tell a friend or  a family member that you are feeling dizzy. If he or she notices any changes in your behavior, have this person call your health care provider.  · Keep all follow-up visits as directed by your health care provider. This is important.  Contact a health care provider if:  · Your dizziness does not go away.  · Your dizziness or light-headedness gets worse.  · You feel nauseous.  · You have reduced hearing.  · You have new symptoms.  · You are unsteady on your feet or you feel like the room is spinning.  Get help right away if:  · You vomit or have diarrhea and are unable to eat or drink anything.  · You have problems talking, walking, swallowing, or using your arms, hands, or legs.  · You feel generally weak.  · You are not thinking clearly or you have trouble forming sentences. It may take a friend or family member to notice this.  · You have chest pain, abdominal pain, shortness of breath, or sweating.  · Your vision changes.  · You notice any bleeding.  · You have a headache.  · You have neck pain or a stiff neck.  · You have a fever.  This information is not intended to replace advice given to you by your health care provider. Make sure you discuss any questions you have with your health care provider.  Document Released: 06/13/2002 Document Revised: 05/25/2017 Document Reviewed: 12/14/2015  [employer] Interactive Patient Education © 2017 [employer]

## 2018-06-08 NOTE — ED NOTES
Discussed Discharge instructions, F/U instructions, and medication instructions with Pt. Rx given, questions answered. Pt escorted out of ED in stable condition.

## 2018-06-09 ENCOUNTER — HOSPITAL ENCOUNTER (EMERGENCY)
Facility: MEDICAL CENTER | Age: 43
End: 2018-06-09
Attending: EMERGENCY MEDICINE
Payer: MEDICAID

## 2018-06-09 VITALS
TEMPERATURE: 98 F | DIASTOLIC BLOOD PRESSURE: 63 MMHG | SYSTOLIC BLOOD PRESSURE: 116 MMHG | RESPIRATION RATE: 16 BRPM | WEIGHT: 277.78 LBS | OXYGEN SATURATION: 98 % | BODY MASS INDEX: 49.21 KG/M2 | HEART RATE: 80 BPM

## 2018-06-09 DIAGNOSIS — R00.2 PALPITATIONS: ICD-10-CM

## 2018-06-09 DIAGNOSIS — G44.209 TENSION HEADACHE: ICD-10-CM

## 2018-06-09 DIAGNOSIS — R55 NEAR SYNCOPE: ICD-10-CM

## 2018-06-09 LAB — EKG IMPRESSION: NORMAL

## 2018-06-09 PROCEDURE — 99283 EMERGENCY DEPT VISIT LOW MDM: CPT

## 2018-06-09 PROCEDURE — A9270 NON-COVERED ITEM OR SERVICE: HCPCS | Performed by: EMERGENCY MEDICINE

## 2018-06-09 PROCEDURE — 700102 HCHG RX REV CODE 250 W/ 637 OVERRIDE(OP): Performed by: EMERGENCY MEDICINE

## 2018-06-09 RX ORDER — ACETAMINOPHEN 325 MG/1
650 TABLET ORAL ONCE
Status: COMPLETED | OUTPATIENT
Start: 2018-06-09 | End: 2018-06-09

## 2018-06-09 RX ADMIN — ACETAMINOPHEN 650 MG: 325 TABLET, FILM COATED ORAL at 15:16

## 2018-06-09 ASSESSMENT — PAIN SCALES - GENERAL: PAINLEVEL_OUTOF10: 7

## 2018-06-09 ASSESSMENT — LIFESTYLE VARIABLES: DO YOU DRINK ALCOHOL: NO

## 2018-06-09 NOTE — ED TRIAGE NOTES
".  Chief Complaint   Patient presents with   • Headache   • Dizziness     reports after working out,    biba from home. Pt reports working out this am went home and ate lunch then took a nap,  \"woke with heart racing\" headache and dizziness. Dizziness has since resolved headache pain 7/10. fsbs 195 pt has lost glucometer.   Seen yesterday for similar event.   "

## 2018-06-09 NOTE — DISCHARGE INSTRUCTIONS
Tension Headache  A tension headache is a feeling of pain, pressure, or aching that is often felt over the front and sides of the head. The pain can be dull, or it can feel tight (constricting). Tension headaches are not normally associated with nausea or vomiting, and they do not get worse with physical activity. Tension headaches can last from 30 minutes to several days. This is the most common type of headache.  CAUSES  The exact cause of this condition is not known. Tension headaches often begin after stress, anxiety, or depression. Other triggers may include:  · Alcohol.  · Too much caffeine, or caffeine withdrawal.  · Respiratory infections, such as colds, flu, or sinus infections.  · Dental problems or teeth clenching.  · Fatigue.  · Holding your head and neck in the same position for a long period of time, such as while using a computer.  · Smoking.  SYMPTOMS  Symptoms of this condition include:  · A feeling of pressure around the head.  · Dull, aching head pain.  · Pain felt over the front and sides of the head.  · Tenderness in the muscles of the head, neck, and shoulders.  DIAGNOSIS  This condition may be diagnosed based on your symptoms and a physical exam. Tests may be done, such as a CT scan or an MRI of your head. These tests may be done if your symptoms are severe or unusual.  TREATMENT  This condition may be treated with lifestyle changes and medicines to help relieve symptoms.  HOME CARE INSTRUCTIONS  Managing Pain   · Take over-the-counter and prescription medicines only as told by your health care provider.  · Lie down in a dark, quiet room when you have a headache.  · If directed, apply ice to the head and neck area:  ¨ Put ice in a plastic bag.  ¨ Place a towel between your skin and the bag.  ¨ Leave the ice on for 20 minutes, 2-3 times per day.  · Use a heating pad or a hot shower to apply heat to the head and neck area as told by your health care provider.  Eating and Drinking   · Eat meals  on a regular schedule.  · Limit alcohol use.  · Decrease your caffeine intake, or stop using caffeine.  General Instructions   · Keep all follow-up visits as told by your health care provider. This is important.  · Keep a headache journal to help find out what may trigger your headaches. For example, write down:  ¨ What you eat and drink.  ¨ How much sleep you get.  ¨ Any change to your diet or medicines.  · Try massage or other relaxation techniques.  · Limit stress.  · Sit up straight, and avoid tensing your muscles.  · Do not use tobacco products, including cigarettes, chewing tobacco, or e-cigarettes. If you need help quitting, ask your health care provider.  · Exercise regularly as told by your health care provider.  · Get 7-9 hours of sleep, or the amount recommended by your health care provider.  SEEK MEDICAL CARE IF:  · Your symptoms are not helped by medicine.  · You have a headache that is different from what you normally experience.  · You have nausea or you vomit.  · You have a fever.  SEEK IMMEDIATE MEDICAL CARE IF:  · Your headache becomes severe.  · You have repeated vomiting.  · You have a stiff neck.  · You have a loss of vision.  · You have problems with speech.  · You have pain in your eye or ear.  · You have muscular weakness or loss of muscle control.  · You lose your balance or you have trouble walking.  · You feel faint or you pass out.  · You have confusion.  This information is not intended to replace advice given to you by your health care provider. Make sure you discuss any questions you have with your health care provider.  Document Released: 12/18/2006 Document Revised: 09/07/2016 Document Reviewed: 04/11/2016  [employer] Interactive Patient Education © 2017 [employer]      Palpitations  A palpitation is the feeling that your heartbeat is irregular or is faster than normal. It may feel like your heart is fluttering or skipping a beat. Palpitations are usually not a serious problem. They  may be caused by many things, including smoking, caffeine, alcohol, stress, and certain medicines. Although most causes of palpitations are not serious, palpitations can be a sign of a serious medical problem. In some cases, you may need further medical evaluation.  Follow these instructions at home:  Pay attention to any changes in your symptoms. Take these actions to help with your condition:  · Avoid the following:  ¨ Caffeinated coffee, tea, soft drinks, diet pills, and energy drinks.  ¨ Chocolate.  ¨ Alcohol.  · Do not use any tobacco products, such as cigarettes, chewing tobacco, and e-cigarettes. If you need help quitting, ask your health care provider.  · Try to reduce your stress and anxiety. Things that can help you relax include:  ¨ Yoga.  ¨ Meditation.  ¨ Physical activity, such as swimming, jogging, or walking.  ¨ Biofeedback. This is a method that helps you learn to use your mind to control things in your body, such as your heartbeats.  · Get plenty of rest and sleep.  · Take over-the-counter and prescription medicines only as told by your health care provider.  · Keep all follow-up visits as told by your health care provider. This is important.  Contact a health care provider if:  · You continue to have a fast or irregular heartbeat after 24 hours.  · Your palpitations occur more often.  Get help right away if:  · You have chest pain or shortness of breath.  · You have a severe headache.  · You feel dizzy or you faint.  This information is not intended to replace advice given to you by your health care provider. Make sure you discuss any questions you have with your health care provider.  Document Released: 12/15/2001 Document Revised: 05/22/2017 Document Reviewed: 09/01/2016  [employer] Interactive Patient Education © 2017 [employer]      Near-Syncope  Introduction  Near-syncope is when you suddenly become weak or dizzy, or you feel like you might pass out (faint). During an episode of near-syncope,  you may:  · Feel dizzy or light-headed.  · Feel nauseous.  · See all white or all black in your field of vision.  · Have cold, clammy skin.  This condition is caused by a sudden decrease in blood flow to the brain. This decrease can result from various causes, but most of those causes are not dangerous. However, near-syncope can be a sign of a serious medical problem, so it is important to seek medical care.  If you fainted, get medical help right away.Call your local emergency services (911 in the [place] Do not drive yourself to the hospital.  Follow these instructions at home:  Pay attention to any changes in your symptoms. Take these actions to help with your condition:  · Have someone stay with you until you feel stable.  · Do not drive, use machinery, or play sports until your health care provider says it is okay.  · Keep all follow-up visits as told by your health care provider. This is important.  · If you start to feel like you might faint, lie down right away and raise (elevate) your feet above the level of your heart. Breathe deeply and steadily. Wait until all of the symptoms have passed.  · Drink enough fluid to keep your urine clear or pale yellow.  · If you are taking blood pressure or heart medicine, get up slowly and take several minutes to sit and then stand. This can reduce dizziness.  · Take over-the-counter and prescription medicines only as told by your health care provider.  Get help right away if:  · You have a severe headache.  · You have unusual pain in your chest, abdomen, or back.  · You are bleeding from your mouth or rectum, or you have black or tarry stool.  · You have a very fast or irregular heartbeat (palpitations).  · You faint once or repeatedly.  · You have a seizure.  · You are confused.  · You have trouble walking.  · You have severe weakness.  · You have vision problems.  These symptoms may represent a serious problem that is an emergency. Do not wait to see if your symptoms  will go away. Get medical help right away. Call your local emergency services (911 in the [place] Do not drive yourself to the hospital.   This information is not intended to replace advice given to you by your health care provider. Make sure you discuss any questions you have with your health care provider.  Document Released: 12/18/2006 Document Revised: 05/25/2017 Document Reviewed: 08/31/2016  © 2017 [employer]vier

## 2018-06-09 NOTE — ED PROVIDER NOTES
ED Provider Note    CHIEF COMPLAINT  Chief Complaint   Patient presents with   • Headache   • Dizziness     reports after working out,          HPI  Chanelle Ortiz is a 42 y.o. female who presents to the ED with palpitations headache abdominal pain near syncope.  The patient states that she worked out, ate something, took a nap, woke up with her heart racing, she had a generalized headache, some abdominal pain.  Unknown how long the symptoms lasted and then now she is about back to normal.  She is concerned that maybe her sugar ambulance checked her sugar was 195.  Patient states she has a slight headache now, no abdominal pains, nausea vomiting, hematuria, dysuria.  The patient states that she is off all of her diabetes medicines.  The patient was seen and evaluated for similar symptoms yesterday and had a full workup including blood tests, EKG that were all unremarkable.  Patient denies any numbness, tingling, weakness    REVIEW OF SYSTEMS  See HPI for further details.      PAST MEDICAL HISTORY  Past Medical History:   Diagnosis Date   • Arrhythmia    • Bronchitis    • Diabetes     5 YEARS AGO, DIET CONTROLLED   • Hypertension    • Hyperthyroidism     Treated with Radioactive iodine last year   • Psychiatric problem        FAMILY HISTORY  Family History   Problem Relation Age of Onset   • Other Mother    • Heart Failure Sister    • Heart Attack Maternal Grandfather        SOCIAL HISTORY  Social History     Social History   • Marital status: Legally      Spouse name: N/A   • Number of children: N/A   • Years of education: N/A     Social History Main Topics   • Smoking status: Never Smoker   • Smokeless tobacco: Never Used   • Alcohol use No   • Drug use: No   • Sexual activity: Not on file     Other Topics Concern   • Not on file     Social History Narrative   • No narrative on file       SURGICAL HISTORY  Past Surgical History:   Procedure Laterality Date   • OTHER ABDOMINAL SURGERY  2002     "cholesystectomy   • GYN SURGERY      ovarian cyst and c sections   • OTHER      tonsillectomy       CURRENT MEDICATIONS  Home Medications     Reviewed by Greta Sorenson R.N. (Registered Nurse) on 06/09/18 at 1453  Med List Status: Partial   Medication Last Dose Status   ferrous sulfate 325 (65 Fe) MG EC tablet  Active   fexofenadine-pseudoephedrine (ALLEGRA-D)  MG per tablet  Active   glimepiride (AMARYL) 4 MG Tab 12/21/2017 Active   Levothyroxine Sodium 150 MCG Cap 12/22/2017 Active   methocarbamol (ROBAXIN) 750 MG Tab  Active   pantoprazole (PROTONIX) 20 MG tablet  Active                ALLERGIES  Allergies   Allergen Reactions   • Morphine      \"I think\" \"I dunno what my reaction was, the nurse just said my oxygen levels were going way to low on it\"        PHYSICAL EXAM  VITAL SIGNS: /60   Pulse 89   Temp 36.1 °C (97 °F) (Temporal)   Resp 16   Wt (!) 126 kg (277 lb 12.5 oz)   SpO2 96%   BMI 49.21 kg/m²   Constitutional: Well morbidly obese, mild distress, Non-toxic appearance.   HENT: Normocephalic, Atraumatic, Bilateral external ears normal, Oropharynx moist, No oral exudates, Nose normal.   Eyes: PERRLA, EOMI, Conjunctiva normal, No discharge.   Neck: Normal range of motion, mild paraspinal tenderness palpation, full range of motion, no nuchal rigidity  Cardiovascular: Regular rate and rhythm, no murmurs  Thorax & Lungs: Clear to auscultation bilaterally  Abdomen: Bowel sounds normal, Soft, No tenderness, No masses, No pulsatile masses.   Skin: Warm, Dry, No erythema, No rash.   Back: No tenderness, No CVA tenderness.   Extremities: Intact distal pulses, No tenderness, No cyanosis, No clubbing.  No edema  Neurologic: Alert & oriented x 3, Normal motor function, Normal sensory function, No focal deficits noted.     Results for orders placed or performed during the hospital encounter of 06/08/18   CBC WITH DIFFERENTIAL   Result Value Ref Range    WBC 9.3 4.8 - 10.8 K/uL    RBC 4.57 4.20 - 5.40 " M/uL    Hemoglobin 10.6 (L) 12.0 - 16.0 g/dL    Hematocrit 35.2 (L) 37.0 - 47.0 %    MCV 77.0 (L) 81.4 - 97.8 fL    MCH 23.2 (L) 27.0 - 33.0 pg    MCHC 30.1 (L) 33.6 - 35.0 g/dL    RDW 42.6 35.9 - 50.0 fL    Platelet Count 194 164 - 446 K/uL    MPV 11.9 9.0 - 12.9 fL    Neutrophils-Polys 65.00 44.00 - 72.00 %    Lymphocytes 22.00 22.00 - 41.00 %    Monocytes 7.80 0.00 - 13.40 %    Eosinophils 4.30 0.00 - 6.90 %    Basophils 0.60 0.00 - 1.80 %    Immature Granulocytes 0.30 0.00 - 0.90 %    Nucleated RBC 0.00 /100 WBC    Neutrophils (Absolute) 6.02 2.00 - 7.15 K/uL    Lymphs (Absolute) 2.04 1.00 - 4.80 K/uL    Monos (Absolute) 0.72 0.00 - 0.85 K/uL    Eos (Absolute) 0.40 0.00 - 0.51 K/uL    Baso (Absolute) 0.06 0.00 - 0.12 K/uL    Immature Granulocytes (abs) 0.03 0.00 - 0.11 K/uL    NRBC (Absolute) 0.00 K/uL   COMP METABOLIC PANEL   Result Value Ref Range    Sodium 139 135 - 145 mmol/L    Potassium 3.8 3.6 - 5.5 mmol/L    Chloride 106 96 - 112 mmol/L    Co2 28 20 - 33 mmol/L    Anion Gap 5.0 0.0 - 11.9    Glucose 115 (H) 65 - 99 mg/dL    Bun 12 8 - 22 mg/dL    Creatinine 0.59 0.50 - 1.40 mg/dL    Calcium 8.6 8.5 - 10.5 mg/dL    AST(SGOT) 12 12 - 45 U/L    ALT(SGPT) 10 2 - 50 U/L    Alkaline Phosphatase 69 30 - 99 U/L    Total Bilirubin 0.3 0.1 - 1.5 mg/dL    Albumin 3.5 3.2 - 4.9 g/dL    Total Protein 6.8 6.0 - 8.2 g/dL    Globulin 3.3 1.9 - 3.5 g/dL    A-G Ratio 1.1 g/dL   BETA-HCG QUALITATIVE SERUM   Result Value Ref Range    Beta-Hcg Qualitative Serum Negative Negative   ESTIMATED GFR   Result Value Ref Range    GFR If African American >60 >60 mL/min/1.73 m 2    GFR If Non African American >60 >60 mL/min/1.73 m 2   EKG (NOW)   Result Value Ref Range    Report       [facility] Emergency Dept.    Test Date:  2018-06-08  Pt Name:    PRATIBHA CUELLAR              Department: ER  MRN:        [id number]                      Room:       [facility]  Gender:     Female                       Technician:  [id number]  :        1975                   Requested By:ER TRIAGE PROTOCOL  Order #:    [id number]                    Reading MD: RICHARD OWUSU MD    Measurements  Intervals                                Axis  Rate:       68                           P:          89  ID:         [id number]                          QRS:        81  QRSD:       98                           T:          36  QT:         400  QTc:        426    Interpretive Statements  SINUS RHYTHM  BORDERLINE R WAVE PROGRESSION, ANTERIOR LEADS  BORDERLINE T ABNORMALITIES, ANTERIOR LEADS  Compared to ECG 2018 05:06:05  No significant changes    Electronically Signed On 2018 14:53:40 PDT by RICHARD OWUSU MD          COURSE & MEDICAL DECISION MAKING  Pertinent Labs & Imaging studies reviewed. (See chart for details)  Patient with palpitations, near syncope, headache that appears to be tension and abdominal pain.  The patient had a workup done here yesterday.  I do not feel the need to repeat these tests.  This does not appear to be acute coronary syndrome.  I have called the schedule to try to get the patient a follow-up appoint with cardiology due to the palpitations to determine if this is a cardiac arrhythmia driving her symptoms.  Patient's blood sugar was 195 here.  She will follow-up with her primary care physician, stay hydrated, return with any concerns.    FINAL IMPRESSION  1. Palpitations    2. Near syncope    3. Tension headache           This dictation was created using voice recognition software. The accuracy of the dictation is limited to the abilities of the software. I expect there may be some errors of grammar and possibly content. The nursing notes were reviewed and certain aspects of this information were incorporated into this note.    Electronically signed by: Richard Owusu 2018 3:07 PM

## 2018-06-30 ENCOUNTER — APPOINTMENT (OUTPATIENT)
Dept: RADIOLOGY | Facility: MEDICAL CENTER | Age: 43
End: 2018-06-30
Attending: EMERGENCY MEDICINE
Payer: MEDICAID

## 2018-06-30 ENCOUNTER — HOSPITAL ENCOUNTER (EMERGENCY)
Facility: MEDICAL CENTER | Age: 43
End: 2018-06-30
Attending: EMERGENCY MEDICINE
Payer: MEDICAID

## 2018-06-30 VITALS
HEIGHT: 63 IN | BODY MASS INDEX: 48.37 KG/M2 | TEMPERATURE: 97.9 F | OXYGEN SATURATION: 96 % | RESPIRATION RATE: 15 BRPM | DIASTOLIC BLOOD PRESSURE: 67 MMHG | WEIGHT: 273 LBS | HEART RATE: 67 BPM | SYSTOLIC BLOOD PRESSURE: 116 MMHG

## 2018-06-30 DIAGNOSIS — J18.9 PNEUMONIA OF LEFT LOWER LOBE DUE TO INFECTIOUS ORGANISM: ICD-10-CM

## 2018-06-30 LAB
ALBUMIN SERPL BCP-MCNC: 3.7 G/DL (ref 3.2–4.9)
ALBUMIN/GLOB SERPL: 1.1 G/DL
ALP SERPL-CCNC: 82 U/L (ref 30–99)
ALT SERPL-CCNC: 11 U/L (ref 2–50)
ANION GAP SERPL CALC-SCNC: 8 MMOL/L (ref 0–11.9)
ANISOCYTOSIS BLD QL SMEAR: ABNORMAL
AST SERPL-CCNC: 17 U/L (ref 12–45)
BASOPHILS # BLD AUTO: 2 % (ref 0–1.8)
BASOPHILS # BLD: 0.18 K/UL (ref 0–0.12)
BILIRUB SERPL-MCNC: 0.3 MG/DL (ref 0.1–1.5)
BUN SERPL-MCNC: 17 MG/DL (ref 8–22)
CALCIUM SERPL-MCNC: 8.9 MG/DL (ref 8.5–10.5)
CHLORIDE SERPL-SCNC: 105 MMOL/L (ref 96–112)
CO2 SERPL-SCNC: 28 MMOL/L (ref 20–33)
CREAT SERPL-MCNC: 0.75 MG/DL (ref 0.5–1.4)
DEPRECATED D DIMER PPP IA-ACNC: <200 NG/ML(D-DU)
EKG IMPRESSION: NORMAL
EOSINOPHIL # BLD AUTO: 0.46 K/UL (ref 0–0.51)
EOSINOPHIL NFR BLD: 5 % (ref 0–6.9)
ERYTHROCYTE [DISTWIDTH] IN BLOOD BY AUTOMATED COUNT: 47.6 FL (ref 35.9–50)
GLOBULIN SER CALC-MCNC: 3.5 G/DL (ref 1.9–3.5)
GLUCOSE SERPL-MCNC: 107 MG/DL (ref 65–99)
HCG SERPL QL: NEGATIVE
HCT VFR BLD AUTO: 37.4 % (ref 37–47)
HGB BLD-MCNC: 11.1 G/DL (ref 12–16)
LYMPHOCYTES # BLD AUTO: 3 K/UL (ref 1–4.8)
LYMPHOCYTES NFR BLD: 33 % (ref 22–41)
MANUAL DIFF BLD: NORMAL
MCH RBC QN AUTO: 23.4 PG (ref 27–33)
MCHC RBC AUTO-ENTMCNC: 29.7 G/DL (ref 33.6–35)
MCV RBC AUTO: 78.7 FL (ref 81.4–97.8)
MICROCYTES BLD QL SMEAR: ABNORMAL
MONOCYTES # BLD AUTO: 0.27 K/UL (ref 0–0.85)
MONOCYTES NFR BLD AUTO: 3 % (ref 0–13.4)
MORPHOLOGY BLD-IMP: NORMAL
NEUTROPHILS # BLD AUTO: 5.19 K/UL (ref 2–7.15)
NEUTROPHILS NFR BLD: 57 % (ref 44–72)
NRBC # BLD AUTO: 0 K/UL
NRBC BLD-RTO: 0 /100 WBC
PLATELET # BLD AUTO: 233 K/UL (ref 164–446)
PLATELET BLD QL SMEAR: NORMAL
PMV BLD AUTO: 11.9 FL (ref 9–12.9)
POTASSIUM SERPL-SCNC: 4.1 MMOL/L (ref 3.6–5.5)
PROT SERPL-MCNC: 7.2 G/DL (ref 6–8.2)
RBC # BLD AUTO: 4.75 M/UL (ref 4.2–5.4)
RBC BLD AUTO: PRESENT
SODIUM SERPL-SCNC: 141 MMOL/L (ref 135–145)
T4 FREE SERPL-MCNC: 0.68 NG/DL (ref 0.53–1.43)
TROPONIN I SERPL-MCNC: <0.01 NG/ML (ref 0–0.04)
TSH SERPL DL<=0.005 MIU/L-ACNC: 48.37 UIU/ML (ref 0.38–5.33)
WBC # BLD AUTO: 9.1 K/UL (ref 4.8–10.8)

## 2018-06-30 PROCEDURE — 85007 BL SMEAR W/DIFF WBC COUNT: CPT

## 2018-06-30 PROCEDURE — 84439 ASSAY OF FREE THYROXINE: CPT

## 2018-06-30 PROCEDURE — 85027 COMPLETE CBC AUTOMATED: CPT

## 2018-06-30 PROCEDURE — 93005 ELECTROCARDIOGRAM TRACING: CPT | Performed by: EMERGENCY MEDICINE

## 2018-06-30 PROCEDURE — 84484 ASSAY OF TROPONIN QUANT: CPT

## 2018-06-30 PROCEDURE — 36415 COLL VENOUS BLD VENIPUNCTURE: CPT

## 2018-06-30 PROCEDURE — 85379 FIBRIN DEGRADATION QUANT: CPT

## 2018-06-30 PROCEDURE — 84703 CHORIONIC GONADOTROPIN ASSAY: CPT

## 2018-06-30 PROCEDURE — 80053 COMPREHEN METABOLIC PANEL: CPT

## 2018-06-30 PROCEDURE — 700105 HCHG RX REV CODE 258: Performed by: EMERGENCY MEDICINE

## 2018-06-30 PROCEDURE — 99284 EMERGENCY DEPT VISIT MOD MDM: CPT

## 2018-06-30 PROCEDURE — 96374 THER/PROPH/DIAG INJ IV PUSH: CPT

## 2018-06-30 PROCEDURE — 84443 ASSAY THYROID STIM HORMONE: CPT

## 2018-06-30 PROCEDURE — 93005 ELECTROCARDIOGRAM TRACING: CPT

## 2018-06-30 PROCEDURE — 71045 X-RAY EXAM CHEST 1 VIEW: CPT

## 2018-06-30 RX ORDER — AZITHROMYCIN 250 MG/1
TABLET, FILM COATED ORAL
Qty: 6 TAB | Refills: 0 | Status: SHIPPED | OUTPATIENT
Start: 2018-06-30 | End: 2018-08-15

## 2018-06-30 RX ORDER — SODIUM CHLORIDE 9 MG/ML
1000 INJECTION, SOLUTION INTRAVENOUS ONCE
Status: COMPLETED | OUTPATIENT
Start: 2018-06-30 | End: 2018-06-30

## 2018-06-30 RX ADMIN — SODIUM CHLORIDE 1000 ML: 9 INJECTION, SOLUTION INTRAVENOUS at 02:24

## 2018-06-30 ASSESSMENT — PAIN SCALES - GENERAL: PAINLEVEL_OUTOF10: 7

## 2018-06-30 NOTE — ED TRIAGE NOTES
Pt a/o x4 arrives via [facility] for SOB/CP. Pt states she awoke from sleep abruptly to use the bathroom and noticed she was SOB and having some chest tightness that radiates to her back. Pt uses CPAP at home and was using it tonight. Pt denies N/V/D, fevers and chills. Denies any cardiac complaints in the past.

## 2018-06-30 NOTE — DISCHARGE INSTRUCTIONS
Community-Acquired Pneumonia, Adult  Pneumonia is an infection of the lungs. There are different types of pneumonia. One type can develop while a person is in a hospital. A different type, called community-acquired pneumonia, develops in people who are not, or have not recently been, in the hospital or other health care facility.  What are the causes?  Pneumonia may be caused by bacteria, viruses, or funguses. Community-acquired pneumonia is often caused by Streptococcus pneumonia bacteria. These bacteria are often passed from one person to another by breathing in droplets from the cough or sneeze of an infected person.  What increases the risk?  The condition is more likely to develop in:  · People who have chronic diseases, such as chronic obstructive pulmonary disease (COPD), asthma, congestive heart failure, cystic fibrosis, diabetes, or kidney disease.  · People who have early-stage or late-stage HIV.  · People who have sickle cell disease.  · People who have had their spleen removed (splenectomy).  · People who have poor dental hygiene.  · People who have medical conditions that increase the risk of breathing in (aspirating) secretions their own mouth and nose.  · People who have a weakened immune system (immunocompromised).  · People who smoke.  · People who travel to areas where pneumonia-causing germs commonly exist.  · People who are around animal habitats or animals that have pneumonia-causing germs, including birds, bats, rabbits, cats, and farm animals.  What are the signs or symptoms?  Symptoms of this condition include:  · A dry cough.  · A wet (productive) cough.  · Fever.  · Sweating.  · Chest pain, especially when breathing deeply or coughing.  · Rapid breathing or difficulty breathing.  · Shortness of breath.  · Shaking chills.  · Fatigue.  · Muscle aches.  How is this diagnosed?  Your health care provider will take a medical history and perform a physical exam. You may also have other tests,  including:  · Imaging studies of your chest, including X-rays.  · Tests to check your blood oxygen level and other blood gases.  · Other tests on blood, mucus (sputum), fluid around your lungs (pleural fluid), and urine.  If your pneumonia is severe, other tests may be done to identify the specific cause of your illness.  How is this treated?  The type of treatment that you receive depends on many factors, such as the cause of your pneumonia, the medicines you take, and other medical conditions that you have. For most adults, treatment and recovery from pneumonia may occur at home. In some cases, treatment must happen in a hospital. Treatment may include:  · Antibiotic medicines, if the pneumonia was caused by bacteria.  · Antiviral medicines, if the pneumonia was caused by a virus.  · Medicines that are given by mouth or through an IV tube.  · Oxygen.  · Respiratory therapy.  Although rare, treating severe pneumonia may include:  · Mechanical ventilation. This is done if you are not breathing well on your own and you cannot maintain a safe blood oxygen level.  · Thoracentesis. This procedure removes fluid around one lung or both lungs to help you breathe better.  Follow these instructions at home:  · Take over-the-counter and prescription medicines only as told by your health care provider.  ¨ Only take cough medicine if you are losing sleep. Understand that cough medicine can prevent your body’s natural ability to remove mucus from your lungs.  ¨ If you were prescribed an antibiotic medicine, take it as told by your health care provider. Do not stop taking the antibiotic even if you start to feel better.  · Sleep in a semi-upright position at night. Try sleeping in a reclining chair, or place a few pillows under your head.  · Do not use tobacco products, including cigarettes, chewing tobacco, and e-cigarettes. If you need help quitting, ask your health care provider.  · Drink enough water to keep your urine clear  or pale yellow. This will help to thin out mucus secretions in your lungs.  How is this prevented?  There are ways that you can decrease your risk of developing community-acquired pneumonia. Consider getting a pneumococcal vaccine if:  · You are older than 65 years of age.  · You are older than 19 years of age and are undergoing cancer treatment, have chronic lung disease, or have other medical conditions that affect your immune system. Ask your health care provider if this applies to you.  There are different types and schedules of pneumococcal vaccines. Ask your health care provider which vaccination option is best for you.  You may also prevent community-acquired pneumonia if you take these actions:  · Get an influenza vaccine every year. Ask your health care provider which type of influenza vaccine is best for you.  · Go to the dentist on a regular basis.  · Wash your hands often. Use hand  if soap and water are not available.  Contact a health care provider if:  · You have a fever.  · You are losing sleep because you cannot control your cough with cough medicine.  Get help right away if:  · You have worsening shortness of breath.  · You have increased chest pain.  · Your sickness becomes worse, especially if you are an older adult or have a weakened immune system.  · You cough up blood.  This information is not intended to replace advice given to you by your health care provider. Make sure you discuss any questions you have with your health care provider.  Document Released: 12/18/2006 Document Revised: 04/27/2017 Document Reviewed: 04/13/2016  [employer] Interactive Patient Education © 2017 [employer]

## 2018-06-30 NOTE — ED PROVIDER NOTES
ED Provider Note    Scribed for Ollie Mills M.D. by Jess Palmer. 6/30/2018, 1:58 AM.    Primary care provider: CHANCE Pate  Means of arrival: Ambulance  History obtained from: Patient  History limited by: None    CHIEF COMPLAINT  Chief Complaint   Patient presents with   • Shortness of Breath       HPI  Chnaelle Ortiz is a 42 y.o. female who presents to the Emergency Department for evaluation of acute shortness of breath with associated chest tightness that radiates to her back onset tonight after waking up to use the bathroom. The patient states prior to the incident she was using her CPAP machine to sleep as she typically does. She additionally endorses dizziness and generalized fatigue that has been ongoing for the past few years, but states it has increased the past few weeks upon exertion when she walks around outside. The patient claims she was initially evaluated at the [facility] clinic for similar symptoms and discharged home with the recommendation to continue taking iron supplements. She states she is currently dieting and in the process of loosing weight, but continues to regularly eat meals. The patient is negative for nausea, vomiting, diarrhea, fever, or chills. She reports no recent medication changes. No alleviating or exacerbating factors are identified at this time.     REVIEW OF SYSTEMS  See HPI for further details. All other systems are negative.  C.     PAST MEDICAL HISTORY   has a past medical history of Arrhythmia; Bronchitis; Diabetes; Hypertension; Hyperthyroidism; and Psychiatric problem.    SURGICAL HISTORY   has a past surgical history that includes other abdominal surgery (2002); gyn surgery; and other.    SOCIAL HISTORY  Social History   Substance Use Topics   • Smoking status: Never Smoker   • Smokeless tobacco: Never Used   • Alcohol use No      History   Drug Use No       FAMILY HISTORY  Family History   Problem Relation Age of Onset   • Other Mother    • Heart  "Failure Sister    • Heart Attack Maternal Grandfather        CURRENT MEDICATIONS  Reviewed.  See Encounter Summary.     ALLERGIES  Allergies   Allergen Reactions   • Morphine      \"I think\" \"I dunno what my reaction was, the nurse just said my oxygen levels were going way to low on it\"        PHYSICAL EXAM  VITAL SIGNS: /67   Pulse 64   Temp 36.6 °C (97.9 °F)   Resp 13   Ht 1.6 m (5' 3\")   Wt 123.8 kg (273 lb)   LMP 06/23/2018 (Approximate)   SpO2 91%   BMI 48.36 kg/m²   Constitutional: Alert in no apparent distress. Overweight.   HENT: No signs of trauma, Bilateral external ears normal, Nose normal.   Eyes: Pupils are equal and reactive, Conjunctiva normal, Non-icteric.   Neck: Normal range of motion, No tenderness, Supple, No stridor.   Lymphatic: No lymphadenopathy noted.   Cardiovascular: Regular rate and rhythm, no murmurs.   Thorax & Lungs: Normal breath sounds, No respiratory distress, No wheezing, No chest tenderness.   Abdomen: Bowel sounds normal, Soft, No tenderness, No masses, No pulsatile masses. No peritoneal signs.  Skin: Warm, Dry, No erythema, No rash.   Back: No bony tenderness, No CVA tenderness.   Extremities: Intact distal pulses, No edema, No tenderness, No cyanosis  Musculoskeletal: Good range of motion in all major joints. No tenderness to palpation or major deformities noted.   Neurologic: Alert , Normal motor function, Normal sensory function, No focal deficits noted.   Psychiatric: Affect normal, Judgment normal, Mood normal.     DIAGNOSTIC STUDIES / PROCEDURES     LABS  Results for orders placed or performed during the hospital encounter of 06/30/18   CBC w/ Differential   Result Value Ref Range    WBC 9.1 4.8 - 10.8 K/uL    RBC 4.75 4.20 - 5.40 M/uL    Hemoglobin 11.1 (L) 12.0 - 16.0 g/dL    Hematocrit 37.4 37.0 - 47.0 %    MCV 78.7 (L) 81.4 - 97.8 fL    MCH 23.4 (L) 27.0 - 33.0 pg    MCHC 29.7 (L) 33.6 - 35.0 g/dL    RDW 47.6 35.9 - 50.0 fL    Platelet Count 233 164 - 446 " K/uL    MPV 11.9 9.0 - 12.9 fL    Neutrophils-Polys 57.00 44.00 - 72.00 %    Lymphocytes 33.00 22.00 - 41.00 %    Monocytes 3.00 0.00 - 13.40 %    Eosinophils 5.00 0.00 - 6.90 %    Basophils 2.00 (H) 0.00 - 1.80 %    Nucleated RBC 0.00 /100 WBC    Neutrophils (Absolute) 5.19 2.00 - 7.15 K/uL    Lymphs (Absolute) 3.00 1.00 - 4.80 K/uL    Monos (Absolute) 0.27 0.00 - 0.85 K/uL    Eos (Absolute) 0.46 0.00 - 0.51 K/uL    Baso (Absolute) 0.18 (H) 0.00 - 0.12 K/uL    NRBC (Absolute) 0.00 K/uL    Anisocytosis 2+     Microcytosis 2+    Complete Metabolic Panel (CMP)   Result Value Ref Range    Sodium 141 135 - 145 mmol/L    Potassium 4.1 3.6 - 5.5 mmol/L    Chloride 105 96 - 112 mmol/L    Co2 28 20 - 33 mmol/L    Anion Gap 8.0 0.0 - 11.9    Glucose 107 (H) 65 - 99 mg/dL    Bun 17 8 - 22 mg/dL    Creatinine 0.75 0.50 - 1.40 mg/dL    Calcium 8.9 8.5 - 10.5 mg/dL    AST(SGOT) 17 12 - 45 U/L    ALT(SGPT) 11 2 - 50 U/L    Alkaline Phosphatase 82 30 - 99 U/L    Total Bilirubin 0.3 0.1 - 1.5 mg/dL    Albumin 3.7 3.2 - 4.9 g/dL    Total Protein 7.2 6.0 - 8.2 g/dL    Globulin 3.5 1.9 - 3.5 g/dL    A-G Ratio 1.1 g/dL   Troponin STAT   Result Value Ref Range    Troponin I <0.01 0.00 - 0.04 ng/mL   D-Dimer (only helpful in low pre-test probability wells critieria. Do not order if patient ruled out by PERC criteria. See Weblinks at top of Labs section)   Result Value Ref Range    D-Dimer Screen <200 <250 ng/mL(D-DU)   TSH (for screening thyroid dysfunction)   Result Value Ref Range    TSH 48.370 (H) 0.380 - 5.330 uIU/mL   Free Thyroxine (add to TSH for patients with existing thyroid dysfunction)   Result Value Ref Range    Free T-4 0.68 0.53 - 1.43 ng/dL   HCG Qual Serum   Result Value Ref Range    Beta-Hcg Qualitative Serum Negative Negative   ESTIMATED GFR   Result Value Ref Range    GFR If African American >60 >60 mL/min/1.73 m 2    GFR If Non African American >60 >60 mL/min/1.73 m 2   DIFFERENTIAL MANUAL   Result Value Ref Range     Manual Diff Status PERFORMED    PERIPHERAL SMEAR REVIEW   Result Value Ref Range    Peripheral Smear Review see below    PLATELET ESTIMATE   Result Value Ref Range    Plt Estimation Normal    MORPHOLOGY   Result Value Ref Range    RBC Morphology Present    EKG (ER)   Result Value Ref Range    Report       [facility] Emergency Dept.    Test Date:  2018  Pt Name:    PRATIBHA CUELLAR              Department: ER  MRN:        [id number]                      Room:       [facility]  Gender:     Female                       Technician: [id number]  :        1975                   Requested By:ER TRIAGE PROTOCOL  Order #:    [id number]                    Reading MD:    Measurements  Intervals                                Axis  Rate:       65                           P:  OK:                                      QRS:        45  QRSD:       90                           T:          18  QT:         424  QTc:        441    Interpretive Statements  ACCELERATED JUNCTIONAL ESCAPE RHYTHM  Compared to ECG 2018 10:25:46  Junctional rhythm now present  Accelerated junctional rhythm now present  Sinus rhythm no longer present  T-wave abnormality no longer present       All labs were reviewed by me.    EKG  Performed at 1:07 AM  12 Lead EKG interpreted by me to show:  Sinus rhythm  Rate 65  Axis: Normal  Intervals: Normal  No acute ST-T wave changes  Baseline artifact    RADIOLOGY  DX-CHEST-PORTABLE (1 VIEW)   Final Result         1.  Hazy left lung base density, could represent subtle infiltrates.        The radiologist's interpretation of all radiological studies and images have been reviewed by me.    COURSE & MEDICAL DECISION MAKING  Pertinent Labs & Imaging studies reviewed. (See chart for details)      1:58 AM - Patient seen and examined at bedside. Patient will be treated with 1 L NS for suspected dehydration. Ordered CBC, HCG Qual, Free Thyroxine, TSH, D-Dimer, Troponin STAT, CMP, DX-Chest, and EKG to  evaluate her symptoms.     3:50 AM - Reviewed patient's lab and radiology results as shown above.     3:58 AM - Patient was reevaluated at bedside. She is resting comfortably in bed and reports to be feeling better after IV fluids. Discussed lab and radiology results with the patient and her she will be prescribed Zithromax for possible pneumonia. Patient is agreeable to discharge.      Decision Making:  This is a 42 y.o. year old female who presents with above complaint. Ultimately it does show that a left lower lobe pneumonia may be the etiology of her generalized fatigue. With the patient's obesity considerations also included cardiac, PE, thyroid derangement, loculated derangement, dehydration. She is feeling slightly better after hydration here. She is understanding that she will need ongoing hydration at home and she will need to start and finish antibiotics as prescribed for the pneumonia. She is understanding and referral back to primary care physician for reevaluation and ongoing care.    The patient will return for new or worsening symptoms and is stable at the time of discharge.    DISPOSITION:  Patient will be discharged home in stable condition.    FOLLOW UP:  CHANCE Pate  [address] [place] [id number]  [phone]          [place] [address]  [phone]          OUTPATIENT MEDICATIONS:  Discharge Medication List as of 6/30/2018  3:56 AM      START taking these medications    Details   azithromycin (ZITHROMAX) 250 MG Tab Take two tabs by mouth on day one, then one tab by mouth daily on days 2-5., Disp-6 Tab, R-0, Print Rx Paper             FINAL IMPRESSION  1. Pneumonia of left lower lobe due to infectious organism (HCC)          Jess FRANCO (Dede), am scribing for, and in the presence of, Ollie Mills M.D..    Electronically signed by: Jess Lyn), 6/30/2018    Ollie FRANCO M.D. personally performed the  services described in this documentation, as scribed by Jess Palmer in my presence, and it is both accurate and complete.    The note accurately reflects work and decisions made by me.  Ollie Mills  7/1/2018  2:09 AM

## 2018-08-07 ENCOUNTER — HOSPITAL ENCOUNTER (EMERGENCY)
Facility: MEDICAL CENTER | Age: 43
End: 2018-08-08
Attending: EMERGENCY MEDICINE
Payer: MEDICAID

## 2018-08-07 DIAGNOSIS — R51.9 ACUTE NONINTRACTABLE HEADACHE, UNSPECIFIED HEADACHE TYPE: ICD-10-CM

## 2018-08-07 DIAGNOSIS — R10.33 PERIUMBILICAL ABDOMINAL PAIN: ICD-10-CM

## 2018-08-07 LAB
ALBUMIN SERPL BCP-MCNC: 3.9 G/DL (ref 3.2–4.9)
ALBUMIN/GLOB SERPL: 1.3 G/DL
ALP SERPL-CCNC: 76 U/L (ref 30–99)
ALT SERPL-CCNC: 15 U/L (ref 2–50)
ANION GAP SERPL CALC-SCNC: 7 MMOL/L (ref 0–11.9)
APPEARANCE UR: ABNORMAL
AST SERPL-CCNC: 13 U/L (ref 12–45)
BACTERIA #/AREA URNS HPF: ABNORMAL /HPF
BASOPHILS # BLD AUTO: 0.6 % (ref 0–1.8)
BASOPHILS # BLD: 0.06 K/UL (ref 0–0.12)
BILIRUB SERPL-MCNC: 0.2 MG/DL (ref 0.1–1.5)
BILIRUB UR QL STRIP.AUTO: NEGATIVE
BUN SERPL-MCNC: 15 MG/DL (ref 8–22)
CALCIUM SERPL-MCNC: 9 MG/DL (ref 8.5–10.5)
CHLORIDE SERPL-SCNC: 106 MMOL/L (ref 96–112)
CO2 SERPL-SCNC: 28 MMOL/L (ref 20–33)
COLOR UR: YELLOW
CREAT SERPL-MCNC: 0.6 MG/DL (ref 0.5–1.4)
EOSINOPHIL # BLD AUTO: 0.23 K/UL (ref 0–0.51)
EOSINOPHIL NFR BLD: 2.5 % (ref 0–6.9)
EPI CELLS #/AREA URNS HPF: ABNORMAL /HPF
ERYTHROCYTE [DISTWIDTH] IN BLOOD BY AUTOMATED COUNT: 53.6 FL (ref 35.9–50)
GLOBULIN SER CALC-MCNC: 3 G/DL (ref 1.9–3.5)
GLUCOSE SERPL-MCNC: 113 MG/DL (ref 65–99)
GLUCOSE UR STRIP.AUTO-MCNC: NEGATIVE MG/DL
HCG SERPL QL: NEGATIVE
HCT VFR BLD AUTO: 38.8 % (ref 37–47)
HGB BLD-MCNC: 12 G/DL (ref 12–16)
HYALINE CASTS #/AREA URNS LPF: ABNORMAL /LPF
IMM GRANULOCYTES # BLD AUTO: 0.02 K/UL (ref 0–0.11)
IMM GRANULOCYTES NFR BLD AUTO: 0.2 % (ref 0–0.9)
KETONES UR STRIP.AUTO-MCNC: NEGATIVE MG/DL
LEUKOCYTE ESTERASE UR QL STRIP.AUTO: NEGATIVE
LIPASE SERPL-CCNC: 33 U/L (ref 11–82)
LYMPHOCYTES # BLD AUTO: 3.17 K/UL (ref 1–4.8)
LYMPHOCYTES NFR BLD: 34 % (ref 22–41)
MCH RBC QN AUTO: 25.3 PG (ref 27–33)
MCHC RBC AUTO-ENTMCNC: 30.9 G/DL (ref 33.6–35)
MCV RBC AUTO: 81.7 FL (ref 81.4–97.8)
MICRO URNS: ABNORMAL
MONOCYTES # BLD AUTO: 0.74 K/UL (ref 0–0.85)
MONOCYTES NFR BLD AUTO: 7.9 % (ref 0–13.4)
NEUTROPHILS # BLD AUTO: 5.11 K/UL (ref 2–7.15)
NEUTROPHILS NFR BLD: 54.8 % (ref 44–72)
NITRITE UR QL STRIP.AUTO: NEGATIVE
NRBC # BLD AUTO: 0 K/UL
NRBC BLD-RTO: 0 /100 WBC
PH UR STRIP.AUTO: 5 [PH]
PLATELET # BLD AUTO: 201 K/UL (ref 164–446)
PMV BLD AUTO: 11.8 FL (ref 9–12.9)
POTASSIUM SERPL-SCNC: 3.8 MMOL/L (ref 3.6–5.5)
PROT SERPL-MCNC: 6.9 G/DL (ref 6–8.2)
PROT UR QL STRIP: NEGATIVE MG/DL
RBC # BLD AUTO: 4.75 M/UL (ref 4.2–5.4)
RBC # URNS HPF: ABNORMAL /HPF
RBC UR QL AUTO: NEGATIVE
SODIUM SERPL-SCNC: 141 MMOL/L (ref 135–145)
SP GR UR STRIP.AUTO: 1.03
UROBILINOGEN UR STRIP.AUTO-MCNC: 0.2 MG/DL
WBC # BLD AUTO: 9.3 K/UL (ref 4.8–10.8)
WBC #/AREA URNS HPF: ABNORMAL /HPF

## 2018-08-07 PROCEDURE — 99285 EMERGENCY DEPT VISIT HI MDM: CPT

## 2018-08-07 PROCEDURE — 96374 THER/PROPH/DIAG INJ IV PUSH: CPT

## 2018-08-07 PROCEDURE — 96375 TX/PRO/DX INJ NEW DRUG ADDON: CPT

## 2018-08-07 PROCEDURE — 700111 HCHG RX REV CODE 636 W/ 250 OVERRIDE (IP): Performed by: EMERGENCY MEDICINE

## 2018-08-07 PROCEDURE — 87086 URINE CULTURE/COLONY COUNT: CPT

## 2018-08-07 PROCEDURE — 80053 COMPREHEN METABOLIC PANEL: CPT

## 2018-08-07 PROCEDURE — 81001 URINALYSIS AUTO W/SCOPE: CPT

## 2018-08-07 PROCEDURE — 84703 CHORIONIC GONADOTROPIN ASSAY: CPT

## 2018-08-07 PROCEDURE — 93005 ELECTROCARDIOGRAM TRACING: CPT | Performed by: EMERGENCY MEDICINE

## 2018-08-07 PROCEDURE — 85025 COMPLETE CBC W/AUTO DIFF WBC: CPT

## 2018-08-07 PROCEDURE — 83690 ASSAY OF LIPASE: CPT

## 2018-08-07 RX ORDER — ONDANSETRON 2 MG/ML
4 INJECTION INTRAMUSCULAR; INTRAVENOUS ONCE
Status: COMPLETED | OUTPATIENT
Start: 2018-08-07 | End: 2018-08-07

## 2018-08-07 RX ADMIN — ONDANSETRON 4 MG: 2 INJECTION, SOLUTION INTRAMUSCULAR; INTRAVENOUS at 22:08

## 2018-08-07 RX ADMIN — FENTANYL CITRATE 50 MCG: 50 INJECTION INTRAMUSCULAR; INTRAVENOUS at 22:08

## 2018-08-07 ASSESSMENT — LIFESTYLE VARIABLES: DO YOU DRINK ALCOHOL: NO

## 2018-08-08 VITALS
BODY MASS INDEX: 42.75 KG/M2 | OXYGEN SATURATION: 96 % | SYSTOLIC BLOOD PRESSURE: 106 MMHG | DIASTOLIC BLOOD PRESSURE: 59 MMHG | RESPIRATION RATE: 16 BRPM | TEMPERATURE: 98.2 F | HEIGHT: 66 IN | WEIGHT: 266 LBS | HEART RATE: 67 BPM

## 2018-08-08 LAB — EKG IMPRESSION: NORMAL

## 2018-08-08 NOTE — DISCHARGE INSTRUCTIONS
Return if you have increasing pain, pain moves to the right lower quadrant, fever, vomiting, diarrhea, confusion, neck stiffness or vision changes.   Abdominal Pain (Nonspecific)  Your exam might not show the exact reason you have abdominal pain. Since there are many different causes of abdominal pain, another checkup and more tests may be needed. It is very important to follow up for lasting (persistent) or worsening symptoms. A possible cause of abdominal pain in any person who still has his or her appendix is acute appendicitis. Appendicitis is often hard to diagnose. Normal blood tests, urine tests, ultrasound, and CT scans do not completely rule out early appendicitis or other causes of abdominal pain. Sometimes, only the changes that happen over time will allow appendicitis and other causes of abdominal pain to be determined. Other potential problems that may require surgery may also take time to become more apparent. Because of this, it is important that you follow all of the instructions below.  HOME CARE INSTRUCTIONS   · Rest as much as possible.   · Do not eat solid food until your pain is gone.   · While adults or children have pain: A diet of water, weak decaffeinated tea, broth or bouillon, gelatin, oral rehydration solutions (ORS), frozen ice pops, or ice chips may be helpful.   · When pain is gone in adults or children: Start a light diet (dry toast, crackers, applesauce, or white rice). Increase the diet slowly as long as it does not bother you. Eat no dairy products (including cheese and eggs) and no spicy, fatty, fried, or high-fiber foods.   · Use no alcohol, caffeine, or cigarettes.   · Take your regular medicines unless your caregiver told you not to.   · Take any prescribed medicine as directed.   · Only take over-the-counter or prescription medicines for pain, discomfort, or fever as directed by your caregiver. Do not give aspirin to children.   If your caregiver has given you a follow-up  appointment, it is very important to keep that appointment. Not keeping the appointment could result in a permanent injury and/or lasting (chronic) pain and/or disability. If there is any problem keeping the appointment, you must call to reschedule.   SEEK IMMEDIATE MEDICAL CARE IF:   · Your pain is not gone in 24 hours.   · Your pain becomes worse, changes location, or feels different.   · You or your child has an oral temperature above 102° F (38.9° C), not controlled by medicine.   · Your baby is older than 3 months with a rectal temperature of 102° F (38.9° C) or higher.   · Your baby is 3 months old or younger with a rectal temperature of 100.4° F (38° C) or higher.   · You have shaking chills.   · You keep throwing up (vomiting) or cannot drink liquids.   · There is blood in your vomit or you see blood in your bowel movements.   · Your bowel movements become dark or black.   · You have frequent bowel movements.   · Your bowel movements stop (become blocked) or you cannot pass gas.   · You have bloody, frequent, or painful urination.   · You have yellow discoloration in the skin or whites of the eyes.   · Your stomach becomes bloated or bigger.   · You have dizziness or fainting.   · You have chest or back pain.   MAKE SURE YOU:   · Understand these instructions.   · Will watch your condition.   · Will get help right away if you are not doing well or get worse.   Document Released: 12/18/2006 Document Revised: 03/11/2013 Document Reviewed: 11/15/2010  [employer]® Patient Information ©2013 [employer].        General Headache Without Cause  Introduction  A headache is pain or discomfort felt around the head or neck area. There are many causes and types of headaches. In some cases, the cause may not be found.  Follow these instructions at home:  Managing pain  · Take over-the-counter and prescription medicines only as told by your doctor.  · Lie down in a dark, quiet room when you have a headache.  · If directed,  apply ice to the head and neck area:  ¨ Put ice in a plastic bag.  ¨ Place a towel between your skin and the bag.  ¨ Leave the ice on for 20 minutes, 2-3 times per day.  · Use a heating pad or hot shower to apply heat to the head and neck area as told by your doctor.  · Keep lights dim if bright lights bother you or make your headaches worse.  Eating and drinking  · Eat meals on a regular schedule.  · Lessen how much alcohol you drink.  · Lessen how much caffeine you drink, or stop drinking caffeine.  General instructions  · Keep all follow-up visits as told by your doctor. This is important.  · Keep a journal to find out if certain things bring on headaches. For example, write down:  ¨ What you eat and drink.  ¨ How much sleep you get.  ¨ Any change to your diet or medicines.  · Relax by getting a massage or doing other relaxing activities.  · Lessen stress.  · Sit up straight. Do not tighten (tense) your muscles.  · Do not use tobacco products. This includes cigarettes, chewing tobacco, or e-cigarettes. If you need help quitting, ask your doctor.  · Exercise regularly as told by your doctor.  · Get enough sleep. This often means 7-9 hours of sleep.  Contact a doctor if:  · Your symptoms are not helped by medicine.  · You have a headache that feels different than the other headaches.  · You feel sick to your stomach (nauseous) or you throw up (vomit).  · You have a fever.  Get help right away if:  · Your headache becomes really bad.  · You keep throwing up.  · You have a stiff neck.  · You have trouble seeing.  · You have trouble speaking.  · You have pain in the eye or ear.  · Your muscles are weak or you lose muscle control.  · You lose your balance or have trouble walking.  · You feel like you will pass out (faint) or you pass out.  · You have confusion.  This information is not intended to replace advice given to you by your health care provider. Make sure you discuss any questions you have with your health  care provider.  Document Released: 09/26/2009 Document Revised: 05/25/2017 Document Reviewed: 04/11/2016  © 2017 [employer]

## 2018-08-08 NOTE — ED TRIAGE NOTES
Pt biba for abdomen pain, 6/10 diffuse and increased with palpation for an hour.  Pt states normal bowel movement and urine, denies n/v/d

## 2018-08-08 NOTE — ED NOTES
D/C instructions provided to patient at bedside, verbalizes understanding and states plans for follow-up with PCP as recommended.   IV removed   All belongings accounted for, all questions answered at this time.   Pt provided MTM information and ambulated to phone in lobby.

## 2018-08-08 NOTE — ED PROVIDER NOTES
ED Provider Note    Scribed for Juan Hardin M.D. by Kishor Duenas. 8/7/2018, 9:32 PM.    Primary care provider: [facility]  Means of arrival: Ambulance  History obtained from: Patient  History limited by: None     CHIEF COMPLAINT  Chief Complaint   Patient presents with   • Abdominal Pain     HPI  Chanelle Ortiz is a 42 y.o. female who was transported via Ambulance to the Emergency Department for abdominal pain.   The patient complains of an acute onset of abdominal pain two hours prior to ED arrival at 7 PM tonight. She describes her abdominal pain is located in her mid abdomen, which is non radiating. She rates her current abdominal pain as 5/10 in severity. The patient has not treated her abdominal pain with any medications, and denies there are any alleviating factors of her pain. The patient denies any exacerbation of her abdominal pain after eating. The patient denies any history of similar abdominal pain.    The patient also complains of an intermittent headache for the last few weeks. The patient's headaches are gradual. She describes some lightheadedness associated with her headaches. The patient states her headaches have been associated with the start of her diet.  The patient has recently started a diet and has been eating a lot of whole grain foods.     The patient has an abdominal surgical history of cholecystectomy. No IV drug use or alcohol abuse. The patient denies any changes in bowel or bladder habits. She has been eating and drinking fluids well without difficulty.     The patient denies any fever, vomiting, diarrhea, constipation, chest pain, shortness of breath, dysuria, bowel or bladder incontinence.       REVIEW OF SYSTEMS  Pertinent positives include abdominal pain, headache, lightheadedness. Pertinent negatives include fever, vomiting, diarrhea, constipation, chest pain, shortness of breath, dysuria, bowel or bladder incontinence. All other systems  "negative.    PAST MEDICAL HISTORY   has a past medical history of Arrhythmia; Bronchitis; Diabetes; Hypertension; Hyperthyroidism; and Psychiatric problem.    SURGICAL HISTORY   has a past surgical history that includes other abdominal surgery (2002); gyn surgery; and other.    SOCIAL HISTORY  Social History   Substance Use Topics   • Smoking status: Never Smoker   • Smokeless tobacco: Never Used   • Alcohol use No      History   Drug Use No     FAMILY HISTORY  Family History   Problem Relation Age of Onset   • Other Mother    • Heart Failure Sister    • Heart Attack Maternal Grandfather      CURRENT MEDICATIONS  Home Medications     Reviewed by Shanel Ramirez R.N. (Registered Nurse) on 08/07/18 at 2118  Med List Status: Not Addressed   Medication Last Dose Status   azithromycin (ZITHROMAX) 250 MG Tab  Active   ferrous sulfate 325 (65 Fe) MG EC tablet  Active   fexofenadine-pseudoephedrine (ALLEGRA-D)  MG per tablet  Active   glimepiride (AMARYL) 4 MG Tab 12/21/2017 Active   Levothyroxine Sodium 150 MCG Cap 12/22/2017 Active   methocarbamol (ROBAXIN) 750 MG Tab  Active   pantoprazole (PROTONIX) 20 MG tablet  Active              ALLERGIES  Allergies   Allergen Reactions   • Morphine      \"I think\" \"I dunno what my reaction was, the nurse just said my oxygen levels were going way to low on it\"      PHYSICAL EXAM  VITAL SIGNS: /59   Pulse 70   Temp 36.8 °C (98.2 °F)   Resp 16   Ht 1.676 m (5' 6\")   Wt 120.7 kg (266 lb)   BMI 42.93 kg/m²     Constitutional: Well developed, Well nourished, mild distress.   HENT: Normocephalic, Atraumatic  Eyes: No sceral icterus.   Cardiovascular: Normal heart rate, Normal rhythm, No murmurs, equal pulses.   Pulmonary: Normal breath sounds, No respiratory distress, No wheezing, No rales, No rhonchi.  Abdomen:Obese, Soft, Pain upon palpation in mid abdomen and epigastric region. No rebound or guarding. No masses  Back: No CVA tenderness.   Skin: Warm, Dry, No erythema, " No rash.   Neurologic: Alert & oriented x 3, Normal finger to nose, Normal cranial nerves II-XII, No pronator drift. Equal strength in upper and lower extremities bilaterally.   Psychiatric: Affect normal, Judgment normal, Mood normal.      LABS  Results for orders placed or performed during the hospital encounter of 08/07/18   CBC WITH DIFFERENTIAL   Result Value Ref Range    WBC 9.3 4.8 - 10.8 K/uL    RBC 4.75 4.20 - 5.40 M/uL    Hemoglobin 12.0 12.0 - 16.0 g/dL    Hematocrit 38.8 37.0 - 47.0 %    MCV 81.7 81.4 - 97.8 fL    MCH 25.3 (L) 27.0 - 33.0 pg    MCHC 30.9 (L) 33.6 - 35.0 g/dL    RDW 53.6 (H) 35.9 - 50.0 fL    Platelet Count 201 164 - 446 K/uL    MPV 11.8 9.0 - 12.9 fL    Neutrophils-Polys 54.80 44.00 - 72.00 %    Lymphocytes 34.00 22.00 - 41.00 %    Monocytes 7.90 0.00 - 13.40 %    Eosinophils 2.50 0.00 - 6.90 %    Basophils 0.60 0.00 - 1.80 %    Immature Granulocytes 0.20 0.00 - 0.90 %    Nucleated RBC 0.00 /100 WBC    Neutrophils (Absolute) 5.11 2.00 - 7.15 K/uL    Lymphs (Absolute) 3.17 1.00 - 4.80 K/uL    Monos (Absolute) 0.74 0.00 - 0.85 K/uL    Eos (Absolute) 0.23 0.00 - 0.51 K/uL    Baso (Absolute) 0.06 0.00 - 0.12 K/uL    Immature Granulocytes (abs) 0.02 0.00 - 0.11 K/uL    NRBC (Absolute) 0.00 K/uL   CMP   Result Value Ref Range    Sodium 141 135 - 145 mmol/L    Potassium 3.8 3.6 - 5.5 mmol/L    Chloride 106 96 - 112 mmol/L    Co2 28 20 - 33 mmol/L    Anion Gap 7.0 0.0 - 11.9    Glucose 113 (H) 65 - 99 mg/dL    Bun 15 8 - 22 mg/dL    Creatinine 0.60 0.50 - 1.40 mg/dL    Calcium 9.0 8.5 - 10.5 mg/dL    AST(SGOT) 13 12 - 45 U/L    ALT(SGPT) 15 2 - 50 U/L    Alkaline Phosphatase 76 30 - 99 U/L    Total Bilirubin 0.2 0.1 - 1.5 mg/dL    Albumin 3.9 3.2 - 4.9 g/dL    Total Protein 6.9 6.0 - 8.2 g/dL    Globulin 3.0 1.9 - 3.5 g/dL    A-G Ratio 1.3 g/dL   URINALYSIS CULTURE, IF INDICATED   Result Value Ref Range    Color Yellow     Character Cloudy (A)     Specific Gravity 1.031 <1.035    Ph 5.0 5.0 -  8.0    Glucose Negative Negative mg/dL    Ketones Negative Negative mg/dL    Protein Negative Negative mg/dL    Bilirubin Negative Negative    Urobilinogen, Urine 0.2 Negative    Nitrite Negative Negative    Leukocyte Esterase Negative Negative    Occult Blood Negative Negative    Micro Urine Req Microscopic    HCG Qual Serum   Result Value Ref Range    Beta-Hcg Qualitative Serum Negative Negative   LIPASE   Result Value Ref Range    Lipase 33 11 - 82 U/L   URINE MICROSCOPIC (W/UA)   Result Value Ref Range    WBC 5-10 (A) /hpf    RBC 2-5 (A) /hpf    Bacteria Few (A) None /hpf    Epithelial Cells Few /hpf    Hyaline Cast 3-5 (A) /lpf   ESTIMATED GFR   Result Value Ref Range    GFR If African American >60 >60 mL/min/1.73 m 2    GFR If Non African American >60 >60 mL/min/1.73 m 2   EKG (ER)   Result Value Ref Range    Report       [facility] Emergency Dept.    Test Date:  2018  Pt Name:    PRATIBHA CUELLAR              Department: ER  MRN:        [id number]                      Room:       [facility]  Gender:     Female                       Technician: [id number]  :        1975                   Requested By:SERGIO URIBE  Order #:    [id number]                    Reading MD:    Measurements  Intervals                                Axis  Rate:       66                           P:          59  MS:         172                          QRS:        78  QRSD:       86                           T:          51  QT:         388  QTc:        407    Interpretive Statements  SINUS RHYTHM  BORDERLINE T ABNORMALITIES, ANTERIOR LEADS  BASELINE WANDER IN LEAD(S) V3,V5  Compared to ECG 2018 01:07:26  T-wave abnormality now present  Junctional rhythm no longer present  Accelerated junctional rhythm no longer present        All labs reviewed by me.    EKG  12 Lead EKG interpreted by me shows a normal sinus rhythm at a rate of 66. Axis normal. No ST elevations. T wave inversions in V1, V2, V3. MS  interval 172. QTc 407. Old EKG from 5/5/18 shows no significant changes. Final impression: Nonspecific T Wave abnormalities in anterior leads.      COURSE & MEDICAL DECISION MAKING  Pertinent Labs & Imaging studies reviewed. (See chart for details)    9:32 PM - Patient seen and examined at bedside. Patient ill be treated with Fentanyl 50 mcg IV, Zofran 4 mg IV. Ordered GFR, urine culture, HCG Qual, CBC, CMP, lipase, EKG to evaluate her symptoms. The differential diagnoses include but are not limited to: gastroenteritis, constipation, pancreatitis, headache      11:51 PM The patient is afebrile, and has not had any further episodes of vomiting. Headache is resolved, and the patient denies any current abdominal pain. She is feeling improved.    Discussed with the patient that at this time I am not able to identify a specific etiology of her abdominal pain.   We discussed discharge instructions, and the patient was given strict return precautions. The patient understands to return if she has increasing pain, pain moves to the right lower quadrant, fever, vomiting, diarrhea, confusion, neck stiffness or vision changes.   Advised close follow up with PCP in 3 days for recheck.           Medical Decision Making: At this point time I do not have a clear etiology for the patient's abdominal pain.  Her abdominal pain is gone away completely.  She has not had any pain or tenderness in the right lower quadrant I do not think she has acute appendicitis.  As far as the patient's headache was gradual in onset.  There is no next stiffness or fever I do not think this is meningitis.  It was not thunderclap in nature not the worst headache of her life I do not think this is subarachnoid hemorrhage.  At this point time patient will be discharged home.  She was given return guidelines for her headache as well as possible early appendicitis.    DISPOSITION:  Patient will be discharged home in stable condition.    FOLLOW UP:  Radha  CHANCE Barton  [address] [address]  [phone]    Schedule an appointment as soon as possible for a visit in 3 days        FINAL IMPRESSION  1. Periumbilical abdominal pain    2. Acute nonintractable headache, unspecified headache type        I, Kishor Duenas (Dede), am scribing for, and in the presence of, Juan Hardin M.D.    Electronically signed by: Kishor Duenas (Dede), 8/7/2018    IJuan M.D. personally performed the services described in this documentation, as scribed by Kishor Duenas in my presence, and it is both accurate and complete.    The note accurately reflects work and decisions made by me.  Juan Hardin  8/8/2018  2:55 AM

## 2018-08-10 LAB
BACTERIA UR CULT: NORMAL
SIGNIFICANT IND 70042: NORMAL
SITE SITE: NORMAL
SOURCE SOURCE: NORMAL

## 2018-08-15 ENCOUNTER — OFFICE VISIT (OUTPATIENT)
Dept: INTERNAL MEDICINE | Facility: MEDICAL CENTER | Age: 43
End: 2018-08-15
Payer: MEDICAID

## 2018-08-15 VITALS
BODY MASS INDEX: 47.77 KG/M2 | HEIGHT: 63 IN | RESPIRATION RATE: 16 BRPM | TEMPERATURE: 97.2 F | DIASTOLIC BLOOD PRESSURE: 74 MMHG | OXYGEN SATURATION: 94 % | WEIGHT: 269.6 LBS | SYSTOLIC BLOOD PRESSURE: 112 MMHG | HEART RATE: 82 BPM

## 2018-08-15 DIAGNOSIS — Z92.3 HX OF RADIOACTIVE IODINE THYROID ABLATION: ICD-10-CM

## 2018-08-15 DIAGNOSIS — Z00.00 GENERAL MEDICAL EXAMINATION: ICD-10-CM

## 2018-08-15 DIAGNOSIS — E66.01 MORBID OBESITY WITH BMI OF 45.0-49.9, ADULT (HCC): ICD-10-CM

## 2018-08-15 DIAGNOSIS — D50.9 IRON DEFICIENCY ANEMIA, UNSPECIFIED IRON DEFICIENCY ANEMIA TYPE: ICD-10-CM

## 2018-08-15 DIAGNOSIS — E11.9 TYPE 2 DIABETES MELLITUS WITHOUT COMPLICATION, WITHOUT LONG-TERM CURRENT USE OF INSULIN (HCC): ICD-10-CM

## 2018-08-15 DIAGNOSIS — E03.9 HYPOTHYROIDISM, UNSPECIFIED TYPE: ICD-10-CM

## 2018-08-15 DIAGNOSIS — G47.33 OSA ON CPAP: ICD-10-CM

## 2018-08-15 PROCEDURE — 99204 OFFICE O/P NEW MOD 45 MIN: CPT | Mod: GC | Performed by: INTERNAL MEDICINE

## 2018-08-15 RX ORDER — FERROUS GLUCONATE 324(38)MG
324 TABLET ORAL
COMMUNITY
End: 2018-08-27

## 2018-08-15 RX ORDER — LEVOTHYROXINE SODIUM 175 UG/1
175 TABLET ORAL
Qty: 90 TAB | Refills: 1 | Status: SHIPPED | OUTPATIENT
Start: 2018-08-15 | End: 2018-08-27 | Stop reason: SDUPTHER

## 2018-08-15 RX ORDER — LEVOTHYROXINE SODIUM 175 UG/1
175 TABLET ORAL
Refills: 0 | COMMUNITY
Start: 2018-07-18 | End: 2018-08-15 | Stop reason: SDUPTHER

## 2018-08-15 ASSESSMENT — PAIN SCALES - GENERAL: PAINLEVEL: NO PAIN

## 2018-08-15 NOTE — PROGRESS NOTES
New Patient    Reason to establish: New patient to establish    Chief Complaint   Patient presents with   • Thyroid Problem         HPI:   Chanelle Ortiz is a 42 y.o. female here for follow-up of Diabetes and Thyroid issues.     Thyroid.   Dx with hyperthyroid in 2007, and had radioactive iodine ablation in 2008.   Since then she has been on levothyroxine 150 mcg, since the ablation.   However, she was at the ER recently, and noted to have a TSH of 48.   She states that she has been compliant with the medication.  She notes fatigue and abdominal discomfort.  Also intolerant to cold.  Denies diarrhea or constipation, skin or hair or nail changes.       DM-2  Recent changes to meds, with an A1c - 6.2, from a year ago, but states was on meds at that time.  She had been losing weight (prior wt ~ 340 lb about 2 yrs ago, now ~ 269 lbs) and then was on glimeperide, when she decided to try diet control.  As a result, she has stopped the glimepiride.   She thinks her last A1c was about 3 months ago, that she states was about 5.4.  She states she is currently not on any medications.       Past chest pains and palpitations.   Patient has previously been evaluated in the ER for occasional chest pains, and palpitations. She notes occasional palpitations while sleeping or lying down. He does not note any active or recent chest pains. However, she has previously been evaluated for this in the ER. She has had past EKGs, noting a few mild/borderline T-wave inversions; however, these have been unchanged over several years.  She has previously had a nuclear med stress study; however, it was decided that it was unremarkable, except for artifact, secondary to body habitus.  Her echo, last year, noted normal function, without obvious valve defect or dysfunction, and EF ~ 55%.      Mental health  Patient states that she has a past history of anxiety and depression, for past couple years, after her ex went to jail.  She also notes  that she has one child with bipolar.  She denies any personal history of bipolar schizophrenia, but is very distracted / wandering in her speech and presentation.      Review of Symptoms  GEN/CONST:   Denies fever, fatigue, weakness, or significant weight change.   H/E:     Denies blurry vision or eye pain.  ENT:   Denies sinus pain, sore throat, ear pain, difficulty hearing, or tinnitus.  CARDIO:   + occasional palpitations (at night or nervous), and +2-pillow orthopnea.   Denies leg swelling.   RESP:   Denies shortness of breath, wheezing, or coughing.  +POLLY with CPAP at night.   GI:    Denies nausea, vomiting, diarrhea, constipation, or abdominal pain.   :   Denies dysuria, hematuria, hesitancy, or urgency.  HEME:  Denies easy bruising, bleeding, or problem with clots.   ALLG:  Denies allergies, asthma, or hives.    MSK:  Denies weakness, edema, joint pains or swellings, or muscle aches.   SKIN:  Denies rashes, itches, lumps/bumps, or sores.   NEURO:  Denies numbness or tingling, altered sensation, or focal weakness.    ENDO:  Denies heat intolerance, polyuria, or polydipsia.  Mild cold intolerance  PSYCH:  Denies  substance abuse, or insomnia.   + anxiety, depression,      Past Medical History:   Diagnosis Date   • Anxiety and depression 2016    after ex went to half-way   • Arrhythmia     notes occasional rapid or prominent heart beat, at night    • Diabetes 2008    on and off meds (due to concern about heart beat changes)   • H/O Hypertension - resolved     Patient states prior HTN, but resolved after changes in other medications (but off HTN meds).    • Hx of Bronchitis    • Hyperthyroidism 2008    Treated with Radioactive iodine, at [place], in 2008   • Hypothyroidism, after radioactive ablation of thyroid 2008   • POLLY on CPAP 2018    gets headaches, if not using CPAP.    • Psychiatric problem - unspecified     pt notes anxiety and depression, with counseller - pt unclear on details.         Past Surgical  "History:   Procedure Laterality Date   • GYN SURGERY      tuboligation, bilateral, during past .    • OTHER ABDOMINAL SURGERY      cholesystectomy   • OVARIAN CYSTECTOMY      unknown details or laterality.   • OTHER      tonsillectomy   • PRIMARY C SECTION       - , ,        Family History   Problem Relation Age of Onset   • Other Mother         sleep apnea   • Heart Failure Sister    • Diabetes Sister    • Heart Disease Sister    • Hypertension Sister    • Stroke Sister    • Hyperlipidemia Sister    • Heart Attack Maternal Grandfather    • Diabetes Daughter    • Psychiatry Daughter         bipolar   • Psychiatry Son         autism       Social History     Social History   • Marital status: Legally      Spouse name: N/A   • Number of children: N/A   • Years of education: N/A     Occupational History   • Not on file.     Social History Main Topics   • Smoking status: Never Smoker   • Smokeless tobacco: Never Used   • Alcohol use No   • Drug use: No   • Sexual activity: Not on file      Comment: .  Tuboligation in .      Other Topics Concern   • Not on file     Social History Narrative   • No narrative on file       Current Outpatient Prescriptions   Medication Sig Dispense Refill   • ferrous gluconate (FERGON) 324 (38 Fe) MG Tab Take 324 mg by mouth every morning with breakfast.     • levothyroxine (SYNTHROID) 175 MCG Tab Take 175 mcg by mouth every morning before breakfast. Take a half hour before breakfast.  0     No current facility-administered medications for this visit.        Allergies as of 08/15/2018 - Reviewed 08/15/2018   Allergen Reaction Noted   • Morphine  2017       Physical Exam  /74   Pulse 82   Temp 36.2 °C (97.2 °F)   Resp 16   Ht 1.59 m (5' 2.6\")   Wt 122.3 kg (269 lb 9.6 oz)   LMP 2018   SpO2 94%   Breastfeeding? No   BMI 48.37 kg/m²   General:  Alert and oriented, No apparent distress.   Morbidly " obese.  Eyes: Pupils equal and reactive. No scleral icterus.   Throat: Clear, no erythema or exudates noted.   Mallampati-3  Neck: Supple. No lymphadenopathy noted.   Obese neck, difficult to evaluate for thyroid or lymphadenopathy.  Lungs: Clear to auscultation bilaterally.  No wheezes or rales.  Cardiovascular: Regular rate and rhythm.  No murmurs, rubs or gallops.  Abdomen:  Soft.  Non-distended.  Non-tender.  No rebound or guarding noted.  Extremities: Obese legs, but No pedal edema.    Good general motion of all 4 extremities.    Neurological:  Motor function and sensation grossly intact and symmetrical.  DTR appeared somewhat decreased, bilaterally, both upper and lower extremity.   Psychological: Appears to have a somewhat wandering presentation of speech.  Appears to have a somewhat/slightly flat affect.      Labs:  Recent from ER:  Past borderline anemia with low mcv, mchc  Elevated glucose (113) on CMP.       Assessment & Plan    1. Hypothyroidism, unspecified type  2. Hx of radioactive iodine thyroid ablation  Lab from 6 weeks ago notes TSH was 48.  Patient states her Synthroid was increased from 150, to 175.  However, it's not entirely certain that she was compliant with medication at that time.  Given the time since the last TSH, will obtain new thyroid labs, for further evaluation.  Also refilling her Synthroid 175, to ensure she has it for future.  - TSH; Future  - FREE THYROXINE; Future  - TRIIDOTHYRONINE; Future  - levothyroxine (SYNTHROID) 175 MCG Tab; Take 1 Tab by mouth every morning before breakfast. Take a half hour before breakfast.  Dispense: 90 Tab; Refill: 1    3. DM-2   Last A1c in our system was 6.2, from 1 year ago (while on glimepiride).  Patient states more recent A1c was 5.4 (without medication - but, not in our computer).  Still had elevated glucose, and some past lab work from hospital (not certain if fasting).  Will obtain new A1c, for further evaluation.  Also get lipid panel,  refer to ophthalmology and diabetic education.  - LIPID PROFILE; Future  - HEMOGLOBIN A1C; Future  - REFERRAL TO OPHTHALMOLOGY  - REFERRAL TO DIABETIC EDUCATION Diabetes Self Management Education / Training (DSME/T) and Medical Nutrition Therapy (MNT): Initial Group DSME/MNT as authorized by payor, Follow-Up DSME/MNT as authorized by payor; DSME/T Content: Monitoring Diabete...    4. Iron deficiency anemia, (borderline) unspecified   Patient's recent hemoglobin was 12.0. However, his previous lower than this with clear anemia.  Has previously had low MCV, MCHC, and other signs for microcytic anemia.  Has a prior prescription for iron; however, only takes it periodically.  Has not had recent iron studies. - Will order.  - IRON/TOTAL IRON BIND; Future  - FERRITIN; Future  - TRANSFERRIN; Future    5. POLLY on CPAP  Patient states she is compliant with CPAP.    However, she also notes that when she does not use her CPAP, she gets a headache in the back of her head.  Patient recommended to continue to use CPAP regularly.    6. Morbid obesity with BMI of 45.0-49.9, adult (HCC)  Patient states she has already been dieting and exercising, with approximate 80 pound weight loss in the past 2 years.  Patient encouraged to continue with weight loss, and referred to diabetic education, for further education for diet (related to diabetes, as well as weight loss), especially considering her hypothyroidism.  - LIPID PROFILE; Future  - Patient identified as having weight management issue.  Appropriate orders and counseling given.  - REFERRAL TO DIABETIC EDUCATION Diabetes Self Management Education / Training (DSME/T) and Medical Nutrition Therapy (MNT): Initial Group DSME/MNT as authorized by payor, Follow-Up DSME/MNT as authorized by payor; DSME/T Content: Monitoring Diabete...      7. General medical examination  Patient has not had a recent Pap smear since 2004. She is agreeable to referral to GYN, for pelvic/Pap / routine  care.  She does not recall when her last eye exam was, and will be referred to ophthalmology, for eye and retinal exam, considering her diabetes.  - REFERRAL TO GYNECOLOGY  - REFERRAL TO OPHTHALMOLOGY      Health Maintenance  Dexa - N/A  Colonoscopy - done at [facility] (possibly 2017, does not think there was a problem).   Mammogram - none. (not yet required).   Pap - 2004 (told normal).  (Referred to GYN)  influ vaccine - last season.    Pneumo Vaccine -  PPSV-23 - 2009.   Tetanus - 2014  Labs < 12 mo / met screen - recent CBC/CMP  No recent lipids.  (Ordering new profile).      Patient to get new labs done, and return to clinic in 1-2 weeks.      A computerized dictation system may have been used for this note.    Despite review, there may be some spelling or grammatical errors.    Trenton Love M.D.  8/15/2018   Attending:  Abelardo Mae M.D.

## 2018-08-15 NOTE — LETTER
FINXI  Trenton Love M.D.  [address]  [place] [address]  Fax: [phone]   Authorization for Release/Disclosure of   Protected Health Information   Name: PRATIBHA CUELLAR : 1975 SSN: [id number]   Address: [address] [place] [address] [address]  [place] [address] Phone:    [phone] (home)    I authorize the entity listed below to release/disclose the PHI below to:   [facility]/Trenton Love M.D. and Trenton Love M.D.   Provider or Entity Name:   [facility]   Address   City, State, [facility]   Phone:      Fax:     Reason for request: continuity of care   Information to be released:    [  ] LAST COLONOSCOPY,  including any PATH REPORT and follow-up  [  ] LAST FIT/COLOGUARD RESULT [  ] LAST DEXA  [  ] LAST MAMMOGRAM  [  ] LAST PAP  [  ] LAST LABS [  ] RETINA EXAM REPORT  [  ] IMMUNIZATION RECORDS  [ x ] Release all info      [  ] Check here and initial the line next to each item to release ALL health information INCLUDING  _____ Care and treatment for drug and / or alcohol abuse  _____ HIV testing, infection status, or AIDS  _____ Genetic Testing    DATES OF SERVICE OR TIME PERIOD TO BE DISCLOSED: _2015 - 2018____  I understand and acknowledge that:  * This Authorization may be revoked at any time by you in writing, except if your health information has already been used or disclosed.  * Your health information that will be used or disclosed as a result of you signing this authorization could be re-disclosed by the recipient. If this occurs, your re-disclosed health information may no longer be protected by State or Federal laws.  * You may refuse to sign this Authorization. Your refusal will not affect your ability to obtain treatment.  * This Authorization becomes effective upon signing and will  on (date) __________.      If no date is indicated, this Authorization will  one (1) year from the signature date.    Name: Pratibha Cuellar    Signature:   Date:        8/15/2018       PLEASE FAX REQUESTED RECORDS BACK TO: [phone]

## 2018-08-19 PROCEDURE — 99284 EMERGENCY DEPT VISIT MOD MDM: CPT

## 2018-08-20 ENCOUNTER — HOSPITAL ENCOUNTER (EMERGENCY)
Facility: MEDICAL CENTER | Age: 43
End: 2018-08-20
Attending: EMERGENCY MEDICINE
Payer: MEDICAID

## 2018-08-20 VITALS
DIASTOLIC BLOOD PRESSURE: 83 MMHG | WEIGHT: 270.28 LBS | OXYGEN SATURATION: 89 % | TEMPERATURE: 97.8 F | BODY MASS INDEX: 48.5 KG/M2 | SYSTOLIC BLOOD PRESSURE: 131 MMHG | RESPIRATION RATE: 17 BRPM | HEART RATE: 71 BPM

## 2018-08-20 DIAGNOSIS — R21 RASH: ICD-10-CM

## 2018-08-20 PROCEDURE — 700102 HCHG RX REV CODE 250 W/ 637 OVERRIDE(OP): Performed by: EMERGENCY MEDICINE

## 2018-08-20 PROCEDURE — 96375 TX/PRO/DX INJ NEW DRUG ADDON: CPT

## 2018-08-20 PROCEDURE — 700111 HCHG RX REV CODE 636 W/ 250 OVERRIDE (IP): Performed by: EMERGENCY MEDICINE

## 2018-08-20 PROCEDURE — A9270 NON-COVERED ITEM OR SERVICE: HCPCS | Performed by: EMERGENCY MEDICINE

## 2018-08-20 PROCEDURE — 96374 THER/PROPH/DIAG INJ IV PUSH: CPT

## 2018-08-20 RX ORDER — METHYLPREDNISOLONE SODIUM SUCCINATE 125 MG/2ML
125 INJECTION, POWDER, LYOPHILIZED, FOR SOLUTION INTRAMUSCULAR; INTRAVENOUS ONCE
Status: COMPLETED | OUTPATIENT
Start: 2018-08-20 | End: 2018-08-20

## 2018-08-20 RX ORDER — DIPHENHYDRAMINE HYDROCHLORIDE 50 MG/ML
50 INJECTION INTRAMUSCULAR; INTRAVENOUS ONCE
Status: COMPLETED | OUTPATIENT
Start: 2018-08-20 | End: 2018-08-20

## 2018-08-20 RX ORDER — FAMOTIDINE 20 MG/1
20 TABLET, FILM COATED ORAL ONCE
Status: COMPLETED | OUTPATIENT
Start: 2018-08-20 | End: 2018-08-20

## 2018-08-20 RX ORDER — ONDANSETRON 2 MG/ML
4 INJECTION INTRAMUSCULAR; INTRAVENOUS ONCE
Status: COMPLETED | OUTPATIENT
Start: 2018-08-20 | End: 2018-08-20

## 2018-08-20 RX ADMIN — FAMOTIDINE 20 MG: 20 TABLET, FILM COATED ORAL at 01:05

## 2018-08-20 RX ADMIN — DIPHENHYDRAMINE HYDROCHLORIDE 50 MG: 50 INJECTION, SOLUTION INTRAMUSCULAR; INTRAVENOUS at 01:05

## 2018-08-20 RX ADMIN — METHYLPREDNISOLONE SODIUM SUCCINATE 125 MG: 125 INJECTION, POWDER, FOR SOLUTION INTRAMUSCULAR; INTRAVENOUS at 01:05

## 2018-08-20 RX ADMIN — ONDANSETRON 4 MG: 2 INJECTION, SOLUTION INTRAMUSCULAR; INTRAVENOUS at 01:24

## 2018-08-20 ASSESSMENT — LIFESTYLE VARIABLES: DO YOU DRINK ALCOHOL: NO

## 2018-08-20 NOTE — ED NOTES
Patient given DC paperwork and instructed to follow up with PCP. Patient given numbers to obtain a PCP. Patient A&O x4 and ambulatory. Patient verbalizes understanding. VSS. NAD.

## 2018-08-20 NOTE — DISCHARGE INSTRUCTIONS
You were seen in the ER for rash that looks like hives. You received some medications to help with the itching and rash and you are safe to go home. You can take Benadryl, 25-50 mg every 6-8 hours at home for the itching if needed. I would like you to follow up with a primary care physician within 48 hours for recheck. Return to the ER with new or worsening symptoms.    Hives  Introduction  Hives (urticaria) are itchy, red, swollen areas on your skin. Hives can show up on any part of your body, and they can vary in size. They can be as small as the tip of a pen or much larger. Hives often fade within 24 hours (acute hives). In other cases, new hives show up after old ones fade. This can continue for many days or weeks (chronic hives).  Hives are caused by your body's reaction to an irritant or to something that you are allergic to (trigger). You can get hives right after being around a trigger or hours later. Hives do not spread from person to person (are not contagious). Hives may get worse if you scratch them, if you exercise, or if you have worries (emotional stress).  Follow these instructions at home:  Medicines  · Take or apply over-the-counter and prescription medicines only as told by your doctor.  · If you were prescribed an antibiotic medicine, use it as told by your doctor. Do not stop taking the antibiotic even if you start to feel better.  Skin Care  · Apply cool, wet cloths (cool compresses) to the itchy, red, swollen areas.  · Do not scratch your skin. Do not rub your skin.  General instructions  · Do not take hot showers or baths. This can make itching worse.  · Do not wear tight clothes.  · Use sunscreen and wear clothing that covers your skin when you are outside.  · Avoid any triggers that cause your hives. Keep a journal to help you keep track of what causes your hives. Write down:  ¨ What medicines you take.  ¨ What you eat and drink.  ¨ What products you use on your skin.  · Keep all follow-up  visits as told by your doctor. This is important.  Contact a doctor if:  · Your symptoms are not better with medicine.  · Your joints are painful or swollen.  Get help right away if:  · You have a fever.  · You have belly pain.  · Your tongue or lips are swollen.  · Your eyelids are swollen.  · Your chest or throat feels tight.  · You have trouble breathing or swallowing.  These symptoms may be an emergency. Do not wait to see if the symptoms will go away. Get medical help right away. Call your local emergency services (911 in the [place] Do not drive yourself to the hospital.   This information is not intended to replace advice given to you by your health care provider. Make sure you discuss any questions you have with your health care provider.  Document Released: 09/26/2009 Document Revised: 05/25/2017 Document Reviewed: 10/05/2016  © 2017 [employer]

## 2018-08-20 NOTE — ED PROVIDER NOTES
"ED Provider Note    Scribed for Orlando Flood M.D. by Nate Bunn. 8/20/2018, 12:36 AM.    Primary care provider: Trenton Love M.D.  Means of arrival: Walk in  History obtained from: Patient  History limited by: None    CHIEF COMPLAINT  Chief Complaint   Patient presents with   • Rash     X \"coupla hours.\" Bilat temples       HPI  Chanelle Ortiz is a 42 y.o. female with a history of allergies who presents to the Emergency Department for evaluation of rash onset today, worsening in the last two hours. She confirms associated itchy tongue and lips. She denies any shortness of breath, difficulty swallowing, nausea, or vomiting. No new soaps or lotions. She has not taken any medications for her symptoms. No exacerbating or alleviating factors noted.    REVIEW OF SYSTEMS  Pertinent positives include rash, itchy tongue, and itchy lips. Pertinent negatives include no shortness of breath.   See HPI for further details. E    PAST MEDICAL HISTORY   has a past medical history of Anxiety and depression (2016); Arrhythmia; Diabetes (2008); H/O Hypertension - resolved; Hx of Bronchitis; Hyperthyroidism (2008); Hypothyroidism, after radioactive ablation of thyroid (2008); POLLY on CPAP (2018); and Psychiatric problem - unspecified.    SURGICAL HISTORY   has a past surgical history that includes other abdominal surgery (2002); gyn surgery (2004); other; primary c section; and ovarian cystectomy (1992).    SOCIAL HISTORY  Social History   Substance Use Topics   • Smoking status: Never Smoker   • Smokeless tobacco: Never Used   • Alcohol use No      History   Drug Use No       FAMILY HISTORY  Family History   Problem Relation Age of Onset   • Other Mother         sleep apnea   • Heart Failure Sister    • Diabetes Sister    • Heart Disease Sister    • Hypertension Sister    • Stroke Sister    • Hyperlipidemia Sister    • Heart Attack Maternal Grandfather    • Diabetes Daughter    • Psychiatry Daughter         " "bipolar   • Psychiatry Son         autism       CURRENT MEDICATIONS  Home Medications     Reviewed by Parmjit Esquivel R.N. (Registered Nurse) on 08/20/18 at 0035  Med List Status: Not Addressed   Medication Last Dose Status   ferrous gluconate (FERGON) 324 (38 Fe) MG Tab  Active   levothyroxine (SYNTHROID) 175 MCG Tab  Active                ALLERGIES  Allergies   Allergen Reactions   • Morphine      \"I think\" \"I dunno what my reaction was, the nurse just said my oxygen levels were going way to low on it\"        PHYSICAL EXAM  VITAL SIGNS: /83   Pulse 73   Temp 36.6 °C (97.8 °F)   Resp 18   Wt 122.6 kg (270 lb 4.5 oz)   SpO2 99%   BMI 48.50 kg/m²   Vitals reviewed.  Constitutional: Alert in no apparent distress.  HENT: No signs of trauma, Bilateral external ears normal, Nose normal. No posterior oropharyngeal edema. No lip or lingual edema.  Eyes: Pupils are equal and reactive, Conjunctiva normal, Non-icteric.   Neck: Normal range of motion, No tenderness, Supple, No stridor.   Lymphatic: No lymphadenopathy noted.   Cardiovascular: Regular rate and rhythm, no murmurs.   Thorax & Lungs: Normal breath sounds, No respiratory distress, No wheezing, No chest tenderness. No stridor.   Abdomen: Bowel sounds normal, Soft, No tenderness, No peritoneal signs, No masses, No pulsatile masses.   Skin: Warm, Dry, No erythema. Small hive-like rash over the volar aspect of the left wrist, hive-like rash over the bilateral temples.  Back: No bony tenderness, No CVA tenderness.   Extremities: Intact distal pulses, No edema, No tenderness, No cyanosis  Musculoskeletal: Good range of motion in all major joints. No tenderness to palpation or major deformities noted.   Neurologic: Alert , Normal motor function, Normal sensory function, No focal deficits noted.   Psychiatric: Affect normal, Judgment normal, Mood normal.     COURSE & MEDICAL DECISION MAKING  Nursing notes, VS, PMSFHx reviewed in chart.  Differential " diagnoses include but not limited to: Allergic reaction, anaphylaxis     12:36 AM Patient seen and examined at bedside. Patient arrives afebrile with normal vital signs. Patient appears well hydrated and non-toxic. The physical exam is remarkable for hive-like rash over bilateral temples and left wrist. No lip or lingual edema. No posterior oropharyngeal edema. No stridor or wheezing. Normal BP and pulse. Unlikely anaphylaxis; most likely allergy to food. Patient will be treated with Benadryl 50 mg, Pepcid 20 mg and Solu-Medrol 125 mg for her symptoms.      On reevaluation, patient is resting comfortably but does report some mild nausea. Given one dose of zofran.    Notified by nursing staff that patient feels improved and would like to be discharged. Upon final reevaluation, patient is sitting comfortably in bed. Rash is significantly improved. No new symptoms. VS remain normal and stable. Safe for d/c with close outpatient follow up. Discharged in improved condition with instructions to take benadryl as needed every 6 hours for itching. Follow up with PCP within 24 hours for recheck. Return to the ER with new or worsening symptoms.    FINAL IMPRESSION  1. Rash         Nate FRANCO (Sarahibana lilia), am scribing for, and in the presence of, Dr. Orlando Flood MD].     Electronically signed by: Nate Bunn (carroll), 8/20/18.     IDr. Orlando, personally performed the services described in this documentation as scribed by Nate Bunn in my presence, and it is both accurate and complete.    The note accurately reflects work and decisions made by me.  Orlando Flood  8/20/2018  5:57 PM

## 2018-08-20 NOTE — ED TRIAGE NOTES
"Chanelle Rain Ortiz  42 y.o. female  Chief Complaint   Patient presents with   • Rash     X \"coupla hours.\" Bilat temples       Pt amb to triage with steady gait for above complaint. Denies new soap, moisturizer, or lotion.  Pt is alert and oriented, speaking in full sentences, follows commands and responds appropriately to questions. NAD. Resp are even and unlabored.  Pt placed in lobby. Pt educated on triage process. Pt encouraged to alert staff for any changes.    "

## 2018-08-21 ENCOUNTER — PATIENT OUTREACH (OUTPATIENT)
Dept: HEALTH INFORMATION MANAGEMENT | Facility: OTHER | Age: 43
End: 2018-08-21

## 2018-08-25 LAB
CHOLEST SERPL-MCNC: 100 MG/DL (ref 100–199)
FERRITIN SERPL-MCNC: 10 NG/ML (ref 15–150)
HBA1C MFR BLD: 5.7 % (ref 4.8–5.6)
HDLC SERPL-MCNC: 29 MG/DL
IRON SATN MFR SERPL: 7 % (ref 15–55)
IRON SERPL-MCNC: 24 UG/DL (ref 27–159)
LABORATORY COMMENT REPORT: ABNORMAL
LDLC SERPL CALC-MCNC: 42 MG/DL (ref 0–99)
T3 SERPL-MCNC: 97 NG/DL (ref 71–180)
T4 FREE SERPL-MCNC: 1.47 NG/DL (ref 0.82–1.77)
TIBC SERPL-MCNC: 348 UG/DL (ref 250–450)
TRANSFERRIN SERPL-MCNC: 284 MG/DL (ref 200–370)
TRIGL SERPL-MCNC: 146 MG/DL (ref 0–149)
TSH SERPL DL<=0.005 MIU/L-ACNC: 0.87 UIU/ML (ref 0.45–4.5)
UIBC SERPL-MCNC: 324 UG/DL (ref 131–425)
VLDLC SERPL CALC-MCNC: 29 MG/DL (ref 5–40)

## 2018-08-27 ENCOUNTER — OFFICE VISIT (OUTPATIENT)
Dept: INTERNAL MEDICINE | Facility: MEDICAL CENTER | Age: 43
End: 2018-08-27
Payer: MEDICAID

## 2018-08-27 VITALS
OXYGEN SATURATION: 92 % | HEART RATE: 72 BPM | BODY MASS INDEX: 49.1 KG/M2 | RESPIRATION RATE: 18 BRPM | WEIGHT: 266.8 LBS | DIASTOLIC BLOOD PRESSURE: 68 MMHG | TEMPERATURE: 97.9 F | SYSTOLIC BLOOD PRESSURE: 114 MMHG | HEIGHT: 62 IN

## 2018-08-27 DIAGNOSIS — E11.9 TYPE 2 DIABETES MELLITUS WITHOUT COMPLICATION, WITHOUT LONG-TERM CURRENT USE OF INSULIN (HCC): ICD-10-CM

## 2018-08-27 DIAGNOSIS — E03.9 HYPOTHYROIDISM, UNSPECIFIED TYPE: ICD-10-CM

## 2018-08-27 DIAGNOSIS — G47.33 OSA ON CPAP: ICD-10-CM

## 2018-08-27 DIAGNOSIS — Z92.3 HX OF RADIOACTIVE IODINE THYROID ABLATION: ICD-10-CM

## 2018-08-27 DIAGNOSIS — D50.9 IRON DEFICIENCY ANEMIA, UNSPECIFIED IRON DEFICIENCY ANEMIA TYPE: ICD-10-CM

## 2018-08-27 DIAGNOSIS — I10 ESSENTIAL HYPERTENSION: ICD-10-CM

## 2018-08-27 DIAGNOSIS — E66.9 OBESITY, UNSPECIFIED CLASSIFICATION, UNSPECIFIED OBESITY TYPE, UNSPECIFIED WHETHER SERIOUS COMORBIDITY PRESENT: ICD-10-CM

## 2018-08-27 PROCEDURE — 99214 OFFICE O/P EST MOD 30 MIN: CPT | Mod: GC | Performed by: INTERNAL MEDICINE

## 2018-08-27 RX ORDER — LEVOTHYROXINE SODIUM 175 UG/1
175 TABLET ORAL
Qty: 30 TAB | Refills: 5 | Status: SHIPPED | OUTPATIENT
Start: 2018-08-27 | End: 2018-08-27 | Stop reason: SDUPTHER

## 2018-08-27 RX ORDER — LEVOTHYROXINE SODIUM 175 UG/1
175 TABLET ORAL
Qty: 30 TAB | Refills: 5 | Status: SHIPPED | OUTPATIENT
Start: 2018-08-27 | End: 2020-07-05

## 2018-08-27 RX ORDER — FERROUS SULFATE 325(65) MG
325 TABLET ORAL DAILY
Qty: 30 TAB | Refills: 4 | Status: SHIPPED | OUTPATIENT
Start: 2018-08-27 | End: 2019-03-13 | Stop reason: SDUPTHER

## 2018-08-27 RX ORDER — FERROUS SULFATE 325(65) MG
325 TABLET ORAL DAILY
Qty: 30 TAB | Refills: 4 | Status: SHIPPED | OUTPATIENT
Start: 2018-08-27 | End: 2018-08-27 | Stop reason: SDUPTHER

## 2018-08-27 ASSESSMENT — PAIN SCALES - GENERAL: PAINLEVEL: NO PAIN

## 2018-08-27 NOTE — PATIENT INSTRUCTIONS
Please drink more water (about 8-10 glasses a day).   Please take the iron supplements (once a day).   Please continue to take the levothyroxine every morning.     Please follow-up with the referral for pulmonology (for the CPAP equipment/monitoring).   Please keep a food journal, and write down if/when you have any odd-feelings (to see if there is a certain food, that you ate shortly before that).     Please return in about 4 months.   Thank you.

## 2018-08-27 NOTE — PROGRESS NOTES
"Established Patient     Chief Complaint   Patient presents with   • Thyroid Problem     labs and follow-up      HPI:  Chanelle Ortiz is a 42 y.o. female here for follow-up.    Thyroid  Patient has previously been seen in the ER, noted have a TSH value of 48.  At that time, she denied being off medications; however, her Synthroid was only increased. Slightly. Her TSH was retested recently, and came back at 0.868 / normal.  She denies any new hot or cold intolerance, constipation or diarrhea, or skin changes.    H/O DM-2 - resolved  Patient notes that she is gone off of the oral medications (glimepiride), after having previously lost weight (decreased from 340 lb to 269 lb) before. The last A1c, on her prior visit had an A1c of 6.2; however, on recent lab work, the new A1c was 5.7.  She states that she has still been watching her diet.  Unfortunately, she states that her referral to diabetic education, did not go through, due to insurance reasons (medicaid).     Iron deficiency Anemia  Patient previously noted to have borderline low hemoglobin at 12, as well as low MCV, MCHC, and previously recommend be on iron.  Recent iron panel shows she is low in iron, saturation, ferritin, but normal transferrin.  She notes that she has not been taking any iron on a regular basis, even though she is previously recommended to take it daily.    Additionally, patient notes that she was recently seen in the ER for rash. She states those covering parts of her face. However, she cannot clearly describe what the rash looked like (and not clearly described in the ER note), but she felt it was related to something she had previously eaten. In the ER, they given her Benadryl, and she improved.  She does not know if she has any food allergies, but states that she occasionally feels \"weird\" after eating fruit.    She additionally notes that she has previously noted some lightheadedness or fast heart rates (in the distant past), and " "has previously been advised by her prior PCP to drink more water (at least 8 glasses a day). However, she states she is still only drinking less than half of that (about 3 or 4 glasses per day - of fluids, in total).      She also notes that she is still compliant with her CPAP, for POLLY; however, she is requesting more information about his contact, for possible equipment changes/maintenance. She has previously seen someone through her prior PCP office and down \"towards [facility]\".      Review of Symptoms  GEN/CONST:   Denies fever, fatigue, weakness, or significant weight change.   H/E:     Denies blurry vision or eye pain.  ENT:   Denies  sore throat, ear pain, difficulty hearing, or tinnitus.  CARDIO:   Denies chest pain, palpitations, orthopnea, or edema.  RESP:   Denies shortness of breath, wheezing, or coughing.  GI:    Denies nausea, vomiting, diarrhea, constipation, or abdominal pain.   :   Denies dysuria, hematuria, hesitancy, or urgency.  HEME:  Denies easy bruising, bleeding,   ALLG:  Denies allergies, asthma, or hives.    MSK:  Denies weakness, edema, joint pains or swellings, or muscle aches.   SKIN:  Denies rashes, itches, or sores.   NEURO:  Denies numbness or tingling, altered sensation, or focal weakness.    ENDO:  Denies heat or cold intolerance, polyuria, or polydipsia.  PSYCH:  Denies anxiety, depression, substance abuse, or insomnia.     Past Medical History:   Diagnosis Date   • Anxiety and depression 2016    after ex went to care home   • Arrhythmia     notes occasional rapid or prominent heart beat, at night    • Diabetes 2008    on and off meds (due to concern about heart beat changes)   • H/O Hypertension - resolved     Patient states prior HTN, but resolved after changes in other medications (but off HTN meds).    • Hx of Bronchitis    • Hyperthyroidism 2008    Treated with Radioactive iodine, at [place], in 2008   • Hypothyroidism, after radioactive ablation of thyroid 2008   • POLLY " "on CPAP 2018    gets headaches, if not using CPAP.    • Psychiatric problem - unspecified     pt notes anxiety and depression, with counseller - pt unclear on details.       Past Surgical History:   Procedure Laterality Date   • GYN SURGERY      tuboligation, bilateral, during past .    • OTHER ABDOMINAL SURGERY      cholesystectomy   • OVARIAN CYSTECTOMY      unknown details or laterality.   • OTHER      tonsillectomy   • PRIMARY C SECTION       - , ,        Family History   Problem Relation Age of Onset   • Other Mother         sleep apnea   • Heart Failure Sister    • Diabetes Sister    • Heart Disease Sister    • Hypertension Sister    • Stroke Sister    • Hyperlipidemia Sister    • Heart Attack Maternal Grandfather    • Diabetes Daughter    • Psychiatry Daughter         bipolar   • Psychiatry Son         autism       Social History     Social History   • Marital status: Legally      Spouse name: N/A   • Number of children: N/A   • Years of education: N/A     Occupational History   • Not on file.     Social History Main Topics   • Smoking status: Never Smoker   • Smokeless tobacco: Never Used   • Alcohol use No   • Drug use: No   • Sexual activity: Not on file      Comment: .  Tuboligation in .      Other Topics Concern   • Not on file     Social History Narrative   • No narrative on file       Current Outpatient Prescriptions   Medication Sig Dispense Refill   • levothyroxine (SYNTHROID) 175 MCG Tab Take 1 Tab by mouth every morning before breakfast. Take a half hour before breakfast. 90 Tab 1     No current facility-administered medications for this visit.        Allergies   Allergen Reactions   • Morphine      \"I think\" \"I dunno what my reaction was, the nurse just said my oxygen levels were going way to low on it\"        Physical Exam  /68   Pulse 72   Temp 36.6 °C (97.9 °F)   Resp 18   Ht 1.585 m (5' 2.4\")   Wt 121 kg (266 lb 12.8 oz) "   LMP 08/25/2018   SpO2 92%   Breastfeeding? No   BMI 48.17 kg/m²   General:  Alert and oriented, No apparent distress.    Morbidly obese. Sitting comfortably; no acute distress.   HEENT:  PERRLA, EOMI, No icterus, No erythema or exudates.    Lungs: Clear to auscultation bilaterally.  No wheezes or rales.  Cardiovascular: Regular rate and rhythm.  No murmurs, rubs or gallops.  Abdomen:  Soft.  Non-distended.  Non-tender.  + BS.  Obese.   Extremities: No pedal edema.  Good general motion of all 4 extremities.   Neurological:  Motor function and sensation grossly intact and symmetrical.   Psychological: Appears to have normal mood, but a bit slow in speech.       Labs:  Recent labs with the following:  Hemoglobin 12.0, with low MCH and MCHC.  Low iron and saturation and ferritin;   normal transferrin.  Lipids-100, 146, 29, 42.  TSH-0.868, FT4-1.47, T3-9-7.  (Normal ranges)  A1c 5.7.        Assessment & Plan    1. Hypothyroidism, unspecified type  2. Hx of radioactive iodine thyroid ablation  Patient had previously elevated TSH (48), while in the ER on a prior visit.  Despite only minimal increase in dose, her current TSH is in low normal range of 0.868.  Although patient denies having not taken the medication before, noncompliance is a possibility.  Will continue on this dose for now.  Will need a recheck of TSH and several months, to evaluate response.  - levothyroxine (SYNTHROID) 175 MCG Tab; Take 1 Tab by mouth every morning before breakfast. Take a half hour before breakfast.  Dispense: 30 Tab; Refill: 5    3. Iron deficiency anemia  Patient has borderline low hemoglobin, but likely also dehydrated and would have lower hemoglobin levels if taking adequate fluid intake.  Low iron, MCH, MCHC, and iron profile, consistent with iron deficiency.  She notes she was previously prescribed iron supplements but has not been taking them recently.  Will prescribe iron supplements, and encourage patient to take them on a  daily basis.  She has also been advised to maintain proper fluid intake.  - ferrous sulfate 325 (65 Fe) MG tablet; Take 1 Tab by mouth every day.  Dispense: 30 Tab; Refill: 4    4. H/O hypertension - resolved.   Patient notes her blood pressure has continued to be resolved, since having lost weight.  In the office, her blood pressure is very good at 114/68.  Patient is not on any medications, and will not start.    5. POLLY on CPAP  Patient had previously received CPAP referral and supplies through prior PCP, and other location (that she is not certain of).  Will refer to pulmonology, to assist with CPAP monitoring, and equipment supply, if needed.  - REFERRAL TO PULMONOLOGY    6. Prior DM-2 - Resolved after weight loss  7. Obesity, BMI = 48  Patient continues to have an improved A1c, 5.7.  Unfortunately, she was declined for diabetic education, due to her insurance (Medicaid).  However, she has recently lost an additional 3 pounds in the past 2 weeks, through diet.  She was applauded for this, and encouraged to continue with diet and nutrition.    She has also advised to maintain proper fluid intake (at least 8 glasses a day).  She was also advised to keep a food journal: both for her diet, and to identify any possible food issues/allergens.        Instructions given to the patient:  Please drink more water (about 8-10 glasses a day).   Please take the iron supplements (once a day).   Please continue to take the levothyroxine every morning.     Please follow-up with the referral for pulmonology (for the CPAP equipment/monitoring).   Please keep a food journal, and write down if/when you have any odd-feelings (to see if there is a certain food, that you ate shortly before that).     Please return in about 4 months.   Thank you.       A computerized dictation system may have been used for this note.    Despite review, there may be some spelling or grammatical errors.    Trenton Love M.D.  8/27/2018   Attending:   Inga Fari M.D.

## 2018-08-27 NOTE — LETTER
[employer]  Trenton Love M.D.  [address]  [place] [address]  Fax: [phone]   Authorization for Release/Disclosure of   Protected Health Information   Name: PRATIBHA CUELLAR : 1975 SSN: [id number]   Address: [address] [place] [address] [address]  [place] [address] Phone:    [phone] (home)    I authorize the entity listed below to release/disclose the PHI below to:   [facility] [employer]/Trenton Love M.D. and Trenton Love M.D.   Provider or Entity Name:    [facility]      Address   City, [facility], Zip    [address] [facility] [address] ?[phone]    Phone:     [phone]     Fax:     Reason for request: continuity of care   Information to be released:    [  ] LAST COLONOSCOPY,  including any PATH REPORT and follow-up  [  ] LAST FIT/COLOGUARD RESULT [  ] LAST DEXA  [  ] LAST MAMMOGRAM  [  ] LAST PAP  [  ] LAST LABS [  ] RETINA EXAM REPORT  [  ] IMMUNIZATION RECORDS  [ x ] Release all info (for 3 yrs)        [  ] Check here and initial the line next to each item to release ALL health information INCLUDING  _____ Care and treatment for drug and / or alcohol abuse  _____ HIV testing, infection status, or AIDS  _____ Genetic Testing    DATES OF SERVICE OR TIME PERIOD TO BE DISCLOSED: _2015 - 2018______  I understand and acknowledge that:  * This Authorization may be revoked at any time by you in writing, except if your health information has already been used or disclosed.  * Your health information that will be used or disclosed as a result of you signing this authorization could be re-disclosed by the recipient. If this occurs, your re-disclosed health information may no longer be protected by State or Federal laws.  * You may refuse to sign this Authorization. Your refusal will not affect your ability to obtain treatment.  * This Authorization becomes effective upon signing and will  on (date) __________.      If no date is indicated, this Authorization will   one (1) year from the signature date.    Name: Chanelle Ortiz    Signature:   Date:     8/27/2018       PLEASE FAX REQUESTED RECORDS BACK TO: [phone]

## 2018-08-30 ENCOUNTER — TELEPHONE (OUTPATIENT)
Dept: INTERNAL MEDICINE | Facility: MEDICAL CENTER | Age: 43
End: 2018-08-30

## 2018-08-30 NOTE — TELEPHONE ENCOUNTER
Documentation Note.     (Not phone-call).   Received outside records, from [facility], [facility] [place].   Brief summary listed below.  (sent 186 pages)....   (grouped by time-frames, but with some subsequent note / comments included in intial timeframe listed).   ...  - 1/2015 - increased weight (332 lbs), from increased eating, with feeling bad. Had been started on glimepiride. Then restarted on metformin. But later notes state that she had not started metformin.  - Regular notes also suggest she has not followed up with diabetic education offered to her.  - Had been noted to be hypothyroid, and surgery on levothyroxine.  - . This notes also mention that she has been taking levothyroxine very late in the day.  - Had elevated TG started on omega-3 fatty acids.  - Also with history of vitamin D deficiency.  - Plantar fasciitis, stable. Recommended exercises.  - 8/2015, patient was seen by another provider ([facility]), and had medication changes. Apparently after stopping metformin, she noticed less GI discomfort.  .. At that time, was on Lantus 5 QHS.    - 9/2015, insulin was up to 10 units, daily at bedtime. Still with A1c 9.8. Weight had increased by that time - 344 lbs.  was also on tradjenta (linagliptan) and glimepiride.  - 2/2016, noted mild back pain, bilateral leg edema, and mild acid reflux (on ranitidine).    - 4/2016, prior complaints of arm pain and dysphagia, that she hasn't been seen at [facility] for) but no records from them yet- even then outside note).  Noted to have dysphagia, GERD, and obesity. Given trial of omeprazole.  - 5/2016 noted improving symptoms after omeprazole. (Also had some dysphagia with wheat bread; however, reports improvement after returning to white bread).  (wt - 327 lbs).   - 6/2016, again noted dysphagia, after stopping omeprazole (she didn't realize she was supposed to continue as at that time).  - Also treated, periodically, for UTIs.  - Was on Lantus  "15, glimepiride 4, levothyroxine 150, Tradjenta 5.  - 8/2016, urticaria and possible headache, and fullness tears, when exercising. Felt to be from inner ear etiology. (wt- 310 lbs).   - Also noted to have neuropathy, and using gabapentin. (wt - 302), stopped Tradjenta and continued glimepiride.  (No injectable insulins).    - recurrent UTI's.   - 6/2017, follow-up for chest pain, noted she was on Norco and Xanax, but not clearly stated why.  - 8/2017, wt - 285 lbs.  Dizzi/ightheadedness - they ordered some sort of apnea study. On glimepiride 2 mg, and levothyroxine 150 mg.   - 10/2017 - fatigue and depression.  - placed on wellbutrin , daily.  And glimepiride 2. - ordered sleep study. ... Stopped wellbutrin after 2 weeks, after making her feel dizzi. (A1c -  5.8).   - 12/2017, noted orthopnea and leg swelling (not yet started on CPAP - referred to [facility] for CPAP supplies).    - 5/2018, wt - 279 lbs.  CP- likely GERD (after food). And some allergic rhinosinusitus. ... Notes last eye exam in 11/2017.   - notes last Pneumo-vax / PPSV-23 in 7/30/2009.    - A1c - 5.4, on 5/21/2018.   - CP and LBP - f/u - with normal exam, but given methocarbamol and low dose aspirin.   - 6/2018, ER follow-up for dizziness.  Given referral for cardiology (for 9/2018), but not clearly stated why (ROS and PE were normal).  ... Also recommended iron, and Protonix.    - 7/2018, ER follow-up for PNA (LLL).  Notes having felt worse with CPAP.  (Notes pneumonia had resolved).  They also increased her levothyroxine from 150, to 175.    ... At that time, the only medication listed was levothyroxine 175.   ... Also with list of some labs.  Some recent results are listed below.  ...  5/21/2018- A1C - 5.4.  Urine-Alb(150 mg),Cre(300 mg), A:C().- \"abnormal\".   6/2/2018 -  Lipids - 102, 125, 29, 52.  BMP - 140, 4.4, 107, 29, 14, 0.65, 103.  AST, ALT, AlkPhos - 13, 11, 84.  TSH - 1.03.  ...  Trenton Love M.D.  8/30/2018 "

## 2018-09-05 ENCOUNTER — OFFICE VISIT (OUTPATIENT)
Dept: CARDIOLOGY | Facility: MEDICAL CENTER | Age: 43
End: 2018-09-05
Payer: MEDICAID

## 2018-09-05 ENCOUNTER — TELEPHONE (OUTPATIENT)
Dept: CARDIOLOGY | Facility: MEDICAL CENTER | Age: 43
End: 2018-09-05

## 2018-09-05 VITALS
SYSTOLIC BLOOD PRESSURE: 120 MMHG | OXYGEN SATURATION: 98 % | HEART RATE: 68 BPM | BODY MASS INDEX: 46.95 KG/M2 | WEIGHT: 265 LBS | DIASTOLIC BLOOD PRESSURE: 70 MMHG | HEIGHT: 63 IN

## 2018-09-05 DIAGNOSIS — R42 DIZZINESSES: ICD-10-CM

## 2018-09-05 PROCEDURE — 99242 OFF/OP CONSLTJ NEW/EST SF 20: CPT | Performed by: INTERNAL MEDICINE

## 2018-09-05 ASSESSMENT — ENCOUNTER SYMPTOMS
GASTROINTESTINAL NEGATIVE: 1
DEPRESSION: 1
PALPITATIONS: 1
RESPIRATORY NEGATIVE: 1
MUSCULOSKELETAL NEGATIVE: 1
DIZZINESS: 1
CONSTITUTIONAL NEGATIVE: 1

## 2018-09-05 NOTE — LETTER
[facility]   [address] [place] [address] [address] [address]  Phone: [phone]  Fax: [phone]              Chanelle Ortiz  1975    Encounter Date: 9/5/2018    Delta Terry M.D.          PROGRESS NOTE:  Chief Complaint   Patient presents with   • Dizziness     New patient       Subjective:   Chanelle Ortiz is a 42 y.o. female who presents today for evaluation of dizziness.  She was sent from the referral center for consultation but no referral doctor listed and she does not know who sent her here.   In reviewing the records, she was seen by Dr. Richard Zapata in the ER in June of this year after episode of palpitations and presyncope.    She has had multiple trips to the ER for a variety of complaints.  Currently she states that if she exercises she becomes a bit lightheaded and feels her heart race.  There is no history of syncope, presyncope or palpitations at rest.  She has a history of sleep apnea and states that she is diabetic.  She is also had radioactive iodine for hyperthyroidism and is now on thyroid replacement.  She was recently found to be iron deficient.  The patient is a cardiologist in 2012, for noncardiac chest pain  But has no recollection of that visit.  In reviewing her records, she has had myocardial perfusion scans in 2008, 2009, and 2017.  She also had an echo in May 2017, and a Holter performed that revealed only very rare PAC and PVC.    Past Medical History:   Diagnosis Date   • Anxiety and depression 2016    after ex went to [facility]   • Arrhythmia     notes occasional rapid or prominent heart beat, at night    • Diabetes 2008    on and off meds (due to concern about heart beat changes)   • H/O Hypertension - resolved     Patient states prior HTN, but resolved after changes in other medications (but off HTN meds).    • Hx of Bronchitis    • Hyperthyroidism 2008    Treated with Radioactive iodine, at [place], in 2008  "  • Hypothyroidism, after radioactive ablation of thyroid    • POLLY on CPAP     gets headaches, if not using CPAP.    • Psychiatric problem - unspecified     pt notes anxiety and depression, with counseller - pt unclear on details.       Past Surgical History:   Procedure Laterality Date   • GYN SURGERY      tuboligation, bilateral, during past .    • OTHER ABDOMINAL SURGERY      cholesystectomy   • OVARIAN CYSTECTOMY      unknown details or laterality.   • OTHER      tonsillectomy   • PRIMARY C SECTION       - , ,      Family History   Problem Relation Age of Onset   • Other Mother         sleep apnea   • Heart Failure Sister    • Diabetes Sister    • Heart Disease Sister    • Hypertension Sister    • Stroke Sister    • Hyperlipidemia Sister    • Heart Attack Maternal Grandfather    • Diabetes Daughter    • Psychiatry Daughter         bipolar   • Psychiatry Son         autism     Social History     Social History   • Marital status: Legally      Spouse name: N/A   • Number of children: N/A   • Years of education: N/A     Occupational History   • Not on file.     Social History Main Topics   • Smoking status: Never Smoker   • Smokeless tobacco: Never Used   • Alcohol use No   • Drug use: No   • Sexual activity: Not on file      Comment: .  Tuboligation in .      Other Topics Concern   • Not on file     Social History Narrative   • No narrative on file     Allergies   Allergen Reactions   • Morphine      \"I think\" \"I dunno what my reaction was, the nurse just said my oxygen levels were going way to low on it\"      Outpatient Encounter Prescriptions as of 2018   Medication Sig Dispense Refill   • levothyroxine (SYNTHROID) 175 MCG Tab Take 1 Tab by mouth every morning before breakfast. Take a half hour before breakfast. 30 Tab 5   • ferrous sulfate 325 (65 Fe) MG tablet Take 1 Tab by mouth every day. 30 Tab 4     No facility-administered " "encounter medications on file as of 9/5/2018.      Review of Systems   Constitutional: Negative.    HENT: Negative.    Respiratory: Negative.         History of sleep apnea.  Uses CPAP   Cardiovascular: Positive for palpitations. Negative for chest pain and leg swelling.   Gastrointestinal: Negative.    Musculoskeletal: Negative.    Skin: Negative.    Neurological: Positive for dizziness.   Endo/Heme/Allergies: Negative.    Psychiatric/Behavioral: Positive for depression.        Objective:   /70   Pulse 68   Ht 1.6 m (5' 3\")   Wt 120.2 kg (265 lb)   LMP 08/25/2018   SpO2 98%   BMI 46.94 kg/m²      Physical Exam   Constitutional: She is oriented to person, place, and time. No distress.   Obese   HENT:   Head: Normocephalic and atraumatic.   Eyes: Pupils are equal, round, and reactive to light. No scleral icterus.   Neck: No JVD present.   Cardiovascular: Normal rate and regular rhythm.    No murmur heard.  Pulmonary/Chest: No respiratory distress. She has no wheezes.   Abdominal: Soft. She exhibits no distension. There is no tenderness.   Obese   Musculoskeletal: She exhibits no edema.   Neurological: She is alert and oriented to person, place, and time.   Skin: Skin is warm and dry.   Psychiatric: Her affect is blunt. She is withdrawn.       Assessment:     [facility]. [facility]         Medical Decision Making:  Today's Assessment / Status / Plan:   The patient is an exceedingly poor historian and has a very blunted affect.  She is not sure why she is here today.  She has had multiple cardiac tests in the past and an unremarkable Holter about a year ago.  She has had approximately 10 EKGs over the last year.  I do not think further cardiac testing at this point is going to shed any light diagnostically.  She had a Holter monitor performed just over year ago that I personally reviewed.  According to the patient the Holter was placed because of the same symptoms that she had recently.  I do not think a repeat " Holter monitor is warranted at this time.  I suspect the patient has significant problems with somatization.  It appears that she is depressed.  I recommended that she increase her iron intake based on a recent lab results.      Trenton Love M.D.  [address] [place] [id number]  VIA In Basket

## 2018-09-05 NOTE — PROGRESS NOTES
Chief Complaint   Patient presents with   • Dizziness     New patient       Subjective:   Chanelle Ortiz is a 42 y.o. female who presents today for evaluation of dizziness.  She was sent from the referral center for consultation but no referral doctor listed and she does not know who sent her here.   In reviewing the records, she was seen by Dr. Richard Zapata in the ER in June of this year after episode of palpitations and presyncope.    She has had multiple trips to the ER for a variety of complaints.  Currently she states that if she exercises she becomes a bit lightheaded and feels her heart race.  There is no history of syncope, presyncope or palpitations at rest.  She has a history of sleep apnea and states that she is diabetic.  She is also had radioactive iodine for hyperthyroidism and is now on thyroid replacement.  She was recently found to be iron deficient.  The patient is a cardiologist in 2012, for noncardiac chest pain  But has no recollection of that visit.  In reviewing her records, she has had myocardial perfusion scans in 2008, 2009, and 2017.  She also had an echo in May 2017, and a Holter performed that revealed only very rare PAC and PVC.    Past Medical History:   Diagnosis Date   • Anxiety and depression 2016    [facility]   • Arrhythmia     notes occasional rapid or prominent heart beat, at night    • Diabetes 2008    on and off meds (due to concern about heart beat changes)   • H/O Hypertension - resolved     Patient states prior HTN, but resolved after changes in other medications (but off HTN meds).    • Hx of Bronchitis    • Hyperthyroidism 2008    Treated with Radioactive iodine, at [place], in 2008   • Hypothyroidism, after radioactive ablation of thyroid 2008   • POLLY on CPAP 2018    gets headaches, if not using CPAP.    • Psychiatric problem - unspecified     pt notes anxiety and depression, with counseller - pt unclear on details.       Past Surgical History:  "  Procedure Laterality Date   • GYN SURGERY      tuboligation, bilateral, during past .    • OTHER ABDOMINAL SURGERY      cholesystectomy   • OVARIAN CYSTECTOMY      unknown details or laterality.   • OTHER      tonsillectomy   • PRIMARY C SECTION       - , ,      Family History   Problem Relation Age of Onset   • Other Mother         sleep apnea   • Heart Failure Sister    • Diabetes Sister    • Heart Disease Sister    • Hypertension Sister    • Stroke Sister    • Hyperlipidemia Sister    • Heart Attack Maternal Grandfather    • Diabetes Daughter    • Psychiatry Daughter         bipolar   • Psychiatry Son         autism     Social History     Social History   • Marital status: Legally      Spouse name: N/A   • Number of children: N/A   • Years of education: N/A     Occupational History   • Not on file.     Social History Main Topics   • Smoking status: Never Smoker   • Smokeless tobacco: Never Used   • Alcohol use No   • Drug use: No   • Sexual activity: Not on file      Comment: .  Tuboligation in .      Other Topics Concern   • Not on file     Social History Narrative   • No narrative on file     Allergies   Allergen Reactions   • Morphine      \"I think\" \"I dunno what my reaction was, the nurse just said my oxygen levels were going way to low on it\"      Outpatient Encounter Prescriptions as of 2018   Medication Sig Dispense Refill   • levothyroxine (SYNTHROID) 175 MCG Tab Take 1 Tab by mouth every morning before breakfast. Take a half hour before breakfast. 30 Tab 5   • ferrous sulfate 325 (65 Fe) MG tablet Take 1 Tab by mouth every day. 30 Tab 4     No facility-administered encounter medications on file as of 2018.      Review of Systems   Constitutional: Negative.    HENT: Negative.    Respiratory: Negative.         History of sleep apnea.  Uses CPAP   Cardiovascular: Positive for palpitations. Negative for chest pain and leg swelling. " "  Gastrointestinal: Negative.    Musculoskeletal: Negative.    Skin: Negative.    Neurological: Positive for dizziness.   Endo/Heme/Allergies: Negative.    Psychiatric/Behavioral: Positive for depression.        Objective:   /70   Pulse 68   Ht 1.6 m (5' 3\")   Wt 120.2 kg (265 lb)   LMP 08/25/2018   SpO2 98%   BMI 46.94 kg/m²     Physical Exam   Constitutional: She is oriented to person, place, and time. No distress.   Obese   HENT:   Head: Normocephalic and atraumatic.   Eyes: Pupils are equal, round, and reactive to light. No scleral icterus.   Neck: No JVD present.   Cardiovascular: Normal rate and regular rhythm.    No murmur heard.  Pulmonary/Chest: No respiratory distress. She has no wheezes.   Abdominal: Soft. She exhibits no distension. There is no tenderness.   Obese   Musculoskeletal: She exhibits no edema.   Neurological: She is alert and oriented to person, place, and time.   Skin: Skin is warm and dry.   Psychiatric: Her affect is blunt. She is withdrawn.       Assessment:     1. [facility]         Medical Decision Making:  Today's Assessment / Status / Plan:   The patient is an exceedingly poor historian and has a very blunted affect.  She is not sure why she is here today.  She has had multiple cardiac tests in the past and an unremarkable Holter about a year ago.  She has had approximately 10 EKGs over the last year.  I do not think further cardiac testing at this point is going to shed any light diagnostically.  She had a Holter monitor performed just over year ago that I personally reviewed.  According to the patient the Holter was placed because of the same symptoms that she had recently.  I do not think a repeat Holter monitor is warranted at this time.  I suspect the patient has significant problems with somatization.  It appears that she is depressed.  I recommended that she increase her iron intake based on a recent lab results.  "

## 2018-09-19 ENCOUNTER — HOSPITAL ENCOUNTER (EMERGENCY)
Facility: MEDICAL CENTER | Age: 43
End: 2018-09-20
Attending: EMERGENCY MEDICINE
Payer: MEDICAID

## 2018-09-19 DIAGNOSIS — N39.0 ACUTE UTI: ICD-10-CM

## 2018-09-19 LAB
ALBUMIN SERPL BCP-MCNC: 4 G/DL (ref 3.2–4.9)
ALBUMIN/GLOB SERPL: 1.1 G/DL
ALP SERPL-CCNC: 97 U/L (ref 30–99)
ALT SERPL-CCNC: 25 U/L (ref 2–50)
ANION GAP SERPL CALC-SCNC: 7 MMOL/L (ref 0–11.9)
APPEARANCE UR: ABNORMAL
AST SERPL-CCNC: 16 U/L (ref 12–45)
BACTERIA #/AREA URNS HPF: ABNORMAL /HPF
BASOPHILS # BLD AUTO: 0.6 % (ref 0–1.8)
BASOPHILS # BLD: 0.04 K/UL (ref 0–0.12)
BILIRUB SERPL-MCNC: 0.3 MG/DL (ref 0.1–1.5)
BILIRUB UR QL STRIP.AUTO: NEGATIVE
BUN SERPL-MCNC: 12 MG/DL (ref 8–22)
CALCIUM SERPL-MCNC: 9.2 MG/DL (ref 8.5–10.5)
CHLORIDE SERPL-SCNC: 104 MMOL/L (ref 96–112)
CO2 SERPL-SCNC: 27 MMOL/L (ref 20–33)
COLOR UR: ABNORMAL
CREAT SERPL-MCNC: 0.7 MG/DL (ref 0.5–1.4)
EOSINOPHIL # BLD AUTO: 0.07 K/UL (ref 0–0.51)
EOSINOPHIL NFR BLD: 1 % (ref 0–6.9)
EPI CELLS #/AREA URNS HPF: ABNORMAL /HPF
ERYTHROCYTE [DISTWIDTH] IN BLOOD BY AUTOMATED COUNT: 50.9 FL (ref 35.9–50)
GLOBULIN SER CALC-MCNC: 3.8 G/DL (ref 1.9–3.5)
GLUCOSE SERPL-MCNC: 117 MG/DL (ref 65–99)
GLUCOSE UR STRIP.AUTO-MCNC: NEGATIVE MG/DL
HCG UR QL: NEGATIVE
HCT VFR BLD AUTO: 44.5 % (ref 37–47)
HGB BLD-MCNC: 14 G/DL (ref 12–16)
HYALINE CASTS #/AREA URNS LPF: ABNORMAL /LPF
IMM GRANULOCYTES # BLD AUTO: 0.02 K/UL (ref 0–0.11)
IMM GRANULOCYTES NFR BLD AUTO: 0.3 % (ref 0–0.9)
KETONES UR STRIP.AUTO-MCNC: NEGATIVE MG/DL
LEUKOCYTE ESTERASE UR QL STRIP.AUTO: ABNORMAL
LIPASE SERPL-CCNC: 27 U/L (ref 11–82)
LYMPHOCYTES # BLD AUTO: 1.88 K/UL (ref 1–4.8)
LYMPHOCYTES NFR BLD: 27 % (ref 22–41)
MCH RBC QN AUTO: 26.1 PG (ref 27–33)
MCHC RBC AUTO-ENTMCNC: 31.5 G/DL (ref 33.6–35)
MCV RBC AUTO: 83 FL (ref 81.4–97.8)
MICRO URNS: ABNORMAL
MONOCYTES # BLD AUTO: 0.7 K/UL (ref 0–0.85)
MONOCYTES NFR BLD AUTO: 10 % (ref 0–13.4)
NEUTROPHILS # BLD AUTO: 4.26 K/UL (ref 2–7.15)
NEUTROPHILS NFR BLD: 61.1 % (ref 44–72)
NITRITE UR QL STRIP.AUTO: NEGATIVE
NRBC # BLD AUTO: 0 K/UL
NRBC BLD-RTO: 0 /100 WBC
PH UR STRIP.AUTO: 5 [PH]
PLATELET # BLD AUTO: 211 K/UL (ref 164–446)
PMV BLD AUTO: 11.9 FL (ref 9–12.9)
POTASSIUM SERPL-SCNC: 3.7 MMOL/L (ref 3.6–5.5)
PROT SERPL-MCNC: 7.8 G/DL (ref 6–8.2)
PROT UR QL STRIP: 100 MG/DL
RBC # BLD AUTO: 5.36 M/UL (ref 4.2–5.4)
RBC # URNS HPF: >150 /HPF
RBC UR QL AUTO: ABNORMAL
SODIUM SERPL-SCNC: 138 MMOL/L (ref 135–145)
SP GR UR REFRACTOMETRY: 1.03
SP GR UR STRIP.AUTO: 1.03
UROBILINOGEN UR STRIP.AUTO-MCNC: 1 MG/DL
WBC # BLD AUTO: 7 K/UL (ref 4.8–10.8)
WBC #/AREA URNS HPF: ABNORMAL /HPF

## 2018-09-19 PROCEDURE — 96375 TX/PRO/DX INJ NEW DRUG ADDON: CPT

## 2018-09-19 PROCEDURE — 87086 URINE CULTURE/COLONY COUNT: CPT

## 2018-09-19 PROCEDURE — 36415 COLL VENOUS BLD VENIPUNCTURE: CPT

## 2018-09-19 PROCEDURE — 85025 COMPLETE CBC W/AUTO DIFF WBC: CPT

## 2018-09-19 PROCEDURE — 80053 COMPREHEN METABOLIC PANEL: CPT

## 2018-09-19 PROCEDURE — 81001 URINALYSIS AUTO W/SCOPE: CPT

## 2018-09-19 PROCEDURE — 81025 URINE PREGNANCY TEST: CPT

## 2018-09-19 PROCEDURE — 96372 THER/PROPH/DIAG INJ SC/IM: CPT

## 2018-09-19 PROCEDURE — 96374 THER/PROPH/DIAG INJ IV PUSH: CPT

## 2018-09-19 PROCEDURE — 700111 HCHG RX REV CODE 636 W/ 250 OVERRIDE (IP): Performed by: EMERGENCY MEDICINE

## 2018-09-19 PROCEDURE — 99285 EMERGENCY DEPT VISIT HI MDM: CPT

## 2018-09-19 PROCEDURE — 83690 ASSAY OF LIPASE: CPT

## 2018-09-19 RX ORDER — ONDANSETRON 2 MG/ML
4 INJECTION INTRAMUSCULAR; INTRAVENOUS ONCE
Status: COMPLETED | OUTPATIENT
Start: 2018-09-19 | End: 2018-09-19

## 2018-09-19 RX ORDER — DICYCLOMINE HYDROCHLORIDE 10 MG/ML
20 INJECTION INTRAMUSCULAR ONCE
Status: COMPLETED | OUTPATIENT
Start: 2018-09-19 | End: 2018-09-19

## 2018-09-19 RX ORDER — KETOROLAC TROMETHAMINE 30 MG/ML
15 INJECTION, SOLUTION INTRAMUSCULAR; INTRAVENOUS ONCE
Status: COMPLETED | OUTPATIENT
Start: 2018-09-19 | End: 2018-09-19

## 2018-09-19 RX ADMIN — DICYCLOMINE HYDROCHLORIDE 20 MG: 10 INJECTION INTRAMUSCULAR at 23:26

## 2018-09-19 RX ADMIN — KETOROLAC TROMETHAMINE 15 MG: 30 INJECTION INTRAMUSCULAR; INTRAVENOUS at 23:27

## 2018-09-19 RX ADMIN — ONDANSETRON 4 MG: 2 INJECTION INTRAMUSCULAR; INTRAVENOUS at 23:26

## 2018-09-19 ASSESSMENT — PAIN DESCRIPTION - DESCRIPTORS: DESCRIPTORS: BURNING;SHARP

## 2018-09-19 ASSESSMENT — LIFESTYLE VARIABLES: DO YOU DRINK ALCOHOL: NO

## 2018-09-19 ASSESSMENT — PAIN SCALES - GENERAL: PAINLEVEL_OUTOF10: 7

## 2018-09-20 VITALS
WEIGHT: 267.42 LBS | SYSTOLIC BLOOD PRESSURE: 141 MMHG | HEIGHT: 63 IN | HEART RATE: 66 BPM | OXYGEN SATURATION: 96 % | RESPIRATION RATE: 33 BRPM | DIASTOLIC BLOOD PRESSURE: 82 MMHG | TEMPERATURE: 96.8 F | BODY MASS INDEX: 47.38 KG/M2

## 2018-09-20 RX ORDER — NITROFURANTOIN 25; 75 MG/1; MG/1
100 CAPSULE ORAL 2 TIMES DAILY
Qty: 10 CAP | Refills: 0 | Status: SHIPPED | OUTPATIENT
Start: 2018-09-20 | End: 2018-09-25

## 2018-09-20 NOTE — ED NOTES
Pt provided cloudy dark gurwinder urine specimen  Saline lock initiated, labs drawn    Returned patient's call. She stated she has indigestion, bloating, and back pain. She would like to be seen to day. She also stated she has been taking her pain medication 3 times a day due to the pain. She has a few left a and needs a refill for the weekend.

## 2018-09-20 NOTE — ED TRIAGE NOTES
Pt reports lower abdominal pain x 1 day, pt has had 2 loose stools, mild nausea, no vomiting, pt reports starting her period today with normal amount of bleeding,  Pt describes the pain is dull with sharp episodes of pain

## 2018-09-20 NOTE — DISCHARGE INSTRUCTIONS
Usted tiene tenisha infección del tracto urinario. Esta puede ser la causa de tu dolor. Complete la receta de antibióticos que le hemos proporcionado. [place] todas las píldoras según lo indicado, hasta que se hayan uriah, incluso si se siente mejor angella.

## 2018-09-20 NOTE — ED PROVIDER NOTES
ED Provider Note    Scribed for Jose Jean M.D. by Jose Jean. 9/19/2018,  10:59 PM.    CHIEF COMPLAINT  Chief Complaint   Patient presents with   • Abdominal Pain   • Nausea       HPI  Chanelle Ortiz is a 42 y.o. female who presents to the Emergency Department complaining of one day of lower abdominal pain with 2 loose stools. Symptoms started yesterday at rest. She says that yesterday she thought it might be food poisoning. She reports mild nausea, no vomiting, reports that she started a normal menstrual cycle today. Her abdominal pain is described as moderate and dull, with sharp, non-sugared episodes of sharp pain with no obvious exacerbating or relieving factors. She says that sometimes the pain radiates to her low back. She is not sure whether or not this might be menstrual cramps. She denies fevers or chills, chest pain or shortness of breath.      REVIEW OF SYSTEMS  See HPI for further details. All other systems are negative.     PAST MEDICAL HISTORY   has a past medical history of Anxiety and depression (2016); Arrhythmia; Diabetes (2008); H/O Hypertension - resolved; Hx of Bronchitis; Hyperthyroidism (2008); Hypothyroidism, after radioactive ablation of thyroid (2008); POLLY on CPAP (2018); and Psychiatric problem - unspecified.    SOCIAL HISTORY  Social History     Social History Main Topics   • Smoking status: Never Smoker   • Smokeless tobacco: Never Used   • Alcohol use No   • Drug use: No   • Sexual activity: Not on file      Comment: .  Tuboligation in 2004.      History   Drug Use No       SURGICAL HISTORY   has a past surgical history that includes other abdominal surgery (2002); gyn surgery (2004); other; primary c section; ovarian cystectomy (1992); cholecystectomy; and primary c section.    CURRENT MEDICATIONS  Home Medications     Reviewed by Yamila Khan R.N. (Registered Nurse) on 09/19/18 at 2226  Med List Status: Not Addressed   Medication Last Dose Status  "  ferrous sulfate 325 (65 Fe) MG tablet 9/5/2018 Active   levothyroxine (SYNTHROID) 175 MCG Tab 9/5/2018 Active                ALLERGIES  Allergies   Allergen Reactions   • Morphine      \"I think\" \"I dunno what my reaction was, the nurse just said my oxygen levels were going way to low on it\"        PHYSICAL EXAM  VITAL SIGNS: /82   Pulse 68   Temp 36 °C (96.8 °F)   Resp (!) 28   Ht 1.6 m (5' 3\")   Wt 121.3 kg (267 lb 6.7 oz)   LMP 09/19/2018 (Exact Date)   SpO2 95%   Breastfeeding? No Comment: not on birthcontrol , states she is not sexually active  BMI 47.37 kg/m²   Pulse ox interpretation: I interpret this pulse ox as normal.  Constitutional: Alert in no apparent distress. Anxious.  HENT: No signs of trauma, Bilateral external ears normal, Nose normal.   Eyes: Conjunctiva normal, Non-icteric.   Neck: Normal range of motion, Supple, No stridor.   Lymphatic: No lymphadenopathy noted.   Cardiovascular: Regular rate and rhythm, no murmurs.   Thorax & Lungs: Normal breath sounds, No respiratory distress, No wheezing, No chest tenderness.   Abdomen: Morbidly obese, Bowel sounds normal, Soft, No tenderness, No masses, No pulsatile masses. No peritoneal signs.  Skin: Warm, Dry, No erythema, No rash.   Extremities: Intact distal pulses, No edema, No cyanosis.  Musculoskeletal: Good range of motion in all major joints. No or major deformities noted.   Neurologic: Alert , Normal motor function, Normal sensory function, No focal deficits noted.   Psychiatric: Affect normal, Judgment normal, Mood normal.     DIAGNOSTIC STUDIES / PROCEDURES      LABS  Labs Reviewed   CBC WITH DIFFERENTIAL - Abnormal; Notable for the following:        Result Value    MCH 26.1 (*)     MCHC 31.5 (*)     RDW 50.9 (*)     All other components within normal limits   COMP METABOLIC PANEL - Abnormal; Notable for the following:     Glucose 117 (*)     Globulin 3.8 (*)     All other components within normal limits   URINALYSIS,CULTURE IF " INDICATED - Abnormal; Notable for the following:     Character Cloudy (*)     Protein 100 (*)     Leukocyte Esterase Small (*)     Occult Blood Large (*)     All other components within normal limits   URINE MICROSCOPIC (W/UA) - Abnormal; Notable for the following:     WBC Packed (*)     RBC >150 (*)     Bacteria Few (*)     All other components within normal limits   LIPASE   HCG QUALITATIVE UR   REFRACTOMETER SG   ESTIMATED GFR   URINE CULTURE(NEW)     All labs reviewed by me.    RADIOLOGY  No orders to display     The radiologist's interpretation of all radiological studies have been reviewed by me.    COURSE & MEDICAL DECISION MAKING  Nursing notes, VS, PMSFHx reviewed in chart.     10:59 PM Patient seen and examined at bedside. Differential diagnosis includes but is not limited to food poisoning, viral gastroenteritis, urinary tract infection, menstrual cramps, pregnancy, ectopic pregnancy, less likely bacterial infection, given 24 hours of symptoms without any vital sign abnormalities or any evidence of peritoneal signs. Ordered for screening laboratory test with blood and urine to evaluate. Patient will be treated with Toradol, Bentyl, Zofran for her symptoms.     12:08 AM this patient's laboratory tests are unremarkable with the exception of strong evidence of urinary tract infection.  This may be the cause of her symptoms.  There is no evidence of any other cause.  She is discharged with a prescription for Macrobid.  I have given primary care follow-up options as well.       The patient will return for new or worsening symptoms and is stable at the time of discharge.    The patient is referred to a primary physician for blood pressure management, diabetic screening, and for all other preventative health concerns.      DISPOSITION:  Patient will be discharged home in stable condition.    FOLLOW UP:  [address] [address]  [phone]        [address]  [address]  [facility] [id number]        [address] [address] [phone]          OUTPATIENT MEDICATIONS:  New Prescriptions    NITROFURANTOIN MONOHYDR MACRO (MACROBID) 100 MG CAP    Take 1 Cap by mouth 2 times a day for 5 days.         FINAL IMPRESSION  1. Acute UTI Active

## 2018-09-20 NOTE — ED NOTES
Written discharge instructions and script given to pt she verbalizes understanding of medication, side effects and indication, pt left er ambulatory with her daughter

## 2018-09-22 LAB
BACTERIA UR CULT: NORMAL
SIGNIFICANT IND 70042: NORMAL
SITE SITE: NORMAL
SOURCE SOURCE: NORMAL

## 2018-10-01 ENCOUNTER — PATIENT MESSAGE (OUTPATIENT)
Dept: HEALTH INFORMATION MANAGEMENT | Facility: OTHER | Age: 43
End: 2018-10-01

## 2018-10-20 ENCOUNTER — HOSPITAL ENCOUNTER (EMERGENCY)
Facility: MEDICAL CENTER | Age: 43
End: 2018-10-21
Attending: EMERGENCY MEDICINE
Payer: MEDICAID

## 2018-10-20 ENCOUNTER — APPOINTMENT (OUTPATIENT)
Dept: RADIOLOGY | Facility: MEDICAL CENTER | Age: 43
End: 2018-10-20
Attending: EMERGENCY MEDICINE
Payer: MEDICAID

## 2018-10-20 DIAGNOSIS — R07.9 CHEST PAIN, UNSPECIFIED TYPE: ICD-10-CM

## 2018-10-20 DIAGNOSIS — G44.209 TENSION HEADACHE: ICD-10-CM

## 2018-10-20 LAB
ALBUMIN SERPL BCP-MCNC: 4 G/DL (ref 3.2–4.9)
ALBUMIN/GLOB SERPL: 1.1 G/DL
ALP SERPL-CCNC: 89 U/L (ref 30–99)
ALT SERPL-CCNC: 18 U/L (ref 2–50)
ANION GAP SERPL CALC-SCNC: 8 MMOL/L (ref 0–11.9)
APTT PPP: 30.2 SEC (ref 24.7–36)
AST SERPL-CCNC: 15 U/L (ref 12–45)
BASOPHILS # BLD AUTO: 0.7 % (ref 0–1.8)
BASOPHILS # BLD: 0.08 K/UL (ref 0–0.12)
BILIRUB SERPL-MCNC: 0.4 MG/DL (ref 0.1–1.5)
BNP SERPL-MCNC: 10 PG/ML (ref 0–100)
BUN SERPL-MCNC: 12 MG/DL (ref 8–22)
CALCIUM SERPL-MCNC: 9.3 MG/DL (ref 8.5–10.5)
CHLORIDE SERPL-SCNC: 102 MMOL/L (ref 96–112)
CO2 SERPL-SCNC: 26 MMOL/L (ref 20–33)
CREAT SERPL-MCNC: 0.67 MG/DL (ref 0.5–1.4)
EKG IMPRESSION: NORMAL
EOSINOPHIL # BLD AUTO: 0.2 K/UL (ref 0–0.51)
EOSINOPHIL NFR BLD: 1.7 % (ref 0–6.9)
ERYTHROCYTE [DISTWIDTH] IN BLOOD BY AUTOMATED COUNT: 45.1 FL (ref 35.9–50)
GLOBULIN SER CALC-MCNC: 3.8 G/DL (ref 1.9–3.5)
GLUCOSE SERPL-MCNC: 96 MG/DL (ref 65–99)
HCT VFR BLD AUTO: 43.6 % (ref 37–47)
HGB BLD-MCNC: 13.6 G/DL (ref 12–16)
IMM GRANULOCYTES # BLD AUTO: 0.04 K/UL (ref 0–0.11)
IMM GRANULOCYTES NFR BLD AUTO: 0.3 % (ref 0–0.9)
INR PPP: 1 (ref 0.87–1.13)
LIPASE SERPL-CCNC: 28 U/L (ref 11–82)
LYMPHOCYTES # BLD AUTO: 3.46 K/UL (ref 1–4.8)
LYMPHOCYTES NFR BLD: 29.6 % (ref 22–41)
MCH RBC QN AUTO: 26 PG (ref 27–33)
MCHC RBC AUTO-ENTMCNC: 31.2 G/DL (ref 33.6–35)
MCV RBC AUTO: 83.2 FL (ref 81.4–97.8)
MONOCYTES # BLD AUTO: 0.76 K/UL (ref 0–0.85)
MONOCYTES NFR BLD AUTO: 6.5 % (ref 0–13.4)
NEUTROPHILS # BLD AUTO: 7.13 K/UL (ref 2–7.15)
NEUTROPHILS NFR BLD: 61.2 % (ref 44–72)
NRBC # BLD AUTO: 0 K/UL
NRBC BLD-RTO: 0 /100 WBC
PLATELET # BLD AUTO: 235 K/UL (ref 164–446)
PMV BLD AUTO: 11.4 FL (ref 9–12.9)
POTASSIUM SERPL-SCNC: 3.5 MMOL/L (ref 3.6–5.5)
PROT SERPL-MCNC: 7.8 G/DL (ref 6–8.2)
PROTHROMBIN TIME: 13.3 SEC (ref 12–14.6)
RBC # BLD AUTO: 5.24 M/UL (ref 4.2–5.4)
SODIUM SERPL-SCNC: 136 MMOL/L (ref 135–145)
TROPONIN I SERPL-MCNC: <0.01 NG/ML (ref 0–0.04)
WBC # BLD AUTO: 11.7 K/UL (ref 4.8–10.8)

## 2018-10-20 PROCEDURE — 85730 THROMBOPLASTIN TIME PARTIAL: CPT

## 2018-10-20 PROCEDURE — 36415 COLL VENOUS BLD VENIPUNCTURE: CPT

## 2018-10-20 PROCEDURE — 85025 COMPLETE CBC W/AUTO DIFF WBC: CPT

## 2018-10-20 PROCEDURE — 96374 THER/PROPH/DIAG INJ IV PUSH: CPT

## 2018-10-20 PROCEDURE — 71045 X-RAY EXAM CHEST 1 VIEW: CPT

## 2018-10-20 PROCEDURE — 83880 ASSAY OF NATRIURETIC PEPTIDE: CPT

## 2018-10-20 PROCEDURE — 80053 COMPREHEN METABOLIC PANEL: CPT

## 2018-10-20 PROCEDURE — 83690 ASSAY OF LIPASE: CPT

## 2018-10-20 PROCEDURE — 700111 HCHG RX REV CODE 636 W/ 250 OVERRIDE (IP): Performed by: EMERGENCY MEDICINE

## 2018-10-20 PROCEDURE — 93005 ELECTROCARDIOGRAM TRACING: CPT

## 2018-10-20 PROCEDURE — 93005 ELECTROCARDIOGRAM TRACING: CPT | Performed by: EMERGENCY MEDICINE

## 2018-10-20 PROCEDURE — 99285 EMERGENCY DEPT VISIT HI MDM: CPT

## 2018-10-20 PROCEDURE — 84484 ASSAY OF TROPONIN QUANT: CPT

## 2018-10-20 PROCEDURE — 85610 PROTHROMBIN TIME: CPT

## 2018-10-20 RX ORDER — KETOROLAC TROMETHAMINE 30 MG/ML
15 INJECTION, SOLUTION INTRAMUSCULAR; INTRAVENOUS ONCE
Status: COMPLETED | OUTPATIENT
Start: 2018-10-20 | End: 2018-10-20

## 2018-10-20 RX ADMIN — KETOROLAC TROMETHAMINE 15 MG: 30 INJECTION, SOLUTION INTRAMUSCULAR at 23:45

## 2018-10-20 ASSESSMENT — PAIN DESCRIPTION - DESCRIPTORS: DESCRIPTORS: ACHING;TENDER

## 2018-10-20 ASSESSMENT — PAIN SCALES - GENERAL: PAINLEVEL_OUTOF10: 10

## 2018-10-21 VITALS
DIASTOLIC BLOOD PRESSURE: 80 MMHG | SYSTOLIC BLOOD PRESSURE: 148 MMHG | BODY MASS INDEX: 47.66 KG/M2 | WEIGHT: 268.96 LBS | RESPIRATION RATE: 1 BRPM | OXYGEN SATURATION: 98 % | HEART RATE: 60 BPM | HEIGHT: 63 IN | TEMPERATURE: 96.7 F

## 2018-10-21 LAB — TROPONIN I SERPL-MCNC: <0.01 NG/ML (ref 0–0.04)

## 2018-10-21 PROCEDURE — 84484 ASSAY OF TROPONIN QUANT: CPT

## 2018-10-21 PROCEDURE — 36415 COLL VENOUS BLD VENIPUNCTURE: CPT

## 2018-10-21 ASSESSMENT — PAIN SCALES - GENERAL: PAINLEVEL_OUTOF10: 6

## 2018-10-21 NOTE — ED PROVIDER NOTES
ED Provider Note    ED Provider Note      Primary care provider: Trenton Love M.D.    CHIEF COMPLAINT  Chief Complaint   Patient presents with   • Chest Pain   • Head Ache       HPI  Chanelle Ortiz is a 43 y.o. female who presents to the Emergency Department with chief complaint of chest pain.  Patient reports minimal chest pain over the last month is gotten more severe over the last 2 days.  Sharp stabbing pain substernally no radiation anywhere else in the body.  Secondarily patient reports that she has been suffering from a headache over the last few days.  Initially the headache was frontal she is had some radiation of pain occiput and somewhat into her neck.  No fevers no chills there is no exertional component to her pain no shortness of breath no lower extremity edema pain swelling.  No nausea no diaphoresis.  She had a minimal cough minimal congestion no other acute symptoms or concerns at this time.      REVIEW OF SYSTEMS  10 systems reviewed and otherwise negative, pertinent positives and negatives listed in the history of present illness        PAST MEDICAL HISTORY   has a past medical history of Anxiety and depression (2016); Arrhythmia; Diabetes (2008); H/O Hypertension - resolved; Hx of Bronchitis; Hyperthyroidism (2008); Hypothyroidism, after radioactive ablation of thyroid (2008); POLLY on CPAP (2018); and Psychiatric problem - unspecified.    SURGICAL HISTORY   has a past surgical history that includes other abdominal surgery (2002); gyn surgery (2004); other; primary c section; ovarian cystectomy (1992); cholecystectomy; and primary c section.    SOCIAL HISTORY  Social History   Substance Use Topics   • Smoking status: Never Smoker   • Smokeless tobacco: Never Used   • Alcohol use No      History   Drug Use No       FAMILY HISTORY  Non-Contributory    CURRENT MEDICATIONS  Home Medications     Reviewed by Edgardo Campos R.N. (Registered Nurse) on 10/20/18 at 1431  Med List Status:  "<None>   Medication Last Dose Status   ferrous sulfate 325 (65 Fe) MG tablet 9/5/2018 Active   levothyroxine (SYNTHROID) 175 MCG Tab 9/5/2018 Active                ALLERGIES  Allergies   Allergen Reactions   • Morphine      \"I think\" \"I dunno what my reaction was, the nurse just said my oxygen levels were going way to low on it\"        PHYSICAL EXAM  VITAL SIGNS: /80   Pulse 63   Temp 35.9 °C (96.7 °F)   Resp 16   Ht 1.6 m (5' 3\")   Wt 122 kg (268 lb 15.4 oz)   SpO2 98%   BMI 47.64 kg/m²   Pulse ox interpretation: I interpret this pulse ox as normal.  Constitutional: Alert and oriented x 3, no acute Distress  HEENT: Atraumatic normocephalic, pupils are equal round reactive to light extraocular movements are intact. The nares is clear, external ears are normal, mouth shows moist mucous membranes  Neck: Supple, no JVD no tracheal deviation  Cardiovascular: Regular rate and rhythm no murmur rub or gallop 2+ pulses peripherally x4  Thorax & Lungs: No respiratory distress, no wheezes rales or rhonchi, No chest tenderness.   GI: Soft nontender nondistended positive bowel sounds, no peritoneal signs  Skin: Warm dry no acute rash or lesion  Musculoskeletal: Moving all extremities with full range and 5 of 5 strength, no acute  deformity  Neurologic: Cranial nerves III through XII are grossly intact, no sensory deficit, no cerebellar dysfunction   Psychiatric: Appropriate affect for situation at this time      DIAGNOSTIC STUDIES / PROCEDURES  LABS      Results for orders placed or performed during the hospital encounter of 10/20/18   Troponin   Result Value Ref Range    Troponin I <0.01 0.00 - 0.04 ng/mL   Btype Natriuretic Peptide   Result Value Ref Range    B Natriuretic Peptide 10 0 - 100 pg/mL   CBC with Differential   Result Value Ref Range    WBC 11.7 (H) 4.8 - 10.8 K/uL    RBC 5.24 4.20 - 5.40 M/uL    Hemoglobin 13.6 12.0 - 16.0 g/dL    Hematocrit 43.6 37.0 - 47.0 %    MCV 83.2 81.4 - 97.8 fL    MCH 26.0 " (L) 27.0 - 33.0 pg    MCHC 31.2 (L) 33.6 - 35.0 g/dL    RDW 45.1 35.9 - 50.0 fL    Platelet Count 235 164 - 446 K/uL    MPV 11.4 9.0 - 12.9 fL    Neutrophils-Polys 61.20 44.00 - 72.00 %    Lymphocytes 29.60 22.00 - 41.00 %    Monocytes 6.50 0.00 - 13.40 %    Eosinophils 1.70 0.00 - 6.90 %    Basophils 0.70 0.00 - 1.80 %    Immature Granulocytes 0.30 0.00 - 0.90 %    Nucleated RBC 0.00 /100 WBC    Neutrophils (Absolute) 7.13 2.00 - 7.15 K/uL    Lymphs (Absolute) 3.46 1.00 - 4.80 K/uL    Monos (Absolute) 0.76 0.00 - 0.85 K/uL    Eos (Absolute) 0.20 0.00 - 0.51 K/uL    Baso (Absolute) 0.08 0.00 - 0.12 K/uL    Immature Granulocytes (abs) 0.04 0.00 - 0.11 K/uL    NRBC (Absolute) 0.00 K/uL   Complete Metabolic Panel (CMP)   Result Value Ref Range    Sodium 136 135 - 145 mmol/L    Potassium 3.5 (L) 3.6 - 5.5 mmol/L    Chloride 102 96 - 112 mmol/L    Co2 26 20 - 33 mmol/L    Anion Gap 8.0 0.0 - 11.9    Glucose 96 65 - 99 mg/dL    Bun 12 8 - 22 mg/dL    Creatinine 0.67 0.50 - 1.40 mg/dL    Calcium 9.3 8.5 - 10.5 mg/dL    AST(SGOT) 15 12 - 45 U/L    ALT(SGPT) 18 2 - 50 U/L    Alkaline Phosphatase 89 30 - 99 U/L    Total Bilirubin 0.4 0.1 - 1.5 mg/dL    Albumin 4.0 3.2 - 4.9 g/dL    Total Protein 7.8 6.0 - 8.2 g/dL    Globulin 3.8 (H) 1.9 - 3.5 g/dL    A-G Ratio 1.1 g/dL   Prothrombin Time   Result Value Ref Range    PT 13.3 12.0 - 14.6 sec    INR 1.00 0.87 - 1.13   APTT   Result Value Ref Range    APTT 30.2 24.7 - 36.0 sec   Lipase   Result Value Ref Range    Lipase 28 11 - 82 U/L   ESTIMATED GFR   Result Value Ref Range    GFR If African American >60 >60 mL/min/1.73 m 2    GFR If Non African American >60 >60 mL/min/1.73 m 2   TROPONIN   Result Value Ref Range    Troponin I <0.01 0.00 - 0.04 ng/mL   EKG (NOW)   Result Value Ref Range    Report       [facility] Emergency Dept.    Test Date:  2018-10-20  Pt Name:    PRATIBHA CUELLAR              Department: ER  MRN:        [id number]                      Room:        BL 16  Gender:     Female                       Technician: [id number]  :        1975                   Requested By:ER TRIAGE PROTOCOL  Order #:    [id number]                    Reading MD: DESTINY FROST MD    Measurements  Intervals                                Axis  Rate:       60                           P:          58  RI:         164                          QRS:        72  QRSD:       82                           T:          34  QT:         416  QTc:        416    Interpretive Statements    normal sinus rhythm at 60    , no ST elevation no ST depression, T-wave  inversion  in lead V2 only previous EKG demonstrated T wave inversion V2 and V3.  appropriate R-wave progression normal axis normal intervals no other ischemic  or  rhythmic features  Electronically Signed On 10- 22:46:52  PDT by DESTINY FROST MD         All labs reviewed by me.      RADIOLOGY  DX-CHEST-LIMITED (1 VIEW)   Final Result         1.  No acute cardiopulmonary disease.        The radiologist's interpretation of all radiological studies have been reviewed by me.    COURSE & MEDICAL DECISION MAKING  Pertinent Labs & Imaging studies reviewed. (See chart for details)    10:39 PM - Patient seen and examined at bedside.       Patient noted to have slightly elevated blood pressure likely circumstantial secondary to presenting complaint. Referred to primary care physician for further evaluation.      Medical Decision Making: Labs unremarkable including initial troponin and repeat 3 hours later.  EKG is unremarkable.  Chest x-ray unremarkable.  Chart reviewed patient's been seen here multiple times for similar presentation previous nuclear medicine stress test with 1 within 1 year unremarkable.  Patient's pain resolved after symptomatic management in the ER she is instructed follow-up with primary care at the next available time return here for any further chest pain shortness of breath worsening headache altered mental  "status dizziness fevers chills any other acute symptoms or concerns otherwise discharged in stable and improved condition.    /80   Pulse 63   Temp 35.9 °C (96.7 °F)   Resp 16   Ht 1.6 m (5' 3\")   Wt 122 kg (268 lb 15.4 oz)   SpO2 98%   BMI 47.64 kg/m²     [facility], Emergency Dept  [address] [address] [phone]    immediately if symptoms worsen    [address] [address]  [phone]  Schedule an appointment as soon as possible for a visit   for establishment of primary care, for blood pressure management          FINAL IMPRESSION  1. Chest pain, unspecified type Active   2. Tension headache Active         This dictation has been created using voice recognition software and/or scribes. The accuracy of the dictation is limited by the abilities of the software and the expertise of the scribes. I expect there may be some errors of grammar and possibly content. I made every attempt to manually correct the errors within my dictation. However, errors related to voice recognition software and/or scribes may still exist and should be interpreted within the appropriate context.            "

## 2018-10-21 NOTE — ED TRIAGE NOTES
"Chanelle Ortiz  43 y.o. female  Chief Complaint   Patient presents with   • Chest Pain   • Head Ache       Pt to triage by  for above complaint.  \"My head hurts when I move, and my chest hurts all the time\"  Pt is alert and oriented, speaking in full sentences, follows commands and responds appropriately to questions. NAD. Resp are even and unlabored.  Pt placed in lobby. Pt educated on triage process. Pt encouraged to alert staff for any changes.  /80   Pulse 63   Temp 35.9 °C (96.7 °F)   Resp 16   Ht 1.6 m (5' 3\")   Wt 122 kg (268 lb 15.4 oz)   SpO2 98%   BMI 47.64 kg/m²     "

## 2018-10-21 NOTE — ED NOTES
Hourly rounding performed. Assessed patient complaints and Bathroom/comfort needs.    Pt resting comfortably. Reports that HA is mildly improved.

## 2018-10-31 ENCOUNTER — OFFICE VISIT (OUTPATIENT)
Dept: INTERNAL MEDICINE | Facility: MEDICAL CENTER | Age: 43
End: 2018-10-31
Payer: MEDICAID

## 2018-10-31 VITALS
OXYGEN SATURATION: 95 % | TEMPERATURE: 97.6 F | DIASTOLIC BLOOD PRESSURE: 75 MMHG | BODY MASS INDEX: 47.84 KG/M2 | SYSTOLIC BLOOD PRESSURE: 124 MMHG | HEART RATE: 66 BPM | WEIGHT: 270 LBS | HEIGHT: 63 IN

## 2018-10-31 DIAGNOSIS — F32.A DEPRESSION, UNSPECIFIED DEPRESSION TYPE: ICD-10-CM

## 2018-10-31 DIAGNOSIS — R51.9 NONINTRACTABLE HEADACHE, UNSPECIFIED CHRONICITY PATTERN, UNSPECIFIED HEADACHE TYPE: ICD-10-CM

## 2018-10-31 DIAGNOSIS — R07.89 ATYPICAL CHEST PAIN: ICD-10-CM

## 2018-10-31 PROCEDURE — 99213 OFFICE O/P EST LOW 20 MIN: CPT | Mod: GE | Performed by: INTERNAL MEDICINE

## 2018-10-31 ASSESSMENT — PATIENT HEALTH QUESTIONNAIRE - PHQ9
3. TROUBLE FALLING OR STAYING ASLEEP OR SLEEPING TOO MUCH: NEARLY EVERY DAY
6. FEELING BAD ABOUT YOURSELF - OR THAT YOU ARE A FAILURE OR HAVE LET YOURSELF OR YOUR FAMILY DOWN: MORE THAN HALF THE DAYS
5. POOR APPETITE OR OVEREATING: NEARLY EVERY DAY
1. LITTLE INTEREST OR PLEASURE IN DOING THINGS: MORE THAN HALF THE DAYS
4. FEELING TIRED OR HAVING LITTLE ENERGY: NEARLY EVERY DAY
SUM OF ALL RESPONSES TO PHQ9 QUESTIONS 1 AND 2: 3
9. THOUGHTS THAT YOU WOULD BE BETTER OFF DEAD, OR OF HURTING YOURSELF: MORE THAN HALF THE DAYS
7. TROUBLE CONCENTRATING ON THINGS, SUCH AS READING THE NEWSPAPER OR WATCHING TELEVISION: NEARLY EVERY DAY
8. MOVING OR SPEAKING SO SLOWLY THAT OTHER PEOPLE COULD HAVE NOTICED. OR THE OPPOSITE, BEING SO FIGETY OR RESTLESS THAT YOU HAVE BEEN MOVING AROUND A LOT MORE THAN USUAL: NEARLY EVERY DAY
2. FEELING DOWN, DEPRESSED, IRRITABLE, OR HOPELESS: SEVERAL DAYS
SUM OF ALL RESPONSES TO PHQ QUESTIONS 1-9: 22

## 2018-10-31 ASSESSMENT — PAIN SCALES - GENERAL: PAINLEVEL: 7=MODERATE-SEVERE PAIN

## 2018-10-31 NOTE — PROGRESS NOTES
Established Patient    Chanelle presents today with the following:    CC: chest pressure    HPI: Chanelle Ortiz is a 43 y.o. female with a history of hypothyroidism, T2DM (now A1c <6.5), iron deficiency anemia, POLLY on CPAP, who presents today for the following concerns:    Recent ED visit for chest pain  Troponin and EKG unremarkable, as was CXR. States she was having chest pressure substernal while at rest sitting down. Has not had any further chest pain. Had negative stress test 5/20/17. Saw Cardiology Dr. Terry 2 months ago, notes negative cardiac workup including multiple EKGs, Holter, and doesn't recommended further testing.    Has Pulmonology appointment 12/5, Cardiology recommended seeing them.    Depression  PHQ-9 22, feels depressed because doesn't know why she is having symptoms. Not working, used to work in a warehouse but stopped in 2008. Daughter and sister have disability and food stamps, living with them, they support her. Denies passive or active suicidal or homicidal ideation.    Headaches  4 day a week, lasts the entire day unless takes Tylenol, which helps. Has headache from front center to back.    Patient Active Problem List    Diagnosis Date Noted   • Dizzinesses 09/05/2018   • Prior DM-2 - Resolved after weight loss 08/15/2018   • Hypothyroidism 08/15/2018   • Morbid obesity with BMI of 45.0-49.9, adult (HCC) 08/15/2018   • Hx of radioactive iodine thyroid ablation 08/15/2018   • Iron deficiency anemia 08/15/2018   • Psychiatric problem - unspecified    • H/O hypertension - resolved.     • POLLY on CPAP 01/01/2018   • Hypothyroid 05/20/2017   • Hx of Anxiety and depression 01/01/2016   • Obesity 04/05/2012       Current Outpatient Prescriptions   Medication Sig Dispense Refill   • levothyroxine (SYNTHROID) 175 MCG Tab Take 1 Tab by mouth every morning before breakfast. Take a half hour before breakfast. 30 Tab 5   • ferrous sulfate 325 (65 Fe) MG tablet Take 1 Tab by mouth every day. 30  "Tab 4     No current facility-administered medications for this visit.        ROS: As per HPI. Additional pertinent symptoms as noted below.    Constitutional: Denies weight loss, fever, chills, weakness, malaise.  Eyes: Denies blurred vision, double vision, yellow sclerae.  ENT: Denies hearing loss, congestion, runny nose, sore throat.  Cardiovascular: Denies chest pain, palpitations.  Respiratory: Denies dyspnea, productive cough.  GI: Denies nausea, vomiting, diarrhea, abdominal pain.  : Denies dysuria, urinary urgency or frequency.  Musculo-skeletal: Denies muscle spasms, joint stiffness.  Skin: Denies change in skin, hair, nails.  Neurological: Denies paresthesias, fasciculations, seizures, focal weakness.  Psychological: See HPI.  All others negative    /75 (BP Location: Right arm, Patient Position: Sitting)   Pulse 66   Temp 36.4 °C (97.6 °F) (Temporal)   Ht 1.6 m (5' 3\")   Wt 122.5 kg (270 lb)   SpO2 95%   BMI 47.83 kg/m²     Physical Exam   General: No apparent distress.  Eyes: Extraocular movements grossly intact. No scleral icterus.  Throat: No erythema or exudates noted.  Neck: Supple. No lymphadenopathy noted. Thyroid not enlarged.  Lungs: Clear to auscultation bilaterally.  Cardiovascular: Regular rate and rhythm.  Abdomen: Soft, non-tender, non-distended. Obese.  Extremities: No cyanosis or edema.  Skin: No rash or suspicious skin lesions noted on exposed skin.  Neurological: Alert and oriented.  Psychiatric: Depressed mood and blunted affect.    Note: I have reviewed all pertinent labs and diagnostic tests associated with this visit with specific comments listed under the assessment and plan below    Assessment and Plan    1. Depression, unspecified depression type  2. Atypical chest pain  Continued symptoms with extensive negative workups in the past. Rule out other underlying psychiatry etiologies.  - REFERRAL TO PSYCHIATRY    3. Nonintractable headache, unspecified chronicity " pattern, unspecified headache type  Controlled on medication below.  - Continue OTC Tylenol, advised not to take more than 3g/day    Followup: With PCP as scheduled 12/27/18.    Signed by: Jossue Metzger M.D.

## 2018-11-17 ENCOUNTER — HOSPITAL ENCOUNTER (EMERGENCY)
Facility: MEDICAL CENTER | Age: 43
End: 2018-11-18
Attending: EMERGENCY MEDICINE
Payer: MEDICAID

## 2018-11-17 DIAGNOSIS — R07.9 CHEST PAIN, UNSPECIFIED TYPE: ICD-10-CM

## 2018-11-17 LAB — EKG IMPRESSION: NORMAL

## 2018-11-17 PROCEDURE — 93005 ELECTROCARDIOGRAM TRACING: CPT | Performed by: EMERGENCY MEDICINE

## 2018-11-17 PROCEDURE — 99284 EMERGENCY DEPT VISIT MOD MDM: CPT

## 2018-11-17 PROCEDURE — 36415 COLL VENOUS BLD VENIPUNCTURE: CPT

## 2018-11-17 PROCEDURE — 93005 ELECTROCARDIOGRAM TRACING: CPT

## 2018-11-17 ASSESSMENT — PAIN SCALES - GENERAL
PAINLEVEL_OUTOF10: 4
PAINLEVEL_OUTOF10: 6

## 2018-11-17 ASSESSMENT — LIFESTYLE VARIABLES: DO YOU DRINK ALCOHOL: NO

## 2018-11-18 ENCOUNTER — APPOINTMENT (OUTPATIENT)
Dept: RADIOLOGY | Facility: MEDICAL CENTER | Age: 43
End: 2018-11-18
Attending: EMERGENCY MEDICINE
Payer: MEDICAID

## 2018-11-18 ENCOUNTER — HOSPITAL ENCOUNTER (EMERGENCY)
Facility: MEDICAL CENTER | Age: 43
End: 2018-11-18
Attending: EMERGENCY MEDICINE
Payer: MEDICAID

## 2018-11-18 VITALS
SYSTOLIC BLOOD PRESSURE: 148 MMHG | HEART RATE: 70 BPM | HEIGHT: 63 IN | BODY MASS INDEX: 47.93 KG/M2 | OXYGEN SATURATION: 94 % | WEIGHT: 270.5 LBS | DIASTOLIC BLOOD PRESSURE: 71 MMHG | TEMPERATURE: 97.1 F | RESPIRATION RATE: 15 BRPM

## 2018-11-18 VITALS
RESPIRATION RATE: 16 BRPM | HEIGHT: 63 IN | OXYGEN SATURATION: 95 % | TEMPERATURE: 97.5 F | WEIGHT: 271.83 LBS | BODY MASS INDEX: 48.16 KG/M2 | DIASTOLIC BLOOD PRESSURE: 74 MMHG | SYSTOLIC BLOOD PRESSURE: 132 MMHG | HEART RATE: 72 BPM

## 2018-11-18 DIAGNOSIS — F41.9 ANXIETY: ICD-10-CM

## 2018-11-18 LAB
ALBUMIN SERPL BCP-MCNC: 4 G/DL (ref 3.2–4.9)
ALBUMIN/GLOB SERPL: 1.1 G/DL
ALP SERPL-CCNC: 88 U/L (ref 30–99)
ALT SERPL-CCNC: 15 U/L (ref 2–50)
ANION GAP SERPL CALC-SCNC: 8 MMOL/L (ref 0–11.9)
APPEARANCE UR: CLEAR
APTT PPP: 31.2 SEC (ref 24.7–36)
AST SERPL-CCNC: 17 U/L (ref 12–45)
BASOPHILS # BLD AUTO: 0.8 % (ref 0–1.8)
BASOPHILS # BLD: 0.08 K/UL (ref 0–0.12)
BILIRUB SERPL-MCNC: 0.3 MG/DL (ref 0.1–1.5)
BILIRUB UR QL STRIP.AUTO: NEGATIVE
BUN SERPL-MCNC: 16 MG/DL (ref 8–22)
CALCIUM SERPL-MCNC: 9.5 MG/DL (ref 8.5–10.5)
CHLORIDE SERPL-SCNC: 102 MMOL/L (ref 96–112)
CO2 SERPL-SCNC: 28 MMOL/L (ref 20–33)
COLOR UR: YELLOW
CREAT SERPL-MCNC: 0.7 MG/DL (ref 0.5–1.4)
D DIMER PPP IA.FEU-MCNC: 0.64 UG/ML (FEU) (ref 0–0.5)
EKG IMPRESSION: NORMAL
EOSINOPHIL # BLD AUTO: 0.17 K/UL (ref 0–0.51)
EOSINOPHIL NFR BLD: 1.7 % (ref 0–6.9)
ERYTHROCYTE [DISTWIDTH] IN BLOOD BY AUTOMATED COUNT: 44.9 FL (ref 35.9–50)
GLOBULIN SER CALC-MCNC: 3.6 G/DL (ref 1.9–3.5)
GLUCOSE SERPL-MCNC: 125 MG/DL (ref 65–99)
GLUCOSE UR STRIP.AUTO-MCNC: NEGATIVE MG/DL
HCG UR QL: NEGATIVE
HCT VFR BLD AUTO: 44.5 % (ref 37–47)
HGB BLD-MCNC: 13.7 G/DL (ref 12–16)
IMM GRANULOCYTES # BLD AUTO: 0.03 K/UL (ref 0–0.11)
IMM GRANULOCYTES NFR BLD AUTO: 0.3 % (ref 0–0.9)
INR PPP: 0.98 (ref 0.87–1.13)
KETONES UR STRIP.AUTO-MCNC: NEGATIVE MG/DL
LEUKOCYTE ESTERASE UR QL STRIP.AUTO: NEGATIVE
LYMPHOCYTES # BLD AUTO: 2.83 K/UL (ref 1–4.8)
LYMPHOCYTES NFR BLD: 28.8 % (ref 22–41)
MCH RBC QN AUTO: 25.7 PG (ref 27–33)
MCHC RBC AUTO-ENTMCNC: 30.8 G/DL (ref 33.6–35)
MCV RBC AUTO: 83.5 FL (ref 81.4–97.8)
MICRO URNS: NORMAL
MONOCYTES # BLD AUTO: 0.65 K/UL (ref 0–0.85)
MONOCYTES NFR BLD AUTO: 6.6 % (ref 0–13.4)
NEUTROPHILS # BLD AUTO: 6.08 K/UL (ref 2–7.15)
NEUTROPHILS NFR BLD: 61.8 % (ref 44–72)
NITRITE UR QL STRIP.AUTO: NEGATIVE
NRBC # BLD AUTO: 0 K/UL
NRBC BLD-RTO: 0 /100 WBC
PH UR STRIP.AUTO: 5.5 [PH]
PLATELET # BLD AUTO: 221 K/UL (ref 164–446)
PMV BLD AUTO: 12 FL (ref 9–12.9)
POTASSIUM SERPL-SCNC: 3.7 MMOL/L (ref 3.6–5.5)
PROT SERPL-MCNC: 7.6 G/DL (ref 6–8.2)
PROT UR QL STRIP: NEGATIVE MG/DL
PROTHROMBIN TIME: 13 SEC (ref 12–14.6)
RBC # BLD AUTO: 5.33 M/UL (ref 4.2–5.4)
RBC UR QL AUTO: NEGATIVE
SODIUM SERPL-SCNC: 138 MMOL/L (ref 135–145)
SP GR UR REFRACTOMETRY: 1.03
SP GR UR STRIP.AUTO: 1.03
TROPONIN I SERPL-MCNC: <0.01 NG/ML (ref 0–0.04)
UROBILINOGEN UR STRIP.AUTO-MCNC: 0.2 MG/DL
WBC # BLD AUTO: 9.8 K/UL (ref 4.8–10.8)

## 2018-11-18 PROCEDURE — 84484 ASSAY OF TROPONIN QUANT: CPT

## 2018-11-18 PROCEDURE — 85610 PROTHROMBIN TIME: CPT

## 2018-11-18 PROCEDURE — 93005 ELECTROCARDIOGRAM TRACING: CPT

## 2018-11-18 PROCEDURE — 81025 URINE PREGNANCY TEST: CPT

## 2018-11-18 PROCEDURE — 99284 EMERGENCY DEPT VISIT MOD MDM: CPT

## 2018-11-18 PROCEDURE — 85379 FIBRIN DEGRADATION QUANT: CPT

## 2018-11-18 PROCEDURE — A9270 NON-COVERED ITEM OR SERVICE: HCPCS | Performed by: EMERGENCY MEDICINE

## 2018-11-18 PROCEDURE — 71046 X-RAY EXAM CHEST 2 VIEWS: CPT

## 2018-11-18 PROCEDURE — 81003 URINALYSIS AUTO W/O SCOPE: CPT

## 2018-11-18 PROCEDURE — 93005 ELECTROCARDIOGRAM TRACING: CPT | Performed by: EMERGENCY MEDICINE

## 2018-11-18 PROCEDURE — 700102 HCHG RX REV CODE 250 W/ 637 OVERRIDE(OP): Performed by: EMERGENCY MEDICINE

## 2018-11-18 PROCEDURE — 80053 COMPREHEN METABOLIC PANEL: CPT

## 2018-11-18 PROCEDURE — 85025 COMPLETE CBC W/AUTO DIFF WBC: CPT

## 2018-11-18 PROCEDURE — 85730 THROMBOPLASTIN TIME PARTIAL: CPT

## 2018-11-18 RX ORDER — DIPHENHYDRAMINE HCL 25 MG
25 TABLET ORAL ONCE
Status: COMPLETED | OUTPATIENT
Start: 2018-11-18 | End: 2018-11-18

## 2018-11-18 RX ADMIN — DIPHENHYDRAMINE HCL 25 MG: 25 TABLET ORAL at 21:08

## 2018-11-18 ASSESSMENT — PAIN DESCRIPTION - DESCRIPTORS: DESCRIPTORS: ACHING

## 2018-11-18 ASSESSMENT — PAIN SCALES - GENERAL
PAINLEVEL_OUTOF10: 7
PAINLEVEL_OUTOF10: 7

## 2018-11-18 ASSESSMENT — LIFESTYLE VARIABLES: DO YOU DRINK ALCOHOL: NO

## 2018-11-18 NOTE — ED TRIAGE NOTES
"Chanelle Ortiz  43 y.o. female  Chief Complaint   Patient presents with   • Anxiety     Pt. states this occurred while her children were fighting and thinks this may be related to anxiety.    • Chest Pain     Pt. states having streaks of pain up into her left arm and left side of her chest. Pt. states she is still having the chest and arm pain. Pt. denies that it radiates anywhere. Pt. denies SOB.     /74   Pulse 68   Temp 36.4 °C (97.5 °F) (Temporal)   Resp 16   Ht 1.6 m (5' 3\")   Wt 123.3 kg (271 lb 13.2 oz)   SpO2 100%   BMI 48.15 kg/m²     Pt amb to triage with steady gait for above complaint. EKG to be completed in triage. Pt. States hx of Type 2 DM. Per [facility], pt's FSBS is 136.  Pt is alert and oriented, speaking in full sentences, follows commands and responds appropriately to questions. NAD. Resp are even and unlabored.  Pt placed in lobby. Pt educated on triage process. Pt encouraged to alert staff for any changes.  "

## 2018-11-18 NOTE — ED TRIAGE NOTES
"Chief Complaint   Patient presents with   • Anxiety     Pt. states this occurred while her children were fighting and thinks this may be related to anxiety.    • Chest Pain     Pt. states having streaks of pain up into her left arm and left side of her chest. Pt. states she is still having the chest and arm pain. Pt. denies that it radiates anywhere. Pt. denies SOB.     Agree with triage note, pt ambulatory to yellow 62 with the above complaint. Pt reporting that after eating dinner tonight she developed left sided CP and left arm tingling, pt denies SOB, N/V but states her \"belly hurts all over,\" pt is unable to localize the pain in her abdomen. Pt placed on cardiac monitor, BP cuff and pulse ox, ekg performed in triage.     "

## 2018-11-18 NOTE — ED NOTES
Patient discharged with instructions for chest pain of unspecified type with prescriptions x0 signs and symptoms explained to patient for reasons to return to emergency department, Patient is understanding and has no further questions.

## 2018-11-18 NOTE — ED PROVIDER NOTES
"ED Provider Note    CHIEF COMPLAINT  Chief Complaint   Patient presents with   • Anxiety     Pt. states this occurred while her children were fighting and thinks this may be related to anxiety.    • Chest Pain     Pt. states having streaks of pain up into her left arm and left side of her chest. Pt. states she is still having the chest and arm pain. Pt. denies that it radiates anywhere. Pt. denies SOB.       HPI  Chanelle Ortiz is a 43 y.o. female who presents for evaluation of chest pain.  The pain started around 8:00 tonight during an argument with her children.  She stated the pain \"ran up and down my left arm\" for a few minutes and then she had a \"pinchy\" sensations in somewhat random areas around her anterior chest.  She currently has no symptoms.  During the episode, she did not have shortness of breath, sweating, or nausea.  She had no neck or jaw pain and no back pain.  She has no history of heart disease but does note hyperthyroidism.  She notes she is diabetic but is not currently being treated because it is diet controlled.  She notes no family history of early heart disease or any other cardiac issues.  She does not drink or smoke and uses no drugs.    REVIEW OF SYSTEMS  Constitutional: No fevers, weakness, or weight loss   Skin: No rashes, abrasions, lacerations, or pruritus  HEENT: No ear pain, ringing in ears, or decreased hearing. No sore throat, runny nose, sores, trouble swallowing, trouble speaking.  Neck: No neck pain, stiffness, or masses.  Chest: No current pain or rashes  Pulm: No shortness of breath, cough, wheezing, stridor, or pain with inspiration/expiration  Gastrointestinal: No nausea, vomiting, diarrhea, constipation, bloating, melena, hematochezia or pain.  Genitourinary: No dysuria or hematuria  Musculoskeletal: No recent trauma, current pain, swelling, weakness  Neurologic: No sensory or motor changes. No confusion or disorientation.  Heme: No bleeding or bruising problems. " "  Immuno: No hx of recurrent infections      PAST MEDICAL HISTORY   has a past medical history of Anxiety and depression (2016); Arrhythmia; Diabetes (2008); H/O Hypertension - resolved; Hx of Bronchitis; Hyperthyroidism (2008); Hypothyroidism, after radioactive ablation of thyroid (2008); POLLY on CPAP (2018); and Psychiatric problem - unspecified.    SOCIAL HISTORY  Social History     Social History Main Topics   • Smoking status: Never Smoker   • Smokeless tobacco: Never Used   • Alcohol use No   • Drug use: No   • Sexual activity: Not on file      Comment: .  Tuboligation in 2004.        SURGICAL HISTORY   has a past surgical history that includes other abdominal surgery (2002); gyn surgery (2004); other; primary c section; ovarian cystectomy (1992); cholecystectomy; and primary c section.    CURRENT MEDICATIONS  Home Medications     Reviewed by Bhargavi Govea R.N. (Registered Nurse) on 11/17/18 at 2211  Med List Status: Partial   Medication Last Dose Status   ferrous sulfate 325 (65 Fe) MG tablet Taking Active   levothyroxine (SYNTHROID) 175 MCG Tab Taking Active                ALLERGIES  Allergies   Allergen Reactions   • Morphine      \"I think\" \"I dunno what my reaction was, the nurse just said my oxygen levels were going way to low on it\"        PHYSICAL EXAM  VITAL SIGNS: /74   Pulse 73   Temp 36.4 °C (97.5 °F) (Temporal)   Resp 16   Ht 1.6 m (5' 3\")   Wt 123.3 kg (271 lb 13.2 oz)   SpO2 96%   BMI 48.15 kg/m²    Gen: Alert in no apparent distress.  HEENT: No signs of trauma, Bilateral external ears normal, Nose normal. Conjunctiva normal, Non-icteric.   Neck:  No tenderness, Supple, No masses  Lymphatic: No cervical lymphadenopathy noted.   Cardiovascular: Regular rate and rhythm, no murmurs.   Thorax & Lungs: Normal breath sounds, No respiratory distress, No wheezing bilateral chest rise  Abdomen: Bowel sounds normal, Soft, No tenderness, No masses, No pulsatile masses. No Guarding " or rebound  Skin: Warm, Dry, No erythema, No rash.    Extremities: Intact distal pulses, No edema  Neurologic: Alert , no facial droop, grossly normal coordination and strength  Psychiatric: Affect normal, Judgment normal, Mood normal.       INITIAL IMPRESSION  Patient has no exam findings to suggest an emergent etiology to explain her pain but I do feel it is reasonable to perform screening labs, EKG, and chest x-ray for possible cardiac or pulmonary causes.  Her symptoms were extremely atypical for ACS and, if her labs are normal, I very much doubt she will need to stay for an ACS rule out given her HEART score of 3.    LABS  Results for orders placed or performed during the hospital encounter of 11/17/18   CBC WITH DIFFERENTIAL   Result Value Ref Range    WBC 9.8 4.8 - 10.8 K/uL    RBC 5.33 4.20 - 5.40 M/uL    Hemoglobin 13.7 12.0 - 16.0 g/dL    Hematocrit 44.5 37.0 - 47.0 %    MCV 83.5 81.4 - 97.8 fL    MCH 25.7 (L) 27.0 - 33.0 pg    MCHC 30.8 (L) 33.6 - 35.0 g/dL    RDW 44.9 35.9 - 50.0 fL    Platelet Count 221 164 - 446 K/uL    MPV 12.0 9.0 - 12.9 fL    Neutrophils-Polys 61.80 44.00 - 72.00 %    Lymphocytes 28.80 22.00 - 41.00 %    Monocytes 6.60 0.00 - 13.40 %    Eosinophils 1.70 0.00 - 6.90 %    Basophils 0.80 0.00 - 1.80 %    Immature Granulocytes 0.30 0.00 - 0.90 %    Nucleated RBC 0.00 /100 WBC    Neutrophils (Absolute) 6.08 2.00 - 7.15 K/uL    Lymphs (Absolute) 2.83 1.00 - 4.80 K/uL    Monos (Absolute) 0.65 0.00 - 0.85 K/uL    Eos (Absolute) 0.17 0.00 - 0.51 K/uL    Baso (Absolute) 0.08 0.00 - 0.12 K/uL    Immature Granulocytes (abs) 0.03 0.00 - 0.11 K/uL    NRBC (Absolute) 0.00 K/uL   COMP METABOLIC PANEL   Result Value Ref Range    Sodium 138 135 - 145 mmol/L    Potassium 3.7 3.6 - 5.5 mmol/L    Chloride 102 96 - 112 mmol/L    Co2 28 20 - 33 mmol/L    Anion Gap 8.0 0.0 - 11.9    Glucose 125 (H) 65 - 99 mg/dL    Bun 16 8 - 22 mg/dL    Creatinine 0.70 0.50 - 1.40 mg/dL    Calcium 9.5 8.5 - 10.5 mg/dL     AST(SGOT) 17 12 - 45 U/L    ALT(SGPT) 15 2 - 50 U/L    Alkaline Phosphatase 88 30 - 99 U/L    Total Bilirubin 0.3 0.1 - 1.5 mg/dL    Albumin 4.0 3.2 - 4.9 g/dL    Total Protein 7.6 6.0 - 8.2 g/dL    Globulin 3.6 (H) 1.9 - 3.5 g/dL    A-G Ratio 1.1 g/dL   TROPONIN   Result Value Ref Range    Troponin I <0.01 0.00 - 0.04 ng/mL   URINALYSIS CULTURE, IF INDICATED   Result Value Ref Range    Color Yellow     Character Clear     Specific Gravity 1.027 <1.035    Ph 5.5 5.0 - 8.0    Glucose Negative Negative mg/dL    Ketones Negative Negative mg/dL    Protein Negative Negative mg/dL    Bilirubin Negative Negative    Urobilinogen, Urine 0.2 Negative    Nitrite Negative Negative    Leukocyte Esterase Negative Negative    Occult Blood Negative Negative    Micro Urine Req see below    HCG QUALITATIVE UR (Lab)   Result Value Ref Range    Beta-Hcg Urine Negative Negative   PROTHROMBIN TIME (INR)   Result Value Ref Range    PT 13.0 12.0 - 14.6 sec    INR 0.98 0.87 - 1.13   APTT   Result Value Ref Range    APTT 31.2 24.7 - 36.0 sec   D-DIMER   Result Value Ref Range    D-Dimer Screen 0.64 (H) 0.00 - 0.50 ug/mL (FEU)   REFRACTOMETER SG   Result Value Ref Range    Specific Gravity 1.027    ESTIMATED GFR   Result Value Ref Range    GFR If African American >60 >60 mL/min/1.73 m 2    GFR If Non African American >60 >60 mL/min/1.73 m 2   EKG   Result Value Ref Range    Report       [facility]    Test Date:  2018  Pt Name:    PRATIBHA CUELLAR              Department: ER  MRN:        [id number]                      Room:  Gender:     Female                       Technician: [id number]  :        [id number]                   Requested By:ER TRIAGE PROTOCOL  Order #:    [id number]                    Taryn PHILIP:    Measurements  Intervals                                Axis  Rate:       69                           P:          62  ME:         156                          QRS:        84  QRSD:       86                            T:          49  QT:         380  QTc:        407    Interpretive Statements  SINUS RHYTHM  Compared to ECG 10/20/2018 22:08:28  ST (T wave) deviation no longer present  T-wave abnormality no longer present  Possible ischemia no longer present         RADIOLOGY  DX-CHEST-2 VIEWS   Final Result      Normal chest.               INTERPRETING LOCATION: [address], [address]          Reevaluation   Time:2:58 AM  Vital signs:   Assessment: Evaluated patient at bedside, she appears calm and comfortable.  She has no symptoms at this time.    COURSE & MEDICAL DECISION MAKING  Pertinent Labs & Imaging studies reviewed. (See chart for details)  Patient arrives for evaluation of atypical chest pain which is most likely related to her emotional state or otherwise benign musculoskeletal cause.  I did note that her d-dimer is slightly elevated however given complete resolution of her symptoms and no other findings to suggest PE, I do not feel CTA is in the patient's best interest as it is unlikely to demonstrate any emergent pathology or .  Patient did state understanding the findings and the presumed diagnosis.  She felt comfortable with the plan for discharge and stated her intent to follow-up with her primary care physician regarding the episode as she may benefit from an outpatient stress test.  I do not feel this needs to happen urgently.  She also stated very clear understanding that if her symptoms worsen or change, she should return for immediate reevaluation and reconsideration of advanced imaging or inpatient admission.    FINAL IMPRESSION  1. Chest pain, unspecified type        Electronically signed by: Milan Avina, 11/17/2018 11:56 PM

## 2018-11-19 NOTE — DISCHARGE INSTRUCTIONS
Take Benadryl as needed for your symptoms    Follow-up with your primary care provider in 48-72 hours

## 2018-11-19 NOTE — ED TRIAGE NOTES
12 led ekg completed and evaluated by erp  Pt placed in er lobby, pt educated on triage process, pt instructed to return to triage nurse for concerns or changes in condition, pt verbalizes understanding of instructions.

## 2018-11-19 NOTE — ED NOTES
"Chief Complaint   Patient presents with   • Neck Pain   • Head Ache   • Irregular Heart Beat     Agree with triage note, pt ambulatory to yellow 55, pt does not appear to be in acute distress. Pt States she had a feeling of her \"heart racing\" and states the \"back of my head feels numb\" and \"I have a sharp pain in my neck.\"  Pt was seen yesterday at this ED for same complaints.  Pt states that after dc she went home and was unable to sleep using her CPAP and that the symptoms continued and she is unable to sleep due to symptoms.    "

## 2018-11-19 NOTE — ED NOTES
Patient discharged with instructions for anxiety with prescriptions x0 signs and symptoms explained to patient for reasons to return to emergency department, Patient is understanding and has no further questions. Pt ambulatory to the lobby with a steady gait.

## 2018-11-19 NOTE — ED PROVIDER NOTES
ED Provider Note    CHIEF COMPLAINT  Chief Complaint   Patient presents with   • Neck Pain   • Head Ache   • Irregular Heart Beat       HPI  Chanelle Ortiz is a 43 y.o. female who presents with chest pain and numbness throughout her scalp.  The patient states that she uses CPAP at night and she took off her CPAP this morning she states that she cannot feel her head.  She did not have a headache at that time but has developed a headache throughout the day.  She does not have any neck pain on my exam despite the triage note.  She states she is also had some palpitations and recurrent chest pain.  She states the chest pain is been ongoing for several months and she has been worked up several times with no clear source.  She has a difficult time characterizing the pain and states there is no known exacerbating or relieving factors.  She states she has been under a lot of stress at home.  She does not have any pain or swelling to her lower extremities nor does she have any risk factors from a pulmonary embolus standpoint.  From a cardiac standpoint she is very low risk as well with her only risk factor being diabetes.  She did have a nuclear stress test a year ago that did not show any evidence of ischemic changes.  With the chest pain she does have associated dyspnea but does not have any nausea nor diaphoresis.    REVIEW OF SYSTEMS  See HPI for further details. All other systems are negative.     PAST MEDICAL HISTORY  Past Medical History:   Diagnosis Date   • Anxiety and depression 2016    after ex went to residential   • Arrhythmia     notes occasional rapid or prominent heart beat, at night    • Diabetes 2008    on and off meds (due to concern about heart beat changes)   • H/O Hypertension - resolved     Patient states prior HTN, but resolved after changes in other medications (but off HTN meds).    • Hx of Bronchitis    • Hyperthyroidism 2008    Treated with Radioactive iodine, at [place], in 2008   •  "Hypothyroidism, after radioactive ablation of thyroid 2008   • POLLY on CPAP 2018    gets headaches, if not using CPAP.    • Psychiatric problem - unspecified     pt notes anxiety and depression, with counseller - pt unclear on details.         SOCIAL HISTORY  Social History     Social History   • Marital status: Legally      Spouse name: N/A   • Number of children: N/A   • Years of education: N/A     Social History Main Topics   • Smoking status: Never Smoker   • Smokeless tobacco: Never Used   • Alcohol use No   • Drug use: No   • Sexual activity: Not on file      Comment: .  Tuboligation in 2004.      Other Topics Concern   • Not on file     Social History Narrative   • No narrative on file           PHYSICAL EXAM  VITAL SIGNS: /71   Pulse 76   Temp 36 °C (96.8 °F) (Temporal)   Resp 15   Ht 1.6 m (5' 3\")   Wt 122.7 kg (270 lb 8.1 oz)   LMP  (Within Weeks)   SpO2 96%   BMI 47.92 kg/m²   Constitutional: Anxious  HENT: Normocephalic, Atraumatic, tympanic membranes are intact and nonerythematous bilaterally, Oropharynx moist without exudates or erythema, Nose normal.   Eyes: PERRLA, EOMI, Conjunctiva normal.  Neck: Supple without meningismus  Lymphatic: No lymphadenopathy noted.   Cardiovascular: Normal heart rate, Normal rhythm, No murmurs, No rubs, No gallops.   Thorax & Lungs: Normal breath sounds, No respiratory distress, No wheezing, No chest tenderness.   Abdomen: Bowel sounds normal, Soft, No tenderness, no rebound, no guarding, no distention, No masses, No pulsatile masses.   Skin: Warm, Dry, No erythema, No rash.   Back: No tenderness, No CVA tenderness.   Extremities: Atraumatic with symmetric distal pulses, No edema, No tenderness, No cyanosis, No clubbing.   Neurologic: Alert & oriented x 3, cranial nerves II through XII are intact, Normal motor function, Normal sensory function, No focal deficits noted.   Psychiatric: Anxious    EKG  Twelve-lead EKG interpreted by myself " shows a normal sinus rhythm with a ventricular rate of 70, normal QRS, normal intervals, no ST segment elevation or depression, T waves inverted in V1 and V2, no dynamic changes from prior      COURSE & MEDICAL DECISION MAKING  Pertinent Labs & Imaging studies reviewed. (See chart for details)  This a 43-year-old female who presents with a lot of nonspecific complaints.  The patient was seen here last night and had a thorough workup including cardiac markers and EKG.  Her EKG does not show any dynamic changes from prior and her history is not consistent with unstable angina.  Furthermore she has had a nuclear stress test within approximately a year that was negative for ischemic changes.  Based on her age and the stress test to be very unlikely that she is having angina.  The patient has been ruled out from a pulmonary illness via the PERC criteria.  Due to the diffuse numbness to the head and her presenting symptoms I suspect this is all from anxiety and possibly hyperventilation syndrome.  The patient has not had any ectopy on the monitor while she has been in the emerge department and her EKG does not show any arrhythmic changes.  Her blood work yesterday did not show any evidence of metabolic source and clinically I do not appreciate any evidence of an infection.  Her chest x-ray was clear yesterday.  Furthermore she did not have a leukocytosis to support an infection.  Her blood sugar appears to be controlled at 125.  I suspect this is all from anxiety.  The patient will take Benadryl as needed for the anxiety and if this is not effective she will follow-up with her primary care provider for further outpatient workup.    FINAL IMPRESSION  1.  Chest pain nonspecific   2.  Palpitations   3.  Scalp paresthesias  4.  Suspect secondary to anxiety     Disposition  The patient will be discharged in stable condition    Electronically signed by: Casa English, 11/18/2018 8:58 PM

## 2018-11-19 NOTE — ED TRIAGE NOTES
"Chief Complaint   Patient presents with   • Neck Pain   • Head Ache   • Irregular Heart Beat     Pt ambulatory to triage with above complaint.  Pt States she had a feeling of her \"heart racing\" and states the \"back of my head feels numb\" and \"I have a sharp pain in my neck.\"  Pt was seen yesterday at this ED for same complaints.  Pt states that after dc she went home and was unable to sleep using her CPAP and that the symptoms continued and she is unable to sleep due to symptoms.      EKG done in triage.      Pt returned to [facility], educated on triage process, and to inform staff of any changes or concerns.    Blood pressure 148/71, pulse 76, temperature 36 °C (96.8 °F), temperature source Temporal, resp. rate 15, height 1.6 m (5' 3\"), weight 122.7 kg (270 lb 8.1 oz), SpO2 96 %, not currently breastfeeding.        "

## 2018-12-18 ENCOUNTER — HOSPITAL ENCOUNTER (OUTPATIENT)
Dept: CARDIOLOGY | Facility: MEDICAL CENTER | Age: 43
End: 2018-12-18
Attending: NURSE PRACTITIONER
Payer: MEDICAID

## 2018-12-18 DIAGNOSIS — R06.02 SHORTNESS OF BREATH: ICD-10-CM

## 2018-12-18 LAB
LV EJECT FRACT  99904: 60
LV EJECT FRACT MOD 2C 99903: 68
LV EJECT FRACT MOD 4C 99902: 65.55
LV EJECT FRACT MOD BP 99901: 67.21

## 2018-12-18 PROCEDURE — 93306 TTE W/DOPPLER COMPLETE: CPT

## 2018-12-18 PROCEDURE — 93306 TTE W/DOPPLER COMPLETE: CPT | Mod: 26 | Performed by: INTERNAL MEDICINE

## 2018-12-22 ENCOUNTER — HOSPITAL ENCOUNTER (EMERGENCY)
Facility: MEDICAL CENTER | Age: 43
End: 2018-12-22
Attending: EMERGENCY MEDICINE
Payer: MEDICAID

## 2018-12-22 VITALS
TEMPERATURE: 97.3 F | WEIGHT: 270.06 LBS | HEART RATE: 91 BPM | BODY MASS INDEX: 47.85 KG/M2 | SYSTOLIC BLOOD PRESSURE: 138 MMHG | HEIGHT: 63 IN | OXYGEN SATURATION: 93 % | DIASTOLIC BLOOD PRESSURE: 89 MMHG | RESPIRATION RATE: 20 BRPM

## 2018-12-22 DIAGNOSIS — J06.9 VIRAL URI WITH COUGH: ICD-10-CM

## 2018-12-22 DIAGNOSIS — J02.9 VIRAL PHARYNGITIS: ICD-10-CM

## 2018-12-22 DIAGNOSIS — B34.9 VIRAL SYNDROME: ICD-10-CM

## 2018-12-22 LAB
S PYO AG THROAT QL: NORMAL
SIGNIFICANT IND 70042: NORMAL
SITE SITE: NORMAL
SOURCE SOURCE: NORMAL

## 2018-12-22 PROCEDURE — 700111 HCHG RX REV CODE 636 W/ 250 OVERRIDE (IP): Performed by: EMERGENCY MEDICINE

## 2018-12-22 PROCEDURE — 99284 EMERGENCY DEPT VISIT MOD MDM: CPT

## 2018-12-22 PROCEDURE — 87880 STREP A ASSAY W/OPTIC: CPT

## 2018-12-22 PROCEDURE — 87081 CULTURE SCREEN ONLY: CPT

## 2018-12-22 RX ORDER — BENZONATATE 100 MG/1
200 CAPSULE ORAL 3 TIMES DAILY PRN
Qty: 20 CAP | Refills: 0 | Status: SHIPPED | OUTPATIENT
Start: 2018-12-22 | End: 2018-12-27

## 2018-12-22 RX ORDER — PSEUDOEPHEDRINE HCL 120 MG/1
120 TABLET, FILM COATED, EXTENDED RELEASE ORAL 2 TIMES DAILY PRN
Qty: 30 TAB | Refills: 0 | Status: SHIPPED | OUTPATIENT
Start: 2018-12-22 | End: 2018-12-27

## 2018-12-22 RX ORDER — DEXAMETHASONE SODIUM PHOSPHATE 4 MG/ML
10 INJECTION, SOLUTION INTRA-ARTICULAR; INTRALESIONAL; INTRAMUSCULAR; INTRAVENOUS; SOFT TISSUE ONCE
Status: COMPLETED | OUTPATIENT
Start: 2018-12-22 | End: 2018-12-22

## 2018-12-22 RX ORDER — NAPROXEN 500 MG/1
500 TABLET ORAL 2 TIMES DAILY WITH MEALS
Qty: 20 TAB | Refills: 0 | Status: SHIPPED | OUTPATIENT
Start: 2018-12-22 | End: 2018-12-27

## 2018-12-22 RX ADMIN — DEXAMETHASONE SODIUM PHOSPHATE 10 MG: 4 INJECTION, SOLUTION INTRAMUSCULAR; INTRAVENOUS at 14:05

## 2018-12-22 ASSESSMENT — PAIN SCALES - GENERAL: PAINLEVEL_OUTOF10: 8

## 2018-12-22 NOTE — ED PROVIDER NOTES
"ED Provider Note    CHIEF COMPLAINT  Chief Complaint   Patient presents with   • Sore Throat     pt reports symptoms 3 days ago/ pt able to maintain own secretions.   • Difficulty Swallowing       HPI  Chanelle Ortiz is a 43 y.o. female who presents for evaluation of a sore throat associated with nasal congestion, rhinorrhea and a cough, this is been going on for 3 days, no fever, no chest pain or shortness of breath.  She states she has a history of diabetes but has not been on any medication for this, she also history of obstructive sleep apnea and obesity and she states that the CPAP machine makes her sore throat worse.    REVIEW OF SYSTEMS  Negative for fever, rash, chest pain, dyspnea, abdominal pain, back pain.     PAST MEDICAL HISTORY   has a past medical history of Anxiety and depression (2016); Arrhythmia; Diabetes (2008); H/O Hypertension - resolved; Hx of Bronchitis; Hyperthyroidism (2008); Hypothyroidism, after radioactive ablation of thyroid (2008); PLOLY on CPAP (2018); and Psychiatric problem - unspecified.    SOCIAL HISTORY  Social History     Social History Main Topics   • Smoking status: Never Smoker   • Smokeless tobacco: Never Used   • Alcohol use No   • Drug use: No   • Sexual activity: Not on file      Comment: .  Tuboligation in 2004.        SURGICAL HISTORY   has a past surgical history that includes other abdominal surgery (2002); gyn surgery (2004); other; primary c section; ovarian cystectomy (1992); cholecystectomy; and primary c section.    CURRENT MEDICATIONS  I personally reviewed the medication list in the charting documentation.     ALLERGIES  Allergies   Allergen Reactions   • Morphine      \"I think\" \"I dunno what my reaction was, the nurse just said my oxygen levels were going way to low on it\"        PHYSICAL EXAM  VITAL SIGNS: /90   Pulse 95   Temp 36.3 °C (97.3 °F) (Temporal)   Resp 20   Ht 1.6 m (5' 3\")   Wt 122.5 kg (270 lb 1 oz)   LMP 12/20/2018 " (Exact Date)   SpO2 93%   BMI 47.84 kg/m²   Constitutional: Alert in no apparent distress.  HENT: Nasal congestion and rhinorrhea are evident, generalized pharyngeal erythema with no asymmetry, no exudates, the uvula is midline.  Eyes: Conjunctiva normal, Non-icteric.   Chest: Normal nonlabored respirations  Skin: No erythema, No rash.   Musculoskeletal: Good range of motion in all major joints.   Neurologic: Alert, No focal deficits noted.   Psychiatric: Affect normal, Judgment normal.    DIAGNOSTIC STUDIES / PROCEDURES    LABS  Results for orders placed or performed during the hospital encounter of 12/22/18   RAPID STREP, CULT IF INDICATED (CULTURE IF NEGATIVE)   Result Value Ref Range    Significant Indicator NEG     Source THRT     Site THROAT     Rapid Strep Screen       Negative for Group A streptococcus.  A negative result may be obtained if the specimen is  inadequate or antigen concentration is below the  sensitivity of the test. This negative test will be followed  up with a culture as requested.          COURSE & MEDICAL DECISION MAKING  Pertinent Labs & Imaging studies reviewed. (See chart for details)    Encounter Summary: This is a 43 y.o. female with signs and symptoms consistent with a viral URI, no indication of pneumonia, patient does have a sore throat and is concerned about strep, a swab will be obtained, she will be treated with Decadron here in the emergency department.  In the absence of a positive strep swab, she will be treated with Tessalon for cough as well as pseudoephedrine for her congestion, strict return instructions will be discussed and she will be discharged home in stable condition.      DISPOSITION: Discharge Home      FINAL IMPRESSION  1. Viral URI with cough    2. Viral pharyngitis    3. Viral syndrome        This dictation was created using voice recognition software. The accuracy of the dictation is limited to the abilities of the software. I expect there may be some errors  of grammar and possibly content. The nursing notes were reviewed and certain aspects of this information were incorporated into this note.    Electronically signed by: Akash Miranda, 12/22/2018 1:53 PM

## 2018-12-22 NOTE — ED TRIAGE NOTES
Chief Complaint   Patient presents with   • Sore Throat     pt reports symptoms 3 days ago/ pt able to maintain own secretions.   • Difficulty Swallowing       Explained to pt triage process, made pt aware to tell this RN/staff of any changes/concerns, pt verbalized understanding of process and instructions given. Pt to RONALD rodriguez.

## 2018-12-22 NOTE — ED TRIAGE NOTES
.  Chief Complaint   Patient presents with   • Sore Throat     pt reports symptoms 3 days ago/ pt able to maintain own secretions.   • Difficulty Swallowing   Agree with triage assessment.  Pt reports no difficulty breathing.  No fever/chills.

## 2018-12-22 NOTE — ED NOTES
All lines and monitors disconnected.  Discharge instructions reviewed, questions answered.  Pt to [facility], escorted by RN.  Pt provided with prescriptions X 3.  Pt states all belongings in possession.

## 2018-12-24 LAB
S PYO SPEC QL CULT: NORMAL
SIGNIFICANT IND 70042: NORMAL
SITE SITE: NORMAL
SOURCE SOURCE: NORMAL

## 2018-12-27 ENCOUNTER — OFFICE VISIT (OUTPATIENT)
Dept: INTERNAL MEDICINE | Facility: MEDICAL CENTER | Age: 43
End: 2018-12-27
Payer: MEDICAID

## 2018-12-27 VITALS
DIASTOLIC BLOOD PRESSURE: 72 MMHG | TEMPERATURE: 97.8 F | HEIGHT: 63 IN | BODY MASS INDEX: 47.84 KG/M2 | WEIGHT: 270 LBS | HEART RATE: 76 BPM | SYSTOLIC BLOOD PRESSURE: 110 MMHG | OXYGEN SATURATION: 98 %

## 2018-12-27 DIAGNOSIS — R73.09 ELEVATED GLUCOSE LEVEL: ICD-10-CM

## 2018-12-27 DIAGNOSIS — E66.01 MORBID OBESITY WITH BMI OF 45.0-49.9, ADULT (HCC): ICD-10-CM

## 2018-12-27 DIAGNOSIS — K21.9 GASTROESOPHAGEAL REFLUX DISEASE, ESOPHAGITIS PRESENCE NOT SPECIFIED: ICD-10-CM

## 2018-12-27 DIAGNOSIS — E11.9 TYPE 2 DIABETES MELLITUS WITHOUT COMPLICATION, WITHOUT LONG-TERM CURRENT USE OF INSULIN (HCC): ICD-10-CM

## 2018-12-27 DIAGNOSIS — D50.9 IRON DEFICIENCY ANEMIA, UNSPECIFIED IRON DEFICIENCY ANEMIA TYPE: ICD-10-CM

## 2018-12-27 DIAGNOSIS — R79.89 LOW VITAMIN D LEVEL: ICD-10-CM

## 2018-12-27 DIAGNOSIS — E03.9 HYPOTHYROIDISM, UNSPECIFIED TYPE: ICD-10-CM

## 2018-12-27 DIAGNOSIS — Z92.3 HX OF RADIOACTIVE IODINE THYROID ABLATION: ICD-10-CM

## 2018-12-27 PROCEDURE — 99214 OFFICE O/P EST MOD 30 MIN: CPT | Mod: GC | Performed by: INTERNAL MEDICINE

## 2018-12-27 RX ORDER — FAMOTIDINE 20 MG/1
20 TABLET, FILM COATED ORAL 2 TIMES DAILY
Qty: 60 TAB | Refills: 2 | Status: SHIPPED | OUTPATIENT
Start: 2018-12-27 | End: 2019-03-21

## 2018-12-27 ASSESSMENT — PAIN SCALES - GENERAL: PAINLEVEL: 5=MODERATE PAIN

## 2018-12-27 NOTE — PROGRESS NOTES
Established Patient     Chief Complaint   Patient presents with   • Follow-Up        HPI:  Chanelle Ortiz is a 43 y.o. female here for follow-up.    Past hx of DM-2 (resolved), that improved after weight loss.  Was in ER recently, and had late-night labs with elevated glucose, but may not have been fasting.  Otherwise, no recent change in weight    Previously noted to have borderline low Hgb, and past hx of low iron.  Previously told to take iron supplements, but she has not been taking them.  She states she likely just forgot.     Thyroid. - Hx of having hyperthyroidism, followed by radioactive ablation, followed by low thyroid levels.   Several months ago, has elevation in TSH (48), and had dose increased.  Recently (3-4 months ago) her TSH was at the low end of the normal range (0.868).  Continues on synthroid 175.    Has not noted tremor, jitteriness, or diarrhea.  But ongoing (and preceding) anxiety.    Past issues with POLLY/CPAP.   Patient went to ER noting her head was numb from too much oxygen, but ER felt it was anxiety and hyperventilation.  She does not like the way the CPAP feels.  She has a follow-up appointment with sleep-studies, on 1/8/19.     HTN - resolved.  Still good BP in office today.     Outside record-review noted diagnosis of low vit d, but not provided a number. No recent lab-work for this.     Recently to ER with sore throat - likely viral URTI, per ER work-up.  She has finished tessalon and pseudofed. Now, only notes a little congestion, but significantly improved.       Review of Symptoms  GEN/CONST:   Denies fever, fatigue, weakness,   H/E:     Denies blurry vision or eye pain.  ENT:   Denies sinus pain, sore throat, difficulty hearing,   CARDIO:   Denies chest pain, palpitations, orthopnea,   RESP:   Denies shortness of breath, wheezing, or coughing.  Recent URTI, and congestion.   Recent irritation with CPAP  GI:    Denies nausea, vomiting, diarrhea, constipation, or abdominal  pain.   + occasional reflux feelings, in chest, when eating certain foods. (or eating quickly)  :   Denies dysuria, hematuria, hesitancy, or urgency.  HEME:  Denies easy bruising, bleeding,     ALLG:  Denies allergies, asthma, or hives.    MSK:  Denies weakness, edema, joint pains or swellings, or muscle aches.   SKIN:  Denies rashes, itches, or sores.   NEURO:  Denies numbness or tingling, or focal weakness.    ENDO:  Denies  polyuria, or polydipsia.  + difficulty losing weight  PSYCH:  Denies substance abuse, or insomnia.   + anxiety (common - prior referral to psychiatry, still in process).       Family History   Problem Relation Age of Onset   • Other Mother         sleep apnea   • Heart Failure Sister    • Diabetes Sister    • Heart Disease Sister    • Hypertension Sister    • Stroke Sister    • Hyperlipidemia Sister    • Heart Attack Maternal Grandfather    • Diabetes Daughter    • Psychiatry Daughter         bipolar   • Psychiatry Son         autism       Social History     Social History   • Marital status: Legally      Spouse name: N/A   • Number of children: N/A   • Years of education: N/A     Occupational History   • Not on file.     Social History Main Topics   • Smoking status: Never Smoker   • Smokeless tobacco: Never Used   • Alcohol use No   • Drug use: No   • Sexual activity: Not on file      Comment: .  Tuboligation in 2004.      Other Topics Concern   • Not on file     Social History Narrative   • No narrative on file       Current Outpatient Prescriptions   Medication Sig Dispense Refill   • levothyroxine (SYNTHROID) 175 MCG Tab Take 1 Tab by mouth every morning before breakfast. Take a half hour before breakfast. 30 Tab 5   • naproxen (NAPROSYN) 500 MG Tab Take 1 Tab by mouth 2 times a day, with meals. (Patient not taking: Reported on 12/27/2018) 20 Tab 0   • benzonatate (TESSALON) 100 MG Cap Take 2 Caps by mouth 3 times a day as needed for Cough. (Patient not taking: Reported  "on 12/27/2018) 20 Cap 0   • pseudoephedrine SR (SUDAFED SR) 120 MG TABLET SR 12 HR Take 1 Tab by mouth 2 times a day as needed for Congestion. (Patient not taking: Reported on 12/27/2018) 30 Tab 0   • ferrous sulfate 325 (65 Fe) MG tablet Take 1 Tab by mouth every day. (Patient not taking: Reported on 12/27/2018) 30 Tab 4     No current facility-administered medications for this visit.        Allergies   Allergen Reactions   • Morphine      \"I think\" \"I dunno what my reaction was, the nurse just said my oxygen levels were going way to low on it\"        Patient Active Problem List    Diagnosis Date Noted   • Headache 10/31/2018   • Dizzinesses 09/05/2018   • Prior DM-2 - Resolved after weight loss 08/15/2018   • Hypothyroidism 08/15/2018   • Morbid obesity with BMI of 45.0-49.9, adult (HCC) 08/15/2018   • Hx of radioactive iodine thyroid ablation 08/15/2018   • Iron deficiency anemia 08/15/2018   • Psychiatric problem - unspecified    • H/O hypertension - resolved.     • POLLY on CPAP 01/01/2018   • Hypothyroid 05/20/2017   • Atypical chest pain 05/20/2017   • Hx of Anxiety and depression 01/01/2016   • Obesity 04/05/2012       Physical Exam  /72 (BP Location: Right arm, Patient Position: Sitting, BP Cuff Size: Adult long)   Pulse 76   Temp 36.6 °C (97.8 °F) (Temporal)   Ht 1.6 m (5' 3\")   Wt 122.5 kg (270 lb)   LMP 12/20/2018 (Exact Date)   SpO2 98%   Breastfeeding? No   BMI 47.83 kg/m²   General:  Alert and oriented, No apparent distress.  Obese  HEENT:   EOMI, No icterus, No erythema or exudates.    Lungs: Clear to auscultation bilaterally.  No wheezes or rales.  Cardiovascular: Regular rate and rhythm.  No murmurs, rubs or gallops.  Abdomen:  Soft.  Non-distended.  Non-tender.    Normal bowel sounds.  No rebound or guarding noted.   Extremities: No pedal edema.  Good general motion of all 4 extremities.   Neurological:  Motor function grossly intact and symmetrical.   Psychological: Appears to have " decreased affect, and a bit tired.       Labs:  Reviewed last cbc and bmp   Also, see HPI comments.       Assessment & Plan    1. Hypothyroidism, unspecified type  2. Hx of radioactive iodine thyroid ablation  Last TSH in normal range (but low end of normal).   Significant decrease, from prior value.   Will obtain new lab, to check dosing.   For now, continue on synthroid 175.  - TSH WITH REFLEX TO FT4; Future    3. Elevated glucose level  4. Prior DM-2 - Resolved after weight loss  5. Morbid obesity with BMI of 45.0-49.9   Last lab with elevation in glucose while in ER, but likely not fasting.   Prior levels were acceptable.  Continues to be improved, since her past weight loss.   Finds it difficult to lose more weight.   Will refer to nutrition (per her request) for counseling on diet advise.  Will also get new labs, including fasting-glucose and A1c.  - REFERRAL TO NUTRITION SERVICES  - COMP METABOLIC PANEL; Future  - HEMOGLOBIN A1C; Future    6. Hx of Iron deficiency, with prior anemia   Previously had low Hgb and distant hx of low iron.   Had recommended iron supplement, but she has not been taking them.   Hgb has improved since then, but also with   hx of POLLY, and questionable compliance with CPAP.  (possibly false elevation in  Hgb).   Will get new lab, prior to next visit.   - CBC WITH DIFFERENTIAL; Future    7. Low vitamin D level  Noted in past (outside) chart review.   Numbers / level not specified.   Will obtain new lab.   - VITAMIN D,25 HYDROXY; Future    8. Acid Reflux  Noted more, after specific foods  (uncertain if worse if eating fast).   Advised to eat slowly, and monitor her foods.  Can try Pepcid, when eating foods that cause irritation.   Can follow / re-evaluate on future visit.   - COMP METABOLIC PANEL; Future  - famotidine (PEPCID) 20 MG Tab; Take 1 Tab by mouth 2 times a day.  Dispense: 60 Tab; Refill: 2      Instructions given to the patient:  Please follow-up with psychiatry.   It was sent  to:  [facility]   [address] [facility] [address]   Phone: [phone]    If they can not schedule you, please check with the referral center, at [facility], at [phone].     Please see if you can be seen by nutrition (the other referral).     Please get the labs done about 1 week before your next appointment (in about 3 months).   Please get them done while fasting (no food, coffee, or juices, for 8 hours - but water is ok).     Please follow-up in 3 months.   Thank you.       A computerized dictation system may have been used for this note.    Despite review, there may be some spelling or grammatical errors.    Trenton Love M.D.  12/27/2018   Attending:  Inga Fair M.D.

## 2018-12-27 NOTE — PATIENT INSTRUCTIONS
Please follow-up with psychiatry.   It was sent to:  [facility]   [address] [facility] [address]   Phone: [phone]    If they can not schedule you, please check with the referral center, at [facility], at [phone].     Please see if you can be seen by nutrition (the other referral).     Please get the labs done about 1 week before your next appointment (in about 3 months).   Please get them done while fasting (no food, coffee, or juices, for 8 hours - but water is ok).     Please follow-up in 3 months.   Thank you.

## 2019-02-11 ENCOUNTER — HOSPITAL ENCOUNTER (EMERGENCY)
Dept: HOSPITAL 8 - ED | Age: 44
Discharge: HOME | End: 2019-02-11
Payer: MEDICAID

## 2019-02-11 VITALS — DIASTOLIC BLOOD PRESSURE: 82 MMHG | SYSTOLIC BLOOD PRESSURE: 130 MMHG

## 2019-02-11 VITALS — WEIGHT: 283.29 LBS | HEIGHT: 63 IN | BODY MASS INDEX: 50.2 KG/M2

## 2019-02-11 DIAGNOSIS — E11.65: ICD-10-CM

## 2019-02-11 DIAGNOSIS — E03.9: ICD-10-CM

## 2019-02-11 DIAGNOSIS — K08.89: ICD-10-CM

## 2019-02-11 DIAGNOSIS — E66.01: ICD-10-CM

## 2019-02-11 DIAGNOSIS — H61.21: Primary | ICD-10-CM

## 2019-02-11 PROCEDURE — 99283 EMERGENCY DEPT VISIT LOW MDM: CPT

## 2019-02-27 ENCOUNTER — OFFICE VISIT (OUTPATIENT)
Dept: CARDIOLOGY | Facility: MEDICAL CENTER | Age: 44
End: 2019-02-27
Payer: MEDICAID

## 2019-02-27 VITALS
HEIGHT: 63 IN | WEIGHT: 281 LBS | BODY MASS INDEX: 49.79 KG/M2 | SYSTOLIC BLOOD PRESSURE: 132 MMHG | OXYGEN SATURATION: 95 % | HEART RATE: 90 BPM | DIASTOLIC BLOOD PRESSURE: 84 MMHG

## 2019-02-27 DIAGNOSIS — E66.01 MORBID OBESITY WITH BMI OF 45.0-49.9, ADULT (HCC): ICD-10-CM

## 2019-02-27 DIAGNOSIS — E66.01 CLASS 3 SEVERE OBESITY DUE TO EXCESS CALORIES IN ADULT, UNSPECIFIED BMI, UNSPECIFIED WHETHER SERIOUS COMORBIDITY PRESENT (HCC): ICD-10-CM

## 2019-02-27 DIAGNOSIS — R07.89 OTHER CHEST PAIN: ICD-10-CM

## 2019-02-27 DIAGNOSIS — E11.9 TYPE 2 DIABETES MELLITUS WITHOUT COMPLICATION, WITHOUT LONG-TERM CURRENT USE OF INSULIN (HCC): ICD-10-CM

## 2019-02-27 PROCEDURE — 99214 OFFICE O/P EST MOD 30 MIN: CPT | Performed by: INTERNAL MEDICINE

## 2019-02-27 RX ORDER — CHLORHEXIDINE GLUCONATE ORAL RINSE 1.2 MG/ML
SOLUTION DENTAL
Refills: 0 | COMMUNITY
Start: 2019-01-04 | End: 2019-03-21

## 2019-02-27 RX ORDER — IBUPROFEN 600 MG/1
TABLET ORAL
Refills: 0 | COMMUNITY
Start: 2019-02-15 | End: 2019-03-21

## 2019-02-27 RX ORDER — AMOXICILLIN 500 MG/1
CAPSULE ORAL
Refills: 0 | COMMUNITY
Start: 2019-02-15 | End: 2019-03-21

## 2019-02-27 NOTE — PROGRESS NOTES
Cardiology Initial Consultation Note    Date of note:    2019    Primary Care Provider: Trenton Love M.D.  Referring Provider: Faith Mcgovern A.P.N.     Patient Name: Chanelle Ortiz   YOB: 1975  MRN:              [id number]    Chief Complaint:  Chest pain    Chanelle Ortiz is a 43 y.o. female  patient presented today with complaints of exertional chest pain and dizziness.  She has headache associated with dizziness and chest pains occasionally.  She also feels chest pain like a little tingling sensation with exertion.    ROS    Positive for dizziness, headache, chest pain, fatigue, back pain.  all other systems reviewed and discussed using a comprehensive questionnaire and are negative.     Past medical history, family history, social history, allergies and labs are reviewed and updated as needed as documented below.    Past Medical History:   Diagnosis Date   • Anxiety and depression     after ex went to prison   • Arrhythmia     notes occasional rapid or prominent heart beat, at night    • Diabetes 2008    on and off meds (due to concern about heart beat changes)   • H/O Hypertension - resolved     Patient states prior HTN, but resolved after changes in other medications (but off HTN meds).    • Hx of Bronchitis    • Hyperthyroidism     Treated with Radioactive iodine, at [place], in    • Hypothyroidism, after radioactive ablation of thyroid    • POLLY on CPAP     gets headaches, if not using CPAP.    • Psychiatric problem - unspecified     pt notes anxiety and depression, with counseller - pt unclear on details.           Past Surgical History:   Procedure Laterality Date   • GYN SURGERY      tuboligation, bilateral, during past .    • OTHER ABDOMINAL SURGERY      cholesystectomy   • OVARIAN CYSTECTOMY      unknown details or laterality.   • CHOLECYSTECTOMY     • OTHER      tonsillectomy   • PRIMARY C SECTION        "- 2002, 2003, 2004   • PRIMARY C SECTION           Current Outpatient Prescriptions   Medication Sig Dispense Refill   • amoxicillin (AMOXIL) 500 MG Cap TAKE 1 CAPSULE BY MOUTH EVERY 8 HOURS FOR 10 DAYS  0   •  MG Tab TAKE 1 TABLET BY MOUTH EVERY 8 HOURS AS NEEDED FOR PAIN -  0   • levothyroxine (SYNTHROID) 175 MCG Tab Take 1 Tab by mouth every morning before breakfast. Take a half hour before breakfast. 30 Tab 5   • chlorhexidine (PERIDEX) 0.12 % Solution PLEASE SEE ATTACHED FOR DETAILED DIRECTIONS  0   • famotidine (PEPCID) 20 MG Tab Take 1 Tab by mouth 2 times a day. 60 Tab 2   • ferrous sulfate 325 (65 Fe) MG tablet Take 1 Tab by mouth every day. (Patient not taking: Reported on 12/27/2018) 30 Tab 4     No current facility-administered medications for this visit.          Allergies   Allergen Reactions   • Morphine      \"I think\" \"I dunno what my reaction was, the nurse just said my oxygen levels were going way to low on it\"          Family History   Problem Relation Age of Onset   • Other Mother         sleep apnea   • Heart Failure Sister    • Diabetes Sister    • Heart Disease Sister    • Hypertension Sister    • Stroke Sister    • Hyperlipidemia Sister    • Heart Attack Maternal Grandfather    • Diabetes Daughter    • Psychiatry Daughter         bipolar   • Psychiatry Son         autism         Social History     Social History   • Marital status: Legally      Spouse name: N/A   • Number of children: N/A   • Years of education: N/A     Occupational History   • Not on file.     Social History Main Topics   • Smoking status: Never Smoker   • Smokeless tobacco: Never Used   • Alcohol use No   • Drug use: No   • Sexual activity: Not on file      Comment: .  Tuboligation in 2004.      Other Topics Concern   • Not on file     Social History Narrative   • No narrative on file         Physical Exam:  Ambulatory Vitals  Blood pressure 132/84, pulse 90, height 1.6 m (5' 3\"), weight (!) 127.5 kg " (281 lb), SpO2 95 %, not currently breastfeeding.   Oxygen Therapy:  Pulse Oximetry: 95 %  BP Readings from Last 4 Encounters:   02/27/19 132/84   12/27/18 110/72   12/22/18 138/89   11/18/18 148/71       Weight/BMI: Body mass index is 49.78 kg/m².  Wt Readings from Last 4 Encounters:   02/27/19 (!) 127.5 kg (281 lb)   12/27/18 122.5 kg (270 lb)   12/22/18 122.5 kg (270 lb 1 oz)   11/18/18 122.7 kg (270 lb 8.1 oz)       General: obesity  Head: atrumatic  Eyes: No conjunctival pallor   ENT: normal external appearance of nose and ears  Neck: JVD absent, carotid bruits absent  Lungs: respiratory sounds  normal, additional breath sounds absent  Heart: Regular rhythm,   No palpable thrills on palpation, murmurs absent, no rubs,   Lower extremity edema absent.   Pedal pulses normal  Abdomen: soft, non tender, non distended.  Extremities/MSK: no clubbing, no cyanosis  Neurological: normal orientation, Gait normal   Psychiatric: Appropriate affect, intact judgement and insight  Skin: Warm extremities        Lab Data Review:  Lab Results   Component Value Date/Time    CHOLSTRLTOT 100 08/23/2018 07:14 AM    CHOLSTRLTOT 93 (L) 09/14/2009 01:05 AM    LDL 42 08/23/2018 07:14 AM    LDL 49 09/14/2009 01:05 AM    HDL 29 (L) 08/23/2018 07:14 AM    HDL 22 (L) 09/14/2009 01:05 AM    TRIGLYCERIDE 146 08/23/2018 07:14 AM    TRIGLYCERIDE 111 09/14/2009 01:05 AM       Lab Results   Component Value Date/Time    SODIUM 138 11/18/2018 12:07 AM    POTASSIUM 3.7 11/18/2018 12:07 AM    CHLORIDE 102 11/18/2018 12:07 AM    CO2 28 11/18/2018 12:07 AM    GLUCOSE 125 (H) 11/18/2018 12:07 AM    BUN 16 11/18/2018 12:07 AM    CREATININE 0.70 11/18/2018 12:07 AM    CREATININE 0.7 01/26/2009 05:15 PM     Lab Results   Component Value Date/Time    ALKPHOSPHAT 88 11/18/2018 12:07 AM    ASTSGOT 17 11/18/2018 12:07 AM    ALTSGPT 15 11/18/2018 12:07 AM    TBILIRUBIN 0.3 11/18/2018 12:07 AM      Lab Results   Component Value Date/Time    WBC 9.8 11/18/2018  12:07 AM     EKG 2018:  Normal sinus rhythm    Echo dated 2018:   CONCLUSIONS  Normal left ventricular chamber size.  Left ventricular ejection fraction is visually estimated to be 60%.  Mild concentric left ventricular hypertrophy.  No significant valve abnormalities.   Estimated right ventricular systolic pressure  is 30 mmHg.    Stress test/coronary angiograms reports any    Myocardial Perfusion   Report   NUCLEAR IMAGING INTERPRETATION   No evidence of significant jeopardized viable myocardium or prior myocardial    infarction.   Normal left ventricular size, ejection fraction, and wall motion.   ECG INTERPRETATION   Negative stress ECG for ischemia.    Medical Decision Makin.  Atypical chest pain  2.  Obesity  3.  Sleep apnea    Her pain is atypical, however given her risk factors and new onset of symptoms would rule out ischemia with cardiac PET.  Her estimated right ventricular systolic pressure was 30, which is in normal limits.  She has normal RV size and function on echocardiogram, I do not think she has pulmonary hypertension.  -Referral to weight management program.  -Encouraged regular exercise, compression stockings, weight loss    Return in about 1 year (around 2020).    This note was dictated using Dragon speech recognition software.    Preston CANALES  Interventional cardiologist  [facility]  [facility], [address]  [address] [address]  Phone: [phone]  Fax: [phone]

## 2019-02-27 NOTE — LETTER
[facility]   [address] [place] [address] [address]  [place] [address]  Phone: [phone]  Fax: [phone]              Chanelle Ortiz  1975    Encounter Date: 2/27/2019    Preston Witt M.D.          PROGRESS NOTE:      Cardiology Initial Consultation Note    Date of note:    2/27/2019    Primary Care Provider: Trenton Love M.D.  Referring Provider: Faith Mcgovern A.P.N.     Patient Name: Chanelle Ortiz   YOB: 1975  MRN:              [id number]    Chief Complaint:  Chest pain    Chanelle Ortiz is a 43 y.o. female  patient presented today with complaints of exertional chest pain and dizziness.  She has headache associated with dizziness and chest pains occasionally.  She also feels chest pain like a little tingling sensation with exertion.    ROS    Positive for dizziness, headache, chest pain, fatigue, back pain.  all other systems reviewed and discussed using a comprehensive questionnaire and are negative.     Past medical history, family history, social history, allergies and labs are reviewed and updated as needed as documented below.    Past Medical History:   Diagnosis Date   • Anxiety and depression 2016    after ex went to prison   • Arrhythmia     notes occasional rapid or prominent heart beat, at night    • Diabetes 2008    on and off meds (due to concern about heart beat changes)   • H/O Hypertension - resolved     Patient states prior HTN, but resolved after changes in other medications (but off HTN meds).    • Hx of Bronchitis    • Hyperthyroidism 2008    Treated with Radioactive iodine, at [employer], in 2008   • Hypothyroidism, after radioactive ablation of thyroid 2008   • POLLY on CPAP 2018    gets headaches, if not using CPAP.    • Psychiatric problem - unspecified     pt notes anxiety and depression, with counseller - pt unclear on details.           Past Surgical History:   Procedure Laterality Date   •  "GYN SURGERY      tuboligation, bilateral, during past .    • OTHER ABDOMINAL SURGERY      cholesystectomy   • OVARIAN CYSTECTOMY      unknown details or laterality.   • CHOLECYSTECTOMY     • OTHER      tonsillectomy   • PRIMARY C SECTION       - , ,    • PRIMARY C SECTION           Current Outpatient Prescriptions   Medication Sig Dispense Refill   • amoxicillin (AMOXIL) 500 MG Cap TAKE 1 CAPSULE BY MOUTH EVERY 8 HOURS FOR 10 DAYS  0   •  MG Tab TAKE 1 TABLET BY MOUTH EVERY 8 HOURS AS NEEDED FOR PAIN -  0   • levothyroxine (SYNTHROID) 175 MCG Tab Take 1 Tab by mouth every morning before breakfast. Take a half hour before breakfast. 30 Tab 5   • chlorhexidine (PERIDEX) 0.12 % Solution PLEASE SEE ATTACHED FOR DETAILED DIRECTIONS  0   • famotidine (PEPCID) 20 MG Tab Take 1 Tab by mouth 2 times a day. 60 Tab 2   • ferrous sulfate 325 (65 Fe) MG tablet Take 1 Tab by mouth every day. (Patient not taking: Reported on 2018) 30 Tab 4     No current facility-administered medications for this visit.          Allergies   Allergen Reactions   • Morphine      \"I think\" \"I dunno what my reaction was, the nurse just said my oxygen levels were going way to low on it\"          Family History   Problem Relation Age of Onset   • Other Mother         sleep apnea   • Heart Failure Sister    • Diabetes Sister    • Heart Disease Sister    • Hypertension Sister    • Stroke Sister    • Hyperlipidemia Sister    • Heart Attack Maternal Grandfather    • Diabetes Daughter    • Psychiatry Daughter         bipolar   • Psychiatry Son         autism         Social History     Social History   • Marital status: Legally      Spouse name: N/A   • Number of children: N/A   • Years of education: N/A     Occupational History   • Not on file.     Social History Main Topics   • Smoking status: Never Smoker   • Smokeless tobacco: Never Used   • Alcohol use No   • Drug use: No   • Sexual " "activity: Not on file      Comment: .  Tuboligation in 2004.      Other Topics Concern   • Not on file     Social History Narrative   • No narrative on file         Physical Exam:  Ambulatory Vitals  Blood pressure 132/84, pulse 90, height 1.6 m (5' 3\"), weight (!) 127.5 kg (281 lb), SpO2 95 %, not currently breastfeeding.   Oxygen Therapy:  Pulse Oximetry: 95 %  BP Readings from Last 4 Encounters:   02/27/19 132/84   12/27/18 110/72   12/22/18 138/89   11/18/18 148/71       Weight/BMI: Body mass index is 49.78 kg/m².  Wt Readings from Last 4 Encounters:   02/27/19 (!) 127.5 kg (281 lb)   12/27/18 122.5 kg (270 lb)   12/22/18 122.5 kg (270 lb 1 oz)   11/18/18 122.7 kg (270 lb 8.1 oz)       General: obesity  Head: atrumatic  Eyes: No conjunctival pallor   ENT: normal external appearance of nose and ears  Neck: JVD absent, carotid bruits absent  Lungs: respiratory sounds  normal, additional breath sounds absent  Heart: Regular rhythm,   No palpable thrills on palpation, murmurs absent, no rubs,   Lower extremity edema absent.   Pedal pulses normal  Abdomen: soft, non tender, non distended.  Extremities/MSK: no clubbing, no cyanosis  Neurological: normal orientation, Gait normal   Psychiatric: Appropriate affect, intact judgement and insight  Skin: Warm extremities        Lab Data Review:  Lab Results   Component Value Date/Time    CHOLSTRLTOT 100 08/23/2018 07:14 AM    CHOLSTRLTOT 93 (L) 09/14/2009 01:05 AM    LDL 42 08/23/2018 07:14 AM    LDL 49 09/14/2009 01:05 AM    HDL 29 (L) 08/23/2018 07:14 AM    HDL 22 (L) 09/14/2009 01:05 AM    TRIGLYCERIDE 146 08/23/2018 07:14 AM    TRIGLYCERIDE 111 09/14/2009 01:05 AM       Lab Results   Component Value Date/Time    SODIUM 138 11/18/2018 12:07 AM    POTASSIUM 3.7 11/18/2018 12:07 AM    CHLORIDE 102 11/18/2018 12:07 AM    CO2 28 11/18/2018 12:07 AM    GLUCOSE 125 (H) 11/18/2018 12:07 AM    BUN 16 11/18/2018 12:07 AM    CREATININE 0.70 11/18/2018 12:07 AM    CREATININE " 0.7 2009 05:15 PM     Lab Results   Component Value Date/Time    ALKPHOSPHAT 88 2018 12:07 AM    ASTSGOT 17 2018 12:07 AM    ALTSGPT 15 2018 12:07 AM    TBILIRUBIN 0.3 2018 12:07 AM      Lab Results   Component Value Date/Time    WBC 9.8 2018 12:07 AM     EKG 2018:  Normal sinus rhythm    Echo dated 2018:   CONCLUSIONS  Normal left ventricular chamber size.  Left ventricular ejection fraction is visually estimated to be 60%.  Mild concentric left ventricular hypertrophy.  No significant valve abnormalities.   Estimated right ventricular systolic pressure  is 30 mmHg.    Stress test/coronary angiograms reports any    Myocardial Perfusion   Report   NUCLEAR IMAGING INTERPRETATION   No evidence of significant jeopardized viable myocardium or prior myocardial    infarction.   Normal left ventricular size, ejection fraction, and wall motion.   ECG INTERPRETATION   Negative stress ECG for ischemia.    Medical Decision Makin.  Atypical chest pain  2.  Obesity  3.  Sleep apnea    Her pain is atypical, however given her risk factors and new onset of symptoms would rule out ischemia with cardiac PET.  Her estimated right ventricular systolic pressure was 30, which is in normal limits.  She has normal RV size and function on echocardiogram, I do not think she has pulmonary hypertension.  -Referral to weight management program.  -Encouraged regular exercise, compression stockings, weight loss    Return in about 1 year (around 2020).    This note was dictated using Dragon speech recognition software.    Preston CANALES  Interventional cardiologist  [facility], [address]  [address] [address]  [place] [address]  Phone: [phone]  Fax: [phone]                    No Recipients

## 2019-03-04 ENCOUNTER — OFFICE VISIT (OUTPATIENT)
Dept: INTERNAL MEDICINE | Facility: MEDICAL CENTER | Age: 44
End: 2019-03-04
Payer: MEDICAID

## 2019-03-04 VITALS
BODY MASS INDEX: 49.96 KG/M2 | TEMPERATURE: 96.8 F | DIASTOLIC BLOOD PRESSURE: 64 MMHG | WEIGHT: 282 LBS | HEART RATE: 79 BPM | OXYGEN SATURATION: 94 % | HEIGHT: 63 IN | SYSTOLIC BLOOD PRESSURE: 120 MMHG

## 2019-03-04 DIAGNOSIS — E11.9 TYPE 2 DIABETES MELLITUS WITHOUT COMPLICATION, WITHOUT LONG-TERM CURRENT USE OF INSULIN (HCC): ICD-10-CM

## 2019-03-04 DIAGNOSIS — R07.89 ATYPICAL CHEST PAIN: ICD-10-CM

## 2019-03-04 DIAGNOSIS — E66.01 CLASS 3 SEVERE OBESITY DUE TO EXCESS CALORIES IN ADULT, UNSPECIFIED BMI, UNSPECIFIED WHETHER SERIOUS COMORBIDITY PRESENT (HCC): ICD-10-CM

## 2019-03-04 DIAGNOSIS — R42 DIZZINESSES: ICD-10-CM

## 2019-03-04 PROCEDURE — 99214 OFFICE O/P EST MOD 30 MIN: CPT | Mod: GC | Performed by: INTERNAL MEDICINE

## 2019-03-04 RX ORDER — ASPIRIN 325 MG
325 TABLET ORAL EVERY 6 HOURS PRN
COMMUNITY
End: 2019-07-14

## 2019-03-04 NOTE — PROGRESS NOTES
Established Patient    Chanelle presents today with the following:    CC:   Presents to request referral for diabetes education and health improvement program    HPI:     Patient is a 43-year-old female, who has established patient of Dr. Love, who presents to request a referral for diabetes education and health improvement program classes.    States that she has not been on any diabetic medications and is concerned about diabetes, would like to participate in classes to help with her lifestyle management such as weight loss and improving her diet, in addition to health improvement program here at [facility] for further intervention.    In terms of her dizziness, chest pain and blurry vision symptoms, patient states she saw a cardiologist Dr. Cook on 2/27/2019, who stated that her chest pain was atypical and otherwise her recent echo was within normal limits, patient is to follow-up with a cardiac PET stress test and she is trying to schedule an appointment to get this done as soon as possible.      Patient Active Problem List    Diagnosis Date Noted   • Low vitamin D level 12/27/2018   • Headache 10/31/2018   • Dizzinesses 09/05/2018   • Prior DM-2 - Resolved after weight loss 08/15/2018   • Hypothyroidism 08/15/2018   • Morbid obesity with BMI of 45.0-49.9, adult (HCC) 08/15/2018   • Hx of radioactive iodine thyroid ablation 08/15/2018   • Iron deficiency anemia 08/15/2018   • Psychiatric problem - unspecified    • H/O hypertension - resolved.     • POLLY on CPAP 01/01/2018   • Hypothyroid 05/20/2017   • Atypical chest pain 05/20/2017   • Hx of Anxiety and depression 01/01/2016   • Obesity 04/05/2012       Current Outpatient Prescriptions   Medication Sig Dispense Refill   • aspirin (ASA) 325 MG Tab Take 325 mg by mouth every 6 hours as needed.     • levothyroxine (SYNTHROID) 175 MCG Tab Take 1 Tab by mouth every morning before breakfast. Take a half hour before breakfast. 30 Tab 5   • ferrous sulfate 325 (65  "Fe) MG tablet Take 1 Tab by mouth every day. 30 Tab 4   • amoxicillin (AMOXIL) 500 MG Cap TAKE 1 CAPSULE BY MOUTH EVERY 8 HOURS FOR 10 DAYS  0   • chlorhexidine (PERIDEX) 0.12 % Solution PLEASE SEE ATTACHED FOR DETAILED DIRECTIONS  0   •  MG Tab TAKE 1 TABLET BY MOUTH EVERY 8 HOURS AS NEEDED FOR PAIN -  0   • famotidine (PEPCID) 20 MG Tab Take 1 Tab by mouth 2 times a day. 60 Tab 2     No current facility-administered medications for this visit.        Social History     Social History   • Marital status: Legally      Spouse name: N/A   • Number of children: N/A   • Years of education: N/A     Occupational History   • Not on file.     Social History Main Topics   • Smoking status: Never Smoker   • Smokeless tobacco: Never Used   • Alcohol use No   • Drug use: No   • Sexual activity: Not on file      Comment: .  Tuboligation in 2004.      Other Topics Concern   • Not on file     Social History Narrative   • No narrative on file       Family History   Problem Relation Age of Onset   • Other Mother         sleep apnea   • Heart Failure Sister    • Diabetes Sister    • Heart Disease Sister    • Hypertension Sister    • Stroke Sister    • Hyperlipidemia Sister    • Heart Attack Maternal Grandfather    • Diabetes Daughter    • Psychiatry Daughter         bipolar   • Psychiatry Son         autism       ROS: As per HPI.  All other systems reviewed and were negative.  Additional pertinent symptoms as noted below.    All others negative    /64 (BP Location: Left arm, Patient Position: Sitting, BP Cuff Size: Large adult)   Pulse 79   Temp 36 °C (96.8 °F) (Temporal)   Ht 1.6 m (5' 3\")   Wt (!) 127.9 kg (282 lb)   SpO2 94%   BMI 49.95 kg/m²     Physical Exam  General:  Alert and oriented, No apparent distress.    Eyes: Pupils equal and reactive. No scleral icterus.    Throat: Clear no erythema or exudates noted.    Neck: Supple. No lymphadenopathy noted. Thyroid not enlarged.    Lungs: Clear " to auscultation and percussion bilaterally.  Normal effort of breathing.    Cardiovascular: Regular rate and rhythm. No murmurs, rubs or gallops.    Abdomen:  Benign. No rebound or guarding noted.    Extremities: No clubbing, cyanosis, edema.  Distal peripheral pulses 2+ bilaterally in all extremities.    Skin: Clear. No rash or suspicious skin lesions noted.          Assessment and Plan    1. Dizzinesses  2. Atypical chest pain  - In terms of her dizziness, chest pain and blurry vision symptoms, patient states she saw a cardiologist Dr. Cook on 2/27/2019, who stated that her chest pain was atypical and otherwise her recent echo was within normal limits  - Patient is to follow-up with a cardiac PET stress test and she is trying to schedule an appointment to get this done as soon as possible  - Will continue to monitor      3. Prior DM-2 - Resolved after weight loss  4. Class 3 severe obesity due to excess calories in adult, unspecified BMI, unspecified whether serious comorbidity present (HCC)  - Patient's BMI 49.95 at office visit today  - Significant time and counseling was spent on weight loss efforts, in addition to diet management and modifications  - Requesting referral to health improvement program and diabetes education  - Provided referral to health improvement program and diabetes education  - Hemoglobin A1c and TSH reflex T4 lab work renewed in addition to previous lab work provided by PCP for patient to get done as soon as possible  - Continue to monitor      Signed by: Woody Laguna M.D.

## 2019-03-04 NOTE — PATIENT INSTRUCTIONS
- Please get all lab work done  - Referral to Health Improvement Program provided  - referral to diabetes education program  - Please schedule appointment for Cardiac PET stress test per cardiology   - Follow-up with Dr Lind in 1 month      Diabetes Mellitus and Exercise  Exercising regularly is important for your overall health, especially when you have diabetes (diabetes mellitus). Exercising is not only about losing weight. It has many health benefits, such as increasing muscle strength and bone density and reducing body fat and stress. This leads to improved fitness, flexibility, and endurance, all of which result in better overall health.  Exercise has additional benefits for people with diabetes, including:  · Reducing appetite.  · Helping to lower and control blood glucose.  · Lowering blood pressure.  · Helping to control amounts of fatty substances (lipids) in the blood, such as cholesterol and triglycerides.  · Helping the body to respond better to insulin (improving insulin sensitivity).  · Reducing how much insulin the body needs.  · Decreasing the risk for heart disease by:  ¨ Lowering cholesterol and triglyceride levels.  ¨ Increasing the levels of good cholesterol.  ¨ Lowering blood glucose levels.  What is my activity plan?  Your health care provider or certified diabetes educator can help you make a plan for the type and frequency of exercise (activity plan) that works for you. Make sure that you:  · Do at least 150 minutes of moderate-intensity or vigorous-intensity exercise each week. This could be brisk walking, biking, or water aerobics.  ¨ Do stretching and strength exercises, such as yoga or weightlifting, at least 2 times a week.  ¨ Spread out your activity over at least 3 days of the week.  · Get some form of physical activity every day.  ¨ Do not go more than 2 days in a row without some kind of physical activity.  ¨ Avoid being inactive for more than 90 minutes at a time. Take frequent  breaks to walk or stretch.  · Choose a type of exercise or activity that you enjoy, and set realistic goals.  · Start slowly, and gradually increase the intensity of your exercise over time.  What do I need to know about managing my diabetes?  · Check your blood glucose before and after exercising.  ¨ If your blood glucose is higher than 240 mg/dL (13.3 mmol/L) before you exercise, check your urine for ketones. If you have ketones in your urine, do not exercise until your blood glucose returns to normal.  · Know the symptoms of low blood glucose (hypoglycemia) and how to treat it. Your risk for hypoglycemia increases during and after exercise. Common symptoms of hypoglycemia can include:  ¨ Hunger.  ¨ Anxiety.  ¨ Sweating and feeling clammy.  ¨ Confusion.  ¨ Dizziness or feeling light-headed.  ¨ Increased heart rate or palpitations.  ¨ Blurry vision.  ¨ Tingling or numbness around the mouth, lips, or tongue.  ¨ Tremors or shakes.  ¨ Irritability.  · Keep a rapid-acting carbohydrate snack available before, during, and after exercise to help prevent or treat hypoglycemia.  · Avoid injecting insulin into areas of the body that are going to be exercised. For example, avoid injecting insulin into:  ¨ The arms, when playing tennis.  ¨ The legs, when jogging.  · Keep records of your exercise habits. Doing this can help you and your health care provider adjust your diabetes management plan as needed. Write down:  ¨ Food that you eat before and after you exercise.  ¨ Blood glucose levels before and after you exercise.  ¨ The type and amount of exercise you have done.  ¨ When your insulin is expected to peak, if you use insulin. Avoid exercising at times when your insulin is peaking.  · When you start a new exercise or activity, work with your health care provider to make sure the activity is safe for you, and to adjust your insulin, medicines, or food intake as needed.  · Drink plenty of water while you exercise to prevent  dehydration or heat stroke. Drink enough fluid to keep your urine clear or pale yellow.  This information is not intended to replace advice given to you by your health care provider. Make sure you discuss any questions you have with your health care provider.  Document Released: 03/09/2005 Document Revised: 07/07/2017 Document Reviewed: 05/29/2017  [employer] Patient Education © 2017 [employer]

## 2019-03-06 ENCOUNTER — TELEPHONE (OUTPATIENT)
Dept: CARDIOLOGY | Facility: MEDICAL CENTER | Age: 44
End: 2019-03-06

## 2019-03-07 NOTE — TELEPHONE ENCOUNTER
medication question   Received: Today   Message Contents   ZAIDA Tapia/Alan       Patient is having a PET scan on Friday, she said Dr. Witt was supposed to call in a prescription of Zofran for her. She can be reached at [phone].      Pt is requesting a medication for claustrophobia and not Zofran as stated above.  She said that he called Dr. Love's office and they will not give her anything and referred back to us.      Asked pt why she feels that she needs anti-anxiety medications. Pt did not answer question and just said that she can deal with it if she has to.     To Dr. Witt: would you like to prescribe anything?

## 2019-03-08 ENCOUNTER — HOSPITAL ENCOUNTER (OUTPATIENT)
Dept: RADIOLOGY | Facility: MEDICAL CENTER | Age: 44
End: 2019-03-08
Attending: INTERNAL MEDICINE
Payer: MEDICAID

## 2019-03-08 DIAGNOSIS — R07.89 OTHER CHEST PAIN: ICD-10-CM

## 2019-03-08 NOTE — TELEPHONE ENCOUNTER
Message   Received: Today   Message Contents   SAGE Koehler R.N.   Caller: Unspecified (2 days ago,  5:06 PM)             I am not sure Zofran will help.   Please advise to not to use medications as it may interefere with our test results.      It appears that the pt cancelled the testing.

## 2019-03-11 NOTE — TELEPHONE ENCOUNTER
"Trent Bailon - REFERRAL TO [facility] HEALTH IMPROVEMENT PROGRAMS (HIP) << Less Detail',event)\" href=\"javascript:;\">More Detail >>   REFERRAL TO [facility] HEALTH IMPROVEMENT PROGRAMS (HIP)   Trent Bailon   Sent: Thu March 07, 2019  9:20 AM   To: ZAIDA Molina,   For this pt....   He has [place] Medicaid.   As of right now, [facility] only accepts Medicaid FFS.   Thank you,   Chip     Called pt and no answer. LM to call back.   "

## 2019-03-13 LAB
25(OH)D3+25(OH)D2 SERPL-MCNC: 20.3 NG/ML (ref 30–100)
ALBUMIN SERPL-MCNC: 3.9 G/DL (ref 3.5–5.5)
ALBUMIN/GLOB SERPL: 1.2 {RATIO} (ref 1.2–2.2)
ALP SERPL-CCNC: 107 IU/L (ref 39–117)
ALT SERPL-CCNC: 15 IU/L (ref 0–32)
AST SERPL-CCNC: 14 IU/L (ref 0–40)
BASOPHILS # BLD AUTO: 0.1 X10E3/UL (ref 0–0.2)
BASOPHILS NFR BLD AUTO: 1 %
BILIRUB SERPL-MCNC: 0.4 MG/DL (ref 0–1.2)
BUN SERPL-MCNC: 12 MG/DL (ref 6–24)
BUN/CREAT SERPL: 17 (ref 9–23)
CALCIUM SERPL-MCNC: 8.6 MG/DL (ref 8.7–10.2)
CHLORIDE SERPL-SCNC: 102 MMOL/L (ref 96–106)
CO2 SERPL-SCNC: 23 MMOL/L (ref 20–29)
CREAT SERPL-MCNC: 0.69 MG/DL (ref 0.57–1)
EOSINOPHIL # BLD AUTO: 0.2 X10E3/UL (ref 0–0.4)
EOSINOPHIL NFR BLD AUTO: 2 %
ERYTHROCYTE [DISTWIDTH] IN BLOOD BY AUTOMATED COUNT: 15.1 % (ref 12.3–15.4)
GLOBULIN SER CALC-MCNC: 3.2 G/DL (ref 1.5–4.5)
GLUCOSE SERPL-MCNC: 142 MG/DL (ref 65–99)
HBA1C MFR BLD: 6.4 % (ref 4.8–5.6)
HCT VFR BLD AUTO: 40 % (ref 34–46.6)
HGB BLD-MCNC: 12.9 G/DL (ref 11.1–15.9)
IMM GRANULOCYTES # BLD AUTO: 0.1 X10E3/UL (ref 0–0.1)
IMM GRANULOCYTES NFR BLD AUTO: 1 %
IMMATURE CELLS  115398: ABNORMAL
LYMPHOCYTES # BLD AUTO: 2.7 X10E3/UL (ref 0.7–3.1)
LYMPHOCYTES NFR BLD AUTO: 25 %
MCH RBC QN AUTO: 25.7 PG (ref 26.6–33)
MCHC RBC AUTO-ENTMCNC: 32.3 G/DL (ref 31.5–35.7)
MCV RBC AUTO: 80 FL (ref 79–97)
MONOCYTES # BLD AUTO: 0.7 X10E3/UL (ref 0.1–0.9)
MONOCYTES NFR BLD AUTO: 7 %
MORPHOLOGY BLD-IMP: ABNORMAL
NEUTROPHILS # BLD AUTO: 7.1 X10E3/UL (ref 1.4–7)
NEUTROPHILS NFR BLD AUTO: 64 %
NRBC BLD AUTO-RTO: ABNORMAL %
PLATELET # BLD AUTO: 230 X10E3/UL (ref 150–379)
POTASSIUM SERPL-SCNC: 4.5 MMOL/L (ref 3.5–5.2)
PROT SERPL-MCNC: 7.1 G/DL (ref 6–8.5)
RBC # BLD AUTO: 5.02 X10E6/UL (ref 3.77–5.28)
SODIUM SERPL-SCNC: 138 MMOL/L (ref 134–144)
TSH SERPL DL<=0.005 MIU/L-ACNC: 1.97 UIU/ML (ref 0.45–4.5)
WBC # BLD AUTO: 10.8 X10E3/UL (ref 3.4–10.8)

## 2019-03-21 ENCOUNTER — OFFICE VISIT (OUTPATIENT)
Dept: INTERNAL MEDICINE | Facility: MEDICAL CENTER | Age: 44
End: 2019-03-21
Payer: MEDICAID

## 2019-03-21 VITALS
HEART RATE: 64 BPM | TEMPERATURE: 97 F | BODY MASS INDEX: 50.89 KG/M2 | DIASTOLIC BLOOD PRESSURE: 60 MMHG | HEIGHT: 63 IN | OXYGEN SATURATION: 94 % | SYSTOLIC BLOOD PRESSURE: 114 MMHG | WEIGHT: 287.2 LBS

## 2019-03-21 DIAGNOSIS — E11.9 TYPE 2 DIABETES MELLITUS WITHOUT COMPLICATION, WITHOUT LONG-TERM CURRENT USE OF INSULIN (HCC): ICD-10-CM

## 2019-03-21 DIAGNOSIS — K21.9 GASTROESOPHAGEAL REFLUX DISEASE, ESOPHAGITIS PRESENCE NOT SPECIFIED: ICD-10-CM

## 2019-03-21 DIAGNOSIS — H92.01 DISCOMFORT OF RIGHT EAR: ICD-10-CM

## 2019-03-21 DIAGNOSIS — H61.21 CERUMINOSIS, RIGHT: ICD-10-CM

## 2019-03-21 DIAGNOSIS — E66.01 CLASS 3 SEVERE OBESITY DUE TO EXCESS CALORIES IN ADULT, UNSPECIFIED BMI, UNSPECIFIED WHETHER SERIOUS COMORBIDITY PRESENT (HCC): ICD-10-CM

## 2019-03-21 DIAGNOSIS — R79.89 LOW VITAMIN D LEVEL: ICD-10-CM

## 2019-03-21 DIAGNOSIS — R73.03 PREDIABETES: ICD-10-CM

## 2019-03-21 DIAGNOSIS — E03.9 HYPOTHYROIDISM, UNSPECIFIED TYPE: ICD-10-CM

## 2019-03-21 PROCEDURE — 99213 OFFICE O/P EST LOW 20 MIN: CPT | Mod: GE | Performed by: INTERNAL MEDICINE

## 2019-03-21 RX ORDER — MULTIVIT-MIN/IRON/FOLIC ACID/K 18-600-40
2 CAPSULE ORAL DAILY
Qty: 60 TAB | Refills: 3 | Status: SHIPPED | OUTPATIENT
Start: 2019-03-21 | End: 2019-10-16

## 2019-03-21 RX ORDER — FAMOTIDINE 20 MG/1
20 TABLET, FILM COATED ORAL 2 TIMES DAILY
Qty: 60 TAB | Refills: 2 | Status: SHIPPED | OUTPATIENT
Start: 2019-03-21 | End: 2019-07-14

## 2019-03-21 NOTE — PATIENT INSTRUCTIONS
"Please start taking the vitamin D.    Please use the Debrox (ear drops) in the right ear.  Use as instructed (for up to 4 days).  Can repeat in a couple weeks, if still needed.     Also, take the Pepcid (famotidine), to avoid the Reflux / heart-burn symptoms (especially if you know you are going to eat food that will be a problem).   ...   (sent to your pharmacy).       Please follow-up on the prior referrals:    - Psychiatry:  [facility]   [address]   [place] [address]   Phone: [phone]      You have a couple listed for \"diabetic education\", you can call [phone], and check with the Referral service, which one to follow-up on..... Or, you can call the offices (that you were referred to) to make an appointment.     - Nutrition services / dietary changes:    [facility]     [address]    [place] [address] [phone]    - Health improvement programs:    [facility]    [address] [place] [address] [phone]         "

## 2019-03-21 NOTE — PROGRESS NOTES
Established Patient     Chief Complaint   Patient presents with   • Follow-Up     hypothyroidism       HPI:  Chanelle Ortiz is a 43 y.o. female here for follow-up.     Thyroid  Patient has previously had low thyroid, and had recent TSH level at 1.97.   Currently denies any symptoms, primarily related to thyroid.    Prediabetes / obesity  Patient has previously been diagnosed with type 2 diabetes; however, this resolved after she lost some weight previously.  At one point, it was down to 5.7.  However, she has recently increasing her A1c, and it is up to 6.4.  She has previously been requesting diabetic education and nutrition classes.  However, she has had difficulty following up with scheduling these, through the referral service.    Ear discomfort  Right ear pain for the past month.  No change in hearing.   No tinnitus or buzzing.   Has noted a bit of sensation of being off-balance during that time.     Reflux  Notes occasional acid reflux symptoms,   after eating a variety of specific foods.  Has been off prilosec.   Had forgotten that we started it, for that purpose.  Willing to restart.     Anxiety   previously noted ongoing anxiety issues.   Has not followed-up on referral yet   (she did not know status of referral).   Reminded To schedule for psychiatry.      Review of Symptoms  GEN/CONST:   Denies fever, fatigue, weakness, or significant weight change.   H/E:     Denies blurry vision or eye pain.  ENT:   Denies sinus pain, sore throat, ear pain, difficulty hearing, or tinnitus.  CARDIO:   Denies chest pain, palpitations, orthopnea, or edema.  RESP:   Denies shortness of breath, wheezing, or coughing.  GI:    Denies nausea, vomiting, diarrhea, constipation, or abdominal pain.   + occasional reflux feelings, in chest, when eating certain foods. (or eating quickly)  :   Denies dysuria, hematuria, hesitancy, or urgency.  HEME:  Denies easy bruising, bleeding, or problem with clots.   ALLG:  Denies  allergies, asthma, or hives.    MSK:  Denies weakness, edema, joint pains or swellings, or muscle aches.   SKIN:  Denies rashes, itches, or sores.   NEURO:  Denies numbness or tingling, altered sensation, or focal weakness.    ENDO:  Denies heat or cold intolerance,   + difficulty losing weight  PSYCH: Denies substance abuse, or insomnia.   + anxiety (prior referral to psychiatry, still in process).       Past Medical History:   Diagnosis Date   • Anxiety and depression     after ex went to residential   • Arrhythmia     notes occasional rapid or prominent heart beat, at night    • Diabetes 2008    on and off meds (due to concern about heart beat changes)   • H/O Hypertension - resolved     Patient states prior HTN, but resolved after changes in other medications (but off HTN meds).    • Hx of Bronchitis    • Hyperthyroidism     Treated with Radioactive iodine, at [facility], in    • Hypothyroidism, after radioactive ablation of thyroid    • POLLY on CPAP     gets headaches, if not using CPAP.    • Psychiatric problem - unspecified     pt notes anxiety and depression, with counseller - pt unclear on details.       Past Surgical History:   Procedure Laterality Date   • GYN SURGERY      tuboligation, bilateral, during past .    • OTHER ABDOMINAL SURGERY      cholesystectomy   • OVARIAN CYSTECTOMY      unknown details or laterality.   • CHOLECYSTECTOMY     • OTHER      tonsillectomy   • PRIMARY C SECTION       - , ,    • PRIMARY C SECTION         Family History   Problem Relation Age of Onset   • Other Mother         sleep apnea   • Heart Failure Sister    • Diabetes Sister    • Heart Disease Sister    • Hypertension Sister    • Stroke Sister    • Hyperlipidemia Sister    • Heart Attack Maternal Grandfather    • Diabetes Daughter    • Psychiatry Daughter         bipolar   • Psychiatry Son         autism       Social History     Social History   • Marital status:  "Legally      Spouse name: N/A   • Number of children: N/A   • Years of education: N/A     Occupational History   • Not on file.     Social History Main Topics   • Smoking status: Never Smoker   • Smokeless tobacco: Never Used   • Alcohol use No   • Drug use: No   • Sexual activity: Not on file      Comment: .  Tuboligation in 2004.      Other Topics Concern   • Not on file     Social History Narrative   • No narrative on file       Current Outpatient Prescriptions   Medication Sig Dispense Refill   • Vitamin D, Cholecalciferol, 1000 units Tab Take 2 Tabs by mouth every day. 60 Tab 3   • ferrous sulfate 325 (65 Fe) MG tablet TAKE 1 TABLET BY MOUTH EVERY DAY 30 Tab 0   • aspirin (ASA) 325 MG Tab Take 325 mg by mouth every 6 hours as needed.     • levothyroxine (SYNTHROID) 175 MCG Tab Take 1 Tab by mouth every morning before breakfast. Take a half hour before breakfast. 30 Tab 5     No current facility-administered medications for this visit.        Allergies   Allergen Reactions   • Morphine      \"I think\" \"I dunno what my reaction was, the nurse just said my oxygen levels were going way to low on it\"        Patient Active Problem List    Diagnosis Date Noted   • Prediabetes 03/21/2019   • Low vitamin D level 12/27/2018   • Headache 10/31/2018   • Dizzinesses 09/05/2018   • Prior DM-2 - Resolved after weight loss 08/15/2018   • Hypothyroidism 08/15/2018   • Morbid obesity with BMI of 45.0-49.9, adult (HCC) 08/15/2018   • Hx of radioactive iodine thyroid ablation 08/15/2018   • Iron deficiency anemia 08/15/2018   • Psychiatric problem - unspecified    • H/O hypertension - resolved.     • POLLY on CPAP 01/01/2018   • Hypothyroid 05/20/2017   • Atypical chest pain 05/20/2017   • Hx of Anxiety and depression 01/01/2016   • Obesity 04/05/2012       Physical Exam  /60 (BP Location: Right arm, Patient Position: Sitting, BP Cuff Size: Large adult)   Pulse 64   Temp 36.1 °C (97 °F) (Temporal)   Ht 1.6 m " "(5' 3\")   Wt (!) 130.3 kg (287 lb 3.2 oz)   SpO2 94%   BMI 50.88 kg/m²   General:  Alert and oriented, No apparent distress.  Morbidly Obese.   HEENT:   EOMI, No icterus, No erythema or exudates.    Right ear, with significant cerumen impaction, appears very dry, dark brown, and appears hard.  Lungs: Clear to auscultation bilaterally.  No wheezes or rales.  Cardiovascular: Regular rate and rhythm.  No murmurs, rubs or gallops.  Abdomen:  Soft.  Large, but Non-distended.  Non-tender.    Normal bowel sounds.  No rebound or guarding noted.   Extremities: Large legs, but No pedal edema.    Good general motion of all 4 extremities.   Neurological:  Motor function and sensation grossly intact and symmetrical.   Psychological: Appears to have normal mood and affect.    Labs:    CBC and CMP reviewed.  Glucose-142.   A1c-6.4.  Vitamin D-20.3.  TSH-1.97.        Assessment & Plan    1. Prediabetes  (worsening A1c)  2. Prior DM-2 - Resolved / Improved after prior weight loss  3. Class 3 severe obesity   Patient is morbidly obese, and interested in improving her health and weight, through diet changes.  She has been referred to dietary/nutrition education (especially for diabetes), previously.  However, she has not followed up with phone calls to schedule.    - Patient will follow up on prior diabetic education referrals.  - Listing of scheduling / referral  locations / phone numbers, provided to patient in \"checkout\" / \"wrap-up\".      4. Hypothyroidism,    Patient has been on Synthroid 175, without side effects.  Patient had recent TSH of 1.97.  We will continue on same.    5. Low vitamin D level  Low vitamin D level of 20.3.  Given vitamin D supplementations (2000 units daily).  Can recheck in a few months  - Vitamin D, Cholecalciferol, 1000 units Tab; Take 2 Tabs by mouth every day.  Dispense: 60 Tab; Refill: 3    6. Reflux  Previously given famotidine, for acid reflux symptoms (occasional).  However patient had forgotten " "what the medication was for, and had not been using it.  She has been given new prescription for this, and new reminder in the \"checkout\" information.  - famotidine (PEPCID) 20 MG Tab; Take 1 Tab by mouth 2 times a day.  Dispense: 60 Tab; Refill: 2    7. Discomfort of right ear  8. Ceruminosis, right  Examination of right ear notes cerumen impaction.  Due to how hard and dark it appears, we will first try to soften up with Debrox, at home, prior to possible irrigation.  Patient is to follow-up in a few weeks, and have the ear reevaluated.  If there is still significant cerumen impaction, then lavage can be done in the office.  - carbamide peroxide (DEBROX) 6.5 % Solution; Place 6 Drops in ear 2 times a day. For up to 4 days (for right ear).  Dispense: 1 Bottle; Refill: 0        Instructions given to the patient:  Please start taking the vitamin D.    Please use the Debrox (ear drops) in the right ear.  Use as instructed (for up to 4 days).  Can repeat in a couple weeks, if still needed.     Also, take the Pepcid (famotidine), to avoid the Reflux / heart-burn symptoms (especially if you know you are going to eat food that will be a problem).   ...   (sent to your pharmacy).     Please follow-up on the prior referrals:  - Psychiatry:  [facility]   [address]   [facility] [address]   Phone: [phone]      You have a couple listed for \"diabetic education\", you can call [phone], and check with the Referral service, which one to follow-up on..... Or, you can call the offices (that you were referred to) to make an appointment.     - Nutrition services / dietary changes:    [facility]     [address]    [place] [address] [phone]    - Health improvement programs:    [facility]    [address]    [place] [address] [phone]      Additionally, patient will follow-up in a few weeks, for cerumen impaction, of right ear.  She is to use Debrox, at home initially.  And " then on future visit, can be checked for need for ear lavage (on next visit). .      A computerized dictation system may have been used for this note.    Despite review, there may be some spelling or grammatical errors.    Trenton Love M.D.  3/21/2019   Attending:  Hussain Osman M.D.

## 2019-04-02 ENCOUNTER — PATIENT MESSAGE (OUTPATIENT)
Dept: HEALTH INFORMATION MANAGEMENT | Facility: OTHER | Age: 44
End: 2019-04-02

## 2019-04-18 ENCOUNTER — OFFICE VISIT (OUTPATIENT)
Dept: INTERNAL MEDICINE | Facility: MEDICAL CENTER | Age: 44
End: 2019-04-18
Payer: MEDICAID

## 2019-04-18 VITALS
WEIGHT: 287.6 LBS | TEMPERATURE: 97.2 F | BODY MASS INDEX: 50.96 KG/M2 | HEIGHT: 63 IN | SYSTOLIC BLOOD PRESSURE: 112 MMHG | DIASTOLIC BLOOD PRESSURE: 72 MMHG | HEART RATE: 87 BPM | OXYGEN SATURATION: 94 %

## 2019-04-18 DIAGNOSIS — N30.00 ACUTE CYSTITIS WITHOUT HEMATURIA: ICD-10-CM

## 2019-04-18 DIAGNOSIS — H61.21 IMPACTED CERUMEN OF RIGHT EAR: ICD-10-CM

## 2019-04-18 LAB
APPEARANCE UR: CLEAR
BILIRUB UR STRIP-MCNC: NORMAL MG/DL
COLOR UR AUTO: YELLOW
GLUCOSE UR STRIP.AUTO-MCNC: NORMAL MG/DL
KETONES UR STRIP.AUTO-MCNC: NORMAL MG/DL
LEUKOCYTE ESTERASE UR QL STRIP.AUTO: NORMAL
NITRITE UR QL STRIP.AUTO: NORMAL
PH UR STRIP.AUTO: 5.5 [PH] (ref 5–8)
PROT UR QL STRIP: NORMAL MG/DL
RBC UR QL AUTO: NORMAL
SP GR UR STRIP.AUTO: 1.02
UROBILINOGEN UR STRIP-MCNC: 0.2 MG/DL

## 2019-04-18 PROCEDURE — 81002 URINALYSIS NONAUTO W/O SCOPE: CPT | Mod: GC | Performed by: INTERNAL MEDICINE

## 2019-04-18 PROCEDURE — 99214 OFFICE O/P EST MOD 30 MIN: CPT | Mod: GC | Performed by: INTERNAL MEDICINE

## 2019-04-18 NOTE — PROGRESS NOTES
Established Patient    Chanelle presents today with the following:    CC: Otalgia.  Pelvic pain.    HPI: Patient is a 43-year-old female here with complaint of otalgia.  As last visit, the patient was evaluated by Dr. Love and was noted to have cerumen impaction of the right ear.  At that time, the patient was advised to start using Debrox eardrops to help soften the earwax.  Patient reports continuation of that ear pain and that she has not been able to use the Debrox drops due to cost.  In the meantime, the patient reports that she has been pouring water in her ear to help soften the earwax.  Patient denies lightheadedness, dizziness, changes in hearing.    Patient also reports a concern for pelvic pain for the past 2 to 3 weeks.  States that she has frequent UTIs and is concerned that she might be having another one.  Patient reports some stinging on urination, changes in urinary smell, and some mild back pain.  Patient denies hematuria, fever, chills, other systemic symptoms.    Patient Active Problem List    Diagnosis Date Noted   • Prediabetes 03/21/2019   • Low vitamin D level 12/27/2018   • Headache 10/31/2018   • Dizzinesses 09/05/2018   • Prior DM-2 - Resolved after weight loss 08/15/2018   • Hypothyroidism 08/15/2018   • Morbid obesity with BMI of 45.0-49.9, adult (HCC) 08/15/2018   • Hx of radioactive iodine thyroid ablation 08/15/2018   • Iron deficiency anemia 08/15/2018   • Psychiatric problem - unspecified    • H/O hypertension - resolved.     • POLLY on CPAP 01/01/2018   • Hypothyroid 05/20/2017   • Atypical chest pain 05/20/2017   • Hx of Anxiety and depression 01/01/2016   • Obesity 04/05/2012       Current Outpatient Prescriptions   Medication Sig Dispense Refill   • ferrous sulfate 325 (65 Fe) MG tablet TAKE 1 TABLET BY MOUTH EVERY DAY 90 Tab 0   • Vitamin D, Cholecalciferol, 1000 units Tab Take 2 Tabs by mouth every day. 60 Tab 3   • famotidine (PEPCID) 20 MG Tab Take 1 Tab by mouth 2  "times a day. 60 Tab 2   • aspirin (ASA) 325 MG Tab Take 325 mg by mouth every 6 hours as needed.     • levothyroxine (SYNTHROID) 175 MCG Tab Take 1 Tab by mouth every morning before breakfast. Take a half hour before breakfast. 30 Tab 5     No current facility-administered medications for this visit.        ROS: As per HPI. Additional pertinent systems as noted below.  Constitutional: Negative for chills and fever.   HENT: Negative for sore throat.    Eyes: Negative for discharge and redness.   Respiratory: Negative for cough, hemoptysis, wheezing and stridor.    Cardiovascular: Negative for chest pain, palpitations and leg swelling.   Gastrointestinal: Negative for abdominal pain, constipation, diarrhea, heartburn, nausea and vomiting.   Genitourinary: Negative for dysuria, flank pain and hematuria.   Musculoskeletal: Negative for falls and myalgias.   Skin: Negative for itching and rash.   Neurological: Negative for dizziness, seizures, loss of consciousness and headaches.   Endo/Heme/Allergies: Negative for polydipsia. Does not bruise/bleed easily.   Psychiatric/Behavioral: Negative for substance abuse and suicidal ideas.       /72 (BP Location: Left arm, Patient Position: Sitting, BP Cuff Size: Large adult)   Pulse 87   Temp 36.2 °C (97.2 °F)   Ht 1.6 m (5' 3\")   Wt (!) 130.5 kg (287 lb 9.6 oz)   SpO2 94%   BMI 50.95 kg/m²     Physical Exam   Constitutional:  oriented to person, place, and time. No distress.   Eyes: Pupils are equal, round, and reactive to light. No scleral icterus.  Ears: copious amounts of cerumen bilaterally pre-irrigation. Post irrigation exam shows normal TM on the left and less cerumen on the right without visualization of the TM (irrigation stopped as the patient was experiencing dizziness).  Neck: Neck supple. No thyromegaly present.   Cardiovascular: Normal rate, regular rhythm and normal heart sounds.  Exam reveals no gallop and no friction rub.  No murmur " heard.  Pulmonary/Chest: Breath sounds normal. Chest wall is not dull to percussion.   Abdomen: Soft with large panus. Tender to palpation in suprapubic area.  Musculoskeletal:   no edema.   Lymphadenopathy: no cervical adenopathy  Neurological: alert and oriented to person, place, and time.   Skin: No cyanosis. Nails show no clubbing.    Note: I have reviewed all pertinent labs and diagnostic tests associated with this visit with specific comments listed under the assessment and plan below    Assessment and Plan    1. Impacted cerumen of right ear  Right otalgia with impacted cerumen that was cleared out with irrigation.  Patient given instruction on ear care.    2. Acute cystitis without hematuria  Patient with frequent UTIs and symptoms consistent with infection.  UA in clinic positive for trace blood.  Patient is likely recovering from acute cystitis without antibiotic intervention; will give short supply of pyridium for symptoms management.  Patient advised to return if she experiences worsening of symptoms or recurrence.      Followup: Return in about 3 months (around 7/18/2019).      Signed by: Aylin Odell M.D.

## 2019-05-20 ENCOUNTER — HOSPITAL ENCOUNTER (EMERGENCY)
Facility: MEDICAL CENTER | Age: 44
End: 2019-05-21
Attending: EMERGENCY MEDICINE
Payer: MEDICAID

## 2019-05-20 DIAGNOSIS — K08.89 PAIN, DENTAL: ICD-10-CM

## 2019-05-20 PROCEDURE — 64400 NJX AA&/STRD TRIGEMINAL NRV: CPT

## 2019-05-20 PROCEDURE — 700101 HCHG RX REV CODE 250: Performed by: EMERGENCY MEDICINE

## 2019-05-20 PROCEDURE — 99283 EMERGENCY DEPT VISIT LOW MDM: CPT

## 2019-05-20 RX ADMIN — BUPIVACAINE HYDROCHLORIDE AND EPINEPHRINE BITARTRATE 10 ML: 5; .005 INJECTION, SOLUTION EPIDURAL; INTRACAUDAL; PERINEURAL at 23:59

## 2019-05-21 VITALS
HEART RATE: 80 BPM | RESPIRATION RATE: 18 BRPM | HEIGHT: 63 IN | OXYGEN SATURATION: 96 % | DIASTOLIC BLOOD PRESSURE: 79 MMHG | WEIGHT: 288.58 LBS | BODY MASS INDEX: 51.13 KG/M2 | SYSTOLIC BLOOD PRESSURE: 142 MMHG | TEMPERATURE: 97.2 F

## 2019-05-21 PROCEDURE — 700102 HCHG RX REV CODE 250 W/ 637 OVERRIDE(OP): Performed by: EMERGENCY MEDICINE

## 2019-05-21 PROCEDURE — A9270 NON-COVERED ITEM OR SERVICE: HCPCS | Performed by: EMERGENCY MEDICINE

## 2019-05-21 RX ORDER — PENICILLIN V POTASSIUM 500 MG/1
500 TABLET ORAL ONCE
Status: COMPLETED | OUTPATIENT
Start: 2019-05-21 | End: 2019-05-21

## 2019-05-21 RX ORDER — PENICILLIN V POTASSIUM 500 MG/1
500 TABLET ORAL 4 TIMES DAILY
Qty: 40 TAB | Refills: 0 | Status: SHIPPED | OUTPATIENT
Start: 2019-05-21 | End: 2019-07-14

## 2019-05-21 RX ORDER — BUPIVACAINE HYDROCHLORIDE AND EPINEPHRINE 5; 5 MG/ML; UG/ML
10 INJECTION, SOLUTION EPIDURAL; INTRACAUDAL; PERINEURAL ONCE
Status: COMPLETED | OUTPATIENT
Start: 2019-05-21 | End: 2019-05-20

## 2019-05-21 RX ADMIN — PENICILLIN V POTASSIUM 500 MG: 500 TABLET ORAL at 00:16

## 2019-05-21 NOTE — ED PROVIDER NOTES
ED Provider Note    CHIEF COMPLAINT  Chief Complaint   Patient presents with   • Tooth Ache     x3 days right lower        HPI    Primary care provider: Trenton Love M.D.   History obtained from: Patient  History limited by: None     Chanelle Ortiz is a 43 y.o. female who presents to the ED complaining of right upper and lower dental pain for the past 3 days but significantly worse tonight after brushing her teeth and gargling water.  She felt a pop in the area.  She denies any fever/difficulty breathing/difficulty swallowing/nausea/vomiting.  She tried taking ibuprofen without improvement.  Patient states that it has been a long time since she last saw a dentist.    REVIEW OF SYSTEMS  Please see HPI for pertinent positives/negatives.     PAST MEDICAL HISTORY  Past Medical History:   Diagnosis Date   • POLLY on CPAP     gets headaches, if not using CPAP.    • Anxiety and depression     after ex went to assisted   • Diabetes     on and off meds (due to concern about heart beat changes)   • Hyperthyroidism     Treated with Radioactive iodine, at [facility] in    • Hypothyroidism, after radioactive ablation of thyroid    • Arrhythmia     notes occasional rapid or prominent heart beat, at night    • H/O Hypertension - resolved     Patient states prior HTN, but resolved after changes in other medications (but off HTN meds).    • Hx of Bronchitis    • Psychiatric problem - unspecified     pt notes anxiety and depression, with counseller - pt unclear on details.          SURGICAL HISTORY  Past Surgical History:   Procedure Laterality Date   • GYN SURGERY      tuboligation, bilateral, during past .    • OTHER ABDOMINAL SURGERY      cholesystectomy   • OVARIAN CYSTECTOMY      unknown details or laterality.   • CHOLECYSTECTOMY     • OTHER      tonsillectomy   • PRIMARY C SECTION       - , ,    • PRIMARY C SECTION          SOCIAL HISTORY  Social  "History     Social History Main Topics   • Smoking status: Never Smoker   • Smokeless tobacco: Never Used   • Alcohol use No   • Drug use: No   • Sexual activity: Not on file      Comment: .  Tuboligation in 2004.         FAMILY HISTORY  Family History   Problem Relation Age of Onset   • Other Mother         sleep apnea   • Heart Failure Sister    • Diabetes Sister    • Heart Disease Sister    • Hypertension Sister    • Stroke Sister    • Hyperlipidemia Sister    • Heart Attack Maternal Grandfather    • Diabetes Daughter    • Psychiatry Daughter         bipolar   • Psychiatry Son         autism        CURRENT MEDICATIONS  Home Medications     Reviewed by Marlys Galloway R.N. (Registered Nurse) on 05/20/19 at 2239  Med List Status: Complete   Medication Last Dose Status   aspirin (ASA) 325 MG Tab prn Active   famotidine (PEPCID) 20 MG Tab not taking Active   ferrous sulfate 325 (65 Fe) MG tablet 5/19/2019 Active   levothyroxine (SYNTHROID) 175 MCG Tab 5/20/2019 Active   Vitamin D, Cholecalciferol, 1000 units Tab 5/20/2019 Active                 ALLERGIES  Allergies   Allergen Reactions   • Morphine      \"I think\" \"I dunno what my reaction was, the nurse just said my oxygen levels were going way to low on it\"         PHYSICAL EXAM  VITAL SIGNS: /79   Pulse 80   Temp 36.2 °C (97.2 °F) (Temporal)   Resp 18   Ht 1.6 m (5' 3\")   Wt (!) 130.9 kg (288 lb 9.3 oz)   LMP 04/22/2019   SpO2 96%   Breastfeeding? No   BMI 51.12 kg/m²  @[id number]::@     Pulse ox interpretation: 94% I interpret this pulse ox as normal     Constitutional: Well developed, well nourished, alert in no apparent distress, nontoxic appearance    HENT: No external signs of trauma, normocephalic, EACs and TMs are clear bilaterally, oropharynx moist and clear, nose normal, uvula is midline, no stridor/trismus/drooling, patient speaking normal voice without difficulty, tenderness to palpation right upper and lower molars, no " gingival swelling/erythema/drainage  Eyes: PERRL, conjunctiva without erythema, no discharge, no icterus    Neck: Soft and supple, trachea midline, no stridor, no tenderness/fullness, no LAD, good ROM    Thorax & Lungs: No respiratory distress    Extremities: No cyanosis, no edema, no gross deformity, intact distal pulses with brisk cap refill    Skin: Warm, dry, no pallor/cyanosis, no rash noted    Lymphatic: No lymphadenopathy noted    Neuro: A/O times 3, no focal deficits noted    Psychiatric: Cooperative       DIAGNOSTIC STUDIES / PROCEDURES    Patient gave verbal consent for dental block after risks/benefits/indications explained.  Patient was positioned for optimal exposure.  Landmarks were identified.  Right superior and inferior posterior alveolar block using total of 10 mL of 0.5% bupivacaine with epinephrine was performed without complication.     LABS  All labs reviewed by me.     Results for orders placed or performed in visit on 04/18/19   POCT Urinalysis   Result Value Ref Range    POC Color yellow Negative    POC Appearance clear Negative    POC Leukocyte Esterase neg Negative    POC Nitrites neg Negative    POC Urobiligen 0.2 Negative (0.2) mg/dL    POC Protein neg Negative mg/dL    POC Urine PH 5.5 5.0 - 8.0    POC Blood TRACE Negative    POC Specific Gravity 1.025 <1.005 - >1.030    POC Ketones neg Negative mg/dL    POC Bilirubin neg Negative mg/dL    POC Glucose neg Negative mg/dL        RADIOLOGY  The radiologist's interpretation of all radiological studies have been reviewed by me.     No orders to display          COURSE & MEDICAL DECISION MAKING  Nursing notes, VS, PMSFHx reviewed in chart.     Review of past medical records shows the patient was last seen in this ED December 22, 2018 for sore throat and difficulty swallowing.      Differential diagnoses considered include but are not limited to: Dental caries, dental fracture/avulsion, abscess, gingivitis, periodontitis, stomatitis        History and physical exam as above.  Dental block was performed as above without complications and patient reports pain is much improved afterwards.  She will be started on penicillin.  She can use acetaminophen/ibuprofen as needed for pain.  Patient noted to be in no acute distress and nontoxic in appearance.  Low clinical suspicion at this time for more serious acute pathology such as sepsis, meningitis, pharyngeal abscess, epiglottitis, Ludwigs angina given the history/exam/findings.  Patient was advised on follow-up with dentist ASAP.  Return to ED precautions were given.  Patient also noted to have slightly elevated blood pressure and will need outpatient follow-up for further management.  Patient verbalized understanding and agreed with plan of care with no further questions or concerns.      The patient is referred to a primary physician for blood pressure management, diabetic screening, and for all other preventative health concerns.       FINAL IMPRESSION  1. Pain, dental Acute          DISPOSITION  Patient will be discharged home in stable condition.       FOLLOW UP  Trenton Love M.D.  [address] [id number] [phone]    Call today      Please follow-up with dentist ASAP          [facility], Emergency Dept  [facility] [facility] [id number] [phone]    If symptoms worsen         OUTPATIENT MEDICATIONS  Discharge Medication List as of 5/21/2019 12:23 AM      START taking these medications    Details   penicillin v potassium (VEETID) 500 MG Tab Take 1 Tab by mouth 4 times a day., Disp-40 Tab, R-0, Print Rx Paper                Electronically signed by: Robert Cobb, 5/20/2019 11:58 PM      Portions of this record were made with voice recognition software.  Despite my review, spelling/grammar/context errors may still remain.  Interpretation of this chart should be taken in this context.

## 2019-05-21 NOTE — ED TRIAGE NOTES
Chief Complaint   Patient presents with   • Tooth Ache     x3 days right lower     Explained wait time and triage process to pt. Pt placed back out in lobby, told to notify ED tech or triage RN of any changes, verbalized understanding.

## 2019-07-01 ENCOUNTER — APPOINTMENT (OUTPATIENT)
Dept: RADIOLOGY | Facility: MEDICAL CENTER | Age: 44
End: 2019-07-01
Attending: EMERGENCY MEDICINE
Payer: MEDICAID

## 2019-07-01 ENCOUNTER — HOSPITAL ENCOUNTER (EMERGENCY)
Facility: MEDICAL CENTER | Age: 44
End: 2019-07-01
Attending: EMERGENCY MEDICINE
Payer: MEDICAID

## 2019-07-01 VITALS
SYSTOLIC BLOOD PRESSURE: 173 MMHG | HEIGHT: 63 IN | RESPIRATION RATE: 20 BRPM | HEART RATE: 97 BPM | DIASTOLIC BLOOD PRESSURE: 100 MMHG | TEMPERATURE: 97.2 F | WEIGHT: 283.29 LBS | BODY MASS INDEX: 50.2 KG/M2 | OXYGEN SATURATION: 95 %

## 2019-07-01 DIAGNOSIS — R07.9 CHEST PAIN, UNSPECIFIED TYPE: ICD-10-CM

## 2019-07-01 DIAGNOSIS — G62.9 NEUROPATHY: ICD-10-CM

## 2019-07-01 LAB
ALBUMIN SERPL BCP-MCNC: 4.1 G/DL (ref 3.2–4.9)
ALBUMIN/GLOB SERPL: 1 G/DL
ALP SERPL-CCNC: 91 U/L (ref 30–99)
ALT SERPL-CCNC: 18 U/L (ref 2–50)
ANION GAP SERPL CALC-SCNC: 9 MMOL/L (ref 0–11.9)
ANISOCYTOSIS BLD QL SMEAR: ABNORMAL
AST SERPL-CCNC: 18 U/L (ref 12–45)
BASOPHILS # BLD AUTO: 0.9 % (ref 0–1.8)
BASOPHILS # BLD: 0.09 K/UL (ref 0–0.12)
BILIRUB SERPL-MCNC: 0.5 MG/DL (ref 0.1–1.5)
BUN SERPL-MCNC: 16 MG/DL (ref 8–22)
CALCIUM SERPL-MCNC: 9.1 MG/DL (ref 8.5–10.5)
CHLORIDE SERPL-SCNC: 105 MMOL/L (ref 96–112)
CO2 SERPL-SCNC: 25 MMOL/L (ref 20–33)
CREAT SERPL-MCNC: 0.7 MG/DL (ref 0.5–1.4)
EKG IMPRESSION: NORMAL
EOSINOPHIL # BLD AUTO: 0.09 K/UL (ref 0–0.51)
EOSINOPHIL NFR BLD: 0.9 % (ref 0–6.9)
ERYTHROCYTE [DISTWIDTH] IN BLOOD BY AUTOMATED COUNT: 43.9 FL (ref 35.9–50)
GIANT PLATELETS BLD QL SMEAR: NORMAL
GLOBULIN SER CALC-MCNC: 4 G/DL (ref 1.9–3.5)
GLUCOSE SERPL-MCNC: 164 MG/DL (ref 65–99)
HCT VFR BLD AUTO: 39.5 % (ref 37–47)
HGB BLD-MCNC: 11.4 G/DL (ref 12–16)
INR PPP: 0.99 (ref 0.87–1.13)
LYMPHOCYTES # BLD AUTO: 2.47 K/UL (ref 1–4.8)
LYMPHOCYTES NFR BLD: 23.5 % (ref 22–41)
MANUAL DIFF BLD: NORMAL
MCH RBC QN AUTO: 22.9 PG (ref 27–33)
MCHC RBC AUTO-ENTMCNC: 28.9 G/DL (ref 33.6–35)
MCV RBC AUTO: 79.5 FL (ref 81.4–97.8)
MICROCYTES BLD QL SMEAR: ABNORMAL
MONOCYTES # BLD AUTO: 0.72 K/UL (ref 0–0.85)
MONOCYTES NFR BLD AUTO: 6.9 % (ref 0–13.4)
MORPHOLOGY BLD-IMP: NORMAL
NEUTROPHILS # BLD AUTO: 7.12 K/UL (ref 2–7.15)
NEUTROPHILS NFR BLD: 67.8 % (ref 44–72)
NRBC # BLD AUTO: 0 K/UL
NRBC BLD-RTO: 0 /100 WBC
PLATELET # BLD AUTO: 233 K/UL (ref 164–446)
PLATELET BLD QL SMEAR: NORMAL
PMV BLD AUTO: 11.3 FL (ref 9–12.9)
POLYCHROMASIA BLD QL SMEAR: NORMAL
POTASSIUM SERPL-SCNC: 4 MMOL/L (ref 3.6–5.5)
PROT SERPL-MCNC: 8.1 G/DL (ref 6–8.2)
PROTHROMBIN TIME: 13.3 SEC (ref 12–14.6)
RBC # BLD AUTO: 4.97 M/UL (ref 4.2–5.4)
RBC BLD AUTO: PRESENT
SODIUM SERPL-SCNC: 139 MMOL/L (ref 135–145)
TROPONIN I SERPL-MCNC: <0.01 NG/ML (ref 0–0.04)
TROPONIN I SERPL-MCNC: <0.01 NG/ML (ref 0–0.04)
VARIANT LYMPHS BLD QL SMEAR: NORMAL
WBC # BLD AUTO: 10.5 K/UL (ref 4.8–10.8)

## 2019-07-01 PROCEDURE — 84484 ASSAY OF TROPONIN QUANT: CPT | Mod: 91

## 2019-07-01 PROCEDURE — 85610 PROTHROMBIN TIME: CPT

## 2019-07-01 PROCEDURE — 93005 ELECTROCARDIOGRAM TRACING: CPT | Performed by: EMERGENCY MEDICINE

## 2019-07-01 PROCEDURE — 99284 EMERGENCY DEPT VISIT MOD MDM: CPT

## 2019-07-01 PROCEDURE — 85007 BL SMEAR W/DIFF WBC COUNT: CPT

## 2019-07-01 PROCEDURE — 700111 HCHG RX REV CODE 636 W/ 250 OVERRIDE (IP): Performed by: EMERGENCY MEDICINE

## 2019-07-01 PROCEDURE — 93005 ELECTROCARDIOGRAM TRACING: CPT

## 2019-07-01 PROCEDURE — 96374 THER/PROPH/DIAG INJ IV PUSH: CPT

## 2019-07-01 PROCEDURE — 71045 X-RAY EXAM CHEST 1 VIEW: CPT

## 2019-07-01 PROCEDURE — 80053 COMPREHEN METABOLIC PANEL: CPT

## 2019-07-01 PROCEDURE — 85027 COMPLETE CBC AUTOMATED: CPT

## 2019-07-01 RX ORDER — KETOROLAC TROMETHAMINE 30 MG/ML
30 INJECTION, SOLUTION INTRAMUSCULAR; INTRAVENOUS ONCE
Status: COMPLETED | OUTPATIENT
Start: 2019-07-01 | End: 2019-07-01

## 2019-07-01 RX ORDER — GABAPENTIN 300 MG/1
300 CAPSULE ORAL 3 TIMES DAILY
Qty: 90 CAP | Refills: 0 | Status: SHIPPED | OUTPATIENT
Start: 2019-07-01 | End: 2020-06-07

## 2019-07-01 RX ORDER — OMEPRAZOLE 20 MG/1
20 CAPSULE, DELAYED RELEASE ORAL DAILY
Qty: 30 CAP | Refills: 0 | Status: SHIPPED | OUTPATIENT
Start: 2019-07-01 | End: 2019-10-16

## 2019-07-01 RX ADMIN — KETOROLAC TROMETHAMINE 30 MG: 30 INJECTION, SOLUTION INTRAMUSCULAR; INTRAVENOUS at 09:41

## 2019-07-01 NOTE — ED PROVIDER NOTES
ED Provider Note    CHIEF COMPLAINT  Chief Complaint   Patient presents with   • Foot Pain     bilateral, denies injury   • Leg Pain     with walking   • Palpitations     when walking for 3 days       HPI  Chanelle Ortiz is a 43 y.o. female who presents with multiple complaints.  She states over the past 3 days she has been having pain in her feet.  She describes it as a tingling and burning sensation bilaterally.  She denies any trauma.  Is not in any one particular spot it is her foot in general.  She also has had intermittent chest pain.  Last night at work she states she had onset of chest pain.  It sounds like a pressure crampy sensation with associated shortness of breath.  She is asymptomatic currently.  She does have a history of diabetes.  She denies fevers or chills.  She is had no cough or sputum production.  Cardiac risk factors are negative for tobacco, positive for diabetes, negative for hypertension, and negative for cholesterol.  She has no history of MIs in her parents.    REVIEW OF SYSTEMS  See HPI for further details. All other systems negative.    PAST MEDICAL HISTORY  Past Medical History:   Diagnosis Date   • Anxiety and depression 2016    after ex went to [facility]   • Arrhythmia     notes occasional rapid or prominent heart beat, at night    • Diabetes 2008    on and off meds (due to concern about heart beat changes)   • H/O Hypertension - resolved     Patient states prior HTN, but resolved after changes in other medications (but off HTN meds).    • Hx of Bronchitis    • Hyperthyroidism 2008    Treated with Radioactive iodine, at [place], in 2008   • Hypothyroidism, after radioactive ablation of thyroid 2008   • POLLY on CPAP 2018    gets headaches, if not using CPAP.    • Psychiatric problem - unspecified     pt notes anxiety and depression, with counseller - pt unclear on details.         FAMILY HISTORY  Family History   Problem Relation Age of Onset   • Other Mother         sleep  "apnea   • Heart Failure Sister    • Diabetes Sister    • Heart Disease Sister    • Hypertension Sister    • Stroke Sister    • Hyperlipidemia Sister    • Heart Attack Maternal Grandfather    • Diabetes Daughter    • Psychiatry Daughter         bipolar   • Psychiatry Son         autism       SOCIAL HISTORY  Social History     Social History   • Marital status: Legally      Spouse name: N/A   • Number of children: N/A   • Years of education: N/A     Social History Main Topics   • Smoking status: Never Smoker   • Smokeless tobacco: Never Used   • Alcohol use No   • Drug use: No   • Sexual activity: Not on file      Comment: .  Tuboligation in .      Other Topics Concern   • Not on file     Social History Narrative   • No narrative on file       SURGICAL HISTORY  Past Surgical History:   Procedure Laterality Date   • GYN SURGERY      tuboligation, bilateral, during past .    • OTHER ABDOMINAL SURGERY      cholesystectomy   • OVARIAN CYSTECTOMY      unknown details or laterality.   • CHOLECYSTECTOMY     • OTHER      tonsillectomy   • PRIMARY C SECTION       - , ,    • PRIMARY C SECTION         CURRENT MEDICATIONS  Home Medications    **Home medications have not yet been reviewed for this encounter**         ALLERGIES  Allergies   Allergen Reactions   • Morphine      \"I think\" \"I dunno what my reaction was, the nurse just said my oxygen levels were going way to low on it\"        PHYSICAL EXAM  VITAL SIGNS: BP (!) 173/100   Pulse 97   Temp 36.2 °C (97.2 °F) (Temporal)   Resp 18   Ht 1.6 m (5' 3\")   Wt (!) 128.5 kg (283 lb 4.7 oz)   SpO2 96%   BMI 50.18 kg/m²   Constitutional: Well developed, Well nourished, No acute distress, Non-toxic appearance.   HENT: Normocephalic, Atraumatic.  Eyes:  EOMI, Conjunctiva normal, No discharge.   Cardiovascular: Normal heart rate, Normal rhythm, No murmurs, No rubs, No gallops.   Thorax & Lungs: Clear to auscultation " without wheezes, rales, or rhonchi. No chest tenderness.   Abdomen:  Soft, No tenderness, No masses, No pulsatile masses.   Skin: Warm, Dry.  Extremities: No edema, no calf tenderness.  Musculoskeletal: Good range of motion in all major joints.   Neurologic: Awake and alert.      EKG  EKG Interpretation    Interpreted by emergency department physician    Rhythm: normal sinus   Rate: normal  Axis: normal  Ectopy: none  Conduction: normal  ST Segments: no acute change  T Waves: non specific changes  Q Waves: none    Clinical Impression: no acute changes        RADIOLOGY/PROCEDURES  DX-CHEST-PORTABLE (1 VIEW)   Final Result      No pulmonary consolidation.            COURSE & MEDICAL DECISION MAKING  Pertinent Labs & Imaging studies reviewed. (See chart for details)  This a 43-year-old here for multiple complaints.  Patient has history of diabetes.  She is complaining of burning and tingling in both feet over the past 3 or 4 days.  She denies any trauma.  She is able to ambulate.  The feet appear to be neurovascularly intact at this time.  She is also been having chest pain.  Her EKG on arrival shows no evidence of acute ischemic changes.  Her chest x-ray shows no acute cardiopulmonary process.  Her initial troponin I is negative.  CBC is unremarkable.  Chemistries are unremarkable except for a glucose of 164 and a known diabetic.  INR is normal.  Delta troponin I is negative.  Patient has a heart score of 1.  I discussed the results of all of her studies with her.  I explained that she does not require acute hospitalization at this time.  I requested she contact her primary care provider today for follow-up.  I will start her on Neurontin for what sounds like peripheral neuropathy.  I also put her on Prilosec because I think her chest discomfort actually may be GI in etiology.  I explained that she may at some point need a stress test.  At this point she is fine for discharge home.  She is given a discharge  instruction sheet on chest pain as well as neuropathy.    FINAL IMPRESSION  1.  Chest pain  2.  Peripheral neuropathy  3.  Hyperglycemia in a known diabetic         Electronically signed by: Demetrius Arguelles, 7/1/2019 8:56 AM

## 2019-07-01 NOTE — ED TRIAGE NOTES
Chief Complaint   Patient presents with   • Foot Pain     bilateral, denies injury   • Leg Pain     with walking   • Palpitations     when walking for 3 days     EKG done prior to triage

## 2019-07-01 NOTE — ED NOTES
C/o burning and numbness to both feet bilaterally. Dx with DMII, not currently taking medications for it.

## 2019-07-13 LAB
25(OH)D3+25(OH)D2 SERPL-MCNC: 27.7 NG/ML (ref 30–100)
ALBUMIN SERPL-MCNC: 3.9 G/DL (ref 3.5–5.5)
ALBUMIN/GLOB SERPL: 1.3 {RATIO} (ref 1.2–2.2)
ALP SERPL-CCNC: 90 IU/L (ref 39–117)
ALT SERPL-CCNC: 18 IU/L (ref 0–32)
AST SERPL-CCNC: 20 IU/L (ref 0–40)
BASOPHILS # BLD AUTO: 0 X10E3/UL (ref 0–0.2)
BASOPHILS NFR BLD AUTO: 0 %
BILIRUB SERPL-MCNC: 0.2 MG/DL (ref 0–1.2)
BUN SERPL-MCNC: 12 MG/DL (ref 6–24)
BUN/CREAT SERPL: 18 (ref 9–23)
CALCIUM SERPL-MCNC: 8.5 MG/DL (ref 8.7–10.2)
CHLORIDE SERPL-SCNC: 105 MMOL/L (ref 96–106)
CHOLEST SERPL-MCNC: 94 MG/DL (ref 100–199)
CO2 SERPL-SCNC: 23 MMOL/L (ref 20–29)
CREAT SERPL-MCNC: 0.65 MG/DL (ref 0.57–1)
EOSINOPHIL # BLD AUTO: 0.3 X10E3/UL (ref 0–0.4)
EOSINOPHIL NFR BLD AUTO: 4 %
ERYTHROCYTE [DISTWIDTH] IN BLOOD BY AUTOMATED COUNT: 15.9 % (ref 12.3–15.4)
FERRITIN SERPL-MCNC: 7 NG/ML (ref 15–150)
GLOBULIN SER CALC-MCNC: 3.1 G/DL (ref 1.5–4.5)
GLUCOSE SERPL-MCNC: 122 MG/DL (ref 65–99)
HBA1C MFR BLD: 6.9 % (ref 4.8–5.6)
HCT VFR BLD AUTO: 34 % (ref 34–46.6)
HDLC SERPL-MCNC: 34 MG/DL
HGB BLD-MCNC: 10.5 G/DL (ref 11.1–15.9)
IMM GRANULOCYTES # BLD AUTO: 0 X10E3/UL (ref 0–0.1)
IMM GRANULOCYTES NFR BLD AUTO: 0 %
IMMATURE CELLS  115398: ABNORMAL
IRON SATN MFR SERPL: 4 % (ref 15–55)
IRON SERPL-MCNC: 17 UG/DL (ref 27–159)
LABORATORY COMMENT REPORT: ABNORMAL
LDLC SERPL CALC-MCNC: 40 MG/DL (ref 0–99)
LYMPHOCYTES # BLD AUTO: 2 X10E3/UL (ref 0.7–3.1)
LYMPHOCYTES NFR BLD AUTO: 29 %
MCH RBC QN AUTO: 23 PG (ref 26.6–33)
MCHC RBC AUTO-ENTMCNC: 30.9 G/DL (ref 31.5–35.7)
MCV RBC AUTO: 75 FL (ref 79–97)
MONOCYTES # BLD AUTO: 0.6 X10E3/UL (ref 0.1–0.9)
MONOCYTES NFR BLD AUTO: 8 %
MORPHOLOGY BLD-IMP: ABNORMAL
NEUTROPHILS # BLD AUTO: 4 X10E3/UL (ref 1.4–7)
NEUTROPHILS NFR BLD AUTO: 59 %
NRBC BLD AUTO-RTO: ABNORMAL %
PLATELET # BLD AUTO: 190 X10E3/UL (ref 150–450)
POTASSIUM SERPL-SCNC: 3.9 MMOL/L (ref 3.5–5.2)
PROT SERPL-MCNC: 7 G/DL (ref 6–8.5)
RBC # BLD AUTO: 4.56 X10E6/UL (ref 3.77–5.28)
SODIUM SERPL-SCNC: 141 MMOL/L (ref 134–144)
TIBC SERPL-MCNC: 411 UG/DL (ref 250–450)
TRIGL SERPL-MCNC: 99 MG/DL (ref 0–149)
TSH SERPL DL<=0.005 MIU/L-ACNC: 1.49 UIU/ML (ref 0.45–4.5)
UIBC SERPL-MCNC: 394 UG/DL (ref 131–425)
VLDLC SERPL CALC-MCNC: 20 MG/DL (ref 5–40)
WBC # BLD AUTO: 6.9 X10E3/UL (ref 3.4–10.8)

## 2019-07-14 ENCOUNTER — APPOINTMENT (OUTPATIENT)
Dept: RADIOLOGY | Facility: MEDICAL CENTER | Age: 44
End: 2019-07-14
Attending: EMERGENCY MEDICINE
Payer: MEDICAID

## 2019-07-14 ENCOUNTER — HOSPITAL ENCOUNTER (EMERGENCY)
Facility: MEDICAL CENTER | Age: 44
End: 2019-07-14
Attending: EMERGENCY MEDICINE
Payer: MEDICAID

## 2019-07-14 VITALS
RESPIRATION RATE: 16 BRPM | HEART RATE: 78 BPM | DIASTOLIC BLOOD PRESSURE: 76 MMHG | OXYGEN SATURATION: 96 % | BODY MASS INDEX: 50.55 KG/M2 | HEIGHT: 63 IN | SYSTOLIC BLOOD PRESSURE: 141 MMHG | WEIGHT: 285.27 LBS | TEMPERATURE: 97.4 F

## 2019-07-14 DIAGNOSIS — M79.671 FOOT PAIN, RIGHT: ICD-10-CM

## 2019-07-14 PROCEDURE — 700102 HCHG RX REV CODE 250 W/ 637 OVERRIDE(OP): Performed by: EMERGENCY MEDICINE

## 2019-07-14 PROCEDURE — 99284 EMERGENCY DEPT VISIT MOD MDM: CPT

## 2019-07-14 PROCEDURE — A9270 NON-COVERED ITEM OR SERVICE: HCPCS | Performed by: EMERGENCY MEDICINE

## 2019-07-14 PROCEDURE — 73630 X-RAY EXAM OF FOOT: CPT | Mod: RT

## 2019-07-14 RX ORDER — ACETAMINOPHEN 500 MG
1000 TABLET ORAL ONCE
Status: COMPLETED | OUTPATIENT
Start: 2019-07-14 | End: 2019-07-14

## 2019-07-14 RX ORDER — IBUPROFEN 600 MG/1
600 TABLET ORAL ONCE
Status: COMPLETED | OUTPATIENT
Start: 2019-07-14 | End: 2019-07-14

## 2019-07-14 RX ADMIN — IBUPROFEN 600 MG: 600 TABLET ORAL at 20:30

## 2019-07-14 RX ADMIN — ACETAMINOPHEN 1000 MG: 500 TABLET ORAL at 20:30

## 2019-07-14 ASSESSMENT — LIFESTYLE VARIABLES: DO YOU DRINK ALCOHOL: NO

## 2019-07-15 NOTE — ED TRIAGE NOTES
"Chief Complaint   Patient presents with   • Foot Pain     right, gradual worsening pain x3 weeks, denies trauma.       Patient to triage via wheelchair; reports she is working a new job and is on her feet way too much now, since then, my foot is really hurting\".  CMS+.    Explained wait time and triage process to pt. Pt placed back out in lobby, told to notify ED tech or triage RN of any changes, verbalized understanding.    "

## 2019-07-15 NOTE — ED NOTES
Patient discharged with instructions for foot pain with prescriptions x0 signs and symptoms explained to patient for reasons to return to emergency department, Patient is understanding and has no further questions. Pt in wheelchair to lobby with daughter.

## 2019-07-15 NOTE — ED PROVIDER NOTES
ED Provider Note    Scribed for Nicola Gracia M.D. by Mely Sebastian. 7/14/2019,  7:57 PM.    Means of Arrival: Walk in  History obtained from: Patient  History limited by: None     CHIEF COMPLAINT  Chief Complaint   Patient presents with   • Foot Pain     right, gradual worsening pain x3 weeks, denies trauma.         HPI  Chanelle Ortiz is a 43 y.o. diabetic female who presents to the Emergency Department for right foot pain onset 3 weeks ago. The patient reports a sharp pain to the top of her right foot. She was started on Gabapentin 2 weeks ago.  She recently started a new job that requires her to be on her feet for long hours. She reports associated dizziness. The patient denies fever. She rates her pain as 5/10 in severity at the moment but states the pain is a 10/10 while she is standing.     REVIEW OF SYSTEMS  CONSTITUTIONAL:  No fever.  MUSCULOSKELETAL: Positive for right foot pain  See HPI for further details.     PAST MEDICAL HISTORY  Past Medical History:   Diagnosis Date   • Anxiety and depression 2016    [facility]   • Arrhythmia     notes occasional rapid or prominent heart beat, at night    • Diabetes 2008    on and off meds (due to concern about heart beat changes)   • H/O Hypertension - resolved     Patient states prior HTN, but resolved after changes in other medications (but off HTN meds).    • Hx of Bronchitis    • Hyperthyroidism 2008    Treated with Radioactive iodine, at [place], in 2008   • Hypothyroidism, after radioactive ablation of thyroid 2008   • POLLY on CPAP 2018    gets headaches, if not using CPAP.    • Psychiatric problem - unspecified     pt notes anxiety and depression, with counseller - pt unclear on details.         FAMILY HISTORY  Family History   Problem Relation Age of Onset   • Other Mother         sleep apnea   • Heart Failure Sister    • Diabetes Sister    • Heart Disease Sister    • Hypertension Sister    • Stroke Sister    • Hyperlipidemia Sister   "  • Heart Attack Maternal Grandfather    • Diabetes Daughter    • Psychiatry Daughter         bipolar   • Psychiatry Son         autism       SOCIAL HISTORY   reports that she has never smoked. She has never used smokeless tobacco. She reports that she does not drink alcohol or use drugs.    SURGICAL HISTORY  Past Surgical History:   Procedure Laterality Date   • GYN SURGERY      tuboligation, bilateral, during past .    • OTHER ABDOMINAL SURGERY      cholesystectomy   • OVARIAN CYSTECTOMY      unknown details or laterality.   • CHOLECYSTECTOMY     • OTHER      tonsillectomy   • PRIMARY C SECTION       - , ,    • PRIMARY C SECTION         CURRENT MEDICATIONS    No current facility-administered medications on file prior to encounter.      Current Outpatient Prescriptions on File Prior to Encounter   Medication Sig Dispense Refill   • gabapentin (NEURONTIN) 300 MG Cap Take 1 Cap by mouth 3 times a day. (Patient taking differently: Take 300 mg by mouth every day.) 90 Cap 0   • omeprazole (PRILOSEC) 20 MG delayed-release capsule Take 1 Cap by mouth every day. 30 Cap 0   • ferrous sulfate 325 (65 Fe) MG tablet TAKE 1 TABLET BY MOUTH EVERY DAY 90 Tab 0   • Vitamin D, Cholecalciferol, 1000 units Tab Take 2 Tabs by mouth every day. 60 Tab 3   • levothyroxine (SYNTHROID) 175 MCG Tab Take 1 Tab by mouth every morning before breakfast. Take a half hour before breakfast. 30 Tab 5       ALLERGIES  Allergies   Allergen Reactions   • Morphine      \"I think\" \"I dunno what my reaction was, the nurse just said my oxygen levels were going way to low on it\"          PHYSICAL EXAM  VITAL SIGNS: /75   Pulse 82   Temp 36.4 °C (97.5 °F) (Temporal)   Resp 14   Ht 1.6 m (5' 3\")   Wt (!) 129.4 kg (285 lb 4.4 oz)   LMP 2019   SpO2 95%   Breastfeeding? No   BMI 50.53 kg/m²    Gen: alert, no acute distress  HENT: ATNC  Eyes: normal conjuctiva  Resp: No resipiratory distress.   CV: No " JVD   Abd: Non-distended  Extremities: Tenderness to the posterior aspect of the medial malleolus. Tenderness to the dorsal portion of the right foot. Distal CSM intact. Tenderness reproduced with flexion of the ankle. No calf tenderness. No warmth or erythema. No deformity         DIAGNOSTIC STUDIES / PROCEDURES       RADIOLOGY  DX-FOOT-COMPLETE 3+ RIGHT   Final Result         1.  No acute traumatic bony injury.        The radiologist’s interpretation of all radiology studies have been reviewed by me.    COURSE & MEDICAL DECISION MAKING  Pertinent Labs & Imaging studies reviewed. (See chart for details)    7:57 PM Patient seen and examined at bedside. Ordered for imaging to evaluate. Patient will be treated with Motrin 600 mg and Tylenol 1000 mg for her symptoms.       Medical Decision Making:  Patient presents with atraumatic right foot pain that is been progressive working for the past few weeks in the setting of increased use at work.  There is no clinical evidence to suggest DVT, cellulitis.  She has no focal neurologic deficits to suggest neuropathic cause.  Patient has no history of trauma.  Will obtain x-rays to evaluate for possible occult fracture.  Possible stress fracture versus arthritis versus tendinitis.    X-rays reassuring, no acute findings.  Patient has reassuring vital signs.  Was provided with anticipatory guidance, referral to orthopedics, advised to follow-up with her regular doctor, provided with a work note.     The patient will return for new or worsening symptoms and is stable at the time of discharge.      DISPOSITION:  Patient will be discharged home in stable condition.    FOLLOW UP:  Trenton Love M.D.  [address]  [facility] [address]  [facility] [address]  [phone]    In 3 days      Conrado Pascual M.D.  [address]  [facility] [address]  [facility] [address]  [phone]    Schedule an appointment as soon as possible for a visit         OUTPATIENT MEDICATIONS:  Discharge Medication  List as of 7/14/2019  8:51 PM            FINAL IMPRESSION  1. Foot pain, right            I, Mely Sebastian (Scribe), am scribing for, and in the presence of, Nicola Gracia M.D..    Electronically signed by: Mely Sebastian (Scribe), 7/14/2019    I, Nicola Gracia M.D. personally performed the services described in this documentation, as scribed by Mely Sebastian in my presence, and it is both accurate and complete.  E  The note accurately reflects work and decisions made by me.  Nicola Gracia  7/14/2019  9:27 PM

## 2019-07-19 ENCOUNTER — HOSPITAL ENCOUNTER (EMERGENCY)
Facility: MEDICAL CENTER | Age: 44
End: 2019-07-20
Attending: EMERGENCY MEDICINE
Payer: MEDICAID

## 2019-07-19 DIAGNOSIS — E86.0 DEHYDRATION: ICD-10-CM

## 2019-07-19 LAB
EKG IMPRESSION: NORMAL
GLUCOSE BLD-MCNC: 127 MG/DL (ref 65–99)

## 2019-07-19 PROCEDURE — 80053 COMPREHEN METABOLIC PANEL: CPT

## 2019-07-19 PROCEDURE — 82962 GLUCOSE BLOOD TEST: CPT

## 2019-07-19 PROCEDURE — 99284 EMERGENCY DEPT VISIT MOD MDM: CPT

## 2019-07-19 PROCEDURE — 93005 ELECTROCARDIOGRAM TRACING: CPT | Performed by: EMERGENCY MEDICINE

## 2019-07-19 PROCEDURE — 93005 ELECTROCARDIOGRAM TRACING: CPT

## 2019-07-19 RX ORDER — SODIUM CHLORIDE 9 MG/ML
INJECTION, SOLUTION INTRAVENOUS CONTINUOUS
Status: DISCONTINUED | OUTPATIENT
Start: 2019-07-20 | End: 2019-07-20 | Stop reason: HOSPADM

## 2019-07-20 VITALS
HEART RATE: 63 BPM | BODY MASS INDEX: 50.38 KG/M2 | WEIGHT: 284.39 LBS | RESPIRATION RATE: 16 BRPM | OXYGEN SATURATION: 95 % | TEMPERATURE: 98.8 F | SYSTOLIC BLOOD PRESSURE: 133 MMHG | DIASTOLIC BLOOD PRESSURE: 83 MMHG

## 2019-07-20 LAB
ALBUMIN SERPL BCP-MCNC: 3.7 G/DL (ref 3.2–4.9)
ALBUMIN/GLOB SERPL: 1 G/DL
ALP SERPL-CCNC: 76 U/L (ref 30–99)
ALT SERPL-CCNC: 15 U/L (ref 2–50)
ANION GAP SERPL CALC-SCNC: 9 MMOL/L (ref 0–11.9)
ANISOCYTOSIS BLD QL SMEAR: ABNORMAL
AST SERPL-CCNC: 12 U/L (ref 12–45)
BASOPHILS # BLD AUTO: 0.8 % (ref 0–1.8)
BASOPHILS # BLD: 0.09 K/UL (ref 0–0.12)
BILIRUB SERPL-MCNC: 0.3 MG/DL (ref 0.1–1.5)
BUN SERPL-MCNC: 19 MG/DL (ref 8–22)
CALCIUM SERPL-MCNC: 8.8 MG/DL (ref 8.5–10.5)
CHLORIDE SERPL-SCNC: 105 MMOL/L (ref 96–112)
CO2 SERPL-SCNC: 25 MMOL/L (ref 20–33)
COMMENT 1642: NORMAL
CREAT SERPL-MCNC: 0.58 MG/DL (ref 0.5–1.4)
EOSINOPHIL # BLD AUTO: 0.3 K/UL (ref 0–0.51)
EOSINOPHIL NFR BLD: 2.8 % (ref 0–6.9)
ERYTHROCYTE [DISTWIDTH] IN BLOOD BY AUTOMATED COUNT: 42.8 FL (ref 35.9–50)
GLOBULIN SER CALC-MCNC: 3.7 G/DL (ref 1.9–3.5)
GLUCOSE SERPL-MCNC: 125 MG/DL (ref 65–99)
HCG UR QL: NEGATIVE
HCT VFR BLD AUTO: 37.9 % (ref 37–47)
HGB BLD-MCNC: 11 G/DL (ref 12–16)
IMM GRANULOCYTES # BLD AUTO: 0.03 K/UL (ref 0–0.11)
IMM GRANULOCYTES NFR BLD AUTO: 0.3 % (ref 0–0.9)
LYMPHOCYTES # BLD AUTO: 3.34 K/UL (ref 1–4.8)
LYMPHOCYTES NFR BLD: 30.7 % (ref 22–41)
MCH RBC QN AUTO: 22.5 PG (ref 27–33)
MCHC RBC AUTO-ENTMCNC: 29 G/DL (ref 33.6–35)
MCV RBC AUTO: 77.5 FL (ref 81.4–97.8)
MICROCYTES BLD QL SMEAR: ABNORMAL
MONOCYTES # BLD AUTO: 0.72 K/UL (ref 0–0.85)
MONOCYTES NFR BLD AUTO: 6.6 % (ref 0–13.4)
MORPHOLOGY BLD-IMP: NORMAL
NEUTROPHILS # BLD AUTO: 6.4 K/UL (ref 2–7.15)
NEUTROPHILS NFR BLD: 58.8 % (ref 44–72)
NRBC # BLD AUTO: 0 K/UL
NRBC BLD-RTO: 0 /100 WBC
PLATELET # BLD AUTO: 211 K/UL (ref 164–446)
PLATELET BLD QL SMEAR: NORMAL
PMV BLD AUTO: 12.1 FL (ref 9–12.9)
POIKILOCYTOSIS BLD QL SMEAR: NORMAL
POTASSIUM SERPL-SCNC: 3.4 MMOL/L (ref 3.6–5.5)
PROT SERPL-MCNC: 7.4 G/DL (ref 6–8.2)
RBC # BLD AUTO: 4.89 M/UL (ref 4.2–5.4)
RBC BLD AUTO: PRESENT
SODIUM SERPL-SCNC: 139 MMOL/L (ref 135–145)
SP GR UR REFRACTOMETRY: 1.01
WBC # BLD AUTO: 10.9 K/UL (ref 4.8–10.8)

## 2019-07-20 PROCEDURE — 700105 HCHG RX REV CODE 258: Performed by: STUDENT IN AN ORGANIZED HEALTH CARE EDUCATION/TRAINING PROGRAM

## 2019-07-20 PROCEDURE — 81025 URINE PREGNANCY TEST: CPT

## 2019-07-20 PROCEDURE — 85025 COMPLETE CBC W/AUTO DIFF WBC: CPT

## 2019-07-20 RX ADMIN — SODIUM CHLORIDE: 9 INJECTION, SOLUTION INTRAVENOUS at 00:28

## 2019-07-20 NOTE — ED NOTES
Pt takes Levothyroxine and an acid reducer (unsure of med). Pt reports dizziness worsens with physical exertion.

## 2019-07-20 NOTE — ED NOTES
Break RN note: First contact with pt for discharge. Discharge instructions provided, pt verbalizes understanding. Pt awake, alert, VSS. Pt ambulatory with steady gait out of ER.

## 2019-07-20 NOTE — ED PROVIDER NOTES
"CHIEF COMPLAINT(1/4)  Chief Complaint   Patient presents with   • Dizziness     \"I am feeling really off balance and really dizzy.\" x3 days.  in triage.    • Blurred Vision     \"Everything is foggy\" x3 days. SALCEDO. Smile equal. Sensation intact.   • Nausea     Pt denies emesis, denies abdominal pain/chest pain       HPI  Chanelle Ortiz is a 43 y.o. female who presents to the emergency department with chief complaint of nausea, dizziness and blurred vision for the past 3 days.  Patient reports she has not been drinking much water lately.  She denies any decreased exertional tolerance and reports she can walk as far she would like without any difficulty.  She recently started working at [employer] and she thinks she might be overdoing it.  No associated fever/chills or decreased appetite.  Patient denies sick contacts at home.  The patient has never had symptoms like this in the past.    Patient describes her dizziness as a feeling of \"being on a boat\".    Of note the patient is still having her periods and is sexually active with her .  Patient states she had a tubal ligation.  She does not think she could be pregnant because of this.    REVIEW OF SYSTEMS(1/10)  Pertinent positives include: Per HPI  Pertinent negatives include: Neurologic deficits, chest pain, shortness of breath, vomiting, abdominal pain, or diarrhea.   All other systems are negative.     PAST MEDICAL HISTORY(PFS1,2)  Past Medical History:   Diagnosis Date   • Anxiety and depression 2016    after ex went to longterm   • Arrhythmia     notes occasional rapid or prominent heart beat, at night    • Diabetes 2008    on and off meds (due to concern about heart beat changes)   • H/O Hypertension - resolved     Patient states prior HTN, but resolved after changes in other medications (but off HTN meds).    • Hx of Bronchitis    • Hyperthyroidism 2008    Treated with Radioactive iodine, at [place], in 2008   • Hypothyroidism, after " "radioactive ablation of thyroid    • POLLY on CPAP 2018    gets headaches, if not using CPAP.    • Psychiatric problem - unspecified     pt notes anxiety and depression, with counseller - pt unclear on details.         FAMILY HISTORY  Family History   Problem Relation Age of Onset   • Other Mother         sleep apnea   • Heart Failure Sister    • Diabetes Sister    • Heart Disease Sister    • Hypertension Sister    • Stroke Sister    • Hyperlipidemia Sister    • Heart Attack Maternal Grandfather    • Diabetes Daughter    • Psychiatry Daughter         bipolar   • Psychiatry Son         autism       SOCIAL HISTORY  Social History   Substance Use Topics   • Smoking status: Never Smoker   • Smokeless tobacco: Never Used   • Alcohol use No     History   Drug Use No       SURGICAL HISTORY  Past Surgical History:   Procedure Laterality Date   • GYN SURGERY      tuboligation, bilateral, during past .    • OTHER ABDOMINAL SURGERY      cholesystectomy   • OVARIAN CYSTECTOMY      unknown details or laterality.   • CHOLECYSTECTOMY     • OTHER      tonsillectomy   • PRIMARY C SECTION       - , ,    • PRIMARY C SECTION         CURRENT MEDICATIONS  Home Medications    **Home medications have not yet been reviewed for this encounter**         ALLERGIES  Allergies   Allergen Reactions   • Morphine      \"I think\" \"I dunno what my reaction was, the nurse just said my oxygen levels were going way to low on it\"        PHYSICAL EXAM  VITAL SIGNS: /83   Pulse 71   Temp 36.5 °C (97.7 °F) (Temporal)   Resp 16   Wt (!) 129 kg (284 lb 6.3 oz)   LMP 2019   SpO2 97%   BMI 50.38 kg/m²    Constitutional: Well developed, Well nourished,.  HENT: Normocephalic, atraumatic, bilateral external ears normal, oropharynx moist, No exudates or erythema.   Eyes: PERRLA, conjunctiva pink, no scleral icterus.   Cardiovascular: Regular rate and rhythm, heart sounds are distant secondary to body " habitus.  Respiratory: Breathing is nonlabored with no accessory muscle use.  Lungs are clear to auscultation bilaterally  Gastrointestinal: Normal bowel sounds, abdomen is soft and nontender to palpation.  Skin: No erythema, no rash.   Genitourinary:  No costovertebral angle tenderness.   Neurologic: Alert & oriented x 3, cranial nerves 2-12 intact by passive exam.  No focal deficit noted.  Psychiatric: Affect normal, Judgment normal, Mood normal.     DIFFERENTIAL DIAGNOSIS:  Viral infection (gastroenteritis), dehydration, benign positional vertigo, myocardial infarction    EKG  Sinus rhythm without ST abnormality    RADIOLOGY/PROCEDURES  No orders to display       LABORATORY: Reviewed as below.  Results for orders placed or performed during the hospital encounter of 07/19/19   CMP   Result Value Ref Range    Sodium 139 135 - 145 mmol/L    Potassium 3.4 (L) 3.6 - 5.5 mmol/L    Chloride 105 96 - 112 mmol/L    Co2 25 20 - 33 mmol/L    Anion Gap 9.0 0.0 - 11.9    Glucose 125 (H) 65 - 99 mg/dL    Bun 19 8 - 22 mg/dL    Creatinine 0.58 0.50 - 1.40 mg/dL    Calcium 8.8 8.5 - 10.5 mg/dL    AST(SGOT) 12 12 - 45 U/L    ALT(SGPT) 15 2 - 50 U/L    Alkaline Phosphatase 76 30 - 99 U/L    Total Bilirubin 0.3 0.1 - 1.5 mg/dL    Albumin 3.7 3.2 - 4.9 g/dL    Total Protein 7.4 6.0 - 8.2 g/dL    Globulin 3.7 (H) 1.9 - 3.5 g/dL    A-G Ratio 1.0 g/dL   CBC WITH DIFFERENTIAL   Result Value Ref Range    WBC 10.9 (H) 4.8 - 10.8 K/uL    RBC 4.89 4.20 - 5.40 M/uL    Hemoglobin 11.0 (L) 12.0 - 16.0 g/dL    Hematocrit 37.9 37.0 - 47.0 %    MCV 77.5 (L) 81.4 - 97.8 fL    MCH 22.5 (L) 27.0 - 33.0 pg    MCHC 29.0 (L) 33.6 - 35.0 g/dL    RDW 42.8 35.9 - 50.0 fL    Platelet Count 211 164 - 446 K/uL    MPV 12.1 9.0 - 12.9 fL    Nucleated RBC 0.00 /100 WBC    NRBC (Absolute) 0.00 K/uL   ESTIMATED GFR   Result Value Ref Range    GFR If African American >60 >60 mL/min/1.73 m 2    GFR If Non African American >60 >60 mL/min/1.73 m 2   ACCU-CHEK GLUCOSE    Result Value Ref Range    Glucose - Accu-Ck 127 (H) 65 - 99 mg/dL   EKG   Result Value Ref Range    Report       [facility] Emergency Dept.    Test Date:  2019  Pt Name:    PRATIBHA CUELLAR              Department: ER  MRN:        [id number]                      Room:  Gender:     Female                       Technician: [id number]  :        1975                   Requested By:ER TRIAGE PROTOCOL  Order #:    [id number]                    Reading MD:    Measurements  Intervals                                Axis  Rate:       75                           P:          51  MT:         148                          QRS:        49  QRSD:       88                           T:          24  QT:         364  QTc:        407    Interpretive Statements  SINUS RHYTHM  Compared to ECG 2019 08:21:34  T-wave abnormality no longer present         COURSE & MEDICAL DECISION MAKING  Patient symptoms are most consistent with gastroenteritis considering she works at [employer] where she could be exposed to multiple people with viral infections.  CBC and CMP were overall normal aside from a mild leukocytosis.  Patient was given IV fluids for dehydration.  Patient reported feeling better after receiving IV fluids.  Patient was cleared to be discharged home with close follow-up with her primary care provider.  Return precautions were given prior to discharge.    CONDITION: Stable    FINAL IMPRESSION  1. Dehydration      Patient here without any complaints of chest pain or shortness of breath, she reports some vague dizziness however her neurologic exam is entirely unremarkable on my exam.  Full strength in bilateral upper and lower extremities, no dysmetria, negative Romberg's.  Patient given IV fluids for treatment of dehydration.  Following this she felt considerably improved.  Her laboratory results are unremarkable      Electronically signed by: Mike Rascon, 2019 11:27 PM

## 2019-07-20 NOTE — ED TRIAGE NOTES
"Chanelle Ortiz  43 y.o. female  Chief Complaint   Patient presents with   • Dizziness     \"I am feeling really off balance and really dizzy.\" x3 days.  in triage.    • Blurred Vision     \"Everything is foggy\" x3 days. SALCEDO. Smile equal. Sensation intact.   • Nausea     Pt denies emesis, denies abdominal pain/chest pain       Pt amb to triage with steady gait for above complaint.  Pt is alert and oriented, speaking in full sentences, follows commands and responds appropriately to questions. Resp are even and unlabored. No behavioral indicators of pain.   Pt placed in triage 1 for EKG. Pt educated on triage process. Pt encouraged to alert staff for any changes.    "

## 2019-10-16 ENCOUNTER — APPOINTMENT (OUTPATIENT)
Dept: RADIOLOGY | Facility: MEDICAL CENTER | Age: 44
End: 2019-10-16
Attending: EMERGENCY MEDICINE
Payer: MEDICAID

## 2019-10-16 ENCOUNTER — HOSPITAL ENCOUNTER (EMERGENCY)
Facility: MEDICAL CENTER | Age: 44
End: 2019-10-17
Attending: EMERGENCY MEDICINE
Payer: MEDICAID

## 2019-10-16 DIAGNOSIS — E86.0 DEHYDRATION: ICD-10-CM

## 2019-10-16 DIAGNOSIS — R07.9 ACUTE CHEST PAIN: ICD-10-CM

## 2019-10-16 DIAGNOSIS — R52 BODY ACHES: ICD-10-CM

## 2019-10-16 DIAGNOSIS — J06.9 UPPER RESPIRATORY TRACT INFECTION, UNSPECIFIED TYPE: ICD-10-CM

## 2019-10-16 DIAGNOSIS — J45.21 MILD INTERMITTENT REACTIVE AIRWAY DISEASE WITH ACUTE EXACERBATION: ICD-10-CM

## 2019-10-16 DIAGNOSIS — J11.1 INFLUENZA-LIKE ILLNESS: ICD-10-CM

## 2019-10-16 DIAGNOSIS — F41.9 ANXIETY AND DEPRESSION: ICD-10-CM

## 2019-10-16 DIAGNOSIS — F32.A ANXIETY AND DEPRESSION: ICD-10-CM

## 2019-10-16 DIAGNOSIS — R06.02 SHORTNESS OF BREATH: ICD-10-CM

## 2019-10-16 DIAGNOSIS — E66.01 CLASS 3 SEVERE OBESITY IN ADULT, UNSPECIFIED BMI, UNSPECIFIED OBESITY TYPE, UNSPECIFIED WHETHER SERIOUS COMORBIDITY PRESENT (HCC): ICD-10-CM

## 2019-10-16 LAB
ALBUMIN SERPL BCP-MCNC: 3.5 G/DL (ref 3.2–4.9)
ALBUMIN/GLOB SERPL: 1 G/DL
ALP SERPL-CCNC: 109 U/L (ref 30–99)
ALT SERPL-CCNC: 17 U/L (ref 2–50)
ANION GAP SERPL CALC-SCNC: 6 MMOL/L (ref 0–11.9)
AST SERPL-CCNC: 13 U/L (ref 12–45)
BASOPHILS # BLD AUTO: 0.6 % (ref 0–1.8)
BASOPHILS # BLD: 0.06 K/UL (ref 0–0.12)
BILIRUB SERPL-MCNC: 0.3 MG/DL (ref 0.1–1.5)
BUN SERPL-MCNC: 16 MG/DL (ref 8–22)
CALCIUM SERPL-MCNC: 8.6 MG/DL (ref 8.5–10.5)
CHLORIDE SERPL-SCNC: 107 MMOL/L (ref 96–112)
CO2 SERPL-SCNC: 26 MMOL/L (ref 20–33)
COMMENT 1642: NORMAL
CREAT SERPL-MCNC: 0.63 MG/DL (ref 0.5–1.4)
EOSINOPHIL # BLD AUTO: 0.42 K/UL (ref 0–0.51)
EOSINOPHIL NFR BLD: 4.2 % (ref 0–6.9)
ERYTHROCYTE [DISTWIDTH] IN BLOOD BY AUTOMATED COUNT: 44.2 FL (ref 35.9–50)
GLOBULIN SER CALC-MCNC: 3.6 G/DL (ref 1.9–3.5)
GLUCOSE SERPL-MCNC: 248 MG/DL (ref 65–99)
HCT VFR BLD AUTO: 33.3 % (ref 37–47)
HGB BLD-MCNC: 9.7 G/DL (ref 12–16)
IMM GRANULOCYTES # BLD AUTO: 0.05 K/UL (ref 0–0.11)
IMM GRANULOCYTES NFR BLD AUTO: 0.5 % (ref 0–0.9)
LYMPHOCYTES # BLD AUTO: 2.43 K/UL (ref 1–4.8)
LYMPHOCYTES NFR BLD: 24.3 % (ref 22–41)
MCH RBC QN AUTO: 21.4 PG (ref 27–33)
MCHC RBC AUTO-ENTMCNC: 29.1 G/DL (ref 33.6–35)
MCV RBC AUTO: 73.5 FL (ref 81.4–97.8)
MICROCYTES BLD QL SMEAR: ABNORMAL
MONOCYTES # BLD AUTO: 0.92 K/UL (ref 0–0.85)
MONOCYTES NFR BLD AUTO: 9.2 % (ref 0–13.4)
MORPHOLOGY BLD-IMP: NORMAL
NEUTROPHILS # BLD AUTO: 6.14 K/UL (ref 2–7.15)
NEUTROPHILS NFR BLD: 61.2 % (ref 44–72)
NRBC # BLD AUTO: 0 K/UL
NRBC BLD-RTO: 0 /100 WBC
PLATELET # BLD AUTO: 205 K/UL (ref 164–446)
PLATELET BLD QL SMEAR: NORMAL
PMV BLD AUTO: 11.6 FL (ref 9–12.9)
POTASSIUM SERPL-SCNC: 3.7 MMOL/L (ref 3.6–5.5)
PROT SERPL-MCNC: 7.1 G/DL (ref 6–8.2)
RBC # BLD AUTO: 4.53 M/UL (ref 4.2–5.4)
RBC BLD AUTO: PRESENT
S PYO DNA SPEC NAA+PROBE: NOT DETECTED
SODIUM SERPL-SCNC: 139 MMOL/L (ref 135–145)
TROPONIN T SERPL-MCNC: <6 NG/L (ref 6–19)
WBC # BLD AUTO: 10 K/UL (ref 4.8–10.8)

## 2019-10-16 PROCEDURE — A9270 NON-COVERED ITEM OR SERVICE: HCPCS

## 2019-10-16 PROCEDURE — 700101 HCHG RX REV CODE 250: Performed by: EMERGENCY MEDICINE

## 2019-10-16 PROCEDURE — 71045 X-RAY EXAM CHEST 1 VIEW: CPT

## 2019-10-16 PROCEDURE — 96374 THER/PROPH/DIAG INJ IV PUSH: CPT

## 2019-10-16 PROCEDURE — 94640 AIRWAY INHALATION TREATMENT: CPT

## 2019-10-16 PROCEDURE — 36415 COLL VENOUS BLD VENIPUNCTURE: CPT

## 2019-10-16 PROCEDURE — 80053 COMPREHEN METABOLIC PANEL: CPT

## 2019-10-16 PROCEDURE — 93005 ELECTROCARDIOGRAM TRACING: CPT | Performed by: EMERGENCY MEDICINE

## 2019-10-16 PROCEDURE — 85025 COMPLETE CBC W/AUTO DIFF WBC: CPT

## 2019-10-16 PROCEDURE — 87651 STREP A DNA AMP PROBE: CPT

## 2019-10-16 PROCEDURE — 700111 HCHG RX REV CODE 636 W/ 250 OVERRIDE (IP): Performed by: EMERGENCY MEDICINE

## 2019-10-16 PROCEDURE — 700105 HCHG RX REV CODE 258: Performed by: EMERGENCY MEDICINE

## 2019-10-16 PROCEDURE — 700111 HCHG RX REV CODE 636 W/ 250 OVERRIDE (IP)

## 2019-10-16 PROCEDURE — 84484 ASSAY OF TROPONIN QUANT: CPT

## 2019-10-16 PROCEDURE — 93005 ELECTROCARDIOGRAM TRACING: CPT

## 2019-10-16 PROCEDURE — 700102 HCHG RX REV CODE 250 W/ 637 OVERRIDE(OP)

## 2019-10-16 PROCEDURE — 99285 EMERGENCY DEPT VISIT HI MDM: CPT

## 2019-10-16 PROCEDURE — 304561 HCHG STAT O2

## 2019-10-16 RX ORDER — DEXAMETHASONE SODIUM PHOSPHATE 4 MG/ML
10 INJECTION, SOLUTION INTRA-ARTICULAR; INTRALESIONAL; INTRAMUSCULAR; INTRAVENOUS; SOFT TISSUE ONCE
Status: COMPLETED | OUTPATIENT
Start: 2019-10-17 | End: 2019-10-16

## 2019-10-16 RX ORDER — AZITHROMYCIN 250 MG/1
TABLET, FILM COATED ORAL
Qty: 6 TAB | Refills: 0 | Status: SHIPPED | OUTPATIENT
Start: 2019-10-16 | End: 2019-10-16

## 2019-10-16 RX ORDER — ALBUTEROL SULFATE 90 UG/1
2 AEROSOL, METERED RESPIRATORY (INHALATION) EVERY 6 HOURS PRN
Qty: 8.5 G | Refills: 0 | Status: SHIPPED | OUTPATIENT
Start: 2019-10-16 | End: 2020-06-07

## 2019-10-16 RX ORDER — KETOROLAC TROMETHAMINE 30 MG/ML
15 INJECTION, SOLUTION INTRAMUSCULAR; INTRAVENOUS ONCE
Status: COMPLETED | OUTPATIENT
Start: 2019-10-16 | End: 2019-10-16

## 2019-10-16 RX ORDER — ACETAMINOPHEN 500 MG
1000 TABLET ORAL ONCE
Status: COMPLETED | OUTPATIENT
Start: 2019-10-17 | End: 2019-10-16

## 2019-10-16 RX ORDER — SODIUM CHLORIDE, SODIUM LACTATE, POTASSIUM CHLORIDE, CALCIUM CHLORIDE 600; 310; 30; 20 MG/100ML; MG/100ML; MG/100ML; MG/100ML
1000 INJECTION, SOLUTION INTRAVENOUS ONCE
Status: COMPLETED | OUTPATIENT
Start: 2019-10-16 | End: 2019-10-17

## 2019-10-16 RX ORDER — IPRATROPIUM BROMIDE AND ALBUTEROL SULFATE 2.5; .5 MG/3ML; MG/3ML
3 SOLUTION RESPIRATORY (INHALATION) ONCE
Status: COMPLETED | OUTPATIENT
Start: 2019-10-16 | End: 2019-10-16

## 2019-10-16 RX ORDER — DEXAMETHASONE SODIUM PHOSPHATE 10 MG/ML
10 INJECTION, SOLUTION INTRAMUSCULAR; INTRAVENOUS ONCE
Status: DISCONTINUED | OUTPATIENT
Start: 2019-10-16 | End: 2019-10-16

## 2019-10-16 RX ORDER — IBUPROFEN 600 MG/1
600 TABLET ORAL EVERY 8 HOURS PRN
Qty: 20 TAB | Refills: 0 | Status: SHIPPED | OUTPATIENT
Start: 2019-10-16 | End: 2020-06-07

## 2019-10-16 RX ADMIN — IPRATROPIUM BROMIDE AND ALBUTEROL SULFATE 3 ML: .5; 3 SOLUTION RESPIRATORY (INHALATION) at 23:09

## 2019-10-16 RX ADMIN — ACETAMINOPHEN 1000 MG: 500 TABLET ORAL at 23:45

## 2019-10-16 RX ADMIN — SODIUM CHLORIDE, POTASSIUM CHLORIDE, SODIUM LACTATE AND CALCIUM CHLORIDE 1000 ML: 600; 310; 30; 20 INJECTION, SOLUTION INTRAVENOUS at 22:35

## 2019-10-16 RX ADMIN — DEXAMETHASONE SODIUM PHOSPHATE 10 MG: 4 INJECTION, SOLUTION INTRA-ARTICULAR; INTRALESIONAL; INTRAMUSCULAR; INTRAVENOUS; SOFT TISSUE at 23:48

## 2019-10-16 RX ADMIN — KETOROLAC TROMETHAMINE 15 MG: 30 INJECTION, SOLUTION INTRAMUSCULAR at 22:35

## 2019-10-16 ASSESSMENT — LIFESTYLE VARIABLES: DO YOU DRINK ALCOHOL: NO

## 2019-10-17 VITALS
SYSTOLIC BLOOD PRESSURE: 134 MMHG | HEART RATE: 92 BPM | BODY MASS INDEX: 51.91 KG/M2 | RESPIRATION RATE: 20 BRPM | OXYGEN SATURATION: 94 % | TEMPERATURE: 98.2 F | HEIGHT: 63 IN | DIASTOLIC BLOOD PRESSURE: 72 MMHG | WEIGHT: 293 LBS

## 2019-10-17 LAB — EKG IMPRESSION: NORMAL

## 2019-10-17 NOTE — ED NOTES
D/C instructions provided to patient at bedside, verbalizes understanding and states plans for follow-up with PCP as recommended.   Scripts given and explained.   IV removed and dressed.   All belongings accounted for, all questions answered at this time.   Pt wheeled to lobby by RN. Pt to call a taxi ride home.

## 2019-10-17 NOTE — DISCHARGE INSTRUCTIONS
You were seen and evaluated in the Emergency Department at [facility] [place] for:     Cough and congestion and body aches and chest pain and shortness of breath    You had the following tests and studies:    Thankfully your EKG and chest x-ray and blood test today all look great.  You do not have strep throat.  You probably have a viral upper respiratory illness that should get better in the coming days.    You received the following medications:    IV fluids, dexamethasone, breathing treatments.    You received the following prescriptions:    Albuterol and ibuprofen, use as prescribed to help you feel better.  ----------------------------    Please make sure to follow up with:    Primary care provider in 1 to 2 days for recheck and routine health care, but if you have any new or worsening symptoms please return to the ER immediately.    Good luck, we hope you get better soon!  ----------------------------    We always encourage patients to return IMMEDIATELY if they have:  Increased or changing pain, passing out, fevers over 100.4 (taken in your mouth or rectally) for more than 2 days, redness or swelling of skin or tissues, feeling like your heart is beating fast, chest pain that is new or worsening, trouble breathing, feeling like your throat is closing up and can not breath, inability to walk, weakness of any part of your body, new dizziness, severe bleeding that won't stop from any part of your body, if you can't eat or drink, or if you have any other concerns.   If you feel worse, please know that you can always return with any questions, concerns, worse symptoms, or you are feeling unsafe. We certainly cannot say for sure that we have ruled out every illness or dangerous disease, but we feel that at this specific time, your exam, tests, and vital signs like heart rate and blood pressure are safe for discharge.

## 2019-10-17 NOTE — ED PROVIDER NOTES
"ED PROVIDER NOTE     Scribed for Norberto Blanco M.D. by Rakan Chaudhary. 10/16/2019, 9:46 PM.    CHIEF COMPLAINT  Chief Complaint   Patient presents with   • Chest Pain     x3 days   • Shortness of Breath     x3 days       HPI    Primary care provider: Trenton Love M.D.  Means of arrival: Walk-In  History obtained from: Patient   History limited by: None    Chanelle Ortiz is a 44 y.o. female who presents with waxing and waning chest pain described as achy and shortness of breath onset 3 days.  Her chest pain is anterior, nonradiating, worsened with coughing.  She notes that she had sick contacts at work and states that \"everyone is sick\". The patient reports associated dry cough, sore throat, muscle aches, subjective fever, and chills. Negative for vomiting. She took Tylenol-flu four hours ago with no alleviation. The patient reports a surgical history including a tonsillectomy and three  sections.  She has had similar episodes in the past.  This is day 3 of illness.  No flu shot this year.  No history of immunosuppression.  Symptoms of been constant and worsening, prompting her to seek emergent evaluation.    REVIEW OF SYSTEMS  Constitutional: Positive for subjective fever and chills.    HENT: Positive for sore throat. Negative for nosebleeds.   Eyes: Negative for vision changes or redness.   Respiratory: Shortness of breath. Dry cough.    Cardiovascular: Positive for chest pain. Negative for palpitations.   Gastrointestinal: Negative for nausea, vomiting, abdominal pain.   Genitourinary: Negative for dysuria or flank pain.   Musculoskeletal: Positive for muscle aches. Negative for back pain or joint pain.   Skin: Negative for itching or rash.   Neurological: Negative for sensory or motor changes.   All other systems reviewed and are negative.    PAST MEDICAL HISTORY   has a past medical history of Anxiety and depression (2016), Arrhythmia, Diabetes (2008), H/O Hypertension - resolved, Hx " "of Bronchitis, Hyperthyroidism (2008), Hypothyroidism, after radioactive ablation of thyroid (2008), POLLY on CPAP (2018), and Psychiatric problem - unspecified.    PAST FAMILY HISTORY  Family History   Problem Relation Age of Onset   • Other Mother         sleep apnea   • Heart Failure Sister    • Diabetes Sister    • Heart Disease Sister    • Hypertension Sister    • Stroke Sister    • Hyperlipidemia Sister    • Heart Attack Maternal Grandfather    • Diabetes Daughter    • Psychiatric Illness Daughter         bipolar   • Psychiatric Illness Son         autism       SOCIAL HISTORY  Social History     Tobacco Use   • Smoking status: Never Smoker   • Smokeless tobacco: Never Used   Substance and Sexual Activity   • Alcohol use: No   • Drug use: No   • Sexual activity: None reported     Comment: .  Tuboligation in 2004.        SURGICAL HISTORY   has a past surgical history that includes other abdominal surgery (2002); gyn surgery (2004); other; primary c section; ovarian cystectomy (1992); cholecystectomy; and primary c section.    CURRENT MEDICATIONS  Home Medications     Reviewed by Tiffany Lara R.N. (Registered Nurse) on 10/16/19 at 2039  Med List Status: Not Addressed   Medication Last Dose Status   gabapentin (NEURONTIN) 300 MG Cap  Active   levothyroxine (SYNTHROID) 175 MCG Tab 10/16/2019 Active   Vitamin D, Cholecalciferol, 1000 units Tab  Active                ALLERGIES  Allergies   Allergen Reactions   • Morphine      \"I think\" \"I dunno what my reaction was, the nurse just said my oxygen levels were going way to low on it\"        PHYSICAL EXAM  VITAL SIGNS: /74   Pulse 95   Temp 36.8 °C (98.2 °F) (Temporal)   Resp 18   Ht 1.6 m (5' 3\")   Wt (!) 134.5 kg (296 lb 8.3 oz)   SpO2 91%   BMI 52.53 kg/m²    Pulse ox interpretation: On room air, I interpret this pulse ox as normal  Constitutional: Sitting up in mild distress.   HEENT: Normocephalic, atraumatic.  Mucous membranes dry. " Posterior pharyngeal erythema, no exudate, uvula midline.  Eyes:  EOMI. Normal sclerae.  Neck: Supple, nontender.  Chest/Pulmonary: Lung sounds diminished bilaterally. No wheezes or rhonchi.  Cardiovascular: Regular rate and rhythm, no murmur.   Abdomen: Soft, nontender; no rebound, guarding, or masses.  Back: No CVA or midline tenderness.   Musculoskeletal: No deformity or edema.  Neuro: Clear speech, normal coordination, cranial nerves II-XII grossly intact, no focal asymmetry or sensory deficits.   Psych: Flat affect but cooperative.  Skin: No rashes, warm and dry.    DIAGNOSTIC STUDIES / PROCEDURES    LABS & EKG  Results for orders placed or performed during the hospital encounter of 10/16/19   CBC with Differential   Result Value Ref Range    WBC 10.0 4.8 - 10.8 K/uL    RBC 4.53 4.20 - 5.40 M/uL    Hemoglobin 9.7 (L) 12.0 - 16.0 g/dL    Hematocrit 33.3 (L) 37.0 - 47.0 %    MCV 73.5 (L) 81.4 - 97.8 fL    MCH 21.4 (L) 27.0 - 33.0 pg    MCHC 29.1 (L) 33.6 - 35.0 g/dL    RDW 44.2 35.9 - 50.0 fL    Platelet Count 205 164 - 446 K/uL    MPV 11.6 9.0 - 12.9 fL    Neutrophils-Polys 61.20 44.00 - 72.00 %    Lymphocytes 24.30 22.00 - 41.00 %    Monocytes 9.20 0.00 - 13.40 %    Eosinophils 4.20 0.00 - 6.90 %    Basophils 0.60 0.00 - 1.80 %    Immature Granulocytes 0.50 0.00 - 0.90 %    Nucleated RBC 0.00 /100 WBC    Neutrophils (Absolute) 6.14 2.00 - 7.15 K/uL    Lymphs (Absolute) 2.43 1.00 - 4.80 K/uL    Monos (Absolute) 0.92 (H) 0.00 - 0.85 K/uL    Eos (Absolute) 0.42 0.00 - 0.51 K/uL    Baso (Absolute) 0.06 0.00 - 0.12 K/uL    Immature Granulocytes (abs) 0.05 0.00 - 0.11 K/uL    NRBC (Absolute) 0.00 K/uL    Microcytosis 2+    Complete Metabolic Panel (CMP)   Result Value Ref Range    Sodium 139 135 - 145 mmol/L    Potassium 3.7 3.6 - 5.5 mmol/L    Chloride 107 96 - 112 mmol/L    Co2 26 20 - 33 mmol/L    Anion Gap 6.0 0.0 - 11.9    Glucose 248 (H) 65 - 99 mg/dL    Bun 16 8 - 22 mg/dL    Creatinine 0.63 0.50 - 1.40 mg/dL     Calcium 8.6 8.5 - 10.5 mg/dL    AST(SGOT) 13 12 - 45 U/L    ALT(SGPT) 17 2 - 50 U/L    Alkaline Phosphatase 109 (H) 30 - 99 U/L    Total Bilirubin 0.3 0.1 - 1.5 mg/dL    Albumin 3.5 3.2 - 4.9 g/dL    Total Protein 7.1 6.0 - 8.2 g/dL    Globulin 3.6 (H) 1.9 - 3.5 g/dL    A-G Ratio 1.0 g/dL   Troponin   Result Value Ref Range    Troponin T <6 6 - 19 ng/L   PERIPHERAL SMEAR REVIEW   Result Value Ref Range    Peripheral Smear Review see below    PLATELET ESTIMATE   Result Value Ref Range    Plt Estimation Normal    MORPHOLOGY   Result Value Ref Range    RBC Morphology Present    DIFFERENTIAL COMMENT   Result Value Ref Range    Comments-Diff see below    ESTIMATED GFR   Result Value Ref Range    GFR If African American >60 >60 mL/min/1.73 m 2    GFR If Non African American >60 >60 mL/min/1.73 m 2   Group A Strep by PCR   Result Value Ref Range    Group A Strep by PCR Not Detected Not Detected   EKG (NOW)   Result Value Ref Range    Report       [facility] Emergency Dept.    Test Date:  2019-10-16  Pt Name:    PRATIBHA CUELLAR              Department: ER  MRN:        [id number]                      Room:  Gender:     Female                       Technician: [id number]  :        1975                   Requested By:ER TRIAGE PROTOCOL  Order #:    [id number]                    Reading MD: Norberto Blanco MD    Measurements  Intervals                                Axis  Rate:       88                           P:          57  UT:         144                          QRS:        84  QRSD:       82                           T:          37  QT:         360  QTc:        436    Interpretive Statements  SINUS RHYTHM  Compared to ECG 2019 20:31:39  Stable EKG no STEMI or strain or dysrhythmia  Electronically Signed On 10- 12:01:39 PDT by Norberto Blanco MD         RADIOLOGY  DX-CHEST-PORTABLE (1 VIEW)   Final Result      No acute cardiopulmonary abnormality.          COURSE & MEDICAL DECISION  MAKING    This is a 44 y.o. female who presents with chest pain and shortness of breath.    Differential Diagnosis includes but is not limited to:  Flu, URI, strep throat, pneumonia, ACS, or PE.    ED Course:    9:47 PM - Patient seen at bedside. Ordered DX chest and labs to evaluate. The patient is agreeable and understanding of my plan of care.  EKG reviewed no STEMI or strain or dysrhythmia.    10: 11 PM - Treated patient with Toradol 15 mg and lactated ringers infusion.  I will keep her n.p.o. until surgical process is ruled out and she looks obviously dehydrated hence treatment with IV fluid bolus.    10: 47 PM - Treated patient with Decadron 10 mg and Duoneb nebulizer solution.     11:28 PM - Patient was reevaluated at bedside. Discussed lab and radiology results with the patient and informed them that her work-up is reassuring with no acute life-threatening illness.  Troponin negative low heart score highly doubt ACS.  She is not critically anemic, white count normal.  Electrodes are stable no acidosis.  She is hyperglycemic but no gap.  Chest x-ray no pneumonia.  Day 3 of illness no immunosuppression no indication for Tamiflu, hence I will not evaluate for influenza because it would not .  Strep negative.  Vitals are stable and she is feeling better after IV fluids thus I feel she has had a very positive response to parenteral rehydration.  Plan discharge, work note provided, will give albuterol for symptom relief at home and ibuprofen.  Discussed probable viral illness, return for any new or worsening symptoms.  I feel she is safe for discharge and outpatient primary care recheck, and I trust she will heed my advice and come right back if she gets any worse.      Medications   lactated ringers infusion (BOLUS) (0 mL Intravenous Stopped 10/17/19 0056)   ketorolac (TORADOL) injection 15 mg (15 mg Intravenous Given 10/16/19 0108)   ipratropium-albuterol (DUONEB) nebulizer solution (3 mL  Nebulization Given 10/16/19 2309)   ipratropium-albuterol (DUONEB) nebulizer solution (3 mL Nebulization Given 10/16/19 2309)   acetaminophen (TYLENOL) tablet 1,000 mg (1,000 mg Oral Given 10/16/19 2345)   dexamethasone (DECADRON) injection 10 mg (10 mg Oral Given 10/16/19 2348)     FINAL IMPRESSION  1. Upper respiratory tract infection, unspecified type    2. Acute chest pain    3. Shortness of breath    4. Mild intermittent reactive airway disease with acute exacerbation    5. Dehydration    6. Class 3 severe obesity in adult, unspecified BMI, unspecified obesity type, unspecified whether serious comorbidity present (HCC)    7. Hx of Anxiety and depression    8. Body aches    9. Influenza-like illness        PRESCRIPTIONS  Discharge Medication List as of 10/17/2019 12:56 AM      START taking these medications    Details   albuterol 108 (90 Base) MCG/ACT Aero Soln inhalation aerosol Inhale 2 Puffs by mouth every 6 hours as needed for Shortness of Breath., Disp-8.5 g, R-0, Print Rx Paper      ibuprofen (MOTRIN) 600 MG Tab Take 1 Tab by mouth every 8 hours as needed for Moderate Pain or Inflammation., Disp-20 Tab, R-0, Print Rx Paper             FOLLOW UP  Trenton Love M.D.  [address] [place] [address] [id number] [phone]    Schedule an appointment as soon as possible for a visit in 2 days  for recheck and routine health care    [facility], Emergency Dept  [address] [facility] [address] [phone]  Today  If you have ANY new or worse symptoms!        -DISCHARGE-     The patient is referred to a primary physician for blood pressure management, diabetic screening, and for all other preventative health concerns.     Pertinent Labs & Imaging studies reviewed and verified by myself, as well as nursing notes and the patient's past medical, family, and social histories (See chart for details).    Results, exam findings, clinical impression, presumed diagnosis, treatment  options, and strict return precautions were discussed with the patient, and they verbalized understanding, agreed with, and appreciated the plan of care.    I, Rakan Chaudhary (Sarahibe), am scribing for, and in the presence of, Norberto Blanco M.D..    Electronically signed by: Rakan Chaudhary (Dede), 10/16/2019    INorberto M.D. personally performed the services described in this documentation, as scribed by Rakan Chaudhary in my presence, and it is both accurate and complete.    Portions of this record were made with voice recognition software.  Despite my review, spelling/grammar/context errors may still remain.  Interpretation of this chart should be taken in this context. C.     The note accurately reflects work and decisions made by me.  Norberto Blanco  10/17/2019  12:06 PM

## 2019-10-17 NOTE — ED TRIAGE NOTES
"Chief Complaint   Patient presents with   • Chest Pain     x3 days   • Shortness of Breath     x3 days     /74   Pulse 95   Temp 36.8 °C (98.2 °F) (Temporal)   Resp 18   Ht 1.6 m (5' 3\")   Wt (!) 134.5 kg (296 lb 8.3 oz)   SpO2 91%     Pt ambulates with a steady gait to triage c/o dry cough, dizziness, chest pain that becomes worse when she bends down. Mask placed on the pt in triage.   "

## 2019-12-03 NOTE — ED PROVIDER NOTES
"ED Provider Note    Scribed for Jake Fortune II, MD by Catalina Garner. 8/14/2017  9:49 PM    Means of Arrival: Ambulance  History obtained by: Patient   Limitations: None     CHIEF COMPLAINT  Chief Complaint   Patient presents with   • Dizziness     x3 weeks, intermittent   • Near Syncopal     \"barely ate today, and been doing too much exercise\".   • Nausea     x2 hours   • Chest Pain     x3 weeks while exercising only       HPI  Chanelle Ortiz is a 41 y.o. female who presents for lightheadedness/dizziness. She reports associated dizziness. The patient is unable to further describe this dizziness. Chanelle states that she normally walks everywhere as she has no transportation and feels like she exercised too much without eating enough. She reports feeling very lightheaded when walking to the bus station today but did not  lose consciousness. Per patient, she has had some shortness of breath, fatigue, chest discomfort for several weeks and is seeing her primary doctor for these symptoms.  She is concerned she may be over exerting herself working out more frequently. Chanelle also complains of lower abdominal pain for the past month. No urinary symptoms. She says she is  Patient denies fevers, chills, nausea, vomiting, diarrhea, dysuria, urinary frequency.       REVIEW OF SYSTEMS  Review of Systems   Constitutional: Negative for fever and chills.   Eyes: Negative for blurred vision, double vision and photophobia.   Respiratory: Negative for cough and wheezing.    Cardiovascular: Positive for chest pain.   Gastrointestinal: Positive for abdominal pain. Negative for nausea, vomiting and diarrhea.   Genitourinary: Negative for dysuria and frequency.   Neurological: Positive for dizziness. Negative for tingling, sensory change, speech change, focal weakness, seizures, loss of consciousness and headaches.   All other systems reviewed and are negative.    See HPI for further details. C    PAST MEDICAL " "HISTORY   has a past medical history of Diabetes; Psychiatric problem; Arrhythmia; Bronchitis; Hypertension; and Hyperthyroidism.    SOCIAL HISTORY  Social History     Social History Main Topics   • Smoking status: Never Smoker    • Alcohol Use: No   • Drug Use: No       SURGICAL HISTORY   has past surgical history that includes other abdominal surgery (2002); gyn surgery; and other.    CURRENT MEDICATIONS  Home Medications     No current facility-administered medications on file prior to encounter.     Current Outpatient Prescriptions on File Prior to Encounter   Medication Sig Dispense Refill   • omeprazole (PRILOSEC) 20 MG delayed-release capsule Take 1 Cap by mouth 2 Times a Day. 30 Cap 0   • alprazolam (XANAX) 0.25 MG Tab Take 1 Tab by mouth 2 times a day as needed for Anxiety. 15 Tab 0   • Levothyroxine Sodium 150 MCG Cap Take 150 mcg by mouth every day.     • glimepiride (AMARYL) 4 MG Tab Take 4 mg by mouth every bedtime.                ALLERGIES  No Known Allergies    PHYSICAL EXAM  VITAL SIGNS: /92 mmHg  Pulse 90  Temp(Src) 36.7 °C (98.1 °F)  Resp 14  Ht 1.6 m (5' 3\")  Wt 129.8 kg (286 lb 2.5 oz)  BMI 50.70 kg/m2  SpO2 99%      Pulse ox interpretation: I interpret this pulse ox as normal.  Constitutional: Alert in no apparent distress.  HENT: No signs of trauma, Bilateral external ears normal, Nose normal.   Eyes: Pupils are equal and reactive, Conjunctiva normal, Non-icteric.   Neck: Normal range of motion, No tenderness, Supple, No stridor.   Lymphatic: No lymphadenopathy noted.   Cardiovascular: Regular rate and rhythm, no murmurs.   Thorax & Lungs: Normal breath sounds, No respiratory distress, No wheezing, No chest tenderness.   Abdomen: Bowel sounds normal, Soft, Suprapubic tenderness, No guarding, No masses, No pulsatile masses. No peritoneal signs.  Skin: Warm, Dry, No erythema, No rash.   Back: No bony tenderness, No CVA tenderness.   Extremities: Intact distal pulses, No edema, No " tenderness, No cyanosis.  Musculoskeletal: Good range of motion in all major joints. No tenderness to palpation or major deformities noted.   Neurologic: Alert , Normal motor function, Normal sensory function, No focal deficits noted. Normal finger to nose testing. No truncal ataxia sitting upright in bed.   Psychiatric: Affect normal, Judgment normal, Mood normal.       DIAGNOSTIC STUDIES / PROCEDURES    EKG  Interpreted by me    Rhythm: Normal sinus rhythm   Rate: 72  Normal intervals   T wave inversion in V2  ST Segments: no acute change  Clinical Impression: Normal EKG without acute changes.      LABS  Pertinent Labs & Imaging studies reviewed. (See chart for details)    RADIOLOGY  DX-CHEST-LIMITED (1 VIEW)   Final Result         No acute cardiopulmonary abnormalities are identified.      The radiologist's interpretations of all radiological studies have been reviewed by me.       COURSE & MEDICAL DECISION MAKING  Pertinent Labs & Imaging studies reviewed. (See chart for details)    9:49 PM This is a 41 y.o. female who presents with dizziness, chest pain, abdominal pain which have been present for weeks and the differential diagnosis includes but is not limited to chronic symptoms, urinary tract infection, orthostatic hypotension, electrolyte abnormality, thyroid dysfunction; unlikely MI. Ordered for a chest XR, EKG, and labs to evaluate. The plan of care was discussed with the patient and I answered all of her questions at this time. The patient understands and is agreeable with this plan of care.      11:26 PM Patient reevaluated at bedside. Discussed normal labs. TSH level is pending. Orthostatic blood pressures: supine 109/64, sitting 115/73, standing 131/87.     12:15 AM Due to insufficient staffing in the lab, the patient's TSH level has been delayed. Lab ordered at 10:07 PM.      1:30 AM Thyroid studies normal. When she sleeps she does have desaturations.  I suspect she has sleep apnea by her body habitus  and symptoms of fatigue during the day.  Documented RR of 32 but on my repeat exams she has normal RR. Clear lung sounds. PERC negative. Discussed findings with her. Chronic symptoms without neurologic deficits. We discussed the importance of follow up with PCP for evaluation of POLLY.     The patient will return for worsening symptoms and is stable at the time of discharge. The patient verbalizes understanding and will comply.    The patient is referred to a primary physician for blood pressure management, diabetic screening, and for all other preventative health concerns.    DISPOSITION:  Patient will be discharged home in stable condition.    FOLLOW UP:  CHANCE Pate  [facility] [address]  [phone]    Call in 1 day  to obtain follow up ASAP for evaluation of symptoms, work up for sleep apnea    [facility], Emergency Dept  [facility] [id number] [phone]    If symptoms worsen, chest pain, shortness of breath      OUTPATIENT MEDICATIONS:  New Prescriptions    No medications on file     FINAL IMPRESSION  1. Fatigue, unspecified type    2. Lightheadedness          Catalina FRANCO (Dede), am scribing for, and in the presence of, Jake Fortune II, MD.    Electronically signed by: Catalina Lyn), 8/14/2017    Jake FRANCO II, MD personally performed the services described in this documentation, as scribed by Catalina Garner in my presence, and it is both accurate and complete.    The note accurately reflects work and decisions made by me.  Jake Fortune II  8/15/2017  1:30 AM       4

## 2020-01-06 NOTE — DISCHARGE PLANNING
Care Transition Team Assessment    Information Source  Orientation : Oriented x 4  Information Given By: Patient  Who is responsible for making decisions for patient? : Patient         Elopement Risk  Legal Hold: No    Interdisciplinary Discharge Planning  Does Admitting Nurse Feel This Could be a Complex Discharge?: No  Primary Care Physician: Radha WAKEFIELD  Lives with - Patient's Self Care Capacity: Parents  Support Systems: Family Member(s)  Housing / Facility: Mobile Home  Do You Take your Prescribed Medications Regularly: Yes  Able to Return to Previous ADL's: Yes  Mobility Issues: No  Prior Services: None  Patient Expects to be Discharged to:: home  Assistance Needed: No  Durable Medical Equipment: Not Applicable    Discharge Preparedness  What is your plan after discharge?: Home with help  What are your discharge supports?: Parent  Prior Functional Level: Ambulatory, Drives Self, Independent with Activities of Daily Living    Functional Assesment  Prior Functional Level: Ambulatory, Drives Self, Independent with Activities of Daily Living                        Domestic Abuse  Have you ever been the victim of abuse or violence?: No              Anticipated Discharge Information  Anticipated discharge disposition: Home  Discharge Address: [address] [place] [address] [address]  Discharge Contact Phone Number: [phone]         Fever, cough

## 2020-03-23 ENCOUNTER — HOSPITAL ENCOUNTER (EMERGENCY)
Facility: MEDICAL CENTER | Age: 45
End: 2020-03-23
Attending: EMERGENCY MEDICINE
Payer: MEDICAID

## 2020-03-23 ENCOUNTER — APPOINTMENT (OUTPATIENT)
Dept: RADIOLOGY | Facility: MEDICAL CENTER | Age: 45
End: 2020-03-23
Attending: EMERGENCY MEDICINE
Payer: MEDICAID

## 2020-03-23 VITALS
RESPIRATION RATE: 16 BRPM | OXYGEN SATURATION: 97 % | DIASTOLIC BLOOD PRESSURE: 84 MMHG | SYSTOLIC BLOOD PRESSURE: 132 MMHG | HEART RATE: 72 BPM | TEMPERATURE: 97 F | WEIGHT: 293 LBS | BODY MASS INDEX: 51.91 KG/M2 | HEIGHT: 63 IN

## 2020-03-23 DIAGNOSIS — S89.92XA INJURY OF LEFT KNEE, INITIAL ENCOUNTER: ICD-10-CM

## 2020-03-23 PROCEDURE — 99284 EMERGENCY DEPT VISIT MOD MDM: CPT

## 2020-03-23 PROCEDURE — 73560 X-RAY EXAM OF KNEE 1 OR 2: CPT | Mod: LT

## 2020-03-23 PROCEDURE — 302875 HCHG BANDAGE ACE 4 OR 6""

## 2020-03-23 RX ORDER — GLIMEPIRIDE 2 MG/1
2 TABLET ORAL EVERY MORNING
COMMUNITY
End: 2020-06-07

## 2020-03-23 ASSESSMENT — LIFESTYLE VARIABLES: DO YOU DRINK ALCOHOL: NO

## 2020-03-23 ASSESSMENT — FIBROSIS 4 INDEX: FIB4 SCORE: 0.68

## 2020-03-23 NOTE — ED NOTES
Pt brought back via WC to room 78.  C/o L knee pain and pressure feeling, not able to bear weight. No relief with OTC medications.  Pending ERP eval.

## 2020-03-23 NOTE — ED TRIAGE NOTES
"Chief Complaint   Patient presents with   • Knee Pain     Patient bib ambulance to triage. Patient states her Left knee \"popped\" twice last night while walking. Patient states that it still hurts and more so with pressure. Patient took 1g of tylenol 2 hours pta, and was given 600 mg of ibuprofen by ems.   "

## 2020-03-23 NOTE — ED PROVIDER NOTES
CHIEF COMPLAINT   Chief Complaint   Patient presents with   • Knee Pain       HPI   Chanelle Ortiz is a 44 y.o. female who presents with knee pain.  She was just doing laundry and walking and had her knee pop twice now she has some pain with movement there is no locking swelling no trauma no fall no angulation of the knee.  All other systems negative    REVIEW OF SYSTEMS   See HPI for further details.      PAST MEDICAL HISTORY   Past Medical History:   Diagnosis Date   • Anxiety and depression 2016    after ex went to detention   • Arrhythmia     notes occasional rapid or prominent heart beat, at night    • Diabetes 2008    on and off meds (due to concern about heart beat changes)   • H/O Hypertension - resolved     Patient states prior HTN, but resolved after changes in other medications (but off HTN meds).    • Hx of Bronchitis    • Hyperthyroidism 2008    Treated with Radioactive iodine, at [place], in 2008   • Hypothyroidism, after radioactive ablation of thyroid 2008   • POLLY on CPAP 2018    gets headaches, if not using CPAP.    • Psychiatric problem - unspecified     pt notes anxiety and depression, with counseller - pt unclear on details.         FAMILY HISTORY  Family History   Problem Relation Age of Onset   • Other Mother         sleep apnea   • Heart Failure Sister    • Diabetes Sister    • Heart Disease Sister    • Hypertension Sister    • Stroke Sister    • Hyperlipidemia Sister    • Heart Attack Maternal Grandfather    • Diabetes Daughter    • Psychiatric Illness Daughter         bipolar   • Psychiatric Illness Son         autism       SOCIAL HISTORY  Social History     Socioeconomic History   • Marital status: Legally      Spouse name: Not on file   • Number of children: Not on file   • Years of education: Not on file   • Highest education level: Not on file   Occupational History   • Not on file   Social Needs   • Financial resource strain: Not on file   • Food insecurity     Worry:  Not on file     Inability: Not on file   • Transportation needs     Medical: Not on file     Non-medical: Not on file   Tobacco Use   • Smoking status: Never Smoker   • Smokeless tobacco: Never Used   Substance and Sexual Activity   • Alcohol use: No   • Drug use: No   • Sexual activity: Not on file     Comment: .  Tuboligation in .    Lifestyle   • Physical activity     Days per week: Not on file     Minutes per session: Not on file   • Stress: Not on file   Relationships   • Social connections     Talks on phone: Not on file     Gets together: Not on file     Attends [employer] service: Not on file     Active member of club or organization: Not on file     Attends meetings of clubs or organizations: Not on file     Relationship status: Not on file   • Intimate partner violence     Fear of current or ex partner: Not on file     Emotionally abused: Not on file     Physically abused: Not on file     Forced sexual activity: Not on file   Other Topics Concern   • Not on file   Social History Narrative   • Not on file       SURGICAL HISTORY  Past Surgical History:   Procedure Laterality Date   • GYN SURGERY      tuboligation, bilateral, during past .    • OTHER ABDOMINAL SURGERY      cholesystectomy   • OVARIAN CYSTECTOMY      unknown details or laterality.   • CHOLECYSTECTOMY     • OTHER      tonsillectomy   • PRIMARY C SECTION       - , ,    • PRIMARY C SECTION         CURRENT MEDICATIONS   Home Medications     Reviewed by Yee Childs R.N. (Registered Nurse) on 20 at 1530  Med List Status: Partial   Medication Last Dose Status   albuterol 108 (90 Base) MCG/ACT Aero Soln inhalation aerosol not taking Active   gabapentin (NEURONTIN) 300 MG Cap not taking Active   glimepiride (AMARYL) 2 MG Tab 3/23/2020 Active   ibuprofen (MOTRIN) 600 MG Tab 3/23/2020 Active   IRON PO 3/23/2020 Active   levothyroxine (SYNTHROID) 175 MCG Tab 3/23/2020 Active           "      ALLERGIES   Allergies   Allergen Reactions   • Morphine      \"I think\" \"I dunno what my reaction was, the nurse just said my oxygen levels were going way to low on it\"        PHYSICAL EXAM  VITAL SIGNS: /89   Pulse 77   Temp 36.1 °C (97 °F) (Temporal)   Resp 16   Ht 1.6 m (5' 3\")   Wt (!) 132.9 kg (293 lb)   SpO2 97%   BMI 51.90 kg/m²   Constitutional: Patient is alert and oriented x3 in   distress   Cardiovascular: Heart rate rhythmic and regular  Thorax & Lungs: Clear to auscultation  Skin: Warm dry no rashes  Extremities: Chest tenderness diffusely about the knee.  There is no obvious swelling however she is morbidly obese I cannot rule it out.  Her knee is stable with valgus varus stressing as well as Lockman's testing.  Skin is warm and dry  Neurologic: Normal motor sensation  Vascular: Good capillary refill      RADIOLOGY/PROCEDURES  DX-KNEE 2- LEFT   Final Result      1.  Small knee effusion.      2.  Moderate osteoarthritis.            COURSE & MEDICAL DECISION MAKING  Pertinent Labs & Imaging studies reviewed. (See chart for details)  Patient's knee exam shows tenderness otherwise normal but she is having pain with ambulation x-rays being obtained    The x-ray does show moderate osteoarthritis small effusion.  I feel her presentation is most consistent with a cartilage injury.  I am placing her on partial weightbearing crutches Ace wrap and orthopedic referral she is encouraged to lose weight and give return precautions    FINAL IMPRESSION  1.   1. Injury of left knee, initial encounter         2.   3.      Electronically signed by: Riccardo Moore M.D., 3/23/2020 4:20 PM    "

## 2020-03-24 NOTE — ED NOTES
All education given and ace wrap applied.  D/c papers given pt verbalized understanding, d/c papers given pt verbalized understanding.  Resp even ul, skin warm and dry, no acute distress noted.  Pt taken to WC with crutches to ED exit, d/c to self.

## 2020-06-07 ENCOUNTER — APPOINTMENT (OUTPATIENT)
Dept: RADIOLOGY | Facility: MEDICAL CENTER | Age: 45
End: 2020-06-07
Attending: EMERGENCY MEDICINE
Payer: MEDICAID

## 2020-06-07 ENCOUNTER — HOSPITAL ENCOUNTER (OUTPATIENT)
Facility: MEDICAL CENTER | Age: 45
End: 2020-06-09
Attending: EMERGENCY MEDICINE | Admitting: HOSPITALIST
Payer: MEDICAID

## 2020-06-07 LAB
ALBUMIN SERPL BCP-MCNC: 3.7 G/DL (ref 3.2–4.9)
ALBUMIN/GLOB SERPL: 1 G/DL
ALP SERPL-CCNC: 114 U/L (ref 30–99)
ALT SERPL-CCNC: 23 U/L (ref 2–50)
ANION GAP SERPL CALC-SCNC: 13 MMOL/L (ref 7–16)
ANISOCYTOSIS BLD QL SMEAR: ABNORMAL
AST SERPL-CCNC: 15 U/L (ref 12–45)
BASOPHILS # BLD AUTO: 0.7 % (ref 0–1.8)
BASOPHILS # BLD: 0.08 K/UL (ref 0–0.12)
BILIRUB SERPL-MCNC: <0.2 MG/DL (ref 0.1–1.5)
BUN SERPL-MCNC: 13 MG/DL (ref 8–22)
CALCIUM SERPL-MCNC: 8.8 MG/DL (ref 8.5–10.5)
CHLORIDE SERPL-SCNC: 101 MMOL/L (ref 96–112)
CO2 SERPL-SCNC: 27 MMOL/L (ref 20–33)
COMMENT 1642: NORMAL
CREAT SERPL-MCNC: 0.69 MG/DL (ref 0.5–1.4)
EKG IMPRESSION: NORMAL
EOSINOPHIL # BLD AUTO: 0.26 K/UL (ref 0–0.51)
EOSINOPHIL NFR BLD: 2.4 % (ref 0–6.9)
ERYTHROCYTE [DISTWIDTH] IN BLOOD BY AUTOMATED COUNT: 44.6 FL (ref 35.9–50)
GLOBULIN SER CALC-MCNC: 3.6 G/DL (ref 1.9–3.5)
GLUCOSE SERPL-MCNC: 214 MG/DL (ref 65–99)
HCT VFR BLD AUTO: 39.2 % (ref 37–47)
HGB BLD-MCNC: 11.7 G/DL (ref 12–16)
IMM GRANULOCYTES # BLD AUTO: 0.1 K/UL (ref 0–0.11)
IMM GRANULOCYTES NFR BLD AUTO: 0.9 % (ref 0–0.9)
LG PLATELETS BLD QL SMEAR: NORMAL
LYMPHOCYTES # BLD AUTO: 2.84 K/UL (ref 1–4.8)
LYMPHOCYTES NFR BLD: 25.7 % (ref 22–41)
MCH RBC QN AUTO: 25.8 PG (ref 27–33)
MCHC RBC AUTO-ENTMCNC: 29.8 G/DL (ref 33.6–35)
MCV RBC AUTO: 86.3 FL (ref 81.4–97.8)
MICROCYTES BLD QL SMEAR: ABNORMAL
MONOCYTES # BLD AUTO: 0.77 K/UL (ref 0–0.85)
MONOCYTES NFR BLD AUTO: 7 % (ref 0–13.4)
MORPHOLOGY BLD-IMP: NORMAL
NEUTROPHILS # BLD AUTO: 6.99 K/UL (ref 2–7.15)
NEUTROPHILS NFR BLD: 63.3 % (ref 44–72)
NRBC # BLD AUTO: 0 K/UL
NRBC BLD-RTO: 0 /100 WBC
OVALOCYTES BLD QL SMEAR: NORMAL
PLATELET # BLD AUTO: 256 K/UL (ref 164–446)
PLATELET BLD QL SMEAR: NORMAL
PMV BLD AUTO: 11.5 FL (ref 9–12.9)
POIKILOCYTOSIS BLD QL SMEAR: NORMAL
POLYCHROMASIA BLD QL SMEAR: NORMAL
POTASSIUM SERPL-SCNC: 3.8 MMOL/L (ref 3.6–5.5)
PROT SERPL-MCNC: 7.3 G/DL (ref 6–8.2)
RBC # BLD AUTO: 4.54 M/UL (ref 4.2–5.4)
RBC BLD AUTO: PRESENT
SODIUM SERPL-SCNC: 141 MMOL/L (ref 135–145)
TROPONIN T SERPL-MCNC: <6 NG/L (ref 6–19)
WBC # BLD AUTO: 11 K/UL (ref 4.8–10.8)

## 2020-06-07 PROCEDURE — 93005 ELECTROCARDIOGRAM TRACING: CPT

## 2020-06-07 PROCEDURE — 70498 CT ANGIOGRAPHY NECK: CPT

## 2020-06-07 PROCEDURE — 700105 HCHG RX REV CODE 258: Performed by: EMERGENCY MEDICINE

## 2020-06-07 PROCEDURE — 84484 ASSAY OF TROPONIN QUANT: CPT

## 2020-06-07 PROCEDURE — 80053 COMPREHEN METABOLIC PANEL: CPT

## 2020-06-07 PROCEDURE — 85025 COMPLETE CBC W/AUTO DIFF WBC: CPT

## 2020-06-07 PROCEDURE — 70496 CT ANGIOGRAPHY HEAD: CPT

## 2020-06-07 PROCEDURE — 99220 PR INITIAL OBSERVATION CARE,LEVL III: CPT | Performed by: HOSPITALIST

## 2020-06-07 PROCEDURE — G0378 HOSPITAL OBSERVATION PER HR: HCPCS

## 2020-06-07 PROCEDURE — 93005 ELECTROCARDIOGRAM TRACING: CPT | Performed by: EMERGENCY MEDICINE

## 2020-06-07 PROCEDURE — A9270 NON-COVERED ITEM OR SERVICE: HCPCS | Performed by: EMERGENCY MEDICINE

## 2020-06-07 PROCEDURE — 36415 COLL VENOUS BLD VENIPUNCTURE: CPT

## 2020-06-07 PROCEDURE — 99285 EMERGENCY DEPT VISIT HI MDM: CPT

## 2020-06-07 PROCEDURE — 84443 ASSAY THYROID STIM HORMONE: CPT

## 2020-06-07 PROCEDURE — 700102 HCHG RX REV CODE 250 W/ 637 OVERRIDE(OP): Performed by: EMERGENCY MEDICINE

## 2020-06-07 RX ORDER — SODIUM CHLORIDE 9 MG/ML
1000 INJECTION, SOLUTION INTRAVENOUS ONCE
Status: COMPLETED | OUTPATIENT
Start: 2020-06-07 | End: 2020-06-08

## 2020-06-07 RX ORDER — LEVOTHYROXINE SODIUM 175 UG/1
175 TABLET ORAL
Status: DISCONTINUED | OUTPATIENT
Start: 2020-06-08 | End: 2020-06-08

## 2020-06-07 RX ORDER — DEXTROSE MONOHYDRATE 25 G/50ML
50 INJECTION, SOLUTION INTRAVENOUS
Status: DISCONTINUED | OUTPATIENT
Start: 2020-06-07 | End: 2020-06-08

## 2020-06-07 RX ORDER — LEVOTHYROXINE SODIUM 175 UG/1
TABLET ORAL
COMMUNITY
Start: 2018-08-27 | End: 2020-06-07

## 2020-06-07 RX ORDER — PROMETHAZINE HYDROCHLORIDE 25 MG/1
12.5-25 TABLET ORAL EVERY 4 HOURS PRN
Status: DISCONTINUED | OUTPATIENT
Start: 2020-06-07 | End: 2020-06-09 | Stop reason: HOSPADM

## 2020-06-07 RX ORDER — SODIUM CHLORIDE 9 MG/ML
INJECTION, SOLUTION INTRAVENOUS CONTINUOUS
Status: DISCONTINUED | OUTPATIENT
Start: 2020-06-08 | End: 2020-06-09 | Stop reason: HOSPADM

## 2020-06-07 RX ORDER — ONDANSETRON 4 MG/1
4 TABLET, ORALLY DISINTEGRATING ORAL EVERY 4 HOURS PRN
Status: DISCONTINUED | OUTPATIENT
Start: 2020-06-07 | End: 2020-06-09 | Stop reason: HOSPADM

## 2020-06-07 RX ORDER — PROMETHAZINE HYDROCHLORIDE 25 MG/1
12.5-25 SUPPOSITORY RECTAL EVERY 4 HOURS PRN
Status: DISCONTINUED | OUTPATIENT
Start: 2020-06-07 | End: 2020-06-09 | Stop reason: HOSPADM

## 2020-06-07 RX ORDER — PROCHLORPERAZINE EDISYLATE 5 MG/ML
5-10 INJECTION INTRAMUSCULAR; INTRAVENOUS EVERY 4 HOURS PRN
Status: DISCONTINUED | OUTPATIENT
Start: 2020-06-07 | End: 2020-06-09 | Stop reason: HOSPADM

## 2020-06-07 RX ORDER — AMOXICILLIN 250 MG
2 CAPSULE ORAL 2 TIMES DAILY
Status: DISCONTINUED | OUTPATIENT
Start: 2020-06-08 | End: 2020-06-09 | Stop reason: HOSPADM

## 2020-06-07 RX ORDER — POLYETHYLENE GLYCOL 3350 17 G/17G
1 POWDER, FOR SOLUTION ORAL
Status: DISCONTINUED | OUTPATIENT
Start: 2020-06-07 | End: 2020-06-09 | Stop reason: HOSPADM

## 2020-06-07 RX ORDER — ONDANSETRON 2 MG/ML
4 INJECTION INTRAMUSCULAR; INTRAVENOUS EVERY 4 HOURS PRN
Status: DISCONTINUED | OUTPATIENT
Start: 2020-06-07 | End: 2020-06-09 | Stop reason: HOSPADM

## 2020-06-07 RX ORDER — GABAPENTIN 300 MG/1
300 CAPSULE ORAL 3 TIMES DAILY
COMMUNITY
Start: 2019-07-01 | End: 2020-07-05

## 2020-06-07 RX ORDER — GABAPENTIN 300 MG/1
300 CAPSULE ORAL 3 TIMES DAILY
Status: DISCONTINUED | OUTPATIENT
Start: 2020-06-08 | End: 2020-06-09 | Stop reason: HOSPADM

## 2020-06-07 RX ORDER — BISACODYL 10 MG
10 SUPPOSITORY, RECTAL RECTAL
Status: DISCONTINUED | OUTPATIENT
Start: 2020-06-07 | End: 2020-06-09 | Stop reason: HOSPADM

## 2020-06-07 RX ORDER — MECLIZINE HYDROCHLORIDE 25 MG/1
25 TABLET ORAL ONCE
Status: COMPLETED | OUTPATIENT
Start: 2020-06-07 | End: 2020-06-07

## 2020-06-07 RX ADMIN — MECLIZINE HYDROCHLORIDE 25 MG: 25 TABLET ORAL at 23:12

## 2020-06-07 RX ADMIN — SODIUM CHLORIDE 1000 ML: 9 INJECTION, SOLUTION INTRAVENOUS at 22:11

## 2020-06-07 ASSESSMENT — FIBROSIS 4 INDEX: FIB4 SCORE: 0.68

## 2020-06-08 ENCOUNTER — APPOINTMENT (OUTPATIENT)
Dept: RADIOLOGY | Facility: MEDICAL CENTER | Age: 45
End: 2020-06-08
Attending: HOSPITALIST
Payer: MEDICAID

## 2020-06-08 PROBLEM — E11.9 DM (DIABETES MELLITUS) (HCC): Status: ACTIVE | Noted: 2020-06-08

## 2020-06-08 PROBLEM — D64.9 ANEMIA: Status: ACTIVE | Noted: 2020-06-08

## 2020-06-08 LAB
ALBUMIN SERPL BCP-MCNC: 3.4 G/DL (ref 3.2–4.9)
ALBUMIN/GLOB SERPL: 1 G/DL
ALP SERPL-CCNC: 100 U/L (ref 30–99)
ALT SERPL-CCNC: 22 U/L (ref 2–50)
ANION GAP SERPL CALC-SCNC: 11 MMOL/L (ref 7–16)
AST SERPL-CCNC: 16 U/L (ref 12–45)
BASOPHILS # BLD AUTO: 0.7 % (ref 0–1.8)
BASOPHILS # BLD: 0.08 K/UL (ref 0–0.12)
BILIRUB SERPL-MCNC: 0.2 MG/DL (ref 0.1–1.5)
BUN SERPL-MCNC: 12 MG/DL (ref 8–22)
CALCIUM SERPL-MCNC: 8.5 MG/DL (ref 8.5–10.5)
CHLORIDE SERPL-SCNC: 102 MMOL/L (ref 96–112)
CO2 SERPL-SCNC: 25 MMOL/L (ref 20–33)
CREAT SERPL-MCNC: 0.59 MG/DL (ref 0.5–1.4)
EOSINOPHIL # BLD AUTO: 0.25 K/UL (ref 0–0.51)
EOSINOPHIL NFR BLD: 2.2 % (ref 0–6.9)
ERYTHROCYTE [DISTWIDTH] IN BLOOD BY AUTOMATED COUNT: 45.2 FL (ref 35.9–50)
GLOBULIN SER CALC-MCNC: 3.4 G/DL (ref 1.9–3.5)
GLUCOSE BLD-MCNC: 139 MG/DL (ref 65–99)
GLUCOSE BLD-MCNC: 146 MG/DL (ref 65–99)
GLUCOSE BLD-MCNC: 146 MG/DL (ref 65–99)
GLUCOSE BLD-MCNC: 148 MG/DL (ref 65–99)
GLUCOSE BLD-MCNC: 187 MG/DL (ref 65–99)
GLUCOSE SERPL-MCNC: 180 MG/DL (ref 65–99)
HCT VFR BLD AUTO: 37.2 % (ref 37–47)
HGB BLD-MCNC: 11.3 G/DL (ref 12–16)
IMM GRANULOCYTES # BLD AUTO: 0.08 K/UL (ref 0–0.11)
IMM GRANULOCYTES NFR BLD AUTO: 0.7 % (ref 0–0.9)
LYMPHOCYTES # BLD AUTO: 2.99 K/UL (ref 1–4.8)
LYMPHOCYTES NFR BLD: 26 % (ref 22–41)
MCH RBC QN AUTO: 26.1 PG (ref 27–33)
MCHC RBC AUTO-ENTMCNC: 30.4 G/DL (ref 33.6–35)
MCV RBC AUTO: 85.9 FL (ref 81.4–97.8)
MONOCYTES # BLD AUTO: 0.75 K/UL (ref 0–0.85)
MONOCYTES NFR BLD AUTO: 6.5 % (ref 0–13.4)
NEUTROPHILS # BLD AUTO: 7.37 K/UL (ref 2–7.15)
NEUTROPHILS NFR BLD: 63.9 % (ref 44–72)
NRBC # BLD AUTO: 0 K/UL
NRBC BLD-RTO: 0 /100 WBC
PLATELET # BLD AUTO: 248 K/UL (ref 164–446)
PMV BLD AUTO: 11.2 FL (ref 9–12.9)
POTASSIUM SERPL-SCNC: 4.1 MMOL/L (ref 3.6–5.5)
PROT SERPL-MCNC: 6.8 G/DL (ref 6–8.2)
RBC # BLD AUTO: 4.33 M/UL (ref 4.2–5.4)
SODIUM SERPL-SCNC: 138 MMOL/L (ref 135–145)
TSH SERPL DL<=0.005 MIU/L-ACNC: 14.7 UIU/ML (ref 0.38–5.33)
WBC # BLD AUTO: 11.5 K/UL (ref 4.8–10.8)

## 2020-06-08 PROCEDURE — 99225 PR SUBSEQUENT OBSERVATION CARE,LEVEL II: CPT | Performed by: INTERNAL MEDICINE

## 2020-06-08 PROCEDURE — A9270 NON-COVERED ITEM OR SERVICE: HCPCS | Performed by: INTERNAL MEDICINE

## 2020-06-08 PROCEDURE — 82962 GLUCOSE BLOOD TEST: CPT

## 2020-06-08 PROCEDURE — 99245 OFF/OP CONSLTJ NEW/EST HI 55: CPT | Performed by: PSYCHIATRY & NEUROLOGY

## 2020-06-08 PROCEDURE — G0378 HOSPITAL OBSERVATION PER HR: HCPCS

## 2020-06-08 PROCEDURE — 700117 HCHG RX CONTRAST REV CODE 255: Performed by: EMERGENCY MEDICINE

## 2020-06-08 PROCEDURE — 700105 HCHG RX REV CODE 258: Performed by: HOSPITALIST

## 2020-06-08 PROCEDURE — 70551 MRI BRAIN STEM W/O DYE: CPT

## 2020-06-08 PROCEDURE — 85025 COMPLETE CBC W/AUTO DIFF WBC: CPT

## 2020-06-08 PROCEDURE — 80053 COMPREHEN METABOLIC PANEL: CPT

## 2020-06-08 PROCEDURE — 36415 COLL VENOUS BLD VENIPUNCTURE: CPT

## 2020-06-08 PROCEDURE — A9270 NON-COVERED ITEM OR SERVICE: HCPCS | Performed by: HOSPITALIST

## 2020-06-08 PROCEDURE — 700102 HCHG RX REV CODE 250 W/ 637 OVERRIDE(OP): Performed by: HOSPITALIST

## 2020-06-08 PROCEDURE — 700102 HCHG RX REV CODE 250 W/ 637 OVERRIDE(OP): Performed by: INTERNAL MEDICINE

## 2020-06-08 PROCEDURE — 94660 CPAP INITIATION&MGMT: CPT

## 2020-06-08 RX ORDER — MECLIZINE HYDROCHLORIDE 25 MG/1
25 TABLET ORAL 3 TIMES DAILY PRN
Status: DISCONTINUED | OUTPATIENT
Start: 2020-06-08 | End: 2020-06-09 | Stop reason: HOSPADM

## 2020-06-08 RX ORDER — ACETAMINOPHEN 325 MG/1
650 TABLET ORAL EVERY 6 HOURS PRN
Status: DISCONTINUED | OUTPATIENT
Start: 2020-06-08 | End: 2020-06-09 | Stop reason: HOSPADM

## 2020-06-08 RX ORDER — DEXTROSE MONOHYDRATE 25 G/50ML
50 INJECTION, SOLUTION INTRAVENOUS
Status: DISCONTINUED | OUTPATIENT
Start: 2020-06-08 | End: 2020-06-09 | Stop reason: HOSPADM

## 2020-06-08 RX ORDER — LEVOTHYROXINE SODIUM 0.2 MG/1
200 TABLET ORAL
Status: DISCONTINUED | OUTPATIENT
Start: 2020-06-08 | End: 2020-06-09 | Stop reason: HOSPADM

## 2020-06-08 RX ADMIN — LEVOTHYROXINE SODIUM 200 MCG: 200 TABLET ORAL at 06:42

## 2020-06-08 RX ADMIN — GABAPENTIN 300 MG: 300 CAPSULE ORAL at 16:55

## 2020-06-08 RX ADMIN — GABAPENTIN 300 MG: 300 CAPSULE ORAL at 06:42

## 2020-06-08 RX ADMIN — SODIUM CHLORIDE: 9 INJECTION, SOLUTION INTRAVENOUS at 01:06

## 2020-06-08 RX ADMIN — DOCUSATE SODIUM 50 MG AND SENNOSIDES 8.6 MG 2 TABLET: 8.6; 5 TABLET, FILM COATED ORAL at 06:42

## 2020-06-08 RX ADMIN — ACETAMINOPHEN 650 MG: 325 TABLET, FILM COATED ORAL at 20:50

## 2020-06-08 RX ADMIN — IOHEXOL 100 ML: 350 INJECTION, SOLUTION INTRAVENOUS at 00:15

## 2020-06-08 RX ADMIN — MECLIZINE HYDROCHLORIDE 25 MG: 25 TABLET ORAL at 08:51

## 2020-06-08 RX ADMIN — GABAPENTIN 300 MG: 300 CAPSULE ORAL at 12:02

## 2020-06-08 RX ADMIN — MECLIZINE HYDROCHLORIDE 25 MG: 25 TABLET ORAL at 15:19

## 2020-06-08 SDOH — ECONOMIC STABILITY: FOOD INSECURITY: WITHIN THE PAST 12 MONTHS, YOU WORRIED THAT YOUR FOOD WOULD RUN OUT BEFORE YOU GOT MONEY TO BUY MORE.: NEVER TRUE

## 2020-06-08 SDOH — ECONOMIC STABILITY: TRANSPORTATION INSECURITY
IN THE PAST 12 MONTHS, HAS LACK OF TRANSPORTATION KEPT YOU FROM MEETINGS, WORK, OR FROM GETTING THINGS NEEDED FOR DAILY LIVING?: NO

## 2020-06-08 SDOH — ECONOMIC STABILITY: FOOD INSECURITY: WITHIN THE PAST 12 MONTHS, THE FOOD YOU BOUGHT JUST DIDN'T LAST AND YOU DIDN'T HAVE MONEY TO GET MORE.: NEVER TRUE

## 2020-06-08 SDOH — ECONOMIC STABILITY: TRANSPORTATION INSECURITY
IN THE PAST 12 MONTHS, HAS THE LACK OF TRANSPORTATION KEPT YOU FROM MEDICAL APPOINTMENTS OR FROM GETTING MEDICATIONS?: NO

## 2020-06-08 ASSESSMENT — ENCOUNTER SYMPTOMS
CHILLS: 0
HEARTBURN: 0
VOMITING: 0
COUGH: 0
HEMOPTYSIS: 0
BLURRED VISION: 0
FOCAL WEAKNESS: 0
PALPITATIONS: 0
EYE DISCHARGE: 0
SHORTNESS OF BREATH: 0
NERVOUS/ANXIOUS: 1
FEVER: 0
NAUSEA: 0
DIZZINESS: 1
SENSORY CHANGE: 1
SPEECH CHANGE: 0
WEAKNESS: 0
SENSORY CHANGE: 0
HALLUCINATIONS: 0
FALLS: 0
MYALGIAS: 0
ABDOMINAL PAIN: 0
HEADACHES: 1
SORE THROAT: 0
BRUISES/BLEEDS EASILY: 0
DEPRESSION: 1
DOUBLE VISION: 0
FLANK PAIN: 0
DEPRESSION: 0

## 2020-06-08 ASSESSMENT — LIFESTYLE VARIABLES
ON A TYPICAL DAY WHEN YOU DRINK ALCOHOL HOW MANY DRINKS DO YOU HAVE: 0
TOTAL SCORE: 0
TOTAL SCORE: 0
EVER FELT BAD OR GUILTY ABOUT YOUR DRINKING: NO
HOW MANY TIMES IN THE PAST YEAR HAVE YOU HAD 5 OR MORE DRINKS IN A DAY: 0
TOTAL SCORE: 0
HAVE PEOPLE ANNOYED YOU BY CRITICIZING YOUR DRINKING: NO
AVERAGE NUMBER OF DAYS PER WEEK YOU HAVE A DRINK CONTAINING ALCOHOL: 0
ALCOHOL_USE: NO
TOTAL SCORE: 0
CONSUMPTION TOTAL: INCOMPLETE
SUBSTANCE_ABUSE: 0
HAVE YOU EVER FELT YOU SHOULD CUT DOWN ON YOUR DRINKING: NO
DOES PATIENT WANT TO STOP DRINKING: NO
ALCOHOL_USE: NO
EVER HAD A DRINK FIRST THING IN THE MORNING TO STEADY YOUR NERVES TO GET RID OF A HANGOVER: NO
TOTAL SCORE: 0
HAVE PEOPLE ANNOYED YOU BY CRITICIZING YOUR DRINKING: NO
HAVE YOU EVER FELT YOU SHOULD CUT DOWN ON YOUR DRINKING: NO
TOTAL SCORE: 0
EVER_SMOKED: NEVER
EVER HAD A DRINK FIRST THING IN THE MORNING TO STEADY YOUR NERVES TO GET RID OF A HANGOVER: NO
EVER FELT BAD OR GUILTY ABOUT YOUR DRINKING: NO
CONSUMPTION TOTAL: NEGATIVE

## 2020-06-08 ASSESSMENT — PATIENT HEALTH QUESTIONNAIRE - PHQ9
2. FEELING DOWN, DEPRESSED, IRRITABLE, OR HOPELESS: NOT AT ALL
1. LITTLE INTEREST OR PLEASURE IN DOING THINGS: NOT AT ALL
SUM OF ALL RESPONSES TO PHQ9 QUESTIONS 1 AND 2: 0

## 2020-06-08 ASSESSMENT — COGNITIVE AND FUNCTIONAL STATUS - GENERAL
TURNING FROM BACK TO SIDE WHILE IN FLAT BAD: A LITTLE
SUGGESTED CMS G CODE MODIFIER DAILY ACTIVITY: CI
TOILETING: A LITTLE
DAILY ACTIVITIY SCORE: 23
MOVING FROM LYING ON BACK TO SITTING ON SIDE OF FLAT BED: A LITTLE
SUGGESTED CMS G CODE MODIFIER MOBILITY: CJ
CLIMB 3 TO 5 STEPS WITH RAILING: A LITTLE
MOBILITY SCORE: 21

## 2020-06-08 ASSESSMENT — COPD QUESTIONNAIRES
HAVE YOU SMOKED AT LEAST 100 CIGARETTES IN YOUR ENTIRE LIFE: NO/DON'T KNOW
DURING THE PAST 4 WEEKS HOW MUCH DID YOU FEEL SHORT OF BREATH: NONE/LITTLE OF THE TIME
DO YOU EVER COUGH UP ANY MUCUS OR PHLEGM?: NO/ONLY WITH OCCASIONAL COLDS OR INFECTIONS
COPD SCREENING SCORE: 0
IN THE PAST 12 MONTHS DO YOU DO LESS THAN YOU USED TO BECAUSE OF YOUR BREATHING PROBLEMS: DISAGREE/UNSURE

## 2020-06-08 NOTE — H&P
Hospital Medicine History & Physical Note    Date of Service  6/7/2020    Primary Care Physician  Mohsen Tamasaby, M.D.    Code Status  Full Code    Chief Complaint  Chief Complaint   Patient presents with   • Dizziness     Onset 30 minutes ago        History of Presenting Illness  44 y.o. female who presented 6/7/2020 with past medical history of diabetes, hypothyroidism, hypertension, morbid obesity who presents with acute onset dizziness.  This patient was helping her daughter with a heavy object subsequently started developing severe dizziness.  She also had significant ataxia mild headache.  Felt like she was going to pass out.  This was present 30 minutes prior to arrival.  Otherwise the patient denies any palpitations.  No numbness no tingling.  No focal weakness.  No change in speech.  She does feel like the room is spinning.  Otherwise no known alleviating or exacerbating factors to her symptoms will be managed hospital for further management.    Review of Systems  Review of Systems   Constitutional: Positive for malaise/fatigue. Negative for chills and fever.   HENT: Negative for congestion, hearing loss and tinnitus.    Eyes: Negative for blurred vision, double vision and discharge.   Respiratory: Negative for cough, hemoptysis and shortness of breath.    Cardiovascular: Negative for chest pain, palpitations and leg swelling.   Gastrointestinal: Negative for abdominal pain, heartburn, nausea and vomiting.   Genitourinary: Negative for dysuria and flank pain.   Musculoskeletal: Negative for joint pain and myalgias.   Skin: Negative for rash.   Neurological: Positive for dizziness and headaches. Negative for sensory change, speech change, focal weakness and weakness.   Endo/Heme/Allergies: Negative for environmental allergies. Does not bruise/bleed easily.   Psychiatric/Behavioral: Negative for depression, hallucinations and substance abuse.       Past Medical History   has a past medical history of Anxiety  "and depression (2016), Arrhythmia, Diabetes (2008), H/O Hypertension - resolved, Hx of Bronchitis, Hyperthyroidism (2008), Hypothyroidism, after radioactive ablation of thyroid (2008), POLLY on CPAP (2018), and Psychiatric problem - unspecified.    Surgical History   has a past surgical history that includes other abdominal surgery (2002); gyn surgery (2004); other; primary c section; ovarian cystectomy (1992); cholecystectomy; and primary c section.     Family History  family history includes Diabetes in her daughter and sister; Heart Attack in her maternal grandfather; Heart Disease in her sister; Heart Failure in her sister; Hyperlipidemia in her sister; Hypertension in her sister; Other in her mother; Psychiatric Illness in her daughter and son; Stroke in her sister.     Social History   reports that she has never smoked. She has never used smokeless tobacco. She reports that she does not drink alcohol or use drugs.    Allergies  Allergies   Allergen Reactions   • Morphine      \"I think\" \"I dunno what my reaction was, the nurse just said my oxygen levels were going way to low on it\"        Medications  Prior to Admission Medications   Prescriptions Last Dose Informant Patient Reported? Taking?   IRON PO   Yes No   Sig: Take  by mouth.   albuterol 108 (90 Base) MCG/ACT Aero Soln inhalation aerosol   No No   Sig: Inhale 2 Puffs by mouth every 6 hours as needed for Shortness of Breath.   gabapentin (NEURONTIN) 300 MG Cap   No No   Sig: Take 1 Cap by mouth 3 times a day.   Patient taking differently: Take 300 mg by mouth every day.   glimepiride (AMARYL) 2 MG Tab   Yes No   Sig: Take 2 mg by mouth every morning.   ibuprofen (MOTRIN) 600 MG Tab   No No   Sig: Take 1 Tab by mouth every 8 hours as needed for Moderate Pain or Inflammation.   levothyroxine (SYNTHROID) 175 MCG Tab   No No   Sig: Take 1 Tab by mouth every morning before breakfast. Take a half hour before breakfast.      Facility-Administered Medications: " None       Physical Exam  Temp:  [36.3 °C (97.3 °F)] 36.3 °C (97.3 °F)  Pulse:  [77-89] 89  Resp:  [14-20] 14  BP: (119-155)/(69-76) 149/76  SpO2:  [95 %-97 %] 97 %    Physical Exam  Vitals signs reviewed.   Constitutional:       Appearance: Normal appearance. She is ill-appearing.   HENT:      Head: Normocephalic and atraumatic.      Nose: No congestion.      Mouth/Throat:      Mouth: Mucous membranes are dry.   Eyes:      Extraocular Movements: Extraocular movements intact.      Pupils: Pupils are equal, round, and reactive to light.   Neck:      Musculoskeletal: Normal range of motion and neck supple. No muscular tenderness.   Cardiovascular:      Rate and Rhythm: Normal rate and regular rhythm.      Pulses: Normal pulses.      Heart sounds: Normal heart sounds. No murmur.   Pulmonary:      Effort: Pulmonary effort is normal. No respiratory distress.      Breath sounds: Normal breath sounds. No stridor.   Abdominal:      General: Bowel sounds are normal. There is no distension.      Palpations: Abdomen is soft.      Tenderness: There is no abdominal tenderness.   Musculoskeletal:         General: No swelling or deformity.   Skin:     General: Skin is warm and dry.      Capillary Refill: Capillary refill takes 2 to 3 seconds.   Neurological:      General: No focal deficit present.      Mental Status: She is alert and oriented to person, place, and time.   Psychiatric:         Mood and Affect: Mood normal.         Behavior: Behavior normal.         Thought Content: Thought content normal.         Judgment: Judgment normal.         Laboratory:  Recent Labs     06/07/20 2145   WBC 11.0*   RBC 4.54   HEMOGLOBIN 11.7*   HEMATOCRIT 39.2   MCV 86.3   MCH 25.8*   MCHC 29.8*   RDW 44.6   PLATELETCT 256   MPV 11.5     Recent Labs     06/07/20  2145   SODIUM 141   POTASSIUM 3.8   CHLORIDE 101   CO2 27   GLUCOSE 214*   BUN 13   CREATININE 0.69   CALCIUM 8.8     Recent Labs     06/07/20 2145   ALTSGPT 23   ASTSGOT 15    ALKPHOSPHAT 114*   TBILIRUBIN <0.2   GLUCOSE 214*         No results for input(s): NTPROBNP in the last 72 hours.      Recent Labs     06/07/20  2145   TROPONINT <6       Imaging:  CT-CTA HEAD WITH & W/O-POST PROCESS    (Results Pending)   CT-CTA NECK WITH & W/O-POST PROCESSING    (Results Pending)         Assessment/Plan:  Anticipate less than 2 midnight hospital stay     * Dizzinesses- (present on admission)  Assessment & Plan  Neuro consulted, given ataxia associated with dizziness   CTA head and neck ordered and pending   MRI brain w/out if unremarkable CT's   IVF, anti emetics, pt/ot evaluation   Trial meclizine     Anemia  Assessment & Plan  Mild, no evidence of bleeding   Cont to trend    DM (diabetes mellitus) ([facility])  Assessment & Plan  SSI Ordered   accu checks     POLLY on CPAP- (present on admission)  Assessment & Plan  Resume home CPAP     Morbid obesity with BMI of 45.0-49.9, adult ([facility])- (present on admission)  Assessment & Plan  Encourage weight loss    Hypothyroidism- (present on admission)  Assessment & Plan  TSH mildly elevated   Will increase home levothyroxine to 200 mcg daily   Recheck TSH In 4 weeks    Ppx: SCD

## 2020-06-08 NOTE — ED TRIAGE NOTES
"Chief Complaint   Patient presents with   • Dizziness     Onset 30 minutes ago      Patient BIB EMS. Patient complains of feeling dizzy and \"unbalanced\" that is worsened standing. Per EMS negative stroke scale and negative 12 lead. EMS blood sugar 146. EMS gave 500 ml NS and 4mg Zofran  "

## 2020-06-08 NOTE — CARE PLAN
Problem: Safety  Goal: Will remain free from falls  Outcome: PROGRESSING AS EXPECTED   Patient remains free from falls. Bed in the lowest position, call light within reach, non-slip footwear used when out of bed, hourly rounding completed.   Problem: Knowledge Deficit  Goal: Knowledge of the prescribed therapeutic regimen will improve  Outcome: PROGRESSING AS EXPECTED   Stroke education provided. Patient educated on plan of care.   Problem: Pain Management  Goal: Pain level will decrease to patient's comfort goal  Outcome: PROGRESSING AS EXPECTED

## 2020-06-08 NOTE — ASSESSMENT & PLAN NOTE
Neuro consulted, given ataxia associated with dizziness   CTA head and neck normal  MRI normal  IVF  meclizine  PT/OT

## 2020-06-08 NOTE — DIETARY
NUTRITION SERVICES: BMI - Pt with BMI >40 (=Body mass index is 51.49 kg/m².), morbid obesity. Weight loss counseling not appropriate in acute care setting. RECOMMEND - Referral to outpatient nutrition services for weight management after D/C.

## 2020-06-08 NOTE — CONSULTS
"Hospital Neurology Consult:    Referring Physician: Jose Ashton M.D.    Reason for consultation: Possible stroke    HPI: Chanelle Ortiz is a 44 y.o. female with history of anxiety, depression, DM, arrhythmia presenting to the hospital for dizziness and consulted for possible stroke. Patient started feeling \"dizzy\" around 9pm. She states her heart was racing and she was feeling lightheaded. She also had some \"numbness over her forehead\". She states she \"feels like she's on a boat\" and some difficulty with gait. She came to the ED for further evaluation and neurology consulted for concern of stroke. She denies any other numbness, weakness, double vision, loss of vision.    ROS:     As above. All other systems reviewed and are negative.    Past Medical History:    has a past medical history of Anxiety and depression (2016), Arrhythmia, Diabetes (2008), H/O Hypertension - resolved, Hx of Bronchitis, Hyperthyroidism (2008), Hypothyroidism, after radioactive ablation of thyroid (2008), POLLY on CPAP (2018), and Psychiatric problem - unspecified.    FHx:  family history includes Diabetes in her daughter and sister; Heart Attack in her maternal grandfather; Heart Disease in her sister; Heart Failure in her sister; Hyperlipidemia in her sister; Hypertension in her sister; Other in her mother; Psychiatric Illness in her daughter and son; Stroke in her sister.    SHx:   reports that she has never smoked. She has never used smokeless tobacco. She reports that she does not drink alcohol or use drugs.    Allergies:  Allergies   Allergen Reactions   • Morphine      \"I think\" \"I dunno what my reaction was, the nurse just said my oxygen levels were going way to low on it\"        Medications:    Current Facility-Administered Medications:   •  gabapentin (NEURONTIN) capsule 300 mg, 300 mg, Oral, TID, Toyin Marks M.D.  •  levothyroxine (SYNTHROID) tablet 175 mcg, 175 mcg, Oral, QAM Toyin M.D.  •  " senna-docusate (PERICOLACE or SENOKOT S) 8.6-50 MG per tablet 2 Tab, 2 Tab, Oral, BID **AND** polyethylene glycol/lytes (MIRALAX) PACKET 1 Packet, 1 Packet, Oral, QDAY PRN **AND** magnesium hydroxide (MILK OF MAGNESIA) suspension 30 mL, 30 mL, Oral, QDAY PRN **AND** bisacodyl (DULCOLAX) suppository 10 mg, 10 mg, Rectal, QDAY PRN, Toyin Marks M.D.  •  NS infusion, , Intravenous, Continuous, Toyin Marks M.D.  •  ondansetron (ZOFRAN) syringe/vial injection 4 mg, 4 mg, Intravenous, Q4HRS PRN, Toyin Marks M.D.  •  ondansetron (ZOFRAN ODT) dispertab 4 mg, 4 mg, Oral, Q4HRS PRN, Toyin Marks M.D.  •  promethazine (PHENERGAN) tablet 12.5-25 mg, 12.5-25 mg, Oral, Q4HRS PRN, Toyin Marks M.D.  •  promethazine (PHENERGAN) suppository 12.5-25 mg, 12.5-25 mg, Rectal, Q4HRS PRN, Toyin Marks M.D.  •  prochlorperazine (COMPAZINE) injection 5-10 mg, 5-10 mg, Intravenous, Q4HRS PRN, Toyin Marks M.D.  •  insulin regular (HUMULIN R) injection 1-6 Units, 1-6 Units, Subcutaneous, Q6HRS **AND** POC Blood Glucose, , , Q6H **AND** NOTIFY MD and PharmD, , , Once **AND** glucose 4 g chewable tablet 16 g, 16 g, Oral, Q15 MIN PRN **AND** dextrose 50% (D50W) injection 50 mL, 50 mL, Intravenous, Q15 MIN PRN, Toyin Marks M.D.  •  [COMPLETED] iohexol (OMNIPAQUE) 350 mg/mL, 100 mL, Intravenous, Once, Jose Ashton M.D., 100 mL at 06/08/20 0015    Current Outpatient Medications:   •  gabapentin (NEURONTIN) 300 MG Cap, Take 300 mg by mouth 3 times a day., Disp: , Rfl:   •  levothyroxine (SYNTHROID) 175 MCG Tab, Take 1 Tab by mouth every morning before breakfast. Take a half hour before breakfast., Disp: 30 Tab, Rfl: 5    Vitals:   Vitals:    06/07/20 2143 06/07/20 2200 06/07/20 2230 06/07/20 2308   BP:  126/69 119/72 149/76   Pulse: 84 81 77 89   Resp: 16 20 14    Temp:       TempSrc:       SpO2: 97% 95% 95% 97%   Weight:       Height:           Labs:  Lab Results   Component Value Date/Time     PROTHROMBTM 13.3 07/01/2019 08:55 AM    INR 0.99 07/01/2019 08:55 AM      Lab Results   Component Value Date/Time    WBC 11.0 (H) 06/07/2020 09:45 PM    RBC 4.54 06/07/2020 09:45 PM    HEMOGLOBIN 11.7 (L) 06/07/2020 09:45 PM    HEMATOCRIT 39.2 06/07/2020 09:45 PM    MCV 86.3 06/07/2020 09:45 PM    MCH 25.8 (L) 06/07/2020 09:45 PM    MCHC 29.8 (L) 06/07/2020 09:45 PM    MPV 11.5 06/07/2020 09:45 PM    NEUTSPOLYS 63.30 06/07/2020 09:45 PM    LYMPHOCYTES 25.70 06/07/2020 09:45 PM    MONOCYTES 7.00 06/07/2020 09:45 PM    EOSINOPHILS 2.40 06/07/2020 09:45 PM    BASOPHILS 0.70 06/07/2020 09:45 PM    HYPOCHROMIA 1+ 02/14/2011 12:15 AM    ANISOCYTOSIS 1+ 06/07/2020 09:45 PM      Lab Results   Component Value Date/Time    SODIUM 141 06/07/2020 09:45 PM    POTASSIUM 3.8 06/07/2020 09:45 PM    CHLORIDE 101 06/07/2020 09:45 PM    CO2 27 06/07/2020 09:45 PM    GLUCOSE 214 (H) 06/07/2020 09:45 PM    BUN 13 06/07/2020 09:45 PM    CREATININE 0.69 06/07/2020 09:45 PM    CREATININE 0.7 01/26/2009 05:15 PM    BUNCREATRAT 18 07/12/2019 07:34 AM      Lab Results   Component Value Date/Time    CHOLSTRLTOT 94 (L) 07/12/2019 07:34 AM    CHOLSTRLTOT 93 (L) 09/14/2009 01:05 AM    LDL 40 07/12/2019 07:34 AM    LDL 49 09/14/2009 01:05 AM    HDL 34 (L) 07/12/2019 07:34 AM    HDL 22 (L) 09/14/2009 01:05 AM    TRIGLYCERIDE 99 07/12/2019 07:34 AM    TRIGLYCERIDE 111 09/14/2009 01:05 AM       Lab Results   Component Value Date/Time    ALKPHOSPHAT 114 (H) 06/07/2020 09:45 PM    ASTSGOT 15 06/07/2020 09:45 PM    ALTSGPT 23 06/07/2020 09:45 PM    TBILIRUBIN <0.2 06/07/2020 09:45 PM      Lab Results   Component Value Date/Time    TSH 1.490 07/12/2019 07:34 AM    TSH 1.970 03/12/2019 08:42 AM    TSH 0.868 08/23/2018 07:14 AM     Lab Results   Component Value Date/Time    FREET4 0.68 06/30/2018 01:16 AM    FREET4 1.04 08/15/2017 12:33 AM     Imaging/Testing:  CT Head W/O CST personally reviewed w/o evidence of acute infarction/hemorrhage.     CTA  head/neck reviewed in chart.     Physical Exam:     General: 43 y/o female in bed in NAD  Cardio: Normal S1/S2. No peripheral edema.   Pulm: CTAX2. No respiratory distress.   Skin: Warm, dry, no rashes or lesions   Psychiatric: Appropriate affect. No active psychosis.  HEENT: Atraumatic head, normal sclera and conjunctiva, moist oral mucosa. No lid lag.  Abdomen: Soft, non tender. No masses or hepatosplenomegaly.    Neurologic:  Mental Status:  AAOx4. Able to follow commands/cross midline. Speech fluent/nondysarthric. Language functions intact. No neglect/apraxia.  Cranial Nerves:  PERRL. EOMi. Face symmetric, palate/tongue midline. Visual fields full to confrontation. Facial sensation intact. No nystagmus, skew, and normal head impulse test.  Motor:  Normal muscle tone and bulk. Strength is 5/5 throughout. No abnormal movements.  Reflexes: Deferred  Coordination: Finger-nose w/o ataxia.   Sensation: Normal to light touch throughout  Gait/Station: Patient adopts a slightly wider based gait. She has her arms abducted slightly for balance around 30 degrees. When asked to adopt a normal posture she continues to walk without major difficulty.     Assessment/Plan:    Chanelle Ortiz is a 44 y.o. female with history of anxiety, depression, DM, arrhythmia presenting to the hospital for dizziness and consulted for possible stroke. At this time symptoms are not highly suggestive of ischemia. The patient has a normal HINTS test (head impulse, nystagmus, test of skew) and she does not exhibit upper/lower extremity ataxia. While she does sway with a Romberg test she does not lose her balance. Given her multiple vascular risk factors however it would be worthwhile to exclude ischemia with an MRI. If negative, then her symptoms are peripheral in origin.     Plan:  -MR Brain W/O CST.   -If positive for ischemia contact neuro for further recommendations. If normal then recommend symptomatic management and neurology signs off.    -Plan discussed with consulting physician and patient's nurse.       Gordy Gallego M.D., Diplomat of the American Board of Psychiatry and Neurology  Diplomat of [facility] Epilepsy [facility]specialty   Assistant Clinical Professor, [facility] Neurology Consultant

## 2020-06-08 NOTE — DISCHARGE PLANNING
Patient is eligible for Meds to Beds at discharge. This service is provided through [facility] ([facility]) if orders are received prior to 4 p.m. Monday through Friday, excluding holidays. Please call [phone] prior to discharge.

## 2020-06-08 NOTE — CARE PLAN
Problem: Safety  Goal: Will remain free from falls  Note: Pt at risk for falls due to stroke. Bed locked and in lowest position. Call light and personal belongings in reach. Bed alarm in place. Hourly rounding.       Problem: Venous Thromboembolism (VTW)/Deep Vein Thrombosis (DVT) Prevention:  Goal: Patient will participate in Venous Thrombosis (VTE)/Deep Vein Thrombosis (DVT)Prevention Measures  Note: Pt at risk for DVT due to stroke. SCDs in place. Encouraging ambulation as tolerated.

## 2020-06-08 NOTE — ED PROVIDER NOTES
ED Provider Note    CHIEF COMPLAINT  Chief Complaint   Patient presents with   • Dizziness     Onset 30 minutes ago        HPI  Chanelle Ortiz is a 44 y.o. female who presents with dizziness. Hx of thyroid issues, type 2 diabetes, anxiety and depression here with dizziness.  Patient reports that she was helping her daughter move a heavy object when she started feeling dizzy, like she is going to pass out, she called 911 immediately.  Upon EMS arrival patient reports that she felt weak and like she was going to pass out.  Patient denies any associated chest pain, shortness of breath or palpitations.  She also reports she feels like the room was spinning and this is been constant.  Patient denies any associated nausea or diaphoresis.  Patient denies any headache, or focal weakness or numbness.  Patient denies any associated neck pain.  Patient denies any abdominal pain diarrhea or changes in bowel movements.  Patient denies any difficulty speaking, change in vision, or drooling or facial droop.     REVIEW OF SYSTEMS  ROS  See HPI for further details. All other systems are negative.     PAST MEDICAL HISTORY   has a past medical history of Anxiety and depression (2016), Arrhythmia, Diabetes (2008), H/O Hypertension - resolved, Hx of Bronchitis, Hyperthyroidism (2008), Hypothyroidism, after radioactive ablation of thyroid (2008), POLLY on CPAP (2018), and Psychiatric problem - unspecified.    SOCIAL HISTORY  Social History     Tobacco Use   • Smoking status: Never Smoker   • Smokeless tobacco: Never Used   Substance and Sexual Activity   • Alcohol use: No   • Drug use: No   • Sexual activity: Not on file     Comment: .  Tuboligation in 2004.        SURGICAL HISTORY   has a past surgical history that includes other abdominal surgery (2002); gyn surgery (2004); other; primary c section; ovarian cystectomy (1992); cholecystectomy; and primary c section.    CURRENT MEDICATIONS  Home Medications     Reviewed by  "Kelsey Esqueda R.N. (Registered Nurse) on 06/07/20 at 2146  Med List Status: <None>   Medication Last Dose Status   albuterol 108 (90 Base) MCG/ACT Aero Soln inhalation aerosol  Active   gabapentin (NEURONTIN) 300 MG Cap  Active   glimepiride (AMARYL) 2 MG Tab  Active   ibuprofen (MOTRIN) 600 MG Tab  Active   IRON PO  Active   levothyroxine (SYNTHROID) 175 MCG Tab  Active                ALLERGIES  Allergies   Allergen Reactions   • Morphine      \"I think\" \"I dunno what my reaction was, the nurse just said my oxygen levels were going way to low on it\"        PHYSICAL EXAM  Physical Exam   Constitutional: She is oriented to person, place, and time. She appears well-developed and well-nourished.   HENT:   Head: Normocephalic and atraumatic.   Eyes: Conjunctivae are normal.   Neck: Normal range of motion. Neck supple.   Cardiovascular: Normal rate and regular rhythm.   Pulmonary/Chest: Effort normal and breath sounds normal.   Abdominal: Soft. Bowel sounds are normal. She exhibits no distension. There is no abdominal tenderness. There is no rebound.   Neurological: She is alert and oriented to person, place, and time.   Distal and proximal strength 5/5 in upper and lower extremities. No dysmetria.  Normal heel-to-shin.  No sensation deficits. No visual field deficits. Cranial nerves intact.      Skin: Skin is warm and dry. No rash noted.   Psychiatric: She has a normal mood and affect. Her behavior is normal.     Results for orders placed or performed during the hospital encounter of 06/07/20   CBC WITH DIFFERENTIAL   Result Value Ref Range    WBC 11.0 (H) 4.8 - 10.8 K/uL    RBC 4.54 4.20 - 5.40 M/uL    Hemoglobin 11.7 (L) 12.0 - 16.0 g/dL    Hematocrit 39.2 37.0 - 47.0 %    MCV 86.3 81.4 - 97.8 fL    MCH 25.8 (L) 27.0 - 33.0 pg    MCHC 29.8 (L) 33.6 - 35.0 g/dL    RDW 44.6 35.9 - 50.0 fL    Platelet Count 256 164 - 446 K/uL    MPV 11.5 9.0 - 12.9 fL    Neutrophils-Polys 63.30 44.00 - 72.00 %    Lymphocytes 25.70 " 22.00 - 41.00 %    Monocytes 7.00 0.00 - 13.40 %    Eosinophils 2.40 0.00 - 6.90 %    Basophils 0.70 0.00 - 1.80 %    Immature Granulocytes 0.90 0.00 - 0.90 %    Nucleated RBC 0.00 /100 WBC    Neutrophils (Absolute) 6.99 2.00 - 7.15 K/uL    Lymphs (Absolute) 2.84 1.00 - 4.80 K/uL    Monos (Absolute) 0.77 0.00 - 0.85 K/uL    Eos (Absolute) 0.26 0.00 - 0.51 K/uL    Baso (Absolute) 0.08 0.00 - 0.12 K/uL    Immature Granulocytes (abs) 0.10 0.00 - 0.11 K/uL    NRBC (Absolute) 0.00 K/uL    Anisocytosis 1+     Microcytosis 1+    CMP   Result Value Ref Range    Sodium 141 135 - 145 mmol/L    Potassium 3.8 3.6 - 5.5 mmol/L    Chloride 101 96 - 112 mmol/L    Co2 27 20 - 33 mmol/L    Anion Gap 13.0 7.0 - 16.0    Glucose 214 (H) 65 - 99 mg/dL    Bun 13 8 - 22 mg/dL    Creatinine 0.69 0.50 - 1.40 mg/dL    Calcium 8.8 8.5 - 10.5 mg/dL    AST(SGOT) 15 12 - 45 U/L    ALT(SGPT) 23 2 - 50 U/L    Alkaline Phosphatase 114 (H) 30 - 99 U/L    Total Bilirubin <0.2 0.1 - 1.5 mg/dL    Albumin 3.7 3.2 - 4.9 g/dL    Total Protein 7.3 6.0 - 8.2 g/dL    Globulin 3.6 (H) 1.9 - 3.5 g/dL    A-G Ratio 1.0 g/dL   TROPONIN   Result Value Ref Range    Troponin T <6 6 - 19 ng/L   PERIPHERAL SMEAR REVIEW   Result Value Ref Range    Peripheral Smear Review see below    PLATELET ESTIMATE   Result Value Ref Range    Plt Estimation Normal    MORPHOLOGY   Result Value Ref Range    RBC Morphology Present     Large Platelets 1+     Polychromia 1+     Poikilocytosis 1+     Ovalocytes 1+    DIFFERENTIAL COMMENT   Result Value Ref Range    Comments-Diff see below    ESTIMATED GFR   Result Value Ref Range    GFR If African American >60 >60 mL/min/1.73 m 2    GFR If Non African American >60 >60 mL/min/1.73 m 2   TSH   Result Value Ref Range    TSH 14.700 (H) 0.380 - 5.330 uIU/mL   EKG   Result Value Ref Range    Report       [facility]    Test Date:  2020-06-07  Pt Name:    PRATIBHA CUELLAR              Department: ER  MRN:         [id number]                      Room:        09  Gender:     Female                       Technician: [id number]  :        [id number]                   Requested By:ER TRIAGE PROTOCOL  Order #:    [id number]                    Reading MD: Poli Garcia MD    Measurements  Intervals                                Axis  Rate:       79                           P:          45  IN:         164                          QRS:        71  QRSD:       86                           T:          29  QT:         368  QTc:        422    Interpretive Statements  EKG is normal sinus rhythm normal axis normal intervals, poor R wave  progression  in precordial leads with associated T wave inversions which is unchanged from    EKG done 2019.  No ST changes consistent with acute regional  ischemia.  Electronically Signed On 2020 22:08:45 PDT by Poli Garcia MD       CT-CTA HEAD WITH & W/O-POST PROCESS   Final Result         1.  CT angiogram of the [place] within normal limits.      CT-CTA NECK WITH & W/O-POST PROCESSING   Final Result         1.  CT angiogram of the neck within normal limits.         MR-BRAIN-W/O    (Results Pending)         COURSE & MEDICAL DECISION MAKING  Pertinent Labs & Imaging studies reviewed. (See chart for details)    Patient here with chief complaint of dizziness.  Patient's dizziness is described most consistent with near syncope.  Patient is very well-appearing.  Her vitals are reassuring.  Questionable dehydration, will give IV fluids and reassess.  Will also check basic labs including troponin although patient's EKG is very reassuring.  Patient's EKG is unremarkable, her basic labs are reassuring.  Patient was ambulated, she remains significantly off balance and with an ataxic gait, given this I am concerned that, especially with the acute onset, a CVA is possible.  Patient neurologic exam otherwise remains entirely unremarkable.  I am unable to elicit any evidence of abnormality.  Her  NIH score is 1-2 though difficult to interpret with patients nl heal to shin; she is in the window however I do not believe especially given patient's NIH that TPA is indicated, furthermore a peripheral cause of her symptoms is also likely.  Symptoms do not appear paroxysmal so I do believe that BPPV is less likely.  I have trialed meclizine without improvement.  Patient's been independently evaluated by neurology, they agree that patient would not be a candidate for TPA, they believe that CVA is considerably less likely especially given patient's inconsistent exam  Patient CTAs are unremarkable  Will check MRI once admitted the patient will be admitted for ongoing work-up      FINAL IMPRESSION  1.  Ataxia, dizziness      Electronically signed by: Jose Ashton M.D., 6/7/2020 9:53 PM

## 2020-06-08 NOTE — ED NOTES
Med rec updated and complete. VIA phone call call to [facility] ( 24). No antibiotics noted on file.     Unable to reach pt at this time.      Home pharmacy [facility]

## 2020-06-09 VITALS
OXYGEN SATURATION: 94 % | DIASTOLIC BLOOD PRESSURE: 87 MMHG | SYSTOLIC BLOOD PRESSURE: 141 MMHG | TEMPERATURE: 99 F | WEIGHT: 293 LBS | HEART RATE: 75 BPM | HEIGHT: 64 IN | RESPIRATION RATE: 16 BRPM | BODY MASS INDEX: 50.02 KG/M2

## 2020-06-09 PROBLEM — R42 DIZZINESSES: Status: RESOLVED | Noted: 2018-09-05 | Resolved: 2020-06-09

## 2020-06-09 LAB
GLUCOSE BLD-MCNC: 110 MG/DL (ref 65–99)
GLUCOSE BLD-MCNC: 128 MG/DL (ref 65–99)
GLUCOSE BLD-MCNC: 144 MG/DL (ref 65–99)

## 2020-06-09 PROCEDURE — 700102 HCHG RX REV CODE 250 W/ 637 OVERRIDE(OP): Performed by: HOSPITALIST

## 2020-06-09 PROCEDURE — 700105 HCHG RX REV CODE 258: Performed by: HOSPITALIST

## 2020-06-09 PROCEDURE — 82962 GLUCOSE BLOOD TEST: CPT

## 2020-06-09 PROCEDURE — A9270 NON-COVERED ITEM OR SERVICE: HCPCS | Performed by: HOSPITALIST

## 2020-06-09 PROCEDURE — 96372 THER/PROPH/DIAG INJ SC/IM: CPT

## 2020-06-09 PROCEDURE — 700111 HCHG RX REV CODE 636 W/ 250 OVERRIDE (IP): Performed by: INTERNAL MEDICINE

## 2020-06-09 PROCEDURE — G0378 HOSPITAL OBSERVATION PER HR: HCPCS

## 2020-06-09 PROCEDURE — 99217 PR OBSERVATION CARE DISCHARGE: CPT | Performed by: INTERNAL MEDICINE

## 2020-06-09 RX ORDER — MECLIZINE HYDROCHLORIDE 25 MG/1
25 TABLET ORAL 3 TIMES DAILY PRN
Qty: 30 TAB | Refills: 0 | Status: SHIPPED
Start: 2020-06-09 | End: 2020-07-05

## 2020-06-09 RX ADMIN — GABAPENTIN 300 MG: 300 CAPSULE ORAL at 06:07

## 2020-06-09 RX ADMIN — LEVOTHYROXINE SODIUM 200 MCG: 200 TABLET ORAL at 06:07

## 2020-06-09 RX ADMIN — DOCUSATE SODIUM 50 MG AND SENNOSIDES 8.6 MG 2 TABLET: 8.6; 5 TABLET, FILM COATED ORAL at 06:07

## 2020-06-09 RX ADMIN — ENOXAPARIN SODIUM 40 MG: 40 INJECTION SUBCUTANEOUS at 17:23

## 2020-06-09 RX ADMIN — SODIUM CHLORIDE: 9 INJECTION, SOLUTION INTRAVENOUS at 02:27

## 2020-06-09 RX ADMIN — ENOXAPARIN SODIUM 40 MG: 40 INJECTION SUBCUTANEOUS at 06:07

## 2020-06-09 RX ADMIN — GABAPENTIN 300 MG: 300 CAPSULE ORAL at 17:23

## 2020-06-09 RX ADMIN — GABAPENTIN 300 MG: 300 CAPSULE ORAL at 11:36

## 2020-06-09 RX ADMIN — DOCUSATE SODIUM 50 MG AND SENNOSIDES 8.6 MG 2 TABLET: 8.6; 5 TABLET, FILM COATED ORAL at 17:23

## 2020-06-09 NOTE — CARE PLAN
Problem: Safety  Goal: Will remain free from falls  Outcome: PROGRESSING AS EXPECTED   Patient remains free from falls. Bed in the lowest position, call light within reach, bed alarm on, non-slip footwear worn when out of bed, and hourly rounding completed.   Problem: Venous Thromboembolism (VTW)/Deep Vein Thrombosis (DVT) Prevention:  Goal: Patient will participate in Venous Thrombosis (VTE)/Deep Vein Thrombosis (DVT)Prevention Measures  Outcome: PROGRESSING AS EXPECTED   SCD's on and lovenox prescribed.

## 2020-06-09 NOTE — PROGRESS NOTES
Hospital Medicine Daily Progress Note    Date of Service  6/8/2020    Chief Complaint  44 y.o. female admitted 6/7/2020 with dizziness/vertigo.    Hospital Course    44-year-old female with a history of morbid obesity, diabetes, anxiety/depression, hypothyroidism, POLLY (on CPAP) who presented with acute dizziness/vertigo as well as some numbness over her forehead.  She also had headache and ataxia.  She had a CTA head and neck which was normal.  She was evaluated by neurology who thought unlikely due to stroke but recommended MRI brain.  MRI brain was normal.  She continued to have vertigo so was treated with meclizine. Her TSH was 14, synthroid increased to 200mcg daily, will need recheck in 4-6 weeks.       Interval Problem Update  MRI brain normal  Continues to have vertigo  We will continue meclizine to see if enough improvement to be discharged in the morning, may need ENT versus vestibular PT referral as likely peripheral etiology    Consultants/Specialty  Neurology    Code Status  Full    Disposition  Likely home when medically clear    Review of Systems  Review of Systems   Constitutional: Negative for chills and fever.   HENT: Negative for congestion and sore throat.    Eyes: Negative for blurred vision.   Respiratory: Negative for cough.    Cardiovascular: Negative for chest pain.   Gastrointestinal: Negative for abdominal pain, nausea and vomiting.   Genitourinary: Negative for hematuria.   Musculoskeletal: Negative for falls.   Neurological: Positive for dizziness, sensory change (numbness over scalp) and headaches. Negative for focal weakness.   Psychiatric/Behavioral: Positive for depression (history of). The patient is nervous/anxious (history of).         Physical Exam  Temp:  [36.2 °C (97.2 °F)-36.7 °C (98.1 °F)] 36.7 °C (98.1 °F)  Pulse:  [65-89] 80  Resp:  [14-20] 18  BP: (114-155)/(68-84) 135/80  SpO2:  [94 %-97 %] 95 %    Physical Exam  Vitals signs and nursing note reviewed.   Constitutional:        General: She is not in acute distress.     Appearance: She is obese. She is not ill-appearing or toxic-appearing.   HENT:      Head: Normocephalic and atraumatic.      Nose: Nose normal.      Mouth/Throat:      Mouth: Mucous membranes are moist.      Pharynx: Oropharynx is clear.   Eyes:      General: No scleral icterus.        Right eye: No discharge.         Left eye: No discharge.      Extraocular Movements: Extraocular movements intact.      Conjunctiva/sclera: Conjunctivae normal.      Pupils: Pupils are equal, round, and reactive to light.   Neck:      Musculoskeletal: Normal range of motion and neck supple.   Cardiovascular:      Rate and Rhythm: Normal rate and regular rhythm.      Heart sounds: No murmur. No friction rub. No gallop.    Pulmonary:      Effort: Pulmonary effort is normal.      Breath sounds: Normal breath sounds.   Abdominal:      General: Bowel sounds are normal. There is no distension.      Palpations: Abdomen is soft.      Tenderness: There is no abdominal tenderness.   Musculoskeletal:      Right lower leg: No edema.      Left lower leg: No edema.   Skin:     General: Skin is warm and dry.   Neurological:      General: No focal deficit present.      Mental Status: She is alert and oriented to person, place, and time.      Cranial Nerves: No cranial nerve deficit.      Sensory: Sensory deficit (some numbness over top of scalp) present.      Motor: No weakness.      Coordination: Coordination normal.   Psychiatric:         Mood and Affect: Mood normal.         Behavior: Behavior normal.         Fluids    Intake/Output Summary (Last 24 hours) at 6/8/2020 1732  Last data filed at 6/8/2020 1300  Gross per 24 hour   Intake 1240 ml   Output --   Net 1240 ml       Laboratory  Recent Labs     06/07/20  2145 06/08/20  0342   WBC 11.0* 11.5*   RBC 4.54 4.33   HEMOGLOBIN 11.7* 11.3*   HEMATOCRIT 39.2 37.2   MCV 86.3 85.9   MCH 25.8* 26.1*   MCHC 29.8* 30.4*   RDW 44.6 45.2   PLATELETCT 256 248    MPV 11.5 11.2     Recent Labs     06/07/20  2145 06/08/20  0342   SODIUM 141 138   POTASSIUM 3.8 4.1   CHLORIDE 101 102   CO2 27 25   GLUCOSE 214* 180*   BUN 13 12   CREATININE 0.69 0.59   CALCIUM 8.8 8.5                   Imaging  MR-BRAIN-W/O   Final Result      MRI of the brain without contrast within normal limits.      CT-CTA HEAD WITH & W/O-POST PROCESS   Final Result         1.  CT angiogram of the [place] within normal limits.      CT-CTA NECK WITH & W/O-POST PROCESSING   Final Result         1.  CT angiogram of the neck within normal limits.              Assessment/Plan  * Dizzinesses- (present on admission)  Assessment & Plan  Neuro consulted, given ataxia associated with dizziness   CTA head and neck normal  MRI normal  IVF  meclizine  PT/OT    Anemia  Assessment & Plan  Mild, no evidence of bleeding   Cont to trend    DM (diabetes mellitus) (HCC)  Assessment & Plan  SSI Ordered   accu checks     POLLY on CPAP- (present on admission)  Assessment & Plan  Resume home CPAP     Morbid obesity with BMI of 45.0-49.9, adult (HCC)- (present on admission)  Assessment & Plan  Encourage weight loss  Nutrition consulted, recommended outpatient nutrition referral    Hypothyroidism- (present on admission)  Assessment & Plan  TSH mildly elevated   Will increase home levothyroxine to 200 mcg daily   Recheck TSH In 4 weeks       VTE prophylaxis: Lovenox

## 2020-06-09 NOTE — PROGRESS NOTES
Monitor summary: SB/SR 55-87, OH 0.24, QRS 0.12, QT 0.42, with occasional PACs and PVCs  per strip from monitor room.

## 2020-06-09 NOTE — DISCHARGE SUMMARY
Discharge Summary    CHIEF COMPLAINT ON ADMISSION  Chief Complaint   Patient presents with   • Dizziness     Onset 30 minutes ago        Reason for Admission  EMS     Admission Date  6/7/2020    CODE STATUS  Full Code    HPI & HOSPITAL COURSE  44 y.o. female admitted 6/7/2020 with acute onset of vertigo, dizziness, headache, axtaxia. She is morbidly obese, and has diabetes, POLLY (on CPAP), anxiety/depression.      She was evaluated by Neurology. CTA head and neck, and MRI brain were negative. Meclizine was started.     Dizziness:   Continue Meclizine for a few days  Follow up with Neurology outpatient    Anemia:    Hemoglobin has been at baseline  No evidence of acute blood loss    Hypothyroidism:  Continue Levothyroxine  Follow up with PCP    Morbid obesity:  Follow up with PCP     Diabetes:   Follow up with PCP       Discharge Date  6/9/20    FOLLOW UP ITEMS POST DISCHARGE  Neurology and PCP in next few days    DISCHARGE DIAGNOSES  Principal Problem (Resolved):    Dizzinesses POA: Yes  Active Problems:    Hypothyroidism POA: Yes    Morbid obesity with BMI of 45.0-49.9, adult (HCC) POA: Yes    POLLY on CPAP POA: Yes      Overview: gets headaches, if not using CPAP.     DM (diabetes mellitus) ([facility]) POA: Unknown    Anemia POA: Unknown      FOLLOW UP  No future appointments.  Mohsen Tamasaby, M.D.  [address] [place] [id number] [phone]    In 2 days        MEDICATIONS ON DISCHARGE     Medication List      START taking these medications      Instructions   meclizine 25 MG Tabs  Commonly known as:  ANTIVERT   Take 1 Tab by mouth 3 times a day as needed for Dizziness or Vertigo.  Dose:  25 mg        CONTINUE taking these medications      Instructions   gabapentin 300 MG Caps  Commonly known as:  NEURONTIN   Take 300 mg by mouth 3 times a day.  Dose:  300 mg     levothyroxine 175 MCG Tabs  Commonly known as:  SYNTHROID   Take 1 Tab by mouth every morning before breakfast. Take a half hour before  "breakfast.  Dose:  175 mcg            Allergies  Allergies   Allergen Reactions   • Morphine      \"I think\" \"I dunno what my reaction was, the nurse just said my oxygen levels were going way to low on it\"        DIET  Orders Placed This Encounter   Procedures   • Diet Order Diabetic     Standing Status:   Standing     Number of Occurrences:   1     Order Specific Question:   Diet:     Answer:   Diabetic [3]       ACTIVITY  As tolerated.    CONSULTATIONS  Neurology    PROCEDURES  N/A    LABORATORY  Lab Results   Component Value Date    SODIUM 138 06/08/2020    POTASSIUM 4.1 06/08/2020    CHLORIDE 102 06/08/2020    CO2 25 06/08/2020    GLUCOSE 180 (H) 06/08/2020    BUN 12 06/08/2020    CREATININE 0.59 06/08/2020    CREATININE 0.7 01/26/2009        Lab Results   Component Value Date    WBC 11.5 (H) 06/08/2020    HEMOGLOBIN 11.3 (L) 06/08/2020    HEMATOCRIT 37.2 06/08/2020    PLATELETCT 248 06/08/2020        Total time of the discharge process exceeds 40 minutes.  "

## 2020-06-09 NOTE — PROGRESS NOTES
Monitor summary: SR-ST , IN 0.18, QRS 0.08, QT 0.40, with rare PVCs per strip from monitor room.

## 2020-06-09 NOTE — CARE PLAN
Problem: Safety  Goal: Will remain free from injury  Note: Bed locked and in lowest position. Call light and personal belongings in reach. Bed alarm in place. Hourly rounding.       Problem: Venous Thromboembolism (VTW)/Deep Vein Thrombosis (DVT) Prevention:  Goal: Patient will participate in Venous Thrombosis (VTE)/Deep Vein Thrombosis (DVT)Prevention Measures  Note: SCDs in place. Encouraging mobilization as tolerated.

## 2020-06-10 NOTE — PROGRESS NOTES
1930: Patient discharged home via cab. Patient wheeled down to cab and assisted in by CNA. Patient received discharge instructions from dayshift RN. PIV removed, patient sent home with all personal belongings.

## 2020-06-29 ENCOUNTER — APPOINTMENT (OUTPATIENT)
Dept: RADIOLOGY | Facility: MEDICAL CENTER | Age: 45
End: 2020-06-29
Attending: EMERGENCY MEDICINE
Payer: MEDICAID

## 2020-06-29 ENCOUNTER — HOSPITAL ENCOUNTER (EMERGENCY)
Facility: MEDICAL CENTER | Age: 45
End: 2020-06-29
Attending: EMERGENCY MEDICINE
Payer: MEDICAID

## 2020-06-29 VITALS
OXYGEN SATURATION: 94 % | HEIGHT: 64 IN | TEMPERATURE: 96.5 F | SYSTOLIC BLOOD PRESSURE: 104 MMHG | HEART RATE: 71 BPM | DIASTOLIC BLOOD PRESSURE: 65 MMHG | WEIGHT: 293 LBS | BODY MASS INDEX: 50.02 KG/M2 | RESPIRATION RATE: 17 BRPM

## 2020-06-29 DIAGNOSIS — E03.9 HYPOTHYROIDISM, UNSPECIFIED TYPE: ICD-10-CM

## 2020-06-29 DIAGNOSIS — R42 DIZZINESS: ICD-10-CM

## 2020-06-29 DIAGNOSIS — R10.30 LOWER ABDOMINAL PAIN: ICD-10-CM

## 2020-06-29 LAB
ALBUMIN SERPL BCP-MCNC: 3.7 G/DL (ref 3.2–4.9)
ALBUMIN/GLOB SERPL: 1.1 G/DL
ALP SERPL-CCNC: 105 U/L (ref 30–99)
ALT SERPL-CCNC: 19 U/L (ref 2–50)
ANION GAP SERPL CALC-SCNC: 11 MMOL/L (ref 7–16)
APPEARANCE UR: CLEAR
AST SERPL-CCNC: 13 U/L (ref 12–45)
BASOPHILS # BLD AUTO: 0.9 % (ref 0–1.8)
BASOPHILS # BLD: 0.1 K/UL (ref 0–0.12)
BILIRUB SERPL-MCNC: 0.2 MG/DL (ref 0.1–1.5)
BILIRUB UR QL STRIP.AUTO: NEGATIVE
BUN SERPL-MCNC: 17 MG/DL (ref 8–22)
CALCIUM SERPL-MCNC: 8.8 MG/DL (ref 8.5–10.5)
CHLORIDE SERPL-SCNC: 104 MMOL/L (ref 96–112)
CO2 SERPL-SCNC: 23 MMOL/L (ref 20–33)
COLOR UR: YELLOW
COVID ORDER STATUS COVID19: NORMAL
CREAT SERPL-MCNC: 0.59 MG/DL (ref 0.5–1.4)
EOSINOPHIL # BLD AUTO: 0.27 K/UL (ref 0–0.51)
EOSINOPHIL NFR BLD: 2.4 % (ref 0–6.9)
ERYTHROCYTE [DISTWIDTH] IN BLOOD BY AUTOMATED COUNT: 42.9 FL (ref 35.9–50)
EST. AVERAGE GLUCOSE BLD GHB EST-MCNC: 160 MG/DL
GLOBULIN SER CALC-MCNC: 3.3 G/DL (ref 1.9–3.5)
GLUCOSE SERPL-MCNC: 188 MG/DL (ref 65–99)
GLUCOSE UR STRIP.AUTO-MCNC: NEGATIVE MG/DL
HBA1C MFR BLD: 7.2 % (ref 0–5.6)
HCG SERPL QL: NEGATIVE
HCT VFR BLD AUTO: 40.1 % (ref 37–47)
HGB BLD-MCNC: 12.1 G/DL (ref 12–16)
IMM GRANULOCYTES # BLD AUTO: 0.04 K/UL (ref 0–0.11)
IMM GRANULOCYTES NFR BLD AUTO: 0.4 % (ref 0–0.9)
KETONES UR STRIP.AUTO-MCNC: ABNORMAL MG/DL
LEUKOCYTE ESTERASE UR QL STRIP.AUTO: NEGATIVE
LYMPHOCYTES # BLD AUTO: 2.81 K/UL (ref 1–4.8)
LYMPHOCYTES NFR BLD: 25.4 % (ref 22–41)
MCH RBC QN AUTO: 25.3 PG (ref 27–33)
MCHC RBC AUTO-ENTMCNC: 30.2 G/DL (ref 33.6–35)
MCV RBC AUTO: 83.9 FL (ref 81.4–97.8)
MICRO URNS: ABNORMAL
MONOCYTES # BLD AUTO: 0.73 K/UL (ref 0–0.85)
MONOCYTES NFR BLD AUTO: 6.6 % (ref 0–13.4)
NEUTROPHILS # BLD AUTO: 7.12 K/UL (ref 2–7.15)
NEUTROPHILS NFR BLD: 64.3 % (ref 44–72)
NITRITE UR QL STRIP.AUTO: NEGATIVE
NRBC # BLD AUTO: 0 K/UL
NRBC BLD-RTO: 0 /100 WBC
PH UR STRIP.AUTO: 5 [PH] (ref 5–8)
PLATELET # BLD AUTO: 205 K/UL (ref 164–446)
PMV BLD AUTO: 11.8 FL (ref 9–12.9)
POTASSIUM SERPL-SCNC: 3.8 MMOL/L (ref 3.6–5.5)
PROT SERPL-MCNC: 7 G/DL (ref 6–8.2)
PROT UR QL STRIP: NEGATIVE MG/DL
RBC # BLD AUTO: 4.78 M/UL (ref 4.2–5.4)
RBC UR QL AUTO: NEGATIVE
SODIUM SERPL-SCNC: 138 MMOL/L (ref 135–145)
SP GR UR STRIP.AUTO: 1.03
T3FREE SERPL-MCNC: 2.41 PG/ML (ref 2–4.4)
T4 FREE SERPL-MCNC: 1.28 NG/DL (ref 0.93–1.7)
TSH SERPL DL<=0.005 MIU/L-ACNC: 1.84 UIU/ML (ref 0.38–5.33)
UROBILINOGEN UR STRIP.AUTO-MCNC: 0.2 MG/DL
WBC # BLD AUTO: 11.1 K/UL (ref 4.8–10.8)

## 2020-06-29 PROCEDURE — 84443 ASSAY THYROID STIM HORMONE: CPT

## 2020-06-29 PROCEDURE — 84703 CHORIONIC GONADOTROPIN ASSAY: CPT

## 2020-06-29 PROCEDURE — 80053 COMPREHEN METABOLIC PANEL: CPT

## 2020-06-29 PROCEDURE — 700111 HCHG RX REV CODE 636 W/ 250 OVERRIDE (IP): Performed by: EMERGENCY MEDICINE

## 2020-06-29 PROCEDURE — 700105 HCHG RX REV CODE 258: Performed by: EMERGENCY MEDICINE

## 2020-06-29 PROCEDURE — 84481 FREE ASSAY (FT-3): CPT

## 2020-06-29 PROCEDURE — 96374 THER/PROPH/DIAG INJ IV PUSH: CPT

## 2020-06-29 PROCEDURE — 85025 COMPLETE CBC W/AUTO DIFF WBC: CPT

## 2020-06-29 PROCEDURE — 81003 URINALYSIS AUTO W/O SCOPE: CPT

## 2020-06-29 PROCEDURE — C9803 HOPD COVID-19 SPEC COLLECT: HCPCS | Performed by: EMERGENCY MEDICINE

## 2020-06-29 PROCEDURE — 84439 ASSAY OF FREE THYROXINE: CPT

## 2020-06-29 PROCEDURE — 76856 US EXAM PELVIC COMPLETE: CPT

## 2020-06-29 PROCEDURE — 36415 COLL VENOUS BLD VENIPUNCTURE: CPT

## 2020-06-29 PROCEDURE — U0003 INFECTIOUS AGENT DETECTION BY NUCLEIC ACID (DNA OR RNA); SEVERE ACUTE RESPIRATORY SYNDROME CORONAVIRUS 2 (SARS-COV-2) (CORONAVIRUS DISEASE [COVID-19]), AMPLIFIED PROBE TECHNIQUE, MAKING USE OF HIGH THROUGHPUT TECHNOLOGIES AS DESCRIBED BY CMS-2020-01-R: HCPCS

## 2020-06-29 PROCEDURE — U0004 COV-19 TEST NON-CDC HGH THRU: HCPCS

## 2020-06-29 PROCEDURE — 99285 EMERGENCY DEPT VISIT HI MDM: CPT

## 2020-06-29 PROCEDURE — 83036 HEMOGLOBIN GLYCOSYLATED A1C: CPT

## 2020-06-29 RX ORDER — KETOROLAC TROMETHAMINE 30 MG/ML
30 INJECTION, SOLUTION INTRAMUSCULAR; INTRAVENOUS ONCE
Status: COMPLETED | OUTPATIENT
Start: 2020-06-29 | End: 2020-06-29

## 2020-06-29 RX ORDER — MECLIZINE HYDROCHLORIDE 25 MG/1
25 TABLET ORAL 3 TIMES DAILY PRN
Qty: 30 TAB | Refills: 0 | Status: SHIPPED | OUTPATIENT
Start: 2020-06-29 | End: 2020-07-05

## 2020-06-29 RX ORDER — SODIUM CHLORIDE 9 MG/ML
1000 INJECTION, SOLUTION INTRAVENOUS CONTINUOUS
Status: ACTIVE | OUTPATIENT
Start: 2020-06-29 | End: 2020-06-29

## 2020-06-29 RX ORDER — NAPROXEN 500 MG/1
500 TABLET ORAL
Qty: 20 TAB | Refills: 0 | Status: SHIPPED | OUTPATIENT
Start: 2020-06-29 | End: 2020-07-05

## 2020-06-29 RX ADMIN — SODIUM CHLORIDE 1000 ML: 9 INJECTION, SOLUTION INTRAVENOUS at 21:11

## 2020-06-29 RX ADMIN — KETOROLAC TROMETHAMINE 30 MG: 30 INJECTION, SOLUTION INTRAMUSCULAR at 21:11

## 2020-06-29 ASSESSMENT — FIBROSIS 4 INDEX: FIB4 SCORE: 0.61

## 2020-06-30 NOTE — ED NOTES
"IV fluids completed. Patient states \"I feel a lot better.\" States that the cramping had greatly improves, currently rates pain 1-2/10. Ambulatory x 200 feet in hallway with steady gait with SBA by ED RN. Denies dizziness/lightheadedness with ambulation. Patient for discharge as per Dr. Chu.  "

## 2020-06-30 NOTE — ED PROVIDER NOTES
ED Provider Note    CHIEF COMPLAINT  Chief Complaint   Patient presents with   • Abdominal Cramps   • Sore Throat       HPI  Chanelle Ortiz is a 44 y.o. female who presents to emergency room today with complaints soreness to the throat, abdominal cramps and chills.  Patient states that symptoms started today cramping the left lower quadrant no discharge from the vaginal area no burning with urination or changes to bowel or bladder.  Pain is rated currently at 5/10.  She had recent increase in her thyroid medication about a week ago she thought maybe is due to this however symptoms some of them have been present even before that such as her dizziness she was discharged on 6/8 from the hospital and placed on meclizine which she did not feel she did not know she had the prescription for dizziness.  Denies chest pain no shortness of breath no fever.  She has had some sore throat with feeling like a lump to her thyroid area.  No known COVID exposure.    REVIEW OF SYSTEMS  See HPI for further details. All other systems are negative.     PAST MEDICAL HISTORY  Past Medical History:   Diagnosis Date   • POLLY on CPAP 2018    gets headaches, if not using CPAP.    • Anxiety and depression 2016    after ex went to shelter   • Diabetes 2008    on and off meds (due to concern about heart beat changes)   • Hyperthyroidism 2008    Treated with Radioactive iodine, at [place], in 2008   • Hypothyroidism, after radioactive ablation of thyroid 2008   • Arrhythmia     notes occasional rapid or prominent heart beat, at night    • Asthma    • Back pain    • H/O Hypertension - resolved     Patient states prior HTN, but resolved after changes in other medications (but off HTN meds).    • Hx of Bronchitis    • Pneumonia    • Psychiatric problem - unspecified     pt notes anxiety and depression, with counseller - pt unclear on details.         FAMILY HISTORY  [unfilled]    SOCIAL HISTORY  Social History     Socioeconomic History   •  "Marital status: Legally      Spouse name: Not on file   • Number of children: Not on file   • Years of education: Not on file   • Highest education level: Not on file   Occupational History   • Not on file   Social Needs   • Financial resource strain: Not on file   • Food insecurity     Worry: Never true     Inability: Never true   • Transportation needs     Medical: No     Non-medical: No   Tobacco Use   • Smoking status: Never Smoker   • Smokeless tobacco: Never Used   Substance and Sexual Activity   • Alcohol use: No   • Drug use: No   • Sexual activity: Not on file     Comment: .  Tuboligation in .    Lifestyle   • Physical activity     Days per week: Not on file     Minutes per session: Not on file   • Stress: Not on file   Relationships   • Social connections     Talks on phone: Not on file     Gets together: Not on file     Attends Islam service: Not on file     Active member of club or organization: Not on file     Attends meetings of clubs or organizations: Not on file     Relationship status: Not on file   • Intimate partner violence     Fear of current or ex partner: Not on file     Emotionally abused: Not on file     Physically abused: Not on file     Forced sexual activity: Not on file   Other Topics Concern   • Not on file   Social History Narrative   • Not on file       SURGICAL HISTORY  Past Surgical History:   Procedure Laterality Date   • GYN SURGERY      tuboligation, bilateral, during past .    • OTHER ABDOMINAL SURGERY      cholesystectomy   • OVARIAN CYSTECTOMY      unknown details or laterality.   • CHOLECYSTECTOMY     • OTHER      tonsillectomy   • PRIMARY C SECTION       - , ,    • PRIMARY C SECTION         CURRENT MEDICATIONS  Home Medications    **Home medications have not yet been reviewed for this encounter**         ALLERGIES  Allergies   Allergen Reactions   • Morphine      \"I think\" \"I dunno what my reaction was, the " "nurse just said my oxygen levels were going way to low on it\"        PHYSICAL EXAM  VITAL SIGNS: /83   Pulse 89   Temp 36.6 °C (97.9 °F) (Temporal)   Resp 17   Ht 1.626 m (5' 4\")   Wt (!) 136.1 kg (300 lb)   LMP 06/01/2020   SpO2 96%   BMI 51.49 kg/m²       Constitutional: Well developed, Well nourished,  acute distress, Non-toxic appearance.   HENT: Normocephalic, Atraumatic, Bilateral external ears normal, Oropharynx dry patient appears dehydrated, No oral exudates, Nose normal.  Goiter noted over the area patient complains of bump over her thyroid consistent with her history.  Eyes: PERRLA, EOMI, Conjunctiva normal, No discharge.   Neck: Normal range of motion, No tenderness, Supple, No stridor.   Lymphatic: No lymphadenopathy noted.   Cardiovascular: Normal heart rate, Normal rhythm, No murmurs, No rubs, No gallops.   Thorax & Lungs: Normal breath sounds, No respiratory distress, No wheezing, No chest tenderness.   Abdomen: Bowel sounds normal, Soft, left lower quadrant tenderness, No masses, No pulsatile masses.  Rebound, guarding or peritoneal signs noted  Skin: Warm, Dry, No erythema, No rash.   Back: No tenderness, No CVA tenderness.    Extremities: Intact distal pulses, No edema, No tenderness, No cyanosis, No clubbing.   Musculoskeletal: Good range of motion in all major joints. No tenderness to palpation or major deformities noted.   Neurologic: Alert & oriented x 3, Normal motor function, Normal sensory function, No focal deficits noted.   Psychiatric: Affect normal, Judgment normal, Mood normal.         RADIOLOGY/PROCEDURES  US-PELVIC COMPLETE (TRANSABDOMINAL/TRANSVAGINAL) (COMBO)   Final Result      1.  Limited exam.      2.  No gross abnormalities are identified.            COURSE & MEDICAL DECISION MAKING  Pertinent Labs & Imaging studies reviewed. (See chart for details)  Patient ultrasound showed no acute process her white count is normal she is afebrile.  The lump in her throat I " feel is a small goiter noted on exam this is consistent with her thyroid history she had repeat labs and she is improving so I do feel that she needs to continue on her new dose.  She is placed on Antivert and given a prescription for this for her dizziness Naprosyn for her pain given IV fluid as she appeared to be dehydrated did improve with this, COVID test obtained and pending she will continue isolation until findings of the test as per protocol.  Return if fever worsening or persistent symptoms or next 24 to 40 hours she verbalized understand instructions need for follow-up as above.    FINAL IMPRESSION  1.  Acute lower quadrant gris pain  2.  Hypothyroid  3.  Dizziness         Electronically signed by: Delta Chu D.O., 6/29/2020 9:14 PM

## 2020-06-30 NOTE — ED NOTES
Patient resting on gurney. No acute distress. States that her cramping has improved since receiving medication per orders. IV fluids remain in progress - placed on pressure bag. Denies further needs at this time. Call bell within reach.

## 2020-06-30 NOTE — ED TRIAGE NOTES
Name and  Verified for triage    Pt here via medic from home with c/o abdominal cramping x 3 days. States today she developed a sore throat and body aches. Denies vomiting or diarrhea. Admits to nausea.     Denies exposure to covid.     States she is concerned about her elevated blood sugars- recently taken off of metformin.

## 2020-07-02 LAB
SARS-COV-2 RNA RESP QL NAA+PROBE: NOTDETECTED
SPECIMEN SOURCE: NORMAL

## 2020-07-04 NOTE — ED NOTES
COVID-19 Test Follow Up  07/03/20      Patient tested negative for COVID-19. I have informed the patient of the result by My Chart message. Encouraged to stay at home until no fever for 72 hours without the use of fever reducing medications and symptoms improving. Informed there is no need to further self-isolate for 14 days for COVID-19 unless otherwise directed by the Health Dept.     SARS-CoV-2 Source  NP Swab     SARS-CoV-2 by PCR  NotDetected          Antonieta Cunningham, PharmD

## 2020-07-05 ENCOUNTER — APPOINTMENT (OUTPATIENT)
Dept: RADIOLOGY | Facility: MEDICAL CENTER | Age: 45
End: 2020-07-05
Attending: EMERGENCY MEDICINE
Payer: MEDICAID

## 2020-07-05 ENCOUNTER — HOSPITAL ENCOUNTER (OUTPATIENT)
Facility: MEDICAL CENTER | Age: 45
End: 2020-07-06
Attending: EMERGENCY MEDICINE | Admitting: HOSPITALIST
Payer: MEDICAID

## 2020-07-05 DIAGNOSIS — R07.9 CHEST PAIN, UNSPECIFIED TYPE: ICD-10-CM

## 2020-07-05 DIAGNOSIS — R00.2 PALPITATIONS: ICD-10-CM

## 2020-07-05 DIAGNOSIS — R55 NEAR SYNCOPE: ICD-10-CM

## 2020-07-05 LAB
ALBUMIN SERPL BCP-MCNC: 3.5 G/DL (ref 3.2–4.9)
ALBUMIN/GLOB SERPL: 1.1 G/DL
ALP SERPL-CCNC: 105 U/L (ref 30–99)
ALT SERPL-CCNC: 19 U/L (ref 2–50)
ANION GAP SERPL CALC-SCNC: 11 MMOL/L (ref 7–16)
AST SERPL-CCNC: 17 U/L (ref 12–45)
BASOPHILS # BLD AUTO: 0.9 % (ref 0–1.8)
BASOPHILS # BLD: 0.08 K/UL (ref 0–0.12)
BILIRUB SERPL-MCNC: <0.2 MG/DL (ref 0.1–1.5)
BUN SERPL-MCNC: 8 MG/DL (ref 8–22)
CALCIUM SERPL-MCNC: 8.5 MG/DL (ref 8.5–10.5)
CHLORIDE SERPL-SCNC: 104 MMOL/L (ref 96–112)
CO2 SERPL-SCNC: 25 MMOL/L (ref 20–33)
CREAT SERPL-MCNC: 0.54 MG/DL (ref 0.5–1.4)
D DIMER PPP IA.FEU-MCNC: 0.49 UG/ML (FEU) (ref 0–0.5)
EKG IMPRESSION: NORMAL
EOSINOPHIL # BLD AUTO: 0.24 K/UL (ref 0–0.51)
EOSINOPHIL NFR BLD: 2.6 % (ref 0–6.9)
ERYTHROCYTE [DISTWIDTH] IN BLOOD BY AUTOMATED COUNT: 42.4 FL (ref 35.9–50)
GLOBULIN SER CALC-MCNC: 3.1 G/DL (ref 1.9–3.5)
GLUCOSE BLD-MCNC: 164 MG/DL (ref 65–99)
GLUCOSE SERPL-MCNC: 184 MG/DL (ref 65–99)
HCG SERPL QL: NEGATIVE
HCT VFR BLD AUTO: 38.1 % (ref 37–47)
HGB BLD-MCNC: 11.6 G/DL (ref 12–16)
IMM GRANULOCYTES # BLD AUTO: 0.04 K/UL (ref 0–0.11)
IMM GRANULOCYTES NFR BLD AUTO: 0.4 % (ref 0–0.9)
LYMPHOCYTES # BLD AUTO: 2.15 K/UL (ref 1–4.8)
LYMPHOCYTES NFR BLD: 23.1 % (ref 22–41)
MCH RBC QN AUTO: 25.2 PG (ref 27–33)
MCHC RBC AUTO-ENTMCNC: 30.4 G/DL (ref 33.6–35)
MCV RBC AUTO: 82.8 FL (ref 81.4–97.8)
MONOCYTES # BLD AUTO: 0.55 K/UL (ref 0–0.85)
MONOCYTES NFR BLD AUTO: 5.9 % (ref 0–13.4)
NEUTROPHILS # BLD AUTO: 6.23 K/UL (ref 2–7.15)
NEUTROPHILS NFR BLD: 67.1 % (ref 44–72)
NRBC # BLD AUTO: 0 K/UL
NRBC BLD-RTO: 0 /100 WBC
PLATELET # BLD AUTO: 216 K/UL (ref 164–446)
PMV BLD AUTO: 11.5 FL (ref 9–12.9)
POTASSIUM SERPL-SCNC: 3.8 MMOL/L (ref 3.6–5.5)
PROT SERPL-MCNC: 6.6 G/DL (ref 6–8.2)
RBC # BLD AUTO: 4.6 M/UL (ref 4.2–5.4)
SODIUM SERPL-SCNC: 140 MMOL/L (ref 135–145)
T4 FREE SERPL-MCNC: 1.48 NG/DL (ref 0.93–1.7)
TROPONIN T SERPL-MCNC: <6 NG/L (ref 6–19)
TSH SERPL DL<=0.005 MIU/L-ACNC: 1.72 UIU/ML (ref 0.38–5.33)
WBC # BLD AUTO: 9.3 K/UL (ref 4.8–10.8)

## 2020-07-05 PROCEDURE — 84439 ASSAY OF FREE THYROXINE: CPT

## 2020-07-05 PROCEDURE — A9270 NON-COVERED ITEM OR SERVICE: HCPCS | Performed by: EMERGENCY MEDICINE

## 2020-07-05 PROCEDURE — 93005 ELECTROCARDIOGRAM TRACING: CPT | Mod: XE | Performed by: HOSPITALIST

## 2020-07-05 PROCEDURE — 84484 ASSAY OF TROPONIN QUANT: CPT

## 2020-07-05 PROCEDURE — 71045 X-RAY EXAM CHEST 1 VIEW: CPT

## 2020-07-05 PROCEDURE — 96375 TX/PRO/DX INJ NEW DRUG ADDON: CPT

## 2020-07-05 PROCEDURE — 80053 COMPREHEN METABOLIC PANEL: CPT

## 2020-07-05 PROCEDURE — 99285 EMERGENCY DEPT VISIT HI MDM: CPT

## 2020-07-05 PROCEDURE — 84443 ASSAY THYROID STIM HORMONE: CPT

## 2020-07-05 PROCEDURE — G0378 HOSPITAL OBSERVATION PER HR: HCPCS

## 2020-07-05 PROCEDURE — 85379 FIBRIN DEGRADATION QUANT: CPT

## 2020-07-05 PROCEDURE — 93005 ELECTROCARDIOGRAM TRACING: CPT

## 2020-07-05 PROCEDURE — 700102 HCHG RX REV CODE 250 W/ 637 OVERRIDE(OP): Performed by: EMERGENCY MEDICINE

## 2020-07-05 PROCEDURE — 84703 CHORIONIC GONADOTROPIN ASSAY: CPT

## 2020-07-05 PROCEDURE — 85025 COMPLETE CBC W/AUTO DIFF WBC: CPT

## 2020-07-05 PROCEDURE — 99220 PR INITIAL OBSERVATION CARE,LEVL III: CPT | Performed by: HOSPITALIST

## 2020-07-05 PROCEDURE — 96372 THER/PROPH/DIAG INJ SC/IM: CPT

## 2020-07-05 PROCEDURE — 700102 HCHG RX REV CODE 250 W/ 637 OVERRIDE(OP): Performed by: HOSPITALIST

## 2020-07-05 PROCEDURE — 93005 ELECTROCARDIOGRAM TRACING: CPT | Performed by: EMERGENCY MEDICINE

## 2020-07-05 PROCEDURE — 82962 GLUCOSE BLOOD TEST: CPT

## 2020-07-05 RX ORDER — LEVOTHYROXINE SODIUM 0.2 MG/1
200 TABLET ORAL
Status: DISCONTINUED | OUTPATIENT
Start: 2020-07-06 | End: 2020-07-06 | Stop reason: HOSPADM

## 2020-07-05 RX ORDER — ONDANSETRON 4 MG/1
4 TABLET, ORALLY DISINTEGRATING ORAL EVERY 4 HOURS PRN
Status: DISCONTINUED | OUTPATIENT
Start: 2020-07-05 | End: 2020-07-06 | Stop reason: HOSPADM

## 2020-07-05 RX ORDER — ASPIRIN 300 MG/1
300 SUPPOSITORY RECTAL DAILY
Status: DISCONTINUED | OUTPATIENT
Start: 2020-07-06 | End: 2020-07-06 | Stop reason: HOSPADM

## 2020-07-05 RX ORDER — PROMETHAZINE HYDROCHLORIDE 25 MG/1
12.5-25 TABLET ORAL EVERY 4 HOURS PRN
Status: DISCONTINUED | OUTPATIENT
Start: 2020-07-05 | End: 2020-07-06 | Stop reason: HOSPADM

## 2020-07-05 RX ORDER — ENALAPRILAT 1.25 MG/ML
1.25 INJECTION INTRAVENOUS EVERY 6 HOURS PRN
Status: DISCONTINUED | OUTPATIENT
Start: 2020-07-05 | End: 2020-07-06 | Stop reason: HOSPADM

## 2020-07-05 RX ORDER — DEXTROSE MONOHYDRATE 25 G/50ML
50 INJECTION, SOLUTION INTRAVENOUS
Status: DISCONTINUED | OUTPATIENT
Start: 2020-07-05 | End: 2020-07-06 | Stop reason: HOSPADM

## 2020-07-05 RX ORDER — POLYETHYLENE GLYCOL 3350 17 G/17G
1 POWDER, FOR SOLUTION ORAL
Status: DISCONTINUED | OUTPATIENT
Start: 2020-07-05 | End: 2020-07-06 | Stop reason: HOSPADM

## 2020-07-05 RX ORDER — ACETAMINOPHEN 325 MG/1
650 TABLET ORAL EVERY 6 HOURS PRN
Status: DISCONTINUED | OUTPATIENT
Start: 2020-07-05 | End: 2020-07-06 | Stop reason: HOSPADM

## 2020-07-05 RX ORDER — NITROGLYCERIN 0.4 MG/1
0.4 TABLET SUBLINGUAL
Status: DISCONTINUED | OUTPATIENT
Start: 2020-07-05 | End: 2020-07-06 | Stop reason: HOSPADM

## 2020-07-05 RX ORDER — BISACODYL 10 MG
10 SUPPOSITORY, RECTAL RECTAL
Status: DISCONTINUED | OUTPATIENT
Start: 2020-07-05 | End: 2020-07-06 | Stop reason: HOSPADM

## 2020-07-05 RX ORDER — PROCHLORPERAZINE EDISYLATE 5 MG/ML
5-10 INJECTION INTRAMUSCULAR; INTRAVENOUS EVERY 4 HOURS PRN
Status: DISCONTINUED | OUTPATIENT
Start: 2020-07-05 | End: 2020-07-06 | Stop reason: HOSPADM

## 2020-07-05 RX ORDER — PROMETHAZINE HYDROCHLORIDE 12.5 MG/1
12.5-25 SUPPOSITORY RECTAL EVERY 4 HOURS PRN
Status: DISCONTINUED | OUTPATIENT
Start: 2020-07-05 | End: 2020-07-06 | Stop reason: HOSPADM

## 2020-07-05 RX ORDER — ASPIRIN 325 MG
325 TABLET ORAL DAILY
Status: DISCONTINUED | OUTPATIENT
Start: 2020-07-06 | End: 2020-07-06 | Stop reason: HOSPADM

## 2020-07-05 RX ORDER — ASPIRIN 325 MG
325 TABLET ORAL ONCE
Status: COMPLETED | OUTPATIENT
Start: 2020-07-05 | End: 2020-07-05

## 2020-07-05 RX ORDER — ONDANSETRON 2 MG/ML
4 INJECTION INTRAMUSCULAR; INTRAVENOUS EVERY 4 HOURS PRN
Status: DISCONTINUED | OUTPATIENT
Start: 2020-07-05 | End: 2020-07-06 | Stop reason: HOSPADM

## 2020-07-05 RX ORDER — AMOXICILLIN 250 MG
2 CAPSULE ORAL 2 TIMES DAILY
Status: DISCONTINUED | OUTPATIENT
Start: 2020-07-05 | End: 2020-07-06 | Stop reason: HOSPADM

## 2020-07-05 RX ORDER — LEVOTHYROXINE SODIUM 0.2 MG/1
200 TABLET ORAL
COMMUNITY
End: 2021-05-11

## 2020-07-05 RX ORDER — ASPIRIN 81 MG/1
324 TABLET, CHEWABLE ORAL DAILY
Status: DISCONTINUED | OUTPATIENT
Start: 2020-07-06 | End: 2020-07-06 | Stop reason: HOSPADM

## 2020-07-05 RX ADMIN — INSULIN HUMAN 2 UNITS: 100 INJECTION, SOLUTION PARENTERAL at 21:27

## 2020-07-05 RX ADMIN — ASPIRIN 325 MG: 325 TABLET, FILM COATED ORAL at 18:38

## 2020-07-05 ASSESSMENT — ENCOUNTER SYMPTOMS
DIARRHEA: 0
NERVOUS/ANXIOUS: 0
VOMITING: 0
PALPITATIONS: 1
SHORTNESS OF BREATH: 1
WEIGHT LOSS: 0
COUGH: 0
SPUTUM PRODUCTION: 0
FEVER: 0
HEADACHES: 0
CHILLS: 0
ABDOMINAL PAIN: 0
FOCAL WEAKNESS: 0
STRIDOR: 0
DIZZINESS: 1
BLOOD IN STOOL: 0
SORE THROAT: 1
NAUSEA: 1
SPEECH CHANGE: 0
BACK PAIN: 0

## 2020-07-05 ASSESSMENT — LIFESTYLE VARIABLES
TOTAL SCORE: 0
EVER FELT BAD OR GUILTY ABOUT YOUR DRINKING: NO
EVER HAD A DRINK FIRST THING IN THE MORNING TO STEADY YOUR NERVES TO GET RID OF A HANGOVER: NO
HAVE YOU EVER FELT YOU SHOULD CUT DOWN ON YOUR DRINKING: NO
HOW MANY TIMES IN THE PAST YEAR HAVE YOU HAD 5 OR MORE DRINKS IN A DAY: 0
EVER_SMOKED: NEVER
DO YOU DRINK ALCOHOL: NO
TOTAL SCORE: 0
HAVE PEOPLE ANNOYED YOU BY CRITICIZING YOUR DRINKING: NO
DOES PATIENT WANT TO STOP DRINKING: NO
TOTAL SCORE: 0
ON A TYPICAL DAY WHEN YOU DRINK ALCOHOL HOW MANY DRINKS DO YOU HAVE: 0
DOES PATIENT WANT TO STOP DRINKING: NO
AVERAGE NUMBER OF DAYS PER WEEK YOU HAVE A DRINK CONTAINING ALCOHOL: 0
ALCOHOL_USE: NO
CONSUMPTION TOTAL: NEGATIVE

## 2020-07-05 ASSESSMENT — FIBROSIS 4 INDEX
FIB4 SCORE: 0.79
FIB4 SCORE: 0.64

## 2020-07-05 ASSESSMENT — PATIENT HEALTH QUESTIONNAIRE - PHQ9
2. FEELING DOWN, DEPRESSED, IRRITABLE, OR HOPELESS: NOT AT ALL
SUM OF ALL RESPONSES TO PHQ9 QUESTIONS 1 AND 2: 0
1. LITTLE INTEREST OR PLEASURE IN DOING THINGS: NOT AT ALL

## 2020-07-05 NOTE — ED TRIAGE NOTES
".  Chief Complaint   Patient presents with   • Dizziness     starting this am. pt says it's worse with exertion. pt reports shes drank only a small amount of water the last couple days.    • Fatigue     x's 3 days. Pt states her daughter may have covid but does not know   • Sore Throat     x's 3 days.    • Shortness of Breath     x's 3 days        ./73   Pulse 76   Resp 17   Ht 1.626 m (5' 4\")   Wt (!) 136.1 kg (300 lb)   SpO2 96%   "

## 2020-07-06 ENCOUNTER — APPOINTMENT (OUTPATIENT)
Dept: RADIOLOGY | Facility: MEDICAL CENTER | Age: 45
End: 2020-07-06
Attending: INTERNAL MEDICINE
Payer: MEDICAID

## 2020-07-06 ENCOUNTER — APPOINTMENT (OUTPATIENT)
Dept: RADIOLOGY | Facility: MEDICAL CENTER | Age: 45
End: 2020-07-06
Attending: HOSPITALIST
Payer: MEDICAID

## 2020-07-06 ENCOUNTER — APPOINTMENT (OUTPATIENT)
Dept: CARDIOLOGY | Facility: MEDICAL CENTER | Age: 45
End: 2020-07-06
Attending: INTERNAL MEDICINE
Payer: MEDICAID

## 2020-07-06 ENCOUNTER — PATIENT OUTREACH (OUTPATIENT)
Dept: HEALTH INFORMATION MANAGEMENT | Facility: OTHER | Age: 45
End: 2020-07-06

## 2020-07-06 VITALS
DIASTOLIC BLOOD PRESSURE: 85 MMHG | TEMPERATURE: 97.2 F | RESPIRATION RATE: 14 BRPM | SYSTOLIC BLOOD PRESSURE: 130 MMHG | HEIGHT: 64 IN | OXYGEN SATURATION: 96 % | HEART RATE: 64 BPM | WEIGHT: 293 LBS | BODY MASS INDEX: 50.02 KG/M2

## 2020-07-06 LAB
ANION GAP SERPL CALC-SCNC: 9 MMOL/L (ref 7–16)
APPEARANCE UR: ABNORMAL
BACTERIA #/AREA URNS HPF: ABNORMAL /HPF
BASOPHILS # BLD AUTO: 1.1 % (ref 0–1.8)
BASOPHILS # BLD: 0.09 K/UL (ref 0–0.12)
BILIRUB UR QL STRIP.AUTO: NEGATIVE
BUN SERPL-MCNC: 10 MG/DL (ref 8–22)
CALCIUM SERPL-MCNC: 8.4 MG/DL (ref 8.5–10.5)
CHLORIDE SERPL-SCNC: 104 MMOL/L (ref 96–112)
CO2 SERPL-SCNC: 26 MMOL/L (ref 20–33)
COLOR UR: YELLOW
CREAT SERPL-MCNC: 0.51 MG/DL (ref 0.5–1.4)
EKG IMPRESSION: NORMAL
EKG IMPRESSION: NORMAL
EOSINOPHIL # BLD AUTO: 0.32 K/UL (ref 0–0.51)
EOSINOPHIL NFR BLD: 3.8 % (ref 0–6.9)
EPI CELLS #/AREA URNS HPF: ABNORMAL /HPF
ERYTHROCYTE [DISTWIDTH] IN BLOOD BY AUTOMATED COUNT: 42.8 FL (ref 35.9–50)
GLUCOSE BLD-MCNC: 135 MG/DL (ref 65–99)
GLUCOSE BLD-MCNC: 170 MG/DL (ref 65–99)
GLUCOSE SERPL-MCNC: 140 MG/DL (ref 65–99)
GLUCOSE UR STRIP.AUTO-MCNC: NEGATIVE MG/DL
HCT VFR BLD AUTO: 36.3 % (ref 37–47)
HGB BLD-MCNC: 11 G/DL (ref 12–16)
HYALINE CASTS #/AREA URNS LPF: ABNORMAL /LPF
IMM GRANULOCYTES # BLD AUTO: 0.03 K/UL (ref 0–0.11)
IMM GRANULOCYTES NFR BLD AUTO: 0.4 % (ref 0–0.9)
KETONES UR STRIP.AUTO-MCNC: NEGATIVE MG/DL
LEUKOCYTE ESTERASE UR QL STRIP.AUTO: NEGATIVE
LV EJECT FRACT  99904: 65
LV EJECT FRACT MOD 4C 99902: 53.35
LYMPHOCYTES # BLD AUTO: 2.76 K/UL (ref 1–4.8)
LYMPHOCYTES NFR BLD: 32.5 % (ref 22–41)
MCH RBC QN AUTO: 25.3 PG (ref 27–33)
MCHC RBC AUTO-ENTMCNC: 30.3 G/DL (ref 33.6–35)
MCV RBC AUTO: 83.4 FL (ref 81.4–97.8)
MICRO URNS: ABNORMAL
MONOCYTES # BLD AUTO: 0.56 K/UL (ref 0–0.85)
MONOCYTES NFR BLD AUTO: 6.6 % (ref 0–13.4)
NEUTROPHILS # BLD AUTO: 4.74 K/UL (ref 2–7.15)
NEUTROPHILS NFR BLD: 55.6 % (ref 44–72)
NITRITE UR QL STRIP.AUTO: NEGATIVE
NRBC # BLD AUTO: 0 K/UL
NRBC BLD-RTO: 0 /100 WBC
PH UR STRIP.AUTO: 5.5 [PH] (ref 5–8)
PLATELET # BLD AUTO: 192 K/UL (ref 164–446)
PMV BLD AUTO: 11.3 FL (ref 9–12.9)
POTASSIUM SERPL-SCNC: 3.7 MMOL/L (ref 3.6–5.5)
PROT UR QL STRIP: NEGATIVE MG/DL
RBC # BLD AUTO: 4.35 M/UL (ref 4.2–5.4)
RBC # URNS HPF: ABNORMAL /HPF
RBC UR QL AUTO: NEGATIVE
SODIUM SERPL-SCNC: 139 MMOL/L (ref 135–145)
SP GR UR STRIP.AUTO: 1.02
UROBILINOGEN UR STRIP.AUTO-MCNC: 0.2 MG/DL
WBC # BLD AUTO: 8.5 K/UL (ref 4.8–10.8)
WBC #/AREA URNS HPF: ABNORMAL /HPF

## 2020-07-06 PROCEDURE — 700117 HCHG RX CONTRAST REV CODE 255: Performed by: INTERNAL MEDICINE

## 2020-07-06 PROCEDURE — 93970 EXTREMITY STUDY: CPT

## 2020-07-06 PROCEDURE — 82962 GLUCOSE BLOOD TEST: CPT

## 2020-07-06 PROCEDURE — 93010 ELECTROCARDIOGRAM REPORT: CPT | Performed by: INTERNAL MEDICINE

## 2020-07-06 PROCEDURE — 700111 HCHG RX REV CODE 636 W/ 250 OVERRIDE (IP): Performed by: HOSPITALIST

## 2020-07-06 PROCEDURE — A9270 NON-COVERED ITEM OR SERVICE: HCPCS | Performed by: HOSPITALIST

## 2020-07-06 PROCEDURE — 99217 PR OBSERVATION CARE DISCHARGE: CPT | Performed by: INTERNAL MEDICINE

## 2020-07-06 PROCEDURE — G0378 HOSPITAL OBSERVATION PER HR: HCPCS

## 2020-07-06 PROCEDURE — 85025 COMPLETE CBC W/AUTO DIFF WBC: CPT

## 2020-07-06 PROCEDURE — 96372 THER/PROPH/DIAG INJ SC/IM: CPT | Mod: XU

## 2020-07-06 PROCEDURE — 80048 BASIC METABOLIC PNL TOTAL CA: CPT

## 2020-07-06 PROCEDURE — 700111 HCHG RX REV CODE 636 W/ 250 OVERRIDE (IP)

## 2020-07-06 PROCEDURE — 93306 TTE W/DOPPLER COMPLETE: CPT | Mod: 26 | Performed by: INTERNAL MEDICINE

## 2020-07-06 PROCEDURE — 93005 ELECTROCARDIOGRAM TRACING: CPT | Mod: XE | Performed by: INTERNAL MEDICINE

## 2020-07-06 PROCEDURE — 81001 URINALYSIS AUTO W/SCOPE: CPT

## 2020-07-06 PROCEDURE — A9502 TC99M TETROFOSMIN: HCPCS

## 2020-07-06 PROCEDURE — 700102 HCHG RX REV CODE 250 W/ 637 OVERRIDE(OP): Performed by: HOSPITALIST

## 2020-07-06 PROCEDURE — 93010 ELECTROCARDIOGRAM REPORT: CPT | Mod: 77 | Performed by: INTERNAL MEDICINE

## 2020-07-06 PROCEDURE — 93306 TTE W/DOPPLER COMPLETE: CPT

## 2020-07-06 RX ORDER — FAMOTIDINE 20 MG/1
20 TABLET, FILM COATED ORAL 2 TIMES DAILY
Status: DISCONTINUED | OUTPATIENT
Start: 2020-07-06 | End: 2020-07-06 | Stop reason: HOSPADM

## 2020-07-06 RX ORDER — FAMOTIDINE 20 MG/1
20 TABLET, FILM COATED ORAL 2 TIMES DAILY
Qty: 10 TAB | Refills: 0 | Status: SHIPPED | OUTPATIENT
Start: 2020-07-06 | End: 2021-02-10

## 2020-07-06 RX ORDER — REGADENOSON 0.08 MG/ML
INJECTION, SOLUTION INTRAVENOUS
Status: COMPLETED
Start: 2020-07-06 | End: 2020-07-06

## 2020-07-06 RX ADMIN — LEVOTHYROXINE SODIUM 200 MCG: 200 TABLET ORAL at 06:22

## 2020-07-06 RX ADMIN — ENOXAPARIN SODIUM 40 MG: 40 INJECTION SUBCUTANEOUS at 06:22

## 2020-07-06 RX ADMIN — HUMAN ALBUMIN MICROSPHERES AND PERFLUTREN 3 ML: 10; .22 INJECTION, SOLUTION INTRAVENOUS at 13:43

## 2020-07-06 RX ADMIN — REGADENOSON 0.4 MG: 0.08 INJECTION, SOLUTION INTRAVENOUS at 08:36

## 2020-07-06 RX ADMIN — ASPIRIN 325 MG: 325 TABLET, FILM COATED ORAL at 06:22

## 2020-07-06 SDOH — ECONOMIC STABILITY: INCOME INSECURITY: HOW HARD IS IT FOR YOU TO PAY FOR THE VERY BASICS LIKE FOOD, HOUSING, MEDICAL CARE, AND HEATING?: NOT VERY HARD

## 2020-07-06 ASSESSMENT — PATIENT HEALTH QUESTIONNAIRE - PHQ9
SUM OF ALL RESPONSES TO PHQ9 QUESTIONS 1 AND 2: 0
2. FEELING DOWN, DEPRESSED, IRRITABLE, OR HOPELESS: NOT AT ALL
1. LITTLE INTEREST OR PLEASURE IN DOING THINGS: NOT AT ALL

## 2020-07-06 NOTE — DISCHARGE SUMMARY
Discharge Summary    CHIEF COMPLAINT ON ADMISSION  Chief Complaint   Patient presents with   • Dizziness     starting this am. pt says it's worse with exertion. pt reports shes drank only a small amount of water the last couple days.    • Fatigue     x's 3 days. Pt states her daughter may have covid but does not know   • Sore Throat     x's 3 days.    • Shortness of Breath     x's 3 days        Reason for Admission  Dizziness     Admission Date  7/5/2020    CODE STATUS  Full Code    HPI & HOSPITAL COURSE  This is a 44 y.o. female history of sleep apnea, diabetes type 2, here with planes of dizziness, shortness of breath and chest pain.  Patient monitored on telemetry.  No significant ectopy was noted.  Troponin was not elevated repeatedly.  EKG was not concerning.  Cardiac stress test was negative for reversible ischemia.  Of note there was a small fixed defect in anterolateral wall, thought to be artifact from next attenuation.    D-dimer was not elevated  Chest x-ray showed no mediastinal widening.  Transthoracic echo was done, unremarkable, no changes  Bilateral lower extremity ultrasound was done, negative for DVT       Therefore, she is discharged in good and stable condition to home with close outpatient follow-up.        Discharge Date  7/6/2020    FOLLOW UP ITEMS POST DISCHARGE  PCP    DISCHARGE DIAGNOSES  Active Problems:    POLLY on CPAP POA: Yes      Overview: gets headaches, if not using CPAP.     DM (diabetes mellitus) (HCC) POA: Yes    Obesity POA: Yes    Hypothyroidism POA: Yes  Resolved Problems:    * No resolved hospital problems. *      FOLLOW UP  No future appointments.  No follow-up provider specified.    MEDICATIONS ON DISCHARGE     Medication List      CONTINUE taking these medications      Instructions   levothyroxine 200 MCG Tabs  Commonly known as:  SYNTHROID   Take 200 mcg by mouth Every morning on an empty stomach.  Dose:  200 mcg     metFORMIN 500 MG Tabs  Commonly known as:  GLUCOPHAGE    "Take 500 mg by mouth every day.  Dose:  500 mg            Allergies  Allergies   Allergen Reactions   • Morphine      \"I think\" \"I dunno what my reaction was, the nurse just said my oxygen levels were going way to low on it\"        DIET  Orders Placed This Encounter   Procedures   • Diet Order Diabetic, Cardiac     Standing Status:   Standing     Number of Occurrences:   1     Order Specific Question:   Diet:     Answer:   Diabetic [3]     Order Specific Question:   Diet:     Answer:   Cardiac [6]       ACTIVITY  As tolerated.  Weight bearing as tolerated    CONSULTATIONS  None    PROCEDURES  None    LABORATORY  Lab Results   Component Value Date    SODIUM 139 07/06/2020    POTASSIUM 3.7 07/06/2020    CHLORIDE 104 07/06/2020    CO2 26 07/06/2020    GLUCOSE 140 (H) 07/06/2020    BUN 10 07/06/2020    CREATININE 0.51 07/06/2020    CREATININE 0.7 01/26/2009        Lab Results   Component Value Date    WBC 8.5 07/06/2020    HEMOGLOBIN 11.0 (L) 07/06/2020    HEMATOCRIT 36.3 (L) 07/06/2020    PLATELETCT 192 07/06/2020      NM-CARDIAC STRESS TEST   Final Result      DX-CHEST-PORTABLE (1 VIEW)   Final Result      No acute cardiac or pulmonary abnormality is noted.      EC-ECHOCARDIOGRAM COMPLETE W/O CONT    (Results Pending)   US-EXTREMITY VENOUS LOWER BILAT    (Results Pending)         Total time of the discharge process exceeds 46 minutes.  "

## 2020-07-06 NOTE — H&P
Hospital Medicine History & Physical Note    Date of Service  7/5/2020    Primary Care Physician  Mohsen Tamasaby, M.D.    Code Status  Full Code    Chief Complaint  Chief Complaint   Patient presents with   • Dizziness     starting this am. pt says it's worse with exertion. pt reports shes drank only a small amount of water the last couple days.    • Fatigue     x's 3 days. Pt states her daughter may have covid but does not know   • Sore Throat     x's 3 days.    • Shortness of Breath     x's 3 days        History of Presenting Illness  This is a morbidly obese 44 y.o. female with diabetes, obstructive sleep apnea medically noncompliant with CPAP who presented 7/5/2020 with chest pain and dizziness over the past week.  Also had concerns of possible COVID exposure from her daughter as well as having shortness of breath, sore throat and fatigue x3 days.  Patient on June 29 had negative COVID test.  Patient states her chest pain is been left upper chest nonradiating.  She does state some shortness of breath associated with it as well as some nausea.  She states her chest pain occurs mostly at night she gasp for air and then has palpitations and chest pain.  She states that rest slowly makes this go away.    Review of Systems  Review of Systems   Constitutional: Negative for chills, fever and weight loss.   HENT: Positive for sore throat. Negative for congestion.    Respiratory: Positive for shortness of breath. Negative for cough, sputum production and stridor.    Cardiovascular: Positive for chest pain and palpitations. Negative for leg swelling.   Gastrointestinal: Positive for nausea. Negative for abdominal pain, blood in stool, diarrhea and vomiting.   Genitourinary: Negative for dysuria.   Musculoskeletal: Negative for back pain and joint pain.   Neurological: Positive for dizziness. Negative for speech change, focal weakness and headaches.   Psychiatric/Behavioral: The patient is not nervous/anxious.        Past  "Medical History   has a past medical history of Anxiety and depression (2016), Arrhythmia, Asthma, Back pain, Diabetes (2008), H/O Hypertension - resolved, Hx of Bronchitis, Hyperthyroidism (2008), Hypothyroidism, after radioactive ablation of thyroid (2008), POLLY on CPAP (2018), Pneumonia, and Psychiatric problem - unspecified.    Surgical History   has a past surgical history that includes other abdominal surgery (2002); gyn surgery (2004); other; primary c section; ovarian cystectomy (1992); cholecystectomy; and primary c section.     Family History  family history includes Diabetes in her daughter and sister; Heart Attack in her maternal grandfather; Heart Disease in her sister; Heart Failure in her sister; Hyperlipidemia in her sister; Hypertension in her sister; Other in her mother; Psychiatric Illness in her daughter and son; Stroke in her sister.     Social History   reports that she has never smoked. She has never used smokeless tobacco. She reports that she does not drink alcohol or use drugs.    Allergies  Allergies   Allergen Reactions   • Morphine      \"I think\" \"I dunno what my reaction was, the nurse just said my oxygen levels were going way to low on it\"        Medications  Prior to Admission Medications   Prescriptions Last Dose Informant Patient Reported? Taking?   levothyroxine (SYNTHROID) 200 MCG Tab 7/5/2020 at AM Patient Yes Yes   Sig: Take 200 mcg by mouth Every morning on an empty stomach.   metFORMIN (GLUCOPHAGE) 500 MG Tab 7/5/2020 at AM Patient Yes Yes   Sig: Take 500 mg by mouth every day.      Facility-Administered Medications: None       Physical Exam  Temp:  [36.7 °C (98.1 °F)-36.9 °C (98.4 °F)] 36.7 °C (98.1 °F)  Pulse:  [67-76] 67  Resp:  [17-18] 17  BP: (127-157)/(73-88) 146/88  SpO2:  [94 %-96 %] 94 %    Physical Exam  Constitutional:       Appearance: She is obese.   HENT:      Head: Normocephalic and atraumatic.      Nose: Nose normal. No congestion.      Mouth/Throat:      Mouth: " Mucous membranes are dry.      Pharynx: Oropharynx is clear. No oropharyngeal exudate.   Eyes:      General: No scleral icterus.        Right eye: No discharge.         Left eye: No discharge.      Extraocular Movements: Extraocular movements intact.      Conjunctiva/sclera: Conjunctivae normal.   Neck:      Musculoskeletal: Neck supple. No muscular tenderness.   Cardiovascular:      Rate and Rhythm: Normal rate and regular rhythm.      Heart sounds: Normal heart sounds. No murmur.   Pulmonary:      Effort: Pulmonary effort is normal. No respiratory distress.      Breath sounds: Normal breath sounds. No stridor. No wheezing.   Abdominal:      General: There is no distension.      Tenderness: There is no abdominal tenderness.   Musculoskeletal:         General: No swelling.      Right lower leg: No edema.      Left lower leg: No edema.   Lymphadenopathy:      Cervical: No cervical adenopathy.   Skin:     General: Skin is dry.      Coloration: Skin is not jaundiced.   Neurological:      General: No focal deficit present.      Mental Status: She is alert and oriented to person, place, and time.      Cranial Nerves: No cranial nerve deficit.   Psychiatric:         Mood and Affect: Mood normal.         Behavior: Behavior normal.         Laboratory:  Recent Labs     07/05/20  1447   WBC 9.3   RBC 4.60   HEMOGLOBIN 11.6*   HEMATOCRIT 38.1   MCV 82.8   MCH 25.2*   MCHC 30.4*   RDW 42.4   PLATELETCT 216   MPV 11.5     Recent Labs     07/05/20  1447   SODIUM 140   POTASSIUM 3.8   CHLORIDE 104   CO2 25   GLUCOSE 184*   BUN 8   CREATININE 0.54   CALCIUM 8.5     Recent Labs     07/05/20  1447   ALTSGPT 19   ASTSGOT 17   ALKPHOSPHAT 105*   TBILIRUBIN <0.2   GLUCOSE 184*         No results for input(s): NTPROBNP in the last 72 hours.      Recent Labs     07/05/20  1447 07/05/20  1840   TROPONINT <6 <6       Imaging:  DX-CHEST-PORTABLE (1 VIEW)   Final Result      No acute cardiac or pulmonary abnormality is noted.      NM-CARDIAC  STRESS TEST    (Results Pending)         Assessment/Plan:    DM (diabetes mellitus) (HCC)- (present on admission)  Assessment & Plan  Diabetic diet  Monitor accuchecks and cover with SSI  Glycohemoglobin 7.2 in past 6 days    POLLY on CPAP- (present on admission)  Assessment & Plan  I recommended continuation of use of her home CPAP and follow-up with her outpatient pulmonary or sleep study center.  I have also encouraged weight loss    Hypothyroidism- (present on admission)  Assessment & Plan  Continue active treatment with levothyroxine 200 echograms daily  7/5 TSH: 1.72    Obesity- (present on admission)  Assessment & Plan  Body mass index is 53.05 kg/m².  Did have a discussion over the patient's weight alerted her of worsening medical issues if she did not need further weight loss.  We discussed dietary and activities to aid in weight loss.  I recommend that she follow-up with the bariatric center.

## 2020-07-06 NOTE — ASSESSMENT & PLAN NOTE
Body mass index is 53.05 kg/m².  Did have a discussion over the patient's weight alerted her of worsening medical issues if she did not need further weight loss.  We discussed dietary and activities to aid in weight loss.  I recommend that she follow-up with the bariatric center.

## 2020-07-06 NOTE — PROGRESS NOTES
Triage officer progress note    Discussed with Dr. Quiroz 44 years old female, with chest pain and near syncope, initial work-up is negative in the emergency room, ER physician requesting hospitalist group to admit.  Dr. Clayton will admit

## 2020-07-06 NOTE — PROGRESS NOTES
Community Health Worker Intake    • Social determinates of health intake completed.   • Identified barriers to reliable transportation, paying for food occasionally, and broken CPAP machine.  • Contact information provided to Chanelle Briseyda Ortiz   • Scheduled Food Delivery: Once pt discharges   • Inpatient assessment completed.    CHW Parrish spoke with pt at bedside to introduce CCM services. Pt confirmed that PCP is Mohsen Tamasaby but indicated having trouble reaching him. She accepted that CHW try to reach out to him to schedule hospital f/u apt. Note in chart indicated that hospital  reached out to pt's PCP and did not get an answer, left VM with pt's call back number. Otherwise pt interested in [facility] since they can provide her with transportation to medical appts. She indicated not having trouble paying for resources such as housing or medical care but mentioned that she could use some help with food. She usually walks to get from place to place or uses public transportation. Her support system consist of her family. Pt is living with her three kids, sister, and her ex in a trailer home. She does not currently have an income. Pt mentioned having to use a CPAP machine but indicated that she may need a new one since it ha snot been working properly.    Plan: CHW will provide pt with information on [facility]. CHW will write food Rx for pt and deliver food bag once pt is discharged. CHW will help pt get set-up with [facility] for CPAP machine. CHW will f/u with pt after d/c to identify any additional needs.

## 2020-07-06 NOTE — CARE PLAN
Problem: Communication  Goal: The ability to communicate needs accurately and effectively will improve  Outcome: PROGRESSING AS EXPECTED     Problem: Safety  Goal: Will remain free from injury  Outcome: PROGRESSING AS EXPECTED  Goal: Will remain free from falls  Outcome: PROGRESSING AS EXPECTED     Problem: Pain Management  Goal: Pain level will decrease to patient's comfort goal  Outcome: PROGRESSING AS EXPECTED     Call light education provide, patient completed return demonstration successfully. Re-enforced use of call light to ensure patient safety and decrease risk of fall.

## 2020-07-06 NOTE — ASSESSMENT & PLAN NOTE
I recommended continuation of use of her home CPAP and follow-up with her outpatient pulmonary or sleep study center.  I have also encouraged weight loss

## 2020-07-07 ENCOUNTER — PATIENT OUTREACH (OUTPATIENT)
Dept: HEALTH INFORMATION MANAGEMENT | Facility: OTHER | Age: 45
End: 2020-07-07

## 2020-07-07 NOTE — DISCHARGE INSTRUCTIONS
Discharge Instructions    Discharged to home by taxi with self. Discharged via wheelchair, hospital escort: Yes.  Special equipment needed: Not Applicable    Be sure to schedule a follow-up appointment with your primary care doctor or any specialists as instructed.     Discharge Plan:   Diet Plan: Discussed  Activity Level: Discussed  Confirmed Follow up Appointment: Patient to Call and Schedule Appointment  Confirmed Symptoms Management: Discussed  Medication Reconciliation Updated: Yes    I understand that a diet low in cholesterol, fat, and sodium is recommended for good health. Unless I have been given specific instructions below for another diet, I accept this instruction as my diet prescription.   Other diet: low fat     Special Instructions: None    · Is patient discharged on Warfarin / Coumadin?   No     Depression / Suicide Risk    As you are discharged from this [facility] facility, it is important to learn how to keep safe from harming yourself.    Recognize the warning signs:  · Abrupt changes in personality, positive or negative- including increase in energy   · Giving away possessions  · Change in eating patterns- significant weight changes-  positive or negative  · Change in sleeping patterns- unable to sleep or sleeping all the time   · Unwillingness or inability to communicate  · Depression  · Unusual sadness, discouragement and loneliness  · Talk of wanting to die  · Neglect of personal appearance   · Rebelliousness- reckless behavior  · Withdrawal from people/activities they love  · Confusion- inability to concentrate     If you or a loved one observes any of these behaviors or has concerns about self-harm, here's what you can do:  · Talk about it- your feelings and reasons for harming yourself  · Remove any means that you might use to hurt yourself (examples: pills, rope, extension cords, firearm)  · Get professional help from the community (Mental Health, Substance Abuse, psychological  counseling)  · Do not be alone:Call your Safe Contact- someone whom you trust who will be there for you.  · Call your local CRISIS HOTLINE 523-3842 or 019-081-6107  · Call your local Children's Mobile Crisis Response Team [place] (405) 378-3698 or www.eTruck  · Call the toll free National Suicide Prevention Hotlines   · National Suicide Prevention Lifeline 454-362-LWZE (0096)  · National Hope Line Network 800-SUICIDE (627-1913)

## 2020-07-07 NOTE — PROGRESS NOTES
Community Health Worker Intake    • Contact information provided to Chanelle Briseyda Ortiz   • Scheduled Food Delivery: Wednesday, July 8  • Outpatient assessment completed.  • Did the patient receive medications post discharge: Yes    CHW Parrish reached out to pt via TC to f/u after d/c. Pt indicated she has not yet picked up medications from [facility] that she was prescribed but will pick them up. She still has not received a call back from PCP's office, but indicated that she will get established with [facility] instead since they can provide her with transportation. Pt mentioned calling [facility] and leaving a VM to call back. Hospital , Ruthie, also called and left a VM to call pt back. CHW was also able to complete online application with pt for [facility] and wrote food Rx for food bank assistance. Pt denied speaking with CCM RN or pharmacists due to no current questions about her health or medications.    Plan: CHW will deliver food bag to pt on Wednesday along with physical [facility] application. CHW will f/u with pt later this week to make sure she received food bag and has any questions regarding [facility] application.

## 2020-07-08 ENCOUNTER — PATIENT OUTREACH (OUTPATIENT)
Dept: HEALTH INFORMATION MANAGEMENT | Facility: OTHER | Age: 45
End: 2020-07-08

## 2020-07-08 NOTE — PROGRESS NOTES
LY Senior reached out to pt via TC for another f/u call. Pt indicated that she received the food bags today from [facility]. CHW notified pt that she can come by [address] [place] [address] once a week if needed to pick-up food or to the other 6 locations listed on food Rx. Pt mentioned receiving a call back from [employer] to get established with a PCP and is now a new pt at the clinic. She has picked up her medications from [facility] and said she has not experienced any negative side-effects. She denied speaking with CCM RN or pharmacists due to having no questions regarding her health or medications at the moment. Pt indicated not needing additional resources or services at this time but will call CCM in the future if needed. CHW will d/c pt from [facility] services due to all goals met 7/8.

## 2020-07-22 ENCOUNTER — APPOINTMENT (OUTPATIENT)
Dept: RADIOLOGY | Facility: MEDICAL CENTER | Age: 45
End: 2020-07-22
Attending: EMERGENCY MEDICINE
Payer: MEDICAID

## 2020-07-22 ENCOUNTER — TELEPHONE (OUTPATIENT)
Dept: PHARMACY | Facility: MEDICAL CENTER | Age: 45
End: 2020-07-22

## 2020-07-22 ENCOUNTER — HOSPITAL ENCOUNTER (EMERGENCY)
Facility: MEDICAL CENTER | Age: 45
End: 2020-07-22
Attending: EMERGENCY MEDICINE
Payer: MEDICAID

## 2020-07-22 VITALS
HEART RATE: 99 BPM | RESPIRATION RATE: 20 BRPM | BODY MASS INDEX: 50.02 KG/M2 | OXYGEN SATURATION: 96 % | HEIGHT: 64 IN | DIASTOLIC BLOOD PRESSURE: 78 MMHG | TEMPERATURE: 98.6 F | SYSTOLIC BLOOD PRESSURE: 158 MMHG | WEIGHT: 293 LBS

## 2020-07-22 DIAGNOSIS — R05.9 COUGH: ICD-10-CM

## 2020-07-22 DIAGNOSIS — J18.9 COMMUNITY ACQUIRED PNEUMONIA, UNSPECIFIED LATERALITY: ICD-10-CM

## 2020-07-22 DIAGNOSIS — Z20.822 SUSPECTED COVID-19 VIRUS INFECTION: ICD-10-CM

## 2020-07-22 LAB
ALBUMIN SERPL BCP-MCNC: 3.8 G/DL (ref 3.2–4.9)
ALBUMIN/GLOB SERPL: 1 G/DL
ALP SERPL-CCNC: 121 U/L (ref 30–99)
ALT SERPL-CCNC: 40 U/L (ref 2–50)
ANION GAP SERPL CALC-SCNC: 16 MMOL/L (ref 7–16)
APPEARANCE UR: ABNORMAL
AST SERPL-CCNC: 29 U/L (ref 12–45)
BACTERIA #/AREA URNS HPF: NEGATIVE /HPF
BASOPHILS # BLD AUTO: 0.3 % (ref 0–1.8)
BASOPHILS # BLD: 0.02 K/UL (ref 0–0.12)
BILIRUB SERPL-MCNC: 0.2 MG/DL (ref 0.1–1.5)
BILIRUB UR QL STRIP.AUTO: NEGATIVE
BUN SERPL-MCNC: 14 MG/DL (ref 8–22)
CALCIUM SERPL-MCNC: 8.6 MG/DL (ref 8.5–10.5)
CHLORIDE SERPL-SCNC: 99 MMOL/L (ref 96–112)
CO2 SERPL-SCNC: 21 MMOL/L (ref 20–33)
COLOR UR: YELLOW
COVID ORDER STATUS COVID19: NORMAL
CREAT SERPL-MCNC: 0.6 MG/DL (ref 0.5–1.4)
EKG IMPRESSION: NORMAL
EOSINOPHIL # BLD AUTO: 0.18 K/UL (ref 0–0.51)
EOSINOPHIL NFR BLD: 3 % (ref 0–6.9)
EPI CELLS #/AREA URNS HPF: ABNORMAL /HPF
ERYTHROCYTE [DISTWIDTH] IN BLOOD BY AUTOMATED COUNT: 41.6 FL (ref 35.9–50)
GLOBULIN SER CALC-MCNC: 3.9 G/DL (ref 1.9–3.5)
GLUCOSE SERPL-MCNC: 223 MG/DL (ref 65–99)
GLUCOSE UR STRIP.AUTO-MCNC: 250 MG/DL
HCG SERPL QL: NEGATIVE
HCT VFR BLD AUTO: 42.7 % (ref 37–47)
HGB BLD-MCNC: 13.2 G/DL (ref 12–16)
HYALINE CASTS #/AREA URNS LPF: ABNORMAL /LPF
IMM GRANULOCYTES # BLD AUTO: 0.03 K/UL (ref 0–0.11)
IMM GRANULOCYTES NFR BLD AUTO: 0.5 % (ref 0–0.9)
KETONES UR STRIP.AUTO-MCNC: NEGATIVE MG/DL
LEUKOCYTE ESTERASE UR QL STRIP.AUTO: NEGATIVE
LIPASE SERPL-CCNC: 26 U/L (ref 11–82)
LYMPHOCYTES # BLD AUTO: 1.55 K/UL (ref 1–4.8)
LYMPHOCYTES NFR BLD: 25.9 % (ref 22–41)
MCH RBC QN AUTO: 24.7 PG (ref 27–33)
MCHC RBC AUTO-ENTMCNC: 30.9 G/DL (ref 33.6–35)
MCV RBC AUTO: 79.8 FL (ref 81.4–97.8)
MICRO URNS: ABNORMAL
MONOCYTES # BLD AUTO: 0.61 K/UL (ref 0–0.85)
MONOCYTES NFR BLD AUTO: 10.2 % (ref 0–13.4)
NEUTROPHILS # BLD AUTO: 3.6 K/UL (ref 2–7.15)
NEUTROPHILS NFR BLD: 60.1 % (ref 44–72)
NITRITE UR QL STRIP.AUTO: NEGATIVE
NRBC # BLD AUTO: 0 K/UL
NRBC BLD-RTO: 0 /100 WBC
PH UR STRIP.AUTO: 5 [PH] (ref 5–8)
PLATELET # BLD AUTO: 182 K/UL (ref 164–446)
PMV BLD AUTO: 11.4 FL (ref 9–12.9)
POTASSIUM SERPL-SCNC: 3.8 MMOL/L (ref 3.6–5.5)
PROT SERPL-MCNC: 7.7 G/DL (ref 6–8.2)
PROT UR QL STRIP: 30 MG/DL
RBC # BLD AUTO: 5.35 M/UL (ref 4.2–5.4)
RBC # URNS HPF: >150 /HPF
RBC UR QL AUTO: ABNORMAL
SARS-COV-2 RNA RESP QL NAA+PROBE: DETECTED
SODIUM SERPL-SCNC: 136 MMOL/L (ref 135–145)
SP GR UR STRIP.AUTO: 1.03
SPECIMEN SOURCE: ABNORMAL
UROBILINOGEN UR STRIP.AUTO-MCNC: 0.2 MG/DL
WBC # BLD AUTO: 6 K/UL (ref 4.8–10.8)
WBC #/AREA URNS HPF: ABNORMAL /HPF

## 2020-07-22 PROCEDURE — 71045 X-RAY EXAM CHEST 1 VIEW: CPT

## 2020-07-22 PROCEDURE — C9803 HOPD COVID-19 SPEC COLLECT: HCPCS | Performed by: EMERGENCY MEDICINE

## 2020-07-22 PROCEDURE — 99284 EMERGENCY DEPT VISIT MOD MDM: CPT

## 2020-07-22 PROCEDURE — 85025 COMPLETE CBC W/AUTO DIFF WBC: CPT

## 2020-07-22 PROCEDURE — 84703 CHORIONIC GONADOTROPIN ASSAY: CPT

## 2020-07-22 PROCEDURE — 80053 COMPREHEN METABOLIC PANEL: CPT

## 2020-07-22 PROCEDURE — 36415 COLL VENOUS BLD VENIPUNCTURE: CPT

## 2020-07-22 PROCEDURE — 81001 URINALYSIS AUTO W/SCOPE: CPT

## 2020-07-22 PROCEDURE — 74176 CT ABD & PELVIS W/O CONTRAST: CPT

## 2020-07-22 PROCEDURE — 83690 ASSAY OF LIPASE: CPT

## 2020-07-22 PROCEDURE — 94640 AIRWAY INHALATION TREATMENT: CPT

## 2020-07-22 PROCEDURE — 93005 ELECTROCARDIOGRAM TRACING: CPT | Performed by: EMERGENCY MEDICINE

## 2020-07-22 PROCEDURE — 700105 HCHG RX REV CODE 258: Performed by: EMERGENCY MEDICINE

## 2020-07-22 PROCEDURE — 700102 HCHG RX REV CODE 250 W/ 637 OVERRIDE(OP): Performed by: EMERGENCY MEDICINE

## 2020-07-22 PROCEDURE — 93005 ELECTROCARDIOGRAM TRACING: CPT

## 2020-07-22 PROCEDURE — A9270 NON-COVERED ITEM OR SERVICE: HCPCS | Performed by: EMERGENCY MEDICINE

## 2020-07-22 PROCEDURE — U0003 INFECTIOUS AGENT DETECTION BY NUCLEIC ACID (DNA OR RNA); SEVERE ACUTE RESPIRATORY SYNDROME CORONAVIRUS 2 (SARS-COV-2) (CORONAVIRUS DISEASE [COVID-19]), AMPLIFIED PROBE TECHNIQUE, MAKING USE OF HIGH THROUGHPUT TECHNOLOGIES AS DESCRIBED BY CMS-2020-01-R: HCPCS

## 2020-07-22 RX ORDER — CEFDINIR 300 MG/1
300 CAPSULE ORAL 2 TIMES DAILY
Qty: 20 CAP | Refills: 0 | Status: SHIPPED | OUTPATIENT
Start: 2020-07-22 | End: 2020-12-18

## 2020-07-22 RX ORDER — CEFDINIR 300 MG/1
300 CAPSULE ORAL 2 TIMES DAILY
Qty: 20 CAP | Refills: 0 | Status: SHIPPED | OUTPATIENT
Start: 2020-07-22 | End: 2020-07-22 | Stop reason: SDUPTHER

## 2020-07-22 RX ORDER — ALBUTEROL SULFATE 90 UG/1
2 AEROSOL, METERED RESPIRATORY (INHALATION) ONCE
Status: COMPLETED | OUTPATIENT
Start: 2020-07-22 | End: 2020-07-22

## 2020-07-22 RX ORDER — ALBUTEROL SULFATE 90 UG/1
2 AEROSOL, METERED RESPIRATORY (INHALATION) EVERY 6 HOURS PRN
Qty: 8.5 G | Refills: 0 | Status: SHIPPED | OUTPATIENT
Start: 2020-07-22

## 2020-07-22 RX ORDER — AZITHROMYCIN 250 MG/1
500 TABLET, FILM COATED ORAL ONCE
Status: COMPLETED | OUTPATIENT
Start: 2020-07-22 | End: 2020-07-22

## 2020-07-22 RX ORDER — AZITHROMYCIN 500 MG/1
500 TABLET, FILM COATED ORAL DAILY
Qty: 5 TAB | Refills: 0 | Status: SHIPPED | OUTPATIENT
Start: 2020-07-22 | End: 2020-07-27

## 2020-07-22 RX ORDER — AZITHROMYCIN 500 MG/1
500 TABLET, FILM COATED ORAL DAILY
Qty: 5 TAB | Refills: 0 | Status: SHIPPED | OUTPATIENT
Start: 2020-07-22 | End: 2020-07-22 | Stop reason: SDUPTHER

## 2020-07-22 RX ORDER — CEFDINIR 300 MG/1
300 CAPSULE ORAL ONCE
Status: COMPLETED | OUTPATIENT
Start: 2020-07-22 | End: 2020-07-22

## 2020-07-22 RX ORDER — SODIUM CHLORIDE 9 MG/ML
1000 INJECTION, SOLUTION INTRAVENOUS ONCE
Status: COMPLETED | OUTPATIENT
Start: 2020-07-22 | End: 2020-07-22

## 2020-07-22 RX ORDER — ALBUTEROL SULFATE 90 UG/1
2 AEROSOL, METERED RESPIRATORY (INHALATION) EVERY 6 HOURS PRN
Qty: 8.5 G | Refills: 0 | Status: SHIPPED | OUTPATIENT
Start: 2020-07-22 | End: 2020-07-22 | Stop reason: SDUPTHER

## 2020-07-22 RX ADMIN — ALBUTEROL SULFATE 2 PUFF: 90 AEROSOL, METERED RESPIRATORY (INHALATION) at 04:09

## 2020-07-22 RX ADMIN — CEFDINIR 300 MG: 300 CAPSULE ORAL at 06:00

## 2020-07-22 RX ADMIN — SODIUM CHLORIDE 1000 ML: 9 INJECTION, SOLUTION INTRAVENOUS at 04:15

## 2020-07-22 RX ADMIN — AZITHROMYCIN MONOHYDRATE 500 MG: 250 TABLET ORAL at 06:00

## 2020-07-22 ASSESSMENT — ENCOUNTER SYMPTOMS
NAUSEA: 0
COUGH: 1
DIZZINESS: 1
VOMITING: 0
ABDOMINAL PAIN: 0
FEVER: 0

## 2020-07-22 ASSESSMENT — FIBROSIS 4 INDEX: FIB4 SCORE: 0.89

## 2020-07-22 NOTE — ED TRIAGE NOTES
"Chief Complaint   Patient presents with   • Cough     Exposure to covid 19. Reports body aches and cough past 2 days.     /89   Pulse (!) 113   Temp 37.1 °C (98.7 °F) (Temporal)   Resp 20   Ht 1.626 m (5' 4\")   Wt (!) 136.5 kg (301 lb)   SpO2 97%   BMI 51.67 kg/m²     PT to ER for above complaint. Educated on triage process. Encouraged to alert staff to changes.  "

## 2020-07-22 NOTE — ED PROVIDER NOTES
ED Provider Note    Scribed for Alix Valdez M.D. by Brent Quiroz. 7/22/2020, 3:47 AM.    Primary care provider: Mohsen Tamasaby, M.D.  Means of arrival: Walk in   History obtained from: Patient  History limited by: None    CHIEF COMPLAINT  Chief Complaint   Patient presents with   • Cough     Exposure to covid 19. Reports body aches and cough past 2 days.       HPI  Chanelle Ortiz is a 44 y.o. female who presents to the Emergency Department with a mild cough acute onset two days ago. In addition to her cough, she reports accompanying feeling unwell, lightheadedness and body aches. Per the patient, her daughter is in the ICU with COVID-19. She states that she has been isolating. The patient reports no alleviating factors.  She also reports some dysuria although states she is starting her period and is unsure if this is related.    PPE Note: I personally donned full PPE for all patient encounters during this visit, including wearing an N95 respirator mask, gloves, and eye protection. Scribe remained outside the patient's room and did not have any contact with the patient for the duration of patient encounter.        REVIEW OF SYSTEMS  Review of Systems   Constitutional: Positive for malaise/fatigue. Negative for fever.   Respiratory: Positive for cough.    Gastrointestinal: Negative for abdominal pain, nausea and vomiting.   Genitourinary: Positive for dysuria.   Musculoskeletal:        Body aches     Neurological: Positive for dizziness.   All other systems reviewed and are negative.        PAST MEDICAL HISTORY   has a past medical history of Anxiety and depression (2016), Arrhythmia, Asthma, Back pain, Diabetes (2008), H/O Hypertension - resolved, Hx of Bronchitis, Hyperthyroidism (2008), Hypothyroidism, after radioactive ablation of thyroid (2008), POLLY on CPAP (2018), Pneumonia, and Psychiatric problem - unspecified.    SURGICAL HISTORY   has a past surgical history that includes other abdominal  "surgery (2002); gyn surgery (2004); other; primary c section; ovarian cystectomy (1992); cholecystectomy; and primary c section.    SOCIAL HISTORY  Social History     Tobacco Use   • Smoking status: Never Smoker   • Smokeless tobacco: Never Used   Substance Use Topics   • Alcohol use: No   • Drug use: No      Social History     Substance and Sexual Activity   Drug Use No       FAMILY HISTORY  Family History   Problem Relation Age of Onset   • Other Mother         sleep apnea   • Heart Failure Sister    • Diabetes Sister    • Heart Disease Sister    • Hypertension Sister    • Stroke Sister    • Hyperlipidemia Sister    • Heart Attack Maternal Grandfather    • Diabetes Daughter    • Psychiatric Illness Daughter         bipolar   • Psychiatric Illness Son         autism       CURRENT MEDICATIONS  Current Outpatient Medications   Medication Instructions   • famotidine (PEPCID) 20 mg, Oral, 2 TIMES DAILY   • levothyroxine (SYNTHROID) 200 mcg, Oral, EACH MORNING ON EMPTY STOMACH   • metFORMIN (GLUCOPHAGE) 500 mg, Oral, DAILY       ALLERGIES  Allergies   Allergen Reactions   • Morphine      \"I think\" \"I dunno what my reaction was, the nurse just said my oxygen levels were going way to low on it\"        PHYSICAL EXAM  VITAL SIGNS: /85   Pulse (!) 115   Temp 37.1 °C (98.7 °F) (Temporal)   Resp 20   Ht 1.626 m (5' 4\")   Wt (!) 136.5 kg (301 lb)   SpO2 94%   BMI 51.67 kg/m²   Vitals reviewed by myself.  Physical Exam  Nursing note and vitals reviewed.  Constitutional: Well-developed and well-nourished. No acute distress.   HENT: Head is normocephalic and atraumatic.  Eyes: extra-ocular movements intact  Cardiovascular: Tachycardic rate and regular rhythm. No murmur heard.  Pulmonary/Chest: Breath sounds normal. No wheezes or rales.   Abdominal: Soft and non-tender. No distention.    Musculoskeletal: Extremities exhibit normal range of motion without edema or tenderness.   Neurological: Awake and alert  Skin: Skin " is warm and dry. No rash.     DIAGNOSTIC STUDIES /  LABS  Labs Reviewed   CBC WITH DIFFERENTIAL - Abnormal; Notable for the following components:       Result Value    MCV 79.8 (*)     MCH 24.7 (*)     MCHC 30.9 (*)     All other components within normal limits   COMP METABOLIC PANEL - Abnormal; Notable for the following components:    Glucose 223 (*)     Alkaline Phosphatase 121 (*)     Globulin 3.9 (*)     All other components within normal limits   URINALYSIS,CULTURE IF INDICATED - Abnormal; Notable for the following components:    Character Cloudy (*)     Glucose 250 (*)     Protein 30 (*)     Occult Blood Large (*)     All other components within normal limits   URINE MICROSCOPIC (W/UA) - Abnormal; Notable for the following components:    WBC 5-10 (*)     RBC >150 (*)     All other components within normal limits   LIPASE   COVID/SARS COV-2    Narrative:     Is patient being admitted?->No  Expected turn around time?->Routine (In-House PCR up to 24  hours)   HCG QUAL SERUM   ESTIMATED GFR   SARS-COV-2, PCR (IN-HOUSE)    Narrative:     Is patient being admitted?->No  Expected turn around time?->Routine (In-House PCR up to 24  hours)       All labs reviewed by me.    EKG Interpretation:  Interpreted by myself    12 Lead EKG interpreted by me to show:  EKG at 3:34 AM: Normal sinus tachycardia, heart rate 112, normal axis, normal intervals, , QRS 80, QTc 443, no acute ST-T segment changes, no dynamic changes when compared to prior EKG from 7/6/2020, no acute arrhythmia or ischemia  My impression of this EKG: Does not indicate ischemia or arrhythmia at this time.    RADIOLOGY  DX-CHEST-PORTABLE (1 VIEW)   Final Result         1.  No acute cardiopulmonary disease.      CT-RENAL COLIC EVALUATION(A/P W/O)   Final Result         1.  Bilateral lower lobe infiltrates, greater on the left.   2.  Hepatomegaly        The radiologist's interpretation of all radiological studies have been reviewed by  me.      REASSESSMENT    3:47 AM Patient presents to the ED with cough and body aches onset two days ago. Patient will be treated with IV fluids and an albuterol inhaler. Ordered for labs and imaging to evaluate her symptoms.     4:02 AM The patient's UA is concerning for blood, I ordered CT-renal colic to evaluate her symptoms     5:30 AM Patient was reevaluated at bedside. Discussed lab and radiology results with the patient and informed them of my plan to discharge her. I have prescribed her albuterol, azithromycin, and cefdinir.The patient understands and agrees to the plan of care.      HYDRATION: Based on the patient's presentation of Tachycardia the patient was given IV fluids. IV Hydration was used because oral hydration was not adequate alone. Upon recheck following hydration, the patient was improved.      COURSE & MEDICAL DECISION MAKING  Nursing notes, VS, PMSFHx reviewed in chart.    Patient is a 44-year-old female who comes in for evaluation of cough and body aches.  Differential diagnosis includes viral syndrome, COVID-19, upper respiratory infection, pneumonia, electrolyte disturbance.  Diagnostic work-up includes labs and chest x-ray.  Of note given her shortness of breath screening EKG was done in triage which was negative for acute arrhythmia or ischemia.  Patient is also complaining of dysuria and therefore I will obtain a urinalysis to assess for UTI.    Patient is initial vitals are notable for tachycardia, she appears uncomfortable on exam.  She was treated with albuterol and IV fluids after which she feels greatly improved and tachycardia resolves.  Labs returned and are unremarkable except for mildly elevated glucose.  Chest x-ray demonstrates no evidence of pneumonia.  Urinalysis demonstrates blood in the urine, this is likely from her menstrual cycle, although given her dysuria I will obtain a CT renal CT to assess for kidney stones.  There is no signs of infection on the urinalysis.  CT  returns and demonstrates no evidence of nephrolithiasis, however patient does have bilateral lower lobe infiltrates, this is likely consistent with viral pneumonia from COVID-19 given her recent exposure to her daughter who has COVID-19 however community-acquired pneumonia cannot be ruled out as COVID test is still pending and therefore patient will be started on azithromycin and cefdinir.  As patient's tachycardia has resolved and she is not hypoxic I feel that she is safe for discharge.  I discussed all the findings with patient and she is agreeable to this plan.  She understands if her dyspnea worsens she should return immediately to the emergency department.  She is provided with prescriptions for albuterol, Cefdinir and azithromycin and instructed on self quarantine.  She has been given strict return precautions and discharged in stable condition.    The patient will return for new or worsening symptoms and is stable at the time of discharge.    The patient is referred to a primary physician for blood pressure management, diabetic screening, and for all other preventative health concerns.    DISPOSITION:  Patient will be discharged home in stable condition.    FOLLOW UP:  Mohsen Tamasaby, M.D.  [address] [place] [id number] [phone]            OUTPATIENT MEDICATIONS:  New Prescriptions    ALBUTEROL 108 (90 BASE) MCG/ACT AERO SOLN INHALATION AEROSOL    Inhale 2 Puffs by mouth every 6 hours as needed for Shortness of Breath.    AZITHROMYCIN (ZITHROMAX) 500 MG TABLET    Take 1 Tab by mouth every day for 5 days.    CEFDINIR (OMNICEF) 300 MG CAP    Take 1 Cap by mouth 2 times a day.         FINAL IMPRESSION  1. Community acquired pneumonia, unspecified laterality    2. Suspected COVID-19 virus infection    3. Cough          IBrent (Scrcallum), am scribing for, and in the presence of, Alix Valdez M.D..    Electronically signed by: Brent Quiroz (Dede), 7/22/2020    Alix FRANCO  ARCELIA Valdez. personally performed the services described in this documentation, as scribed by Brent Quiroz in my presence, and it is both accurate and complete. C    The note accurately reflects work and decisions made by me.  Alix Valdez M.D.  7/22/2020  6:09 AM

## 2020-07-22 NOTE — ED NOTES
Pt given discharge instructions and instructed on self quarantine. Medication education provided. Pt aware not to drive after taking narcotics. PIV removed, dressing applied. Signed copy in chart. Pt states all belongings in possession. Pt ambulatory off unit with steady gait.

## 2020-07-22 NOTE — ED NOTES
COVID-19 Test Follow-Up  07/22/20    Patient is positive for COVID-19.       7/22/2020 03:58   SARS-CoV-2 by PCR DETECTED (AA)     I have informed the patient of the positive result by phone and that the Health Dept would be in contact soon. Instructed them to continue to quarantine and self-isolate according with the CDC guidance or as otherwise directed by the Health Dept.    Per the CDC, she should continue to quarantine until: At least 3 days (72 hours) have passed since recovery defined as resolution of fever without the use of fever-reducing medications and improvement in respiratory symptoms (e.g., cough, shortness of breath); and, At least 10 days have passed since symptoms first appeared.  She is advised to return to the ER for worsening symptoms including difficulty breathing, ongoing fever, weakness or chest pain.  She has also lost her prescriptions, I will send them in electronically to the [facility] on [address].     Antonieta Cunningham, PharmD

## 2020-09-12 ENCOUNTER — HOSPITAL ENCOUNTER (EMERGENCY)
Facility: MEDICAL CENTER | Age: 45
End: 2020-09-12
Attending: EMERGENCY MEDICINE
Payer: MEDICAID

## 2020-09-12 ENCOUNTER — APPOINTMENT (OUTPATIENT)
Dept: RADIOLOGY | Facility: MEDICAL CENTER | Age: 45
End: 2020-09-12
Attending: EMERGENCY MEDICINE
Payer: MEDICAID

## 2020-09-12 VITALS
WEIGHT: 293 LBS | DIASTOLIC BLOOD PRESSURE: 71 MMHG | RESPIRATION RATE: 18 BRPM | OXYGEN SATURATION: 94 % | SYSTOLIC BLOOD PRESSURE: 128 MMHG | TEMPERATURE: 97.4 F | HEART RATE: 83 BPM | BODY MASS INDEX: 51.67 KG/M2

## 2020-09-12 DIAGNOSIS — R07.89 ATYPICAL CHEST PAIN: ICD-10-CM

## 2020-09-12 LAB
ALBUMIN SERPL BCP-MCNC: 4 G/DL (ref 3.2–4.9)
ALBUMIN/GLOB SERPL: 1.2 G/DL
ALP SERPL-CCNC: 85 U/L (ref 30–99)
ALT SERPL-CCNC: 24 U/L (ref 2–50)
ANION GAP SERPL CALC-SCNC: 14 MMOL/L (ref 7–16)
ANISOCYTOSIS BLD QL SMEAR: ABNORMAL
AST SERPL-CCNC: 20 U/L (ref 12–45)
BASOPHILS # BLD AUTO: 0.8 % (ref 0–1.8)
BASOPHILS # BLD: 0.08 K/UL (ref 0–0.12)
BILIRUB SERPL-MCNC: 0.3 MG/DL (ref 0.1–1.5)
BUN SERPL-MCNC: 13 MG/DL (ref 8–22)
CALCIUM SERPL-MCNC: 8.8 MG/DL (ref 8.5–10.5)
CHLORIDE SERPL-SCNC: 100 MMOL/L (ref 96–112)
CO2 SERPL-SCNC: 24 MMOL/L (ref 20–33)
COMMENT 1642: NORMAL
CREAT SERPL-MCNC: 0.7 MG/DL (ref 0.5–1.4)
EKG IMPRESSION: NORMAL
EOSINOPHIL # BLD AUTO: 0.15 K/UL (ref 0–0.51)
EOSINOPHIL NFR BLD: 1.5 % (ref 0–6.9)
ERYTHROCYTE [DISTWIDTH] IN BLOOD BY AUTOMATED COUNT: 42.5 FL (ref 35.9–50)
GLOBULIN SER CALC-MCNC: 3.3 G/DL (ref 1.9–3.5)
GLUCOSE SERPL-MCNC: 126 MG/DL (ref 65–99)
HCT VFR BLD AUTO: 38.4 % (ref 37–47)
HGB BLD-MCNC: 11.2 G/DL (ref 12–16)
HYPOCHROMIA BLD QL SMEAR: ABNORMAL
IMM GRANULOCYTES # BLD AUTO: 0.04 K/UL (ref 0–0.11)
IMM GRANULOCYTES NFR BLD AUTO: 0.4 % (ref 0–0.9)
LYMPHOCYTES # BLD AUTO: 2.54 K/UL (ref 1–4.8)
LYMPHOCYTES NFR BLD: 26.1 % (ref 22–41)
MCH RBC QN AUTO: 22.1 PG (ref 27–33)
MCHC RBC AUTO-ENTMCNC: 29.2 G/DL (ref 33.6–35)
MCV RBC AUTO: 75.7 FL (ref 81.4–97.8)
MICROCYTES BLD QL SMEAR: ABNORMAL
MONOCYTES # BLD AUTO: 0.79 K/UL (ref 0–0.85)
MONOCYTES NFR BLD AUTO: 8.1 % (ref 0–13.4)
MORPHOLOGY BLD-IMP: NORMAL
NEUTROPHILS # BLD AUTO: 6.13 K/UL (ref 2–7.15)
NEUTROPHILS NFR BLD: 63.1 % (ref 44–72)
NRBC # BLD AUTO: 0 K/UL
NRBC BLD-RTO: 0 /100 WBC
OVALOCYTES BLD QL SMEAR: NORMAL
PLATELET # BLD AUTO: 234 K/UL (ref 164–446)
PLATELET BLD QL SMEAR: NORMAL
PMV BLD AUTO: 11.1 FL (ref 9–12.9)
POIKILOCYTOSIS BLD QL SMEAR: NORMAL
POLYCHROMASIA BLD QL SMEAR: NORMAL
POTASSIUM SERPL-SCNC: 3.8 MMOL/L (ref 3.6–5.5)
PROT SERPL-MCNC: 7.3 G/DL (ref 6–8.2)
RBC # BLD AUTO: 5.07 M/UL (ref 4.2–5.4)
RBC BLD AUTO: PRESENT
SODIUM SERPL-SCNC: 138 MMOL/L (ref 135–145)
TROPONIN T SERPL-MCNC: <6 NG/L (ref 6–19)
WBC # BLD AUTO: 9.7 K/UL (ref 4.8–10.8)

## 2020-09-12 PROCEDURE — 84484 ASSAY OF TROPONIN QUANT: CPT

## 2020-09-12 PROCEDURE — 74177 CT ABD & PELVIS W/CONTRAST: CPT

## 2020-09-12 PROCEDURE — 80053 COMPREHEN METABOLIC PANEL: CPT

## 2020-09-12 PROCEDURE — 99284 EMERGENCY DEPT VISIT MOD MDM: CPT

## 2020-09-12 PROCEDURE — 700117 HCHG RX CONTRAST REV CODE 255: Performed by: EMERGENCY MEDICINE

## 2020-09-12 PROCEDURE — 93005 ELECTROCARDIOGRAM TRACING: CPT

## 2020-09-12 PROCEDURE — 85025 COMPLETE CBC W/AUTO DIFF WBC: CPT

## 2020-09-12 PROCEDURE — 71045 X-RAY EXAM CHEST 1 VIEW: CPT

## 2020-09-12 PROCEDURE — 93005 ELECTROCARDIOGRAM TRACING: CPT | Performed by: EMERGENCY MEDICINE

## 2020-09-12 PROCEDURE — 71275 CT ANGIOGRAPHY CHEST: CPT

## 2020-09-12 RX ADMIN — IOHEXOL 100 ML: 350 INJECTION, SOLUTION INTRAVENOUS at 17:14

## 2020-09-12 ASSESSMENT — FIBROSIS 4 INDEX: FIB4 SCORE: 1.11

## 2020-09-12 NOTE — ED TRIAGE NOTES
Pt BIB [facility] with c/c of chest pain for approx 2 months. Pt stating she was placed on 2 new medications. Pt did test for COVID 7/22.

## 2020-09-13 NOTE — ED PROVIDER NOTES
ED Provider Note    CHIEF COMPLAINT  Chief Complaint   Patient presents with   • Chest Pain       HPI  Chanelle Ortiz is a 44 y.o. female who presents complaining of chest pain.  The chest pain is over the left lateral chest wall.  Its worse when she takes a deep breath or cough.  Patient states she was started on medication for her diabetes and is wondering if this is caused this.  Patient had COVID 19 back in July and she has had chest pain ever since.  Pain is worse with inspiration or cough.  Patient denies hemoptysis.  Patient also states she is having some general abdominal pain.    REVIEW OF SYSTEMS  See HPI for further details.  No vomiting or diarrhea.  No fever no chills.  All other systems are negative.    PAST MEDICAL HISTORY  Past Medical History:   Diagnosis Date   • Anxiety and depression 2016    after ex went to residential   • Arrhythmia     notes occasional rapid or prominent heart beat, at night    • Asthma    • Back pain    • Diabetes 2008    on and off meds (due to concern about heart beat changes)   • H/O Hypertension - resolved     Patient states prior HTN, but resolved after changes in other medications (but off HTN meds).    • Hx of Bronchitis    • Hyperthyroidism 2008    Treated with Radioactive iodine, at [facility], in 2008   • Hypothyroidism, after radioactive ablation of thyroid 2008   • POLLY on CPAP 2018    gets headaches, if not using CPAP.    • Pneumonia    • Psychiatric problem - unspecified     pt notes anxiety and depression, with counseller - pt unclear on details.         FAMILY HISTORY  Family History   Problem Relation Age of Onset   • Other Mother         sleep apnea   • Heart Failure Sister    • Diabetes Sister    • Heart Disease Sister    • Hypertension Sister    • Stroke Sister    • Hyperlipidemia Sister    • Heart Attack Maternal Grandfather    • Diabetes Daughter    • Psychiatric Illness Daughter         bipolar   • Psychiatric Illness Son         autism       SOCIAL  HISTORY  Social History     Socioeconomic History   • Marital status:      Spouse name: Not on file   • Number of children: Not on file   • Years of education: Not on file   • Highest education level: Not on file   Occupational History   • Not on file   Social Needs   • Financial resource strain: Not very hard   • Food insecurity     Worry: Never true     Inability: Never true   • Transportation needs     Medical: No     Non-medical: No   Tobacco Use   • Smoking status: Never Smoker   • Smokeless tobacco: Never Used   Substance and Sexual Activity   • Alcohol use: No   • Drug use: No   • Sexual activity: Not on file     Comment: .  Tuboligation in .    Lifestyle   • Physical activity     Days per week: Not on file     Minutes per session: Not on file   • Stress: Not on file   Relationships   • Social connections     Talks on phone: Not on file     Gets together: Not on file     Attends Restorationist service: Not on file     Active member of club or organization: Not on file     Attends meetings of clubs or organizations: Not on file     Relationship status: Not on file   • Intimate partner violence     Fear of current or ex partner: Not on file     Emotionally abused: Not on file     Physically abused: Not on file     Forced sexual activity: Not on file   Other Topics Concern   • Not on file   Social History Narrative   • Not on file       SURGICAL HISTORY  Past Surgical History:   Procedure Laterality Date   • GYN SURGERY      tuboligation, bilateral, during past .    • OTHER ABDOMINAL SURGERY      cholesystectomy   • OVARIAN CYSTECTOMY      unknown details or laterality.   • CHOLECYSTECTOMY     • OTHER      tonsillectomy   • PRIMARY C SECTION       - , ,    • PRIMARY C SECTION         CURRENT MEDICATIONS  Home Medications    **Home medications have not yet been reviewed for this encounter**         ALLERGIES  Allergies   Allergen Reactions   • Morphine       "\"I think\" \"I dunno what my reaction was, the nurse just said my oxygen levels were going way to low on it\"        PHYSICAL EXAM  VITAL SIGNS: /70   Pulse 88   Temp 36.3 °C (97.4 °F) (Temporal)   Resp 18   Wt (!) 136.5 kg (301 lb)   SpO2 95%   BMI 51.67 kg/m²   Constitutional: Well developed, Well nourished, anxious  HENT: Normocephalic, Atraumatic, Bilateral external ears normal, Oropharynx moist,  Eyes: CRISTHIAN, EOMI, Conjunctiva normal, No discharge.   Neck: Normal range of motion, No tenderness, Supple, No stridor.   Lymphatic: No lymphadenopathy noted.   Cardiovascular: Regular rate and rhythm without murmur rub or gallop  Thorax & Lungs: Clear without wheezing.  There is tenderness over the left chest wall.  Abdomen: Bowel sounds normal, Soft, mild right lower quadrant tenderness without signs of peritonitis  Skin: Warm, Dry, No erythema, No rash.   Back: No tenderness, No CVA tenderness.   Extremities: No edema, No tenderness, No cyanosis, No clubbing. Dorsalis pedis pulses 2+ equal bilaterally. Radial pulses 2+ equal bilaterally.  Neurologic: Alert & oriented x 3, Normal motor function, Normal sensory function, No focal deficits noted.     EKG  Sinus rhythm.  Normal P waves.  Normal QRS.  Normal ST segments.  Normal T waves.  Borderline EKG without signs of ischemia    RADIOLOGY/PROCEDURES  CT-CTA CHEST PULMONARY ARTERY W/ RECONS   Final Result      1.  No CT evidence of pulmonary embolism.      2.  Opacification in the left lung base could be due to discoid atelectasis or scarring. Developing infiltrate is also possible.      CT-ABDOMEN-PELVIS WITH   Final Result      No acute abnormalities are noted on abdominal CT.   No acute abnormalities are identified on pelvic CT.         DX-CHEST-PORTABLE (1 VIEW)   Final Result      No evidence of acute cardiopulmonary process.            COURSE & MEDICAL DECISION MAKING  Pertinent Labs & Imaging studies reviewed. (See chart for details)  Differential " diagnosis includes pneumothorax, pulmonary embolus, diverticulitis or appendicitis with her abdomen.  CT scans were unremarkable.  Chest x-ray was also clear.  Patient was reassured.  Patient has had chest pain for many days and her troponin is negative.  Patient was reassured and discharged home in stable condition.  I suspect that the consolidation on her CT is likely atelectasis versus resolving pneumonia from her COVID 19 pneumonia.  Patient was discharged home in stable condition      FINAL IMPRESSION  1.   1. Atypical chest pain         2.   3.      Please note that this dictation was created using voice recognition software. I have worked with consultants from the vendor as well as technical experts from [facility] to optimize the interface. I have made every reasonable attempt to correct obvious errors, but I expect that there are errors of grammar and possibly content that I did not discover before finalizing the note.      Electronically signed by: Jose Waldron M.D., 9/12/2020 5:39 PM

## 2020-09-13 NOTE — ED TRIAGE NOTES
Pt discharged home as ordered by erp. Pt instructed to follow up with their PCP and return here as need. Pt verbalized understanding and left ambulating independently

## 2020-10-28 ENCOUNTER — HOSPITAL ENCOUNTER (EMERGENCY)
Facility: MEDICAL CENTER | Age: 45
End: 2020-10-28
Attending: EMERGENCY MEDICINE
Payer: MEDICAID

## 2020-10-28 VITALS
RESPIRATION RATE: 16 BRPM | SYSTOLIC BLOOD PRESSURE: 117 MMHG | DIASTOLIC BLOOD PRESSURE: 71 MMHG | TEMPERATURE: 96.9 F | BODY MASS INDEX: 50.02 KG/M2 | WEIGHT: 293 LBS | HEIGHT: 64 IN | OXYGEN SATURATION: 94 % | HEART RATE: 85 BPM

## 2020-10-28 DIAGNOSIS — K04.7 DENTAL ABSCESS: ICD-10-CM

## 2020-10-28 DIAGNOSIS — R22.0 FACIAL SWELLING: ICD-10-CM

## 2020-10-28 LAB
ALBUMIN SERPL BCP-MCNC: 3.8 G/DL (ref 3.2–4.9)
ALBUMIN/GLOB SERPL: 1 G/DL
ALP SERPL-CCNC: 91 U/L (ref 30–99)
ALT SERPL-CCNC: 26 U/L (ref 2–50)
ANION GAP SERPL CALC-SCNC: 12 MMOL/L (ref 7–16)
AST SERPL-CCNC: 20 U/L (ref 12–45)
BASOPHILS # BLD AUTO: 0.7 % (ref 0–1.8)
BASOPHILS # BLD: 0.07 K/UL (ref 0–0.12)
BILIRUB SERPL-MCNC: 0.3 MG/DL (ref 0.1–1.5)
BUN SERPL-MCNC: 14 MG/DL (ref 8–22)
CALCIUM SERPL-MCNC: 9 MG/DL (ref 8.5–10.5)
CHLORIDE SERPL-SCNC: 102 MMOL/L (ref 96–112)
CO2 SERPL-SCNC: 23 MMOL/L (ref 20–33)
COMMENT 1642: NORMAL
CREAT SERPL-MCNC: 0.5 MG/DL (ref 0.5–1.4)
EOSINOPHIL # BLD AUTO: 0.18 K/UL (ref 0–0.51)
EOSINOPHIL NFR BLD: 1.8 % (ref 0–6.9)
ERYTHROCYTE [DISTWIDTH] IN BLOOD BY AUTOMATED COUNT: 44.5 FL (ref 35.9–50)
GLOBULIN SER CALC-MCNC: 3.8 G/DL (ref 1.9–3.5)
GLUCOSE SERPL-MCNC: 125 MG/DL (ref 65–99)
HCT VFR BLD AUTO: 37.7 % (ref 37–47)
HGB BLD-MCNC: 10.7 G/DL (ref 12–16)
IMM GRANULOCYTES # BLD AUTO: 0.04 K/UL (ref 0–0.11)
IMM GRANULOCYTES NFR BLD AUTO: 0.4 % (ref 0–0.9)
LYMPHOCYTES # BLD AUTO: 1.98 K/UL (ref 1–4.8)
LYMPHOCYTES NFR BLD: 19.5 % (ref 22–41)
MCH RBC QN AUTO: 20.9 PG (ref 27–33)
MCHC RBC AUTO-ENTMCNC: 28.4 G/DL (ref 33.6–35)
MCV RBC AUTO: 73.5 FL (ref 81.4–97.8)
MICROCYTES BLD QL SMEAR: ABNORMAL
MONOCYTES # BLD AUTO: 0.62 K/UL (ref 0–0.85)
MONOCYTES NFR BLD AUTO: 6.1 % (ref 0–13.4)
MORPHOLOGY BLD-IMP: NORMAL
NEUTROPHILS # BLD AUTO: 7.26 K/UL (ref 2–7.15)
NEUTROPHILS NFR BLD: 71.5 % (ref 44–72)
NRBC # BLD AUTO: 0 K/UL
NRBC BLD-RTO: 0 /100 WBC
PLATELET # BLD AUTO: 286 K/UL (ref 164–446)
PLATELET BLD QL SMEAR: NORMAL
PMV BLD AUTO: 10.8 FL (ref 9–12.9)
POIKILOCYTOSIS BLD QL SMEAR: NORMAL
POLYCHROMASIA BLD QL SMEAR: NORMAL
POTASSIUM SERPL-SCNC: 4 MMOL/L (ref 3.6–5.5)
PROT SERPL-MCNC: 7.6 G/DL (ref 6–8.2)
RBC # BLD AUTO: 5.13 M/UL (ref 4.2–5.4)
RBC BLD AUTO: PRESENT
SCHISTOCYTES BLD QL SMEAR: NORMAL
SODIUM SERPL-SCNC: 137 MMOL/L (ref 135–145)
WBC # BLD AUTO: 10.2 K/UL (ref 4.8–10.8)

## 2020-10-28 PROCEDURE — 99284 EMERGENCY DEPT VISIT MOD MDM: CPT

## 2020-10-28 PROCEDURE — 85025 COMPLETE CBC W/AUTO DIFF WBC: CPT

## 2020-10-28 PROCEDURE — 96365 THER/PROPH/DIAG IV INF INIT: CPT

## 2020-10-28 PROCEDURE — 700105 HCHG RX REV CODE 258: Performed by: EMERGENCY MEDICINE

## 2020-10-28 PROCEDURE — 80053 COMPREHEN METABOLIC PANEL: CPT

## 2020-10-28 PROCEDURE — 700111 HCHG RX REV CODE 636 W/ 250 OVERRIDE (IP): Performed by: EMERGENCY MEDICINE

## 2020-10-28 PROCEDURE — 96375 TX/PRO/DX INJ NEW DRUG ADDON: CPT

## 2020-10-28 RX ORDER — KETOROLAC TROMETHAMINE 30 MG/ML
30 INJECTION, SOLUTION INTRAMUSCULAR; INTRAVENOUS ONCE
Status: COMPLETED | OUTPATIENT
Start: 2020-10-28 | End: 2020-10-28

## 2020-10-28 RX ORDER — AMOXICILLIN AND CLAVULANATE POTASSIUM 875; 125 MG/1; MG/1
1 TABLET, FILM COATED ORAL 2 TIMES DAILY
Qty: 14 TAB | Refills: 0 | Status: SHIPPED | OUTPATIENT
Start: 2020-10-28 | End: 2020-11-04

## 2020-10-28 RX ORDER — EMPAGLIFLOZIN 10 MG/1
10 TABLET, FILM COATED ORAL DAILY
COMMUNITY
End: 2021-02-11

## 2020-10-28 RX ORDER — AMOXICILLIN AND CLAVULANATE POTASSIUM 875; 125 MG/1; MG/1
1 TABLET, FILM COATED ORAL 2 TIMES DAILY
Qty: 14 TAB | Refills: 0 | Status: SHIPPED | OUTPATIENT
Start: 2020-10-28 | End: 2020-10-28 | Stop reason: SDUPTHER

## 2020-10-28 RX ORDER — LISINOPRIL 10 MG/1
10 TABLET ORAL DAILY
COMMUNITY
End: 2020-12-08

## 2020-10-28 RX ORDER — HYDROCODONE BITARTRATE AND ACETAMINOPHEN 5; 325 MG/1; MG/1
1 TABLET ORAL EVERY 8 HOURS PRN
Qty: 12 TAB | Refills: 0 | Status: SHIPPED | OUTPATIENT
Start: 2020-10-28 | End: 2020-10-31

## 2020-10-28 RX ADMIN — KETOROLAC TROMETHAMINE 30 MG: 30 INJECTION, SOLUTION INTRAMUSCULAR; INTRAVENOUS at 11:49

## 2020-10-28 RX ADMIN — SODIUM CHLORIDE 3 G: 900 INJECTION INTRAVENOUS at 13:04

## 2020-10-28 ASSESSMENT — LIFESTYLE VARIABLES
DOES PATIENT WANT TO STOP DRINKING: NO
DO YOU DRINK ALCOHOL: NO

## 2020-10-28 ASSESSMENT — FIBROSIS 4 INDEX: FIB4 SCORE: 0.79

## 2020-10-28 NOTE — ED NOTES
Understanding of DC paperwork. Iv removed. Bleeding controlled. All belongings with pt out of Department

## 2020-10-28 NOTE — ED PROVIDER NOTES
ED Provider Note    Scribed for Hallie Renae M.D. by Jayant Thornton. 10/28/2020, 11:07 AM.    Primary care provider: Mohsen Tamasaby, M.D.  Means of arrival: Private Vehicle  History obtained from: Patient  History limited by: None    CHIEF COMPLAINT  Chief Complaint   Patient presents with   • Facial Swelling     left cheek swelling for the last week following dental pain however pt was also started on lisinopril 1 week ago   • Oral Swelling     left upper dental pain x 1 week, has an appt wiht a dentist in december        HPI  Chanelle Ortiz is a 45 y.o. female who presents to the Emergency Department for acute, worsening left facial swelling and dental pain onset this morning. Patient states that she has had left sided dental pain for the past week, and this morning her face started to swell. There are no known alleviating or exacerbating factors. She has an associated headache, but denies any vomiting, shortness of breath, tongue swelling, or fever. Patient is set to follow up with a dentist in January. She has a past medical history of diabetes, and states her sugars have recently stayed under 180.     REVIEW OF SYSTEMS  Pertinent positives include left facial swelling, left sided dental pain, and headache. Pertinent negatives include no vomiting, shortness of breath, tongue swelling, or fever.  All other systems reviewed and negative.     PAST MEDICAL HISTORY   has a past medical history of Anxiety and depression (2016), Arrhythmia, Asthma, Back pain, Diabetes (2008), H/O Hypertension - resolved, Hx of Bronchitis, Hyperthyroidism (2008), Hypothyroidism, after radioactive ablation of thyroid (2008), POLLY on CPAP (2018), Pneumonia, and Psychiatric problem - unspecified.    SURGICAL HISTORY   has a past surgical history that includes other abdominal surgery (2002); gyn surgery (2004); other; primary c section; ovarian cystectomy (1992); cholecystectomy; and primary c section.    SOCIAL HISTORY  Social  "History     Tobacco Use   • Smoking status: Never Smoker   • Smokeless tobacco: Never Used   Substance Use Topics   • Alcohol use: No   • Drug use: No      Social History     Substance and Sexual Activity   Drug Use No       FAMILY HISTORY  Family History   Problem Relation Age of Onset   • Other Mother         sleep apnea   • Heart Failure Sister    • Diabetes Sister    • Heart Disease Sister    • Hypertension Sister    • Stroke Sister    • Hyperlipidemia Sister    • Heart Attack Maternal Grandfather    • Diabetes Daughter    • Psychiatric Illness Daughter         bipolar   • Psychiatric Illness Son         autism       CURRENT MEDICATIONS  Home Medications     Reviewed by Luzma Gee R.N. (Registered Nurse) on 10/28/20 at 1028  Med List Status: Partial   Medication Last Dose Status   albuterol 108 (90 Base) MCG/ACT Aero Soln inhalation aerosol  Active   cefdinir (OMNICEF) 300 MG Cap Not Taking Active   Empagliflozin (JARDIANCE) 10 MG Tab  Active   famotidine (PEPCID) 20 MG Tab 10/28/2020 Active   levothyroxine (SYNTHROID) 200 MCG Tab 10/28/2020 Active   lisinopril (PRINIVIL) 10 MG Tab  Active   metFORMIN (GLUCOPHAGE) 500 MG Tab 10/28/2020 Active                ALLERGIES  Allergies   Allergen Reactions   • Morphine      \"I think\" \"I dunno what my reaction was, the nurse just said my oxygen levels were going way to low on it\"        PHYSICAL EXAM  VITAL SIGNS: /82   Pulse 85   Temp 36.1 °C (96.9 °F) (Temporal)   Resp 16   Ht 1.626 m (5' 4\")   Wt (!) 133.3 kg (293 lb 14 oz)   LMP 10/28/2020   SpO2 94%   BMI 50.44 kg/m²     Constitutional:  Well developed, No acute distress, Non-toxic appearance.   HENT: Upper left first molar has an abscess with purulent drainage. There is swelling to the left cheek with tenderness to palpation. No stridor or trismus. Normocephalic, Atraumatic, Bilateral external ears normal, Oropharynx moist, No oral exudates, Nose normal.   Eyes: PERRL, EOMI, Conjunctiva normal, " No discharge.   Neck: Normal range of motion, No tenderness, Supple, no meningeal signs  Cardiovascular: Normal heart rate, Normal rhythm  Thorax & Lungs: Normal breath sounds, No respiratory distress  Abdomen: Benign abdominal exam, no guarding no rebound, no tenderness, no distention  Skin: Warm, Dry, No erythema, No rash.   Back: No tenderness, No CVA tenderness.   Extremities: Intact distal pulses, No tenderness   Neurologic: Alert & oriented x 3, Normal motor function, Normal sensory function, No focal deficits noted.   Psychiatric: appropriate     LABS  Results for orders placed or performed during the hospital encounter of 10/28/20   CBC WITH DIFFERENTIAL   Result Value Ref Range    WBC 10.2 4.8 - 10.8 K/uL    RBC 5.13 4.20 - 5.40 M/uL    Hemoglobin 10.7 (L) 12.0 - 16.0 g/dL    Hematocrit 37.7 37.0 - 47.0 %    MCV 73.5 (L) 81.4 - 97.8 fL    MCH 20.9 (L) 27.0 - 33.0 pg    MCHC 28.4 (L) 33.6 - 35.0 g/dL    RDW 44.5 35.9 - 50.0 fL    Platelet Count 286 164 - 446 K/uL    MPV 10.8 9.0 - 12.9 fL    Neutrophils-Polys 71.50 44.00 - 72.00 %    Lymphocytes 19.50 (L) 22.00 - 41.00 %    Monocytes 6.10 0.00 - 13.40 %    Eosinophils 1.80 0.00 - 6.90 %    Basophils 0.70 0.00 - 1.80 %    Immature Granulocytes 0.40 0.00 - 0.90 %    Nucleated RBC 0.00 /100 WBC    Neutrophils (Absolute) 7.26 (H) 2.00 - 7.15 K/uL    Lymphs (Absolute) 1.98 1.00 - 4.80 K/uL    Monos (Absolute) 0.62 0.00 - 0.85 K/uL    Eos (Absolute) 0.18 0.00 - 0.51 K/uL    Baso (Absolute) 0.07 0.00 - 0.12 K/uL    Immature Granulocytes (abs) 0.04 0.00 - 0.11 K/uL    NRBC (Absolute) 0.00 K/uL    Microcytosis 3+ (A)    COMP METABOLIC PANEL   Result Value Ref Range    Sodium 137 135 - 145 mmol/L    Potassium 4.0 3.6 - 5.5 mmol/L    Chloride 102 96 - 112 mmol/L    Co2 23 20 - 33 mmol/L    Anion Gap 12.0 7.0 - 16.0    Glucose 125 (H) 65 - 99 mg/dL    Bun 14 8 - 22 mg/dL    Creatinine 0.50 0.50 - 1.40 mg/dL    Calcium 9.0 8.5 - 10.5 mg/dL    AST(SGOT) 20 12 - 45 U/L     ALT(SGPT) 26 2 - 50 U/L    Alkaline Phosphatase 91 30 - 99 U/L    Total Bilirubin 0.3 0.1 - 1.5 mg/dL    Albumin 3.8 3.2 - 4.9 g/dL    Total Protein 7.6 6.0 - 8.2 g/dL    Globulin 3.8 (H) 1.9 - 3.5 g/dL    A-G Ratio 1.0 g/dL   PERIPHERAL SMEAR REVIEW   Result Value Ref Range    Peripheral Smear Review see below    PLATELET ESTIMATE   Result Value Ref Range    Plt Estimation Normal    MORPHOLOGY   Result Value Ref Range    RBC Morphology Present     Polychromia 1+     Poikilocytosis 1+     Schistocytes 1+    DIFFERENTIAL COMMENT   Result Value Ref Range    Comments-Diff see below    ESTIMATED GFR   Result Value Ref Range    GFR If African American >60 >60 mL/min/1.73 m 2    GFR If Non African American >60 >60 mL/min/1.73 m 2   All labs reviewed by me.    COURSE & MEDICAL DECISION MAKING  Nursing notes, VS, PMSFHx reviewed in chart.    Patient presents emergency department with evidence of minimal facial swelling and tenderness and pain in the left upper molar.  I suspect the patient has a apical abscess.  Patient is not having any drooling or trismus.  There is no swelling of the tongue or lips.  There is no evidence of airway compromise.  She has a normal voice.  Patient is afebrile.  She has a nonfocal neurologic exam.  She is tolerating p.o.  Patient is a diabetic.  With the minimal swelling I do not feel she needs a CT of the face at this time.  We will however check labs to evaluate her glucose and white count.  We will also give a dose of IV antibiotics.    11:07 AM - Patient seen and examined at bedside. Discussed plan of care, including ordering labs and treating with antibiotics. Patient agrees to the plan of care. The patient will be medicated with Toradol 30 mg and Unasyn 3 g. Ordered for estimated GFR, CBC w/ diff, and CMP to evaluate her symptoms.     Laboratory results are reassuring with a normal white count without evidence of bandemia.  Patient does have a glucose of 125 but no evidence of a gap  acidosis.  Patient is feeling well and I believe stable for outpatient management.  Return precautions were given.  She has been referred to oral surgery for further evaluation of her abscess.    I reviewed prescription monitoring program for patient's narcotic use before prescribing a scheduled drug.The patient will not drink alcohol nor drive with prescribed medications. The patient will return for new or worsening symptoms and is stable at the time of discharge.    The patient is referred to a primary physician for blood pressure management, diabetic screening, and for all other preventative health concerns.    In prescribing controlled substances to this patient, I certify that I have obtained and reviewed the medical history of Chanelle Ortiz. I have also made a good darlene effort to obtain applicable records from other providers who have treated the patient and no other records are available at this time.     I have conducted a physical exam and documented it. I have reviewed Ms. Ortiz’s prescription history as maintained by the [place] Prescription Monitoring Program.     I have assessed the patient’s risk for abuse, dependency, and addiction using the validated Opioid Risk Tool available at https://www.mdcalc.com/jayftk-liew-acgu-ort-narcotic-abuse.     Given the above, I believe the benefits of controlled substance therapy outweigh the risks. The reasons for prescribing controlled substances include non-narcotic, oral analgesic alternatives have been inadequate for pain control. Accordingly, I have discussed the risk and benefits, treatment plan, and alternative therapies with the patient.       DISPOSITION:  Patient will be discharged home in stable condition.    FOLLOW UP:  Faith Benitez D.D.S.  [address]  [facility] [address]  [phone]    Schedule an appointment as soon as possible for a visit in 2 days  If symptoms worsen, return to the er.      OUTPATIENT MEDICATIONS:  New  Prescriptions    AMOXICILLIN-CLAVULANATE (AUGMENTIN) 875-125 MG TAB    Take 1 Tab by mouth 2 times a day for 7 days.     FINAL IMPRESSION  1. Dental abscess    2. Facial swelling          IJayant (Scribe), am scribing for, and in the presence of, Hallie Renae M.D..    Electronically signed by: Jayant Thornton (Scribe), 10/28/2020    IHallie M.D. personally performed the services described in this documentation, as scribed by Jayant Thornton in my presence, and it is both accurate and complete. C.    The note accurately reflects work and decisions made by me.  Hallie Renae M.D.  10/28/2020  1:40 PM

## 2020-10-28 NOTE — ED TRIAGE NOTES
"Chief Complaint   Patient presents with   • Facial Swelling     left cheek swelling for the last week following dental pain however pt was also started on lisinopril 1 week ago   • Oral Swelling     left upper dental pain x 1 week, has an appt wiht a dentist in december      Pt ambulatory to triage, does not appear to be in any distress. Airway patent. Pt educated on triage process and placed back in the lobby.   Educated to notify staff If any changes.     /82   Pulse 85   Temp 36.1 °C (96.9 °F) (Temporal)   Resp 16   Ht 1.626 m (5' 4\")   Wt (!) 133.3 kg (293 lb 14 oz)   LMP 10/28/2020   SpO2 94%   BMI 50.44 kg/m²     "

## 2020-12-08 ENCOUNTER — HOSPITAL ENCOUNTER (EMERGENCY)
Facility: MEDICAL CENTER | Age: 45
End: 2020-12-08
Attending: EMERGENCY MEDICINE
Payer: MEDICAID

## 2020-12-08 ENCOUNTER — APPOINTMENT (OUTPATIENT)
Dept: RADIOLOGY | Facility: MEDICAL CENTER | Age: 45
End: 2020-12-08
Attending: EMERGENCY MEDICINE
Payer: MEDICAID

## 2020-12-08 VITALS
SYSTOLIC BLOOD PRESSURE: 122 MMHG | RESPIRATION RATE: 53 BRPM | DIASTOLIC BLOOD PRESSURE: 56 MMHG | TEMPERATURE: 97.5 F | HEART RATE: 74 BPM | OXYGEN SATURATION: 94 %

## 2020-12-08 DIAGNOSIS — R00.2 PALPITATIONS: ICD-10-CM

## 2020-12-08 LAB
ALBUMIN SERPL BCP-MCNC: 3.7 G/DL (ref 3.2–4.9)
ALBUMIN/GLOB SERPL: 1.1 G/DL
ALP SERPL-CCNC: 93 U/L (ref 30–99)
ALT SERPL-CCNC: 14 U/L (ref 2–50)
ANION GAP SERPL CALC-SCNC: 10 MMOL/L (ref 7–16)
AST SERPL-CCNC: 13 U/L (ref 12–45)
BASOPHILS # BLD AUTO: 0.9 % (ref 0–1.8)
BASOPHILS # BLD: 0.07 K/UL (ref 0–0.12)
BILIRUB SERPL-MCNC: 0.3 MG/DL (ref 0.1–1.5)
BUN SERPL-MCNC: 14 MG/DL (ref 8–22)
CALCIUM SERPL-MCNC: 8.3 MG/DL (ref 8.4–10.2)
CHLORIDE SERPL-SCNC: 106 MMOL/L (ref 96–112)
CO2 SERPL-SCNC: 25 MMOL/L (ref 20–33)
COMMENT 1642: NORMAL
CREAT SERPL-MCNC: 0.56 MG/DL (ref 0.5–1.4)
EKG IMPRESSION: NORMAL
EOSINOPHIL # BLD AUTO: 0.11 K/UL (ref 0–0.51)
EOSINOPHIL NFR BLD: 1.5 % (ref 0–6.9)
ERYTHROCYTE [DISTWIDTH] IN BLOOD BY AUTOMATED COUNT: 45.4 FL (ref 35.9–50)
GLOBULIN SER CALC-MCNC: 3.4 G/DL (ref 1.9–3.5)
GLUCOSE SERPL-MCNC: 123 MG/DL (ref 65–99)
HCT VFR BLD AUTO: 36 % (ref 37–47)
HGB BLD-MCNC: 9.8 G/DL (ref 12–16)
HYPOCHROMIA BLD QL SMEAR: ABNORMAL
IMM GRANULOCYTES # BLD AUTO: 0.05 K/UL (ref 0–0.11)
IMM GRANULOCYTES NFR BLD AUTO: 0.7 % (ref 0–0.9)
LYMPHOCYTES # BLD AUTO: 1.8 K/UL (ref 1–4.8)
LYMPHOCYTES NFR BLD: 24.2 % (ref 22–41)
MCH RBC QN AUTO: 19.6 PG (ref 27–33)
MCHC RBC AUTO-ENTMCNC: 27.2 G/DL (ref 33.6–35)
MCV RBC AUTO: 72.1 FL (ref 81.4–97.8)
MICROCYTES BLD QL SMEAR: ABNORMAL
MONOCYTES # BLD AUTO: 0.56 K/UL (ref 0–0.85)
MONOCYTES NFR BLD AUTO: 7.5 % (ref 0–13.4)
NEUTROPHILS # BLD AUTO: 4.86 K/UL (ref 2–7.15)
NEUTROPHILS NFR BLD: 65.2 % (ref 44–72)
NRBC # BLD AUTO: 0 K/UL
NRBC BLD-RTO: 0 /100 WBC
PLATELET # BLD AUTO: 254 K/UL (ref 164–446)
PLATELET BLD QL SMEAR: NORMAL
PMV BLD AUTO: 11.3 FL (ref 9–12.9)
POLYCHROMASIA BLD QL SMEAR: NORMAL
POTASSIUM SERPL-SCNC: 3.9 MMOL/L (ref 3.6–5.5)
PROT SERPL-MCNC: 7.1 G/DL (ref 6–8.2)
RBC # BLD AUTO: 4.99 M/UL (ref 4.2–5.4)
RBC BLD AUTO: PRESENT
SODIUM SERPL-SCNC: 141 MMOL/L (ref 135–145)
TROPONIN T SERPL-MCNC: <6 NG/L (ref 6–19)
TSH SERPL DL<=0.005 MIU/L-ACNC: 0.37 UIU/ML (ref 0.38–5.33)
WBC # BLD AUTO: 7.5 K/UL (ref 4.8–10.8)

## 2020-12-08 PROCEDURE — 99283 EMERGENCY DEPT VISIT LOW MDM: CPT

## 2020-12-08 PROCEDURE — 71045 X-RAY EXAM CHEST 1 VIEW: CPT

## 2020-12-08 PROCEDURE — 84443 ASSAY THYROID STIM HORMONE: CPT

## 2020-12-08 PROCEDURE — 93005 ELECTROCARDIOGRAM TRACING: CPT | Performed by: EMERGENCY MEDICINE

## 2020-12-08 PROCEDURE — 84484 ASSAY OF TROPONIN QUANT: CPT

## 2020-12-08 PROCEDURE — 80053 COMPREHEN METABOLIC PANEL: CPT

## 2020-12-08 PROCEDURE — 93005 ELECTROCARDIOGRAM TRACING: CPT

## 2020-12-08 PROCEDURE — 85025 COMPLETE CBC W/AUTO DIFF WBC: CPT

## 2020-12-08 PROCEDURE — 36415 COLL VENOUS BLD VENIPUNCTURE: CPT

## 2020-12-08 ASSESSMENT — PAIN SCALES - WONG BAKER: WONGBAKER_NUMERICALRESPONSE: DOESN'T HURT AT ALL

## 2020-12-08 NOTE — ED NOTES
ERP at bedside. Pt agrees with plan of care discussed by ERP. AIDET acknowledged with patient. IV established. Blood sent to lab. X-ray completed. Trace in low position, side rail up for pt safety. Call light within reach. Will continue to monitor.

## 2020-12-08 NOTE — ED TRIAGE NOTES
Chief Complaint   Patient presents with   • Palpitations     pt BIB [facility] c/o palpitations for 3 months that has gotten worse. pt started on Metformin 3 months ago and thinks its the cause of palpitations. pt denies SOB or CP with this episodes, however gets dizzy off and on.   positive for covid 3 months ago  /65   Pulse 77   Temp 36.4 °C (97.5 °F) (Temporal)   Resp 20   LMP 12/07/2020   SpO2 95%

## 2020-12-08 NOTE — ED NOTES
Dc instructions reviewed with pt. To f/u with cardiology today as well as pcp, return for worsening s/s

## 2020-12-08 NOTE — ED NOTES
Med rec updated and complete  Allergies reviewed  Pt reports no vitamins.   Pt reports no antibiotics in the last 2 weeks

## 2020-12-08 NOTE — ED PROVIDER NOTES
ED Provider Note    CHIEF COMPLAINT  Chief Complaint   Patient presents with   • Palpitations     pt BIB [facility] c/o palpitations for 3 months that has gotten worse. pt started on Metformin 3 months ago and thinks its the cause of palpitations. pt denies SOB or CP with this episodes, however gets dizzy off and on.       HPI  Chanelle Ortiz is a 45 y.o. female who presents complaining of heart palpitations.  Patient states she feels like this is been going on for weeks to months.  Symptoms have been getting progressively worse.  She was started on Metformin and was concerned that this could be the cause.  Patient denies any chest pain or dyspnea.  Patient denies fever or chills.  No vomiting or diarrhea.    REVIEW OF SYSTEMS  See HPI for further details.  No cough or cold symptoms all other systems are negative.    PAST MEDICAL HISTORY  Past Medical History:   Diagnosis Date   • Anxiety and depression 2016    after ex went to long-term   • Arrhythmia     notes occasional rapid or prominent heart beat, at night    • Asthma    • Back pain    • Diabetes 2008    on and off meds (due to concern about heart beat changes)   • H/O Hypertension - resolved     Patient states prior HTN, but resolved after changes in other medications (but off HTN meds).    • Hx of Bronchitis    • Hyperthyroidism 2008    Treated with Radioactive iodine, at [facility], in 2008   • Hypothyroidism, after radioactive ablation of thyroid 2008   • POLLY on CPAP 2018    gets headaches, if not using CPAP.    • Pneumonia    • Psychiatric problem - unspecified     pt notes anxiety and depression, with counseller - pt unclear on details.         FAMILY HISTORY  Family History   Problem Relation Age of Onset   • Other Mother         sleep apnea   • Heart Failure Sister    • Diabetes Sister    • Heart Disease Sister    • Hypertension Sister    • Stroke Sister    • Hyperlipidemia Sister    • Heart Attack Maternal Grandfather    • Diabetes Daughter    •  Psychiatric Illness Daughter         bipolar   • Psychiatric Illness Son         autism       SOCIAL HISTORY  Social History     Socioeconomic History   • Marital status:      Spouse name: Not on file   • Number of children: Not on file   • Years of education: Not on file   • Highest education level: Not on file   Occupational History   • Not on file   Social Needs   • Financial resource strain: Not very hard   • Food insecurity     Worry: Never true     Inability: Never true   • Transportation needs     Medical: No     Non-medical: No   Tobacco Use   • Smoking status: Never Smoker   • Smokeless tobacco: Never Used   Substance and Sexual Activity   • Alcohol use: No   • Drug use: No   • Sexual activity: Not on file     Comment: .  Tuboligation in .    Lifestyle   • Physical activity     Days per week: Not on file     Minutes per session: Not on file   • Stress: Not on file   Relationships   • Social connections     Talks on phone: Not on file     Gets together: Not on file     Attends Confucianist service: Not on file     Active member of club or organization: Not on file     Attends meetings of clubs or organizations: Not on file     Relationship status: Not on file   • Intimate partner violence     Fear of current or ex partner: Not on file     Emotionally abused: Not on file     Physically abused: Not on file     Forced sexual activity: Not on file   Other Topics Concern   • Not on file   Social History Narrative   • Not on file       SURGICAL HISTORY  Past Surgical History:   Procedure Laterality Date   • GYN SURGERY      tuboligation, bilateral, during past .    • OTHER ABDOMINAL SURGERY      cholesystectomy   • OVARIAN CYSTECTOMY      unknown details or laterality.   • CHOLECYSTECTOMY     • OTHER      tonsillectomy   • PRIMARY C SECTION       - , ,    • PRIMARY C SECTION         CURRENT MEDICATIONS  Home Medications     Reviewed by Betsy Campbell,  "PhT (Pharmacy Tech) on 12/08/20 at 1029  Med List Status: Complete   Medication Last Dose Status   albuterol 108 (90 Base) MCG/ACT Aero Soln inhalation aerosol Not Taking Active   cefdinir (OMNICEF) 300 MG Cap Not Taking Active   Empagliflozin (JARDIANCE) 10 MG Tab 12/8/2020 Active   famotidine (PEPCID) 20 MG Tab 12/8/2020 Active   levothyroxine (SYNTHROID) 200 MCG Tab 12/8/2020 Active   metformin (GLUCOPHAGE) 1000 MG tablet 12/8/2020 Active                ALLERGIES  Allergies   Allergen Reactions   • Morphine      \"I think\" \"I dunno what my reaction was, the nurse just said my oxygen levels were going way to low on it\"        PHYSICAL EXAM  VITAL SIGNS: /57   Pulse 83   Temp 36.4 °C (97.5 °F) (Temporal)   Resp (!) 53   LMP 12/07/2020   SpO2 93%   Constitutional: Well developed, Well nourished, No acute distress, morbidly obese  HENT: Normocephalic, Atraumatic, Bilateral external ears normal,   Eyes: CRISTHIAN, EOMI, Conjunctiva normal, No discharge.   Neck: Normal range of motion, No tenderness, Supple, No stridor. No nuchal rigidity  Lymphatic: No lymphadenopathy noted.   Cardiovascular: Regular rate and rhythm without murmur rub or gallop.  There are no extrasystoles  Thorax & Lungs: Clear without wheezing  Abdomen: Bowel sounds normal, Soft, No tenderness, No masses, No pulsatile masses.   Skin: Warm, Dry, No erythema, No rash.   Back: No tenderness, No CVA tenderness.   Extremities: No edema, No tenderness, No cyanosis, No clubbing. Dorsalis pedis pulses 2+ equal bilaterally. Radial pulses 2+ equal bilaterally.  Neurologic: Alert & oriented x 3, Normal motor function, Normal sensory function, No focal deficits noted.     EKG  Sinus rhythm at a rate of 70.  Normal P waves.  Normal QRS.  Borderline late  R wave progression.  Normal ST segments.  Normal T waves.  Borderline EKG    RADIOLOGY/PROCEDURES  Results for orders placed or performed during the hospital encounter of 12/08/20   CBC with Differential "   Result Value Ref Range    WBC 7.5 4.8 - 10.8 K/uL    RBC 4.99 4.20 - 5.40 M/uL    Hemoglobin 9.8 (L) 12.0 - 16.0 g/dL    Hematocrit 36.0 (L) 37.0 - 47.0 %    MCV 72.1 (L) 81.4 - 97.8 fL    MCH 19.6 (L) 27.0 - 33.0 pg    MCHC 27.2 (L) 33.6 - 35.0 g/dL    RDW 45.4 35.9 - 50.0 fL    Platelet Count 254 164 - 446 K/uL    MPV 11.3 9.0 - 12.9 fL    Neutrophils-Polys 65.20 44.00 - 72.00 %    Lymphocytes 24.20 22.00 - 41.00 %    Monocytes 7.50 0.00 - 13.40 %    Eosinophils 1.50 0.00 - 6.90 %    Basophils 0.90 0.00 - 1.80 %    Immature Granulocytes 0.70 0.00 - 0.90 %    Nucleated RBC 0.00 /100 WBC    Neutrophils (Absolute) 4.86 2.00 - 7.15 K/uL    Lymphs (Absolute) 1.80 1.00 - 4.80 K/uL    Monos (Absolute) 0.56 0.00 - 0.85 K/uL    Eos (Absolute) 0.11 0.00 - 0.51 K/uL    Baso (Absolute) 0.07 0.00 - 0.12 K/uL    Immature Granulocytes (abs) 0.05 0.00 - 0.11 K/uL    NRBC (Absolute) 0.00 K/uL    Hypochromia 1+     Microcytosis 2+ (A)    Complete Metabolic Panel (CMP)   Result Value Ref Range    Sodium 141 135 - 145 mmol/L    Potassium 3.9 3.6 - 5.5 mmol/L    Chloride 106 96 - 112 mmol/L    Co2 25 20 - 33 mmol/L    Anion Gap 10.0 7.0 - 16.0    Glucose 123 (H) 65 - 99 mg/dL    Bun 14 8 - 22 mg/dL    Creatinine 0.56 0.50 - 1.40 mg/dL    Calcium 8.3 (L) 8.4 - 10.2 mg/dL    AST(SGOT) 13 12 - 45 U/L    ALT(SGPT) 14 2 - 50 U/L    Alkaline Phosphatase 93 30 - 99 U/L    Total Bilirubin 0.3 0.1 - 1.5 mg/dL    Albumin 3.7 3.2 - 4.9 g/dL    Total Protein 7.1 6.0 - 8.2 g/dL    Globulin 3.4 1.9 - 3.5 g/dL    A-G Ratio 1.1 g/dL   Troponin   Result Value Ref Range    Troponin T <6 6 - 19 ng/L   TSH   Result Value Ref Range    TSH 0.368 (L) 0.380 - 5.330 uIU/mL   ESTIMATED GFR   Result Value Ref Range    GFR If African American >60 >60 mL/min/1.73 m 2    GFR If Non African American >60 >60 mL/min/1.73 m 2   PLATELET ESTIMATE   Result Value Ref Range    Plt Estimation Normal    MORPHOLOGY   Result Value Ref Range    RBC Morphology Present      Polychromia 1+    DIFFERENTIAL COMMENT   Result Value Ref Range    Comments-Diff see below    EKG   Result Value Ref Range    Report       [facility] Emergency Dept.    Test Date:  2020  Pt Name:    PRATIBHA CUELLAR              Department: [facility]  MRN:        [id number]                      [facility]  Gender:     Female                       Technician: CINTIA  :        [id number]                   Requested By:ER TRIAGE PROTOCOL  Order #:    [id number]                    Reading MD:    Measurements  Intervals                                Axis  Rate:       73                           P:          62  VA:         142                          QRS:        86  QRSD:       94                           T:          58  QT:         376  QTc:        415    Interpretive Statements  Sinus rhythm  Low voltage, precordial leads  Nonspecific T abnormalities, anterior leads  Compared to ECG 2020 14:55:40  Low QRS voltage now present  T-wave abnormality still present           COURSE & MEDICAL DECISION MAKING  Pertinent Labs & Imaging studies reviewed. (See chart for details)  Patient is having palpitation but has a normal EKG.  Her electrolytes and baseline blood work are unremarkable.  There is no sign of MI or acute coronary ischemia.  Patient would benefit from a Holter monitor and/or patch.  Patient will be referred to the cardiologist for this      FINAL IMPRESSION  1.  Palpitations  2.   3.      Please note that this dictation was created using voice recognition software. I have worked with consultants from the vendor as well as technical experts from [facility] to optimize the interface. I have made every reasonable attempt to correct obvious errors, but I expect that there are errors of grammar and possibly content that I did not discover before finalizing the note.      Electronically signed by: Jose Waldron M.D., 2020 11:22 AM

## 2020-12-17 ENCOUNTER — HOSPITAL ENCOUNTER (EMERGENCY)
Dept: HOSPITAL 8 - ED | Age: 45
Discharge: HOME | End: 2020-12-17
Payer: MEDICAID

## 2020-12-17 VITALS — DIASTOLIC BLOOD PRESSURE: 75 MMHG | SYSTOLIC BLOOD PRESSURE: 136 MMHG

## 2020-12-17 VITALS — HEIGHT: 64 IN | WEIGHT: 283.07 LBS | BODY MASS INDEX: 48.33 KG/M2

## 2020-12-17 DIAGNOSIS — R55: ICD-10-CM

## 2020-12-17 DIAGNOSIS — R11.0: ICD-10-CM

## 2020-12-17 DIAGNOSIS — R00.2: ICD-10-CM

## 2020-12-17 DIAGNOSIS — R42: ICD-10-CM

## 2020-12-17 DIAGNOSIS — N39.0: Primary | ICD-10-CM

## 2020-12-17 DIAGNOSIS — E66.01: ICD-10-CM

## 2020-12-17 DIAGNOSIS — E86.0: ICD-10-CM

## 2020-12-17 DIAGNOSIS — R07.89: ICD-10-CM

## 2020-12-17 DIAGNOSIS — E03.9: ICD-10-CM

## 2020-12-17 DIAGNOSIS — E11.65: ICD-10-CM

## 2020-12-17 DIAGNOSIS — B37.3: ICD-10-CM

## 2020-12-17 LAB
<PLATELET ESTIMATE>: ADEQUATE
<PLT MORPHOLOGY>: (no result)
ALBUMIN SERPL-MCNC: 3.3 G/DL (ref 3.4–5)
ALP SERPL-CCNC: 90 U/L (ref 45–117)
ALT SERPL-CCNC: 23 U/L (ref 12–78)
ANION GAP SERPL CALC-SCNC: 2 MMOL/L (ref 5–15)
ANISOCYTOSIS BLD QL SMEAR: (no result)
BASOPHILS # BLD AUTO: 0.1 X10^3/UL (ref 0–0.1)
BASOPHILS NFR BLD AUTO: 1 % (ref 0–1)
BILIRUB SERPL-MCNC: 0.4 MG/DL (ref 0.2–1)
CALCIUM SERPL-MCNC: 8.9 MG/DL (ref 8.5–10.1)
CHLORIDE SERPL-SCNC: 107 MMOL/L (ref 98–107)
CREAT SERPL-MCNC: 0.63 MG/DL (ref 0.55–1.02)
EOSINOPHIL # BLD AUTO: 0.2 X10^3/UL (ref 0–0.4)
EOSINOPHIL NFR BLD AUTO: 2 % (ref 1–7)
ERYTHROCYTE [DISTWIDTH] IN BLOOD BY AUTOMATED COUNT: 17.9 % (ref 9.6–15.2)
LYMPHOCYTES # BLD AUTO: 2.3 X10^3/UL (ref 1–3.4)
LYMPHOCYTES NFR BLD AUTO: 27 % (ref 22–44)
MCH RBC QN AUTO: 19.6 PG (ref 27–34.8)
MCHC RBC AUTO-ENTMCNC: 30 G/DL (ref 32.4–35.8)
MD: (no result)
MICROCYTES BLD QL SMEAR: (no result)
MICROSCOPIC: (no result)
MONOCYTES # BLD AUTO: 0.7 X10^3/UL (ref 0.2–0.8)
MONOCYTES NFR BLD AUTO: 9 % (ref 2–9)
NEUTROPHILS # BLD AUTO: 5.1 X10^3/UL (ref 1.8–6.8)
NEUTROPHILS NFR BLD AUTO: 61 % (ref 42–75)
PLATELET # BLD AUTO: 224 X10^3/UL (ref 130–400)
PMV BLD AUTO: 9 FL (ref 7.4–10.4)
POLYCHROMASIA BLD QL SMEAR: (no result)
PROT SERPL-MCNC: 7.7 G/DL (ref 6.4–8.2)
RBC # BLD AUTO: 5.04 X10^6/UL (ref 3.82–5.3)
TROPONIN I SERPL-MCNC: < 0.015 NG/ML (ref 0–0.04)

## 2020-12-17 PROCEDURE — 99285 EMERGENCY DEPT VISIT HI MDM: CPT

## 2020-12-17 PROCEDURE — 71045 X-RAY EXAM CHEST 1 VIEW: CPT

## 2020-12-17 PROCEDURE — 80053 COMPREHEN METABOLIC PANEL: CPT

## 2020-12-17 PROCEDURE — 93005 ELECTROCARDIOGRAM TRACING: CPT

## 2020-12-17 PROCEDURE — 87086 URINE CULTURE/COLONY COUNT: CPT

## 2020-12-17 PROCEDURE — 36415 COLL VENOUS BLD VENIPUNCTURE: CPT

## 2020-12-17 PROCEDURE — 85025 COMPLETE CBC W/AUTO DIFF WBC: CPT

## 2020-12-17 PROCEDURE — 87186 SC STD MICRODIL/AGAR DIL: CPT

## 2020-12-17 PROCEDURE — 84484 ASSAY OF TROPONIN QUANT: CPT

## 2020-12-17 PROCEDURE — 81001 URINALYSIS AUTO W/SCOPE: CPT

## 2020-12-17 NOTE — NUR
PT UP TO BEDSIDE COMMODE WITH THIS RN TO PROVIDE URINE SAMPLE. PT REPORTS MILD 
DIZZINESS WHEN STANDING. PT RETURNED TO [facility] VSS. PT DENIES ANY NEEDS 
AT THIS TIME.

## 2020-12-17 NOTE — NUR
LAB AT BEDSIDE. PT SITTING UPRIGHT, NAD, VSS. PT DENIES ANY NEEDS AT THIS TIME. 
CALL LIGHT AND PERSONAL BELONGINGS WITHIN REACH.

## 2020-12-17 NOTE — NUR
PT BIB EMS FOR NEAR [facility]NC[facility] EPISODE AFTER STANDING UP. PT WAS SEEN AND RENOWN 
FOR SAME AND WAS GIVEN REFERRAL - PT HAS APPT FOR CARDIOLOGIST TOMMOROW. EKG 
COMPLETE. PT CONNECTED TO ALL MONITORING EQUIPMENT.

## 2020-12-18 ENCOUNTER — OFFICE VISIT (OUTPATIENT)
Dept: CARDIOLOGY | Facility: MEDICAL CENTER | Age: 45
End: 2020-12-18
Payer: MEDICAID

## 2020-12-18 VITALS
HEART RATE: 82 BPM | OXYGEN SATURATION: 95 % | HEIGHT: 64 IN | DIASTOLIC BLOOD PRESSURE: 64 MMHG | RESPIRATION RATE: 16 BRPM | SYSTOLIC BLOOD PRESSURE: 142 MMHG | WEIGHT: 283.4 LBS | BODY MASS INDEX: 48.38 KG/M2

## 2020-12-18 DIAGNOSIS — R73.03 PREDIABETES: ICD-10-CM

## 2020-12-18 DIAGNOSIS — R00.2 PALPITATIONS: ICD-10-CM

## 2020-12-18 DIAGNOSIS — E66.01 MORBID OBESITY WITH BMI OF 45.0-49.9, ADULT (HCC): ICD-10-CM

## 2020-12-18 DIAGNOSIS — G47.33 OSA ON CPAP: ICD-10-CM

## 2020-12-18 DIAGNOSIS — R07.89 OTHER CHEST PAIN: ICD-10-CM

## 2020-12-18 PROBLEM — E11.9 DM (DIABETES MELLITUS) (HCC): Status: RESOLVED | Noted: 2020-06-08 | Resolved: 2020-12-18

## 2020-12-18 PROBLEM — E03.9 HYPOTHYROID: Status: RESOLVED | Noted: 2017-05-20 | Resolved: 2020-12-18

## 2020-12-18 PROBLEM — D64.9 ANEMIA: Status: RESOLVED | Noted: 2020-06-08 | Resolved: 2020-12-18

## 2020-12-18 PROBLEM — E11.9 TYPE 2 DIABETES MELLITUS WITHOUT COMPLICATION, WITHOUT LONG-TERM CURRENT USE OF INSULIN (HCC): Status: RESOLVED | Noted: 2018-08-15 | Resolved: 2020-12-18

## 2020-12-18 PROCEDURE — 99214 OFFICE O/P EST MOD 30 MIN: CPT | Performed by: NURSE PRACTITIONER

## 2020-12-18 RX ORDER — LISINOPRIL 5 MG/1
5 TABLET ORAL DAILY
Qty: 30 TAB | Refills: 11 | Status: SHIPPED | OUTPATIENT
Start: 2020-12-18 | End: 2021-05-11 | Stop reason: SDUPTHER

## 2020-12-18 RX ORDER — AMOXICILLIN 500 MG/1
500 CAPSULE ORAL 3 TIMES DAILY
COMMUNITY
Start: 2020-12-14 | End: 2021-02-10

## 2020-12-18 RX ORDER — ERGOCALCIFEROL 1.25 MG/1
50000 CAPSULE ORAL
COMMUNITY
Start: 2020-11-15

## 2020-12-18 RX ORDER — HYDROCODONE BITARTRATE AND ACETAMINOPHEN 5; 325 MG/1; MG/1
TABLET ORAL
COMMUNITY
Start: 2020-11-02 | End: 2021-02-10

## 2020-12-18 RX ORDER — EMPAGLIFLOZIN 25 MG/1
1 TABLET, FILM COATED ORAL
COMMUNITY
Start: 2020-12-15 | End: 2020-12-18

## 2020-12-18 RX ORDER — CALCIUM CITRATE/VITAMIN D3 200MG-6.25
TABLET ORAL
COMMUNITY
Start: 2020-11-18 | End: 2021-02-11

## 2020-12-18 RX ORDER — IBUPROFEN 800 MG/1
TABLET ORAL PRN
COMMUNITY
Start: 2020-12-14 | End: 2021-02-11

## 2020-12-18 ASSESSMENT — ENCOUNTER SYMPTOMS
ORTHOPNEA: 0
COUGH: 0
SHORTNESS OF BREATH: 0
FEVER: 0
PND: 0
DIZZINESS: 0
CHILLS: 0
MYALGIAS: 0
HEARTBURN: 0
PALPITATIONS: 1
DIZZINESS: 1
NERVOUS/ANXIOUS: 1
MYALGIAS: 1
CLAUDICATION: 0
SHORTNESS OF BREATH: 1
ABDOMINAL PAIN: 0

## 2020-12-18 ASSESSMENT — FIBROSIS 4 INDEX: FIB4 SCORE: 0.62

## 2020-12-18 NOTE — NON-PROVIDER
Chief Complaint   Patient presents with   • Chest Pain     F/V Dx: Atypical    • HTN (Uncontrolled)     HPI    Chanelle Ortiz is a 45 y.o. female that presents today with complaints of heart palpitations and chest pain for the past 3 to 4 months.    She has a history of morbid obesity, DM type 2, hypothyroid, anxiety and depression, and POLLY on CPAP.     She describes her chest pain as squeezing sensation which radiates to both arms and jaw and is exacerbated by exercise. It reaches a pain level of 6/10 for an average of about 5 minutes. It is relieved with rest. She also has SOB with exertion.    She also complains of palpitations accompanied by dizziness and lightheadedness which occur almost daily and usually come on about 30 minutes after walking and can last 30 minutes to an hour. She drinks caffeine every other day and notices her palpitations are worse on those days.    She reports that she quit taking her metformin and lisinopril earlier this week as she had all of her teeth removed on Monday due to an infection and has been on a liquid diet. She states that she believes the metformin may be the cause of her chest pain and that the lisinopril could be causing her palpitations.    Review of Systems   Constitutional: Negative for chills and fever.   Respiratory: Positive for shortness of breath. Negative for cough.    Cardiovascular: Positive for chest pain and palpitations. Negative for leg swelling.   Gastrointestinal: Negative for abdominal pain and heartburn.   Musculoskeletal: Positive for myalgias.   Neurological: Positive for dizziness.   Psychiatric/Behavioral: The patient is nervous/anxious.      Physical Exam   Constitutional: She is oriented to person, place, and time and well-developed, well-nourished, and in no distress.   Morbidly obese   HENT:   Head: Normocephalic and atraumatic.   Eyes: EOM are normal.   Neck: Normal range of motion. Neck supple.   Cardiovascular: Normal rate, regular rhythm  and normal heart sounds.   No murmur heard.  Pulmonary/Chest: Effort normal and breath sounds normal. No respiratory distress.   Musculoskeletal: Normal range of motion.         General: No edema.   Neurological: She is alert and oriented to person, place, and time. Gait normal.   Skin: Skin is warm and dry.   Psychiatric: Mood, memory, affect and judgment normal.       ASSESSMENT    1. Chest pain and palpitations  2. HTN  3. Obesity with DM  4. POLLY on CPAP    PLAN    1. Palpitations  - almost daily, worse with caffeine, limit caffeine intake  -Holter monitor 48 hours    2. Chest Pain  -atypical chest pain, poor historian  -recommend NM Cardiac PET, was ordered last year, not completed  -echo with normal LV function  -noncompliance with BP med and not checking BP at home, restart lisinopril    3. HTN  - restart lisinopril 5 mg daily  -monitor BP at home and follow up at next visit    4. Obesity with DM type 2  -Recommend exercise and improve diet  -liquid diet at this time due to having teeth extracted earlier this week.    5. POLLY on CPAP  -follow up with sleep center as scheduled  Mild RV pressure elevation on last echo      Follow up in clinic with Edel WAKEFIELD in 1 month with holter and cardiac PET; call for worsening symptoms or concerns

## 2020-12-18 NOTE — PROGRESS NOTES
Chief Complaint   Patient presents with   • Chest Pain     F/V Dx: Atypical    • HTN (Uncontrolled)     Subjective:   Chanelle Ortiz is a 45 y.o. female who presents today for unspecified chest pain and palpitations.    Hx of morbid obesity, DM type 2, hypothyroid, anxiety/depression, intermittent chest pains and palpitations,non-compliance with medication use, and POLLY on CPAP.      She describes her chest pain as squeezing sensation which radiates to both arms and jaw and is exacerbated by exercise. It reaches a pain level of 6/10 for an average of about 5 minutes. It is relieved with rest. She also has SOB with exertion.     She also complains of palpitations accompanied by dizziness and lightheadedness which occur almost daily and usually come on about 30 minutes after walking and can last 30 minutes to an hour. She drinks caffeine every other day and notices her palpitations are worse on those days.     She reports that she quit taking her metformin and lisinopril earlier this week as she had all of her teeth removed on Monday due to an infection and has been on a liquid diet. She states that she believes the metformin may be the cause of her chest pain and that the lisinopril could be causing her palpitations.    Past Medical History:   Diagnosis Date   • Anxiety and depression 2016    after ex went to [facility]   • Arrhythmia     notes occasional rapid or prominent heart beat, at night    • Asthma    • Back pain    • Diabetes 2008    on and off meds (due to concern about heart beat changes)   • H/O Hypertension - resolved     Patient states prior HTN, but resolved after changes in other medications (but off HTN meds).    • Hx of Bronchitis    • Hyperthyroidism 2008    Treated with Radioactive iodine, at [facility], in 2008   • Hypothyroidism, after radioactive ablation of thyroid 2008   • POLLY on CPAP 2018    gets headaches, if not using CPAP.    • Pneumonia    • Psychiatric problem - unspecified     pt  notes anxiety and depression, with counseller - pt unclear on details.       Past Surgical History:   Procedure Laterality Date   • GYN SURGERY      tuboligation, bilateral, during past .    • OTHER ABDOMINAL SURGERY      cholesystectomy   • OVARIAN CYSTECTOMY      unknown details or laterality.   • CHOLECYSTECTOMY     • OTHER      tonsillectomy   • PRIMARY C SECTION       - , ,    • PRIMARY C SECTION       Family History   Problem Relation Age of Onset   • Other Mother         sleep apnea   • Heart Failure Sister    • Diabetes Sister    • Heart Disease Sister    • Hypertension Sister    • Stroke Sister    • Hyperlipidemia Sister    • Heart Attack Maternal Grandfather    • Diabetes Daughter    • Psychiatric Illness Daughter         bipolar   • Psychiatric Illness Son         autism     Social History     Socioeconomic History   • Marital status:      Spouse name: Not on file   • Number of children: Not on file   • Years of education: Not on file   • Highest education level: Not on file   Occupational History   • Not on file   Social Needs   • Financial resource strain: Not very hard   • Food insecurity     Worry: Never true     Inability: Never true   • Transportation needs     Medical: No     Non-medical: No   Tobacco Use   • Smoking status: Never Smoker   • Smokeless tobacco: Never Used   Substance and Sexual Activity   • Alcohol use: No   • Drug use: No   • Sexual activity: Not on file     Comment: .  Tuboligation in .    Lifestyle   • Physical activity     Days per week: Not on file     Minutes per session: Not on file   • Stress: Not on file   Relationships   • Social connections     Talks on phone: Not on file     Gets together: Not on file     Attends Sabianist service: Not on file     Active member of club or organization: Not on file     Attends meetings of clubs or organizations: Not on file     Relationship status: Not on file   • Intimate  "partner violence     Fear of current or ex partner: Not on file     Emotionally abused: Not on file     Physically abused: Not on file     Forced sexual activity: Not on file   Other Topics Concern   • Not on file   Social History Narrative   • Not on file     Allergies   Allergen Reactions   • Morphine      \"I think\" \"I dunno what my reaction was, the nurse just said my oxygen levels were going way to low on it\"      Outpatient Encounter Medications as of 12/18/2020   Medication Sig Dispense Refill   • ibuprofen (MOTRIN) 800 MG Tab      • HYDROcodone-acetaminophen (NORCO) 5-325 MG Tab per tablet TAKE 1 TABLET BY MOUTH EVERY 8 HOURS AS NEEDED FOR 3 DAYS K04.7     • TRUE METRIX BLOOD GLUCOSE TEST strip 1 STRIP 4 TIMES A DAY     • vitamin D, Ergocalciferol, (DRISDOL) 1.25 MG (26162 UT) Cap capsule TAKE ONE CAP ONCE A WEEK FOR 12 WEEKS     • amoxicillin (AMOXIL) 500 MG Cap Take 500 mg by mouth 3 times a day.     • lisinopril (PRINIVIL) 5 MG Tab Take 1 Tab by mouth every day. 30 Tab 11   • metformin (GLUCOPHAGE) 1000 MG tablet Take 1,000 mg by mouth every day. Pt is to take 1 tablet twice a day, but only takes this medication once a day     • Empagliflozin (JARDIANCE) 10 MG Tab Take 10 mg by mouth every day.     • albuterol 108 (90 Base) MCG/ACT Aero Soln inhalation aerosol Inhale 2 Puffs by mouth every 6 hours as needed for Shortness of Breath. 8.5 g 0   • famotidine (PEPCID) 20 MG Tab Take 1 Tab by mouth 2 Times a Day. 10 Tab 0   • levothyroxine (SYNTHROID) 200 MCG Tab Take 200 mcg by mouth Every morning on an empty stomach.     • [DISCONTINUED] JARDIANCE 25 MG Tab Take 1 Tab by mouth every day.     • [DISCONTINUED] cefdinir (OMNICEF) 300 MG Cap Take 1 Cap by mouth 2 times a day. (Patient not taking: Reported on 12/8/2020) 20 Cap 0     No facility-administered encounter medications on file as of 12/18/2020.      Review of Systems   Constitutional: Negative for fever and malaise/fatigue.   Respiratory: Negative for " "cough and shortness of breath.    Cardiovascular: Positive for chest pain and palpitations. Negative for orthopnea, claudication, leg swelling and PND.   Gastrointestinal: Negative for abdominal pain.   Musculoskeletal: Negative for myalgias.   Neurological: Negative for dizziness.        Objective:   /64 (BP Location: Left arm, Patient Position: Sitting, BP Cuff Size: Adult)   Pulse 82   Resp 16   Ht 1.626 m (5' 4\")   Wt (!) 128.5 kg (283 lb 6.4 oz)   LMP 12/07/2020   SpO2 95%   BMI 48.65 kg/m²     Physical Exam   Constitutional: She is oriented to person, place, and time. She appears well-developed.   obese   HENT:   Head: Normocephalic and atraumatic.   Eyes: EOM are normal.   Neck: No JVD present.   Cardiovascular: Normal rate, regular rhythm, normal heart sounds and intact distal pulses.   Pulmonary/Chest: Effort normal and breath sounds normal.   Musculoskeletal: Normal range of motion.         General: No edema.   Neurological: She is alert and oriented to person, place, and time.   Skin: Skin is warm and dry.   Psychiatric:   Flat affect   Nursing note and vitals reviewed.      Assessment:     1. POLLY on CPAP     2. Prediabetes     3. Morbid obesity with BMI of 45.0-49.9, adult (HCC)     4. Other chest pain  NM-CARDIAC PET   5. Palpitations  Cardiac Event Monitor     Medical Decision Making:  Today's Assessment / Status / Plan:     1. Chest pain and palpitations  -very poor historian, atypical and unspecified chest pain and palpitations  -recommend follow triggers and document when symptoms occur  -recommend cardiac PET that was ordered last year for possible ischemia and order 48 hour holter for palpitation review  -patient has frequent ER visits for these symptoms  -echo with normal LV function  -non-compliance with BP medication, unable to check BP at home-re-start lisinopril 5 mg daily    2. HTN  -re-start lisinopril 5 mg  -follow up with BP at next apt  -recommend obtaining home BP monitor " for BP log    3.Obesity with DM  -recommend weight management program, she hasn't called them to schedule this  -doesn't exercise or eat healthy diet  -currently with liquid diet due to losing teeth from tooth infection  -DM managed by PCP, she has stopped metformin    4. POLLY on CPAP  -mild RV pressure elevation on last echo  -recommend cont to follow up with sleep center    Follow up in clinic with Edel WAKEFIELD in 1 month with holter and cardiac PET; call for worsening symptoms or concerns    Patient verbalizes understanding and agrees with the plan of care.

## 2021-02-03 ENCOUNTER — NON-PROVIDER VISIT (OUTPATIENT)
Dept: CARDIOLOGY | Facility: MEDICAL CENTER | Age: 46
End: 2021-02-03
Payer: MEDICAID

## 2021-02-03 ENCOUNTER — TELEPHONE (OUTPATIENT)
Dept: CARDIOLOGY | Facility: MEDICAL CENTER | Age: 46
End: 2021-02-03

## 2021-02-03 DIAGNOSIS — R00.2 PALPITATIONS: ICD-10-CM

## 2021-02-03 DIAGNOSIS — R07.89 OTHER CHEST PAIN: ICD-10-CM

## 2021-02-03 DIAGNOSIS — I49.3 PVC (PREMATURE VENTRICULAR CONTRACTION): ICD-10-CM

## 2021-02-03 NOTE — TELEPHONE ENCOUNTER
Home enrollment completed for the 5 day Zio patch program per ASHU Brown.  Monitor to be mailed to patient by [employer].    >Currently pending EOS.

## 2021-02-10 ENCOUNTER — OFFICE VISIT (OUTPATIENT)
Dept: CARDIOLOGY | Facility: MEDICAL CENTER | Age: 46
End: 2021-02-10
Payer: MEDICAID

## 2021-02-10 VITALS
OXYGEN SATURATION: 95 % | SYSTOLIC BLOOD PRESSURE: 130 MMHG | BODY MASS INDEX: 47.46 KG/M2 | RESPIRATION RATE: 16 BRPM | HEIGHT: 64 IN | HEART RATE: 84 BPM | DIASTOLIC BLOOD PRESSURE: 70 MMHG | WEIGHT: 278 LBS

## 2021-02-10 DIAGNOSIS — E66.01 MORBID OBESITY WITH BMI OF 45.0-49.9, ADULT (HCC): ICD-10-CM

## 2021-02-10 DIAGNOSIS — R07.89 OTHER CHEST PAIN: ICD-10-CM

## 2021-02-10 DIAGNOSIS — G47.33 OSA ON CPAP: ICD-10-CM

## 2021-02-10 DIAGNOSIS — R73.03 PREDIABETES: ICD-10-CM

## 2021-02-10 DIAGNOSIS — R00.2 PALPITATIONS: ICD-10-CM

## 2021-02-10 DIAGNOSIS — R07.9 CHEST PAIN, UNSPECIFIED TYPE: ICD-10-CM

## 2021-02-10 PROCEDURE — 99214 OFFICE O/P EST MOD 30 MIN: CPT | Performed by: NURSE PRACTITIONER

## 2021-02-10 RX ORDER — LEVOTHYROXINE SODIUM 175 UG/1
TABLET ORAL
COMMUNITY
Start: 2021-02-08 | End: 2021-02-11

## 2021-02-10 RX ORDER — NITROFURANTOIN 25; 75 MG/1; MG/1
CAPSULE ORAL
COMMUNITY
Start: 2020-12-18 | End: 2021-02-11

## 2021-02-10 RX ORDER — IBUPROFEN 600 MG/1
600 TABLET ORAL 3 TIMES DAILY PRN
COMMUNITY
Start: 2021-01-06 | End: 2021-02-11

## 2021-02-10 RX ORDER — LANOLIN ALCOHOL/MO/W.PET/CERES
CREAM (GRAM) TOPICAL
COMMUNITY
Start: 2021-02-08 | End: 2021-02-11

## 2021-02-10 RX ORDER — MECLIZINE HYDROCHLORIDE 25 MG/1
TABLET ORAL
COMMUNITY
End: 2021-02-11

## 2021-02-10 RX ORDER — NAPROXEN 500 MG/1
TABLET ORAL
COMMUNITY
End: 2021-02-11

## 2021-02-10 RX ORDER — METRONIDAZOLE 500 MG/1
500 TABLET ORAL
COMMUNITY
Start: 2021-01-29 | End: 2021-05-11

## 2021-02-10 RX ORDER — CEFDINIR 300 MG/1
CAPSULE ORAL
COMMUNITY
End: 2021-02-11

## 2021-02-10 RX ORDER — GLIMEPIRIDE 2 MG/1
TABLET ORAL
COMMUNITY
End: 2021-02-11

## 2021-02-10 RX ORDER — AZITHROMYCIN 500 MG/1
TABLET, FILM COATED ORAL
COMMUNITY
End: 2021-02-11

## 2021-02-10 RX ORDER — FLUCONAZOLE 150 MG/1
TABLET ORAL
COMMUNITY
Start: 2021-01-22 | End: 2021-02-11

## 2021-02-10 RX ORDER — EMPAGLIFLOZIN 25 MG/1
25 TABLET, FILM COATED ORAL DAILY
COMMUNITY
Start: 2021-02-10 | End: 2021-05-11

## 2021-02-10 RX ORDER — AMOXICILLIN AND CLAVULANATE POTASSIUM 875; 125 MG/1; MG/1
TABLET, FILM COATED ORAL
COMMUNITY
End: 2021-02-11

## 2021-02-10 ASSESSMENT — ENCOUNTER SYMPTOMS
DIZZINESS: 0
ABDOMINAL PAIN: 0
MYALGIAS: 0
CLAUDICATION: 0
PND: 0
ORTHOPNEA: 0
FEVER: 0
COUGH: 0
SHORTNESS OF BREATH: 0
PALPITATIONS: 1

## 2021-02-10 ASSESSMENT — FIBROSIS 4 INDEX: FIB4 SCORE: 0.62

## 2021-02-11 ENCOUNTER — APPOINTMENT (OUTPATIENT)
Dept: RADIOLOGY | Facility: MEDICAL CENTER | Age: 46
End: 2021-02-11
Attending: EMERGENCY MEDICINE
Payer: MEDICAID

## 2021-02-11 ENCOUNTER — HOSPITAL ENCOUNTER (EMERGENCY)
Facility: MEDICAL CENTER | Age: 46
End: 2021-02-11
Attending: EMERGENCY MEDICINE
Payer: MEDICAID

## 2021-02-11 VITALS
HEIGHT: 64 IN | BODY MASS INDEX: 47.46 KG/M2 | RESPIRATION RATE: 12 BRPM | HEART RATE: 75 BPM | DIASTOLIC BLOOD PRESSURE: 70 MMHG | WEIGHT: 278 LBS | SYSTOLIC BLOOD PRESSURE: 130 MMHG | TEMPERATURE: 97.8 F | OXYGEN SATURATION: 95 %

## 2021-02-11 DIAGNOSIS — R07.9 CHEST PAIN, UNSPECIFIED TYPE: ICD-10-CM

## 2021-02-11 DIAGNOSIS — R11.0 NAUSEA: ICD-10-CM

## 2021-02-11 LAB
ALBUMIN SERPL BCP-MCNC: 3.7 G/DL (ref 3.2–4.9)
ALBUMIN/GLOB SERPL: 1 G/DL
ALP SERPL-CCNC: 98 U/L (ref 30–99)
ALT SERPL-CCNC: 19 U/L (ref 2–50)
ANION GAP SERPL CALC-SCNC: 10 MMOL/L (ref 7–16)
AST SERPL-CCNC: 16 U/L (ref 12–45)
BASOPHILS # BLD AUTO: 0.9 % (ref 0–1.8)
BASOPHILS # BLD: 0.08 K/UL (ref 0–0.12)
BILIRUB SERPL-MCNC: 0.2 MG/DL (ref 0.1–1.5)
BUN SERPL-MCNC: 14 MG/DL (ref 8–22)
CALCIUM SERPL-MCNC: 8.9 MG/DL (ref 8.5–10.5)
CHLORIDE SERPL-SCNC: 104 MMOL/L (ref 96–112)
CO2 SERPL-SCNC: 25 MMOL/L (ref 20–33)
COMMENT 1642: NORMAL
CREAT SERPL-MCNC: 0.51 MG/DL (ref 0.5–1.4)
EKG IMPRESSION: NORMAL
EOSINOPHIL # BLD AUTO: 0.17 K/UL (ref 0–0.51)
EOSINOPHIL NFR BLD: 1.8 % (ref 0–6.9)
ERYTHROCYTE [DISTWIDTH] IN BLOOD BY AUTOMATED COUNT: 47 FL (ref 35.9–50)
GLOBULIN SER CALC-MCNC: 3.6 G/DL (ref 1.9–3.5)
GLUCOSE SERPL-MCNC: 150 MG/DL (ref 65–99)
HCG SERPL QL: NEGATIVE
HCT VFR BLD AUTO: 38.4 % (ref 37–47)
HGB BLD-MCNC: 10.8 G/DL (ref 12–16)
HYPOCHROMIA BLD QL SMEAR: ABNORMAL
IMM GRANULOCYTES # BLD AUTO: 0.02 K/UL (ref 0–0.11)
IMM GRANULOCYTES NFR BLD AUTO: 0.2 % (ref 0–0.9)
LIPASE SERPL-CCNC: 46 U/L (ref 11–82)
LYMPHOCYTES # BLD AUTO: 2.94 K/UL (ref 1–4.8)
LYMPHOCYTES NFR BLD: 31.5 % (ref 22–41)
MCH RBC QN AUTO: 19.9 PG (ref 27–33)
MCHC RBC AUTO-ENTMCNC: 28.1 G/DL (ref 33.6–35)
MCV RBC AUTO: 70.8 FL (ref 81.4–97.8)
MONOCYTES # BLD AUTO: 0.7 K/UL (ref 0–0.85)
MONOCYTES NFR BLD AUTO: 7.5 % (ref 0–13.4)
MORPHOLOGY BLD-IMP: NORMAL
NEUTROPHILS # BLD AUTO: 5.41 K/UL (ref 2–7.15)
NEUTROPHILS NFR BLD: 58.1 % (ref 44–72)
NRBC # BLD AUTO: 0 K/UL
NRBC BLD-RTO: 0 /100 WBC
PLATELET # BLD AUTO: 249 K/UL (ref 164–446)
PLATELET BLD QL SMEAR: NORMAL
PMV BLD AUTO: 10.6 FL (ref 9–12.9)
POTASSIUM SERPL-SCNC: 3.4 MMOL/L (ref 3.6–5.5)
PROT SERPL-MCNC: 7.3 G/DL (ref 6–8.2)
RBC # BLD AUTO: 5.42 M/UL (ref 4.2–5.4)
RBC BLD AUTO: PRESENT
SODIUM SERPL-SCNC: 139 MMOL/L (ref 135–145)
TROPONIN T SERPL-MCNC: <6 NG/L (ref 6–19)
TROPONIN T SERPL-MCNC: <6 NG/L (ref 6–19)
WBC # BLD AUTO: 9.3 K/UL (ref 4.8–10.8)

## 2021-02-11 PROCEDURE — 84703 CHORIONIC GONADOTROPIN ASSAY: CPT

## 2021-02-11 PROCEDURE — 84484 ASSAY OF TROPONIN QUANT: CPT

## 2021-02-11 PROCEDURE — 83690 ASSAY OF LIPASE: CPT

## 2021-02-11 PROCEDURE — 700111 HCHG RX REV CODE 636 W/ 250 OVERRIDE (IP): Performed by: EMERGENCY MEDICINE

## 2021-02-11 PROCEDURE — 93005 ELECTROCARDIOGRAM TRACING: CPT | Performed by: EMERGENCY MEDICINE

## 2021-02-11 PROCEDURE — 96374 THER/PROPH/DIAG INJ IV PUSH: CPT

## 2021-02-11 PROCEDURE — 99285 EMERGENCY DEPT VISIT HI MDM: CPT

## 2021-02-11 PROCEDURE — 85025 COMPLETE CBC W/AUTO DIFF WBC: CPT

## 2021-02-11 PROCEDURE — 71045 X-RAY EXAM CHEST 1 VIEW: CPT

## 2021-02-11 PROCEDURE — 80053 COMPREHEN METABOLIC PANEL: CPT

## 2021-02-11 PROCEDURE — 36415 COLL VENOUS BLD VENIPUNCTURE: CPT

## 2021-02-11 RX ORDER — ONDANSETRON 4 MG/1
4 TABLET, ORALLY DISINTEGRATING ORAL EVERY 6 HOURS PRN
Qty: 10 TABLET | Refills: 0 | Status: SHIPPED | OUTPATIENT
Start: 2021-02-11 | End: 2021-05-11

## 2021-02-11 RX ORDER — ONDANSETRON 2 MG/ML
4 INJECTION INTRAMUSCULAR; INTRAVENOUS ONCE
Status: COMPLETED | OUTPATIENT
Start: 2021-02-11 | End: 2021-02-11

## 2021-02-11 RX ADMIN — ONDANSETRON 4 MG: 2 INJECTION INTRAMUSCULAR; INTRAVENOUS at 22:09

## 2021-02-11 ASSESSMENT — PAIN DESCRIPTION - DESCRIPTORS: DESCRIPTORS: BURNING

## 2021-02-11 ASSESSMENT — FIBROSIS 4 INDEX: FIB4 SCORE: 0.62

## 2021-02-11 NOTE — PROGRESS NOTES
Chief Complaint   Patient presents with   • Dizziness     Subjective:   Chanelle Ortiz is a 45 y.o. female who presents again for unspecified chest pain and palpitations.    Hx of morbid obesity, DM type 2, hypothyroid, anxiety/depression, intermittent chest pains and palpitations,non-compliance with medication use, and POLLY on CPAP.      She again reports persistent symptoms of dizziness, atypical chest pain, and palpitations.    She has the Zio monitor on today, results will be pending after return.    She did not get her stress test as ordered.    She is unable to get weight management clinic apt due to insurance not covering this.    She still is on soft diet due to no teeth.    Past Medical History:   Diagnosis Date   • Anxiety and depression     after [facility]   • Arrhythmia     notes occasional rapid or prominent heart beat, at night    • Asthma    • Back pain    • Diabetes     on and off meds (due to concern about heart beat changes)   • H/O Hypertension - resolved     Patient states prior HTN, but resolved after changes in other medications (but off HTN meds).    • Hx of Bronchitis    • Hyperthyroidism     Treated with Radioactive iodine, at [facility] in    • Hypothyroidism, after radioactive ablation of thyroid    • POLLY on CPAP     gets headaches, if not using CPAP.    • Pneumonia    • Psychiatric problem - unspecified     pt notes anxiety and depression, with counseller - pt unclear on details.       Past Surgical History:   Procedure Laterality Date   • GYN SURGERY      tuboligation, bilateral, during past .    • OTHER ABDOMINAL SURGERY      cholesystectomy   • OVARIAN CYSTECTOMY      unknown details or laterality.   • CHOLECYSTECTOMY     • OTHER      tonsillectomy   • PRIMARY C SECTION       - , ,    • PRIMARY C SECTION       Family History   Problem Relation Age of Onset   • Other Mother         sleep apnea   • Heart  "Failure Sister    • Diabetes Sister    • Heart Disease Sister    • Hypertension Sister    • Stroke Sister    • Hyperlipidemia Sister    • Heart Attack Maternal Grandfather    • Diabetes Daughter    • Psychiatric Illness Daughter         bipolar   • Psychiatric Illness Son         autism     Social History     Socioeconomic History   • Marital status:      Spouse name: Not on file   • Number of children: Not on file   • Years of education: Not on file   • Highest education level: Not on file   Occupational History   • Not on file   Tobacco Use   • Smoking status: Never Smoker   • Smokeless tobacco: Never Used   Substance and Sexual Activity   • Alcohol use: No   • Drug use: No   • Sexual activity: Not on file     Comment: .  Tuboligation in 2004.    Other Topics Concern   • Not on file   Social History Narrative   • Not on file     Social Determinants of Health     Financial Resource Strain: Low Risk    • Difficulty of Paying Living Expenses: Not very hard   Food Insecurity: No Food Insecurity   • Worried About Running Out of Food in the Last Year: Never true   • Ran Out of Food in the Last Year: Never true   Transportation Needs: No Transportation Needs   • Lack of Transportation (Medical): No   • Lack of Transportation (Non-Medical): No   Physical Activity:    • Days of Exercise per Week:    • Minutes of Exercise per Session:    Stress:    • Feeling of Stress :    Social Connections:    • Frequency of Communication with Friends and Family:    • Frequency of Social Gatherings with Friends and Family:    • Attends Jain Services:    • Active Member of Clubs or Organizations:    • Attends Club or Organization Meetings:    • Marital Status:    Intimate Partner Violence:    • Fear of Current or Ex-Partner:    • Emotionally Abused:    • Physically Abused:    • Sexually Abused:      Allergies   Allergen Reactions   • Morphine      \"I think\" \"I dunno what my reaction was, the nurse just said my oxygen " "levels were going way to low on it\"      Outpatient Encounter Medications as of 2/10/2021   Medication Sig Dispense Refill   • nitrofurantoin (MACROBID) 100 MG Cap TAKE 1 CAPSULE BY MOUTH EVERY 12 HOURS FOR 7 DAYS     • ibuprofen (MOTRIN) 800 MG Tab as needed.     • TRUE METRIX BLOOD GLUCOSE TEST strip 1 STRIP 4 TIMES A DAY     • vitamin D, Ergocalciferol, (DRISDOL) 1.25 MG (27877 UT) Cap capsule TAKE ONE CAP ONCE A WEEK FOR 12 WEEKS     • lisinopril (PRINIVIL) 5 MG Tab Take 1 Tab by mouth every day. 30 Tab 11   • metformin (GLUCOPHAGE) 1000 MG tablet Take 1,000 mg by mouth every day. Pt is to take 1 tablet twice a day, but only takes this medication once a day     • Empagliflozin (JARDIANCE) 10 MG Tab Take 10 mg by mouth every day.     • albuterol 108 (90 Base) MCG/ACT Aero Soln inhalation aerosol Inhale 2 Puffs by mouth every 6 hours as needed for Shortness of Breath. 8.5 g 0   • levothyroxine (SYNTHROID) 200 MCG Tab Take 200 mcg by mouth Every morning on an empty stomach.     • [DISCONTINUED] HYDROcodone-acetaminophen (NORCO) 5-325 MG Tab per tablet TAKE 1 TABLET BY MOUTH EVERY 8 HOURS AS NEEDED FOR 3 DAYS K04.7     • [DISCONTINUED] amoxicillin (AMOXIL) 500 MG Cap Take 500 mg by mouth 3 times a day.     • [DISCONTINUED] famotidine (PEPCID) 20 MG Tab Take 1 Tab by mouth 2 Times a Day. (Patient not taking: Reported on 2/10/2021) 10 Tab 0     No facility-administered encounter medications on file as of 2/10/2021.     Review of Systems   Constitutional: Negative for fever and malaise/fatigue.   Respiratory: Negative for cough and shortness of breath.    Cardiovascular: Positive for chest pain and palpitations. Negative for orthopnea, claudication, leg swelling and PND.   Gastrointestinal: Negative for abdominal pain.   Musculoskeletal: Negative for myalgias.   Neurological: Negative for dizziness.        Objective:   /70 (BP Location: Right arm, Patient Position: Sitting, BP Cuff Size: Large adult)   Pulse 84  " " Resp 16   Ht 1.626 m (5' 4\")   Wt (!) 126 kg (278 lb)   SpO2 95%   BMI 47.72 kg/m²     Physical Exam   Constitutional: She is oriented to person, place, and time. She appears well-developed.   obese   HENT:   Head: Normocephalic and atraumatic.   Eyes: EOM are normal.   Neck: No JVD present.   Cardiovascular: Normal rate, regular rhythm, normal heart sounds and intact distal pulses.   Pulmonary/Chest: Effort normal and breath sounds normal.   Musculoskeletal:         General: No edema. Normal range of motion.   Neurological: She is alert and oriented to person, place, and time.   Skin: Skin is warm and dry.   Psychiatric:   Flat affect   Nursing note and vitals reviewed.      Assessment:     1. POLLY on CPAP     2. Prediabetes     3. Morbid obesity with BMI of 45.0-49.9, adult (HCC)     4. Chest pain, unspecified type  NM-CARDIAC STRESS TEST   5. Other chest pain     6. Palpitations       Medical Decision Making:  Today's Assessment / Status / Plan:     1. Chest pain and palpitations  -very poor historian, atypical and unspecified chest pain and palpitations  -recommend follow triggers and document when symptoms occur  -recommend nuclear stress imaging that was ordered last year for possible ischemia and order 48 hour holter for palpitation review  -patient has frequent ER visits for these symptoms  -echo with normal LV function    2. HTN  -cont lisinopril 5 mg  -BP improved now    3.Obesity with DM  -recommend weight management program, insurance doesn't cover  -doesn't exercise or eat healthy diet  -currently with liquid diet due to losing teeth from tooth infection  -DM managed by PCP, she has stopped metformin-recommend follow up with PCP soon to discuss this    4. POLLY on CPAP  -mild RV pressure elevation on last echo  -recommend cont to follow up with sleep center    Follow up in clinic with Edel WAKEFIELD in 3 months with holter and cardiac PET- call with results; call for worsening symptoms or " concerns    Patient verbalizes understanding and agrees with the plan of care.

## 2021-02-12 NOTE — ED NOTES
Med Rec completed per patient at bedside  Preferred Pharmacy: [facility]  Allergies reviewed    Patient reports that she is on Metronidazole 500 mg twice daily for 7 days for a fungal infection. Patient reports that she   has 1 tablet left to finish the course.    Patient repots that she is suppose to be taking Lisinopril 5 mg once daily but does not take all the time. Last dose taken was about 1 week ago.    Patient is suppose to be taking metformin 1000 mg twice daily but only takes once daily with dinner.

## 2021-02-12 NOTE — ED TRIAGE NOTES
Chief Complaint   Patient presents with   • Chest Pain     Pt BIB EMS from home for above complaint. Pt reports she was sitting on the couch watching TV when the pain started. Pt has had this jacobs intermittent over the last 4 months. Pt reports dizziness and nausea. Pt describes pain as burning. Pt received one SL nitro, 4mg zofran, and 324ASA prior to arrival.

## 2021-02-12 NOTE — ED PROVIDER NOTES
ED Provider Note    CHIEF COMPLAINT  Chief Complaint   Patient presents with    Chest Pain       HPI  Chanelle Ortiz is a 45 y.o. female who presents to the emergency department complaining of left-sided chest discomfort and nausea. Past medical history is documented below. She explained that this evening she had a pizza dinner and was in sitting on the couch watching TV when she had onset of her symptoms. Predominantly the nausea it is her most concerning symptom at this point. Mild and resolving left-sided chest discomfort. No prior history of same. States that her blood sugars have been relatively well and she uses metformin for this. Denies any trauma injury or strain. No recent illness. No cough cold congestion. No diarrhea. No known sick contacts.    REVIEW OF SYSTEMS  See HPI for further details. All other systems are negative.     PAST MEDICAL HISTORY   has a past medical history of Anxiety and depression (2016), Arrhythmia, Asthma, Back pain, Diabetes (2008), H/O Hypertension - resolved, Hx of Bronchitis, Hyperthyroidism (2008), Hypothyroidism, after radioactive ablation of thyroid (2008), POLLY on CPAP (2018), Pneumonia, and Psychiatric problem - unspecified.    SOCIAL HISTORY  Social History     Tobacco Use    Smoking status: Never Smoker    Smokeless tobacco: Never Used   Substance and Sexual Activity    Alcohol use: No    Drug use: No    Sexual activity: Not on file     Comment: .  Tuboligation in 2004.        SURGICAL HISTORY   has a past surgical history that includes other abdominal surgery (2002); gyn surgery (2004); other; primary c section; ovarian cystectomy (1992); cholecystectomy; and primary c section.    CURRENT MEDICATIONS  Home Medications       Reviewed by Samuel Peña Out, PhT (Pharmacy Tech) on 02/11/21 at 2102  Med List Status: Complete     Medication Last Dose Status   albuterol 108 (90 Base) MCG/ACT Aero Soln inhalation aerosol Not Taking Active   aspirin EC  "(ECOTRIN) 81 MG Tablet Delayed Response 2/11/2021 Active   JARDIANCE 25 MG Tab 2/10/2021 Active   levothyroxine (SYNTHROID) 200 MCG Tab 2/11/2021 Active   lisinopril (PRINIVIL) 5 MG Tab About 1 week ago Active   metformin (GLUCOPHAGE) 1000 MG tablet 2/11/2021 Active   metroNIDAZOLE (FLAGYL) 500 MG Tab 2/10/2021 Active   vitamin D, Ergocalciferol, (DRISDOL) 1.25 MG (79016 UT) Cap capsule 2/7/2021 Active                    ALLERGIES  Allergies   Allergen Reactions    Morphine Vomiting and Nausea     \"I think\" \"I dunno what my reaction was, the nurse just said my oxygen levels were going way to low on it\" \"heart races\"       PHYSICAL EXAM  VITAL SIGNS: /70   Pulse 75   Temp 36.6 °C (97.8 °F) (Temporal)   Resp 12   Ht 1.626 m (5' 4\")   Wt (!) 126 kg (278 lb)   SpO2 95%   BMI 47.72 kg/m²  @MARTY[[id number]::@   Pulse ox interpretation: I interpret this pulse ox as normal.  Constitutional: Alert in no apparent distress.  HENT: No signs of trauma, Bilateral external ears normal, Nose normal.   Eyes: Pupils are equal and reactive  Neck: Normal range of motion, No tenderness, Supple  Cardiovascular: Regular rate and rhythm, no murmurs.   Thorax & Lungs: Normal breath sounds, No respiratory distress, No wheezing, No chest tenderness.   Abdomen: Bowel sounds normal, Soft, No tenderness, overweight  Skin: Warm, Dry, No erythema, No rash.   Extremities: Intact distal pulses, No edema, No tenderness  Musculoskeletal: Good range of motion in all major joints. No tenderness to palpation or major deformities noted.   Neurologic: Alert , Normal motor function, Normal sensory function, No focal deficits noted.   Psychiatric: Affect normal, Judgment normal, Mood normal.       DIAGNOSTIC STUDIES / PROCEDURES    LABS  Results for orders placed or performed during the hospital encounter of 02/11/21   CBC with Differential   Result Value Ref Range    WBC 9.3 4.8 - 10.8 K/uL    RBC 5.42 (H) 4.20 - 5.40 M/uL    Hemoglobin 10.8 (L) " 12.0 - 16.0 g/dL    Hematocrit 38.4 37.0 - 47.0 %    MCV 70.8 (L) 81.4 - 97.8 fL    MCH 19.9 (L) 27.0 - 33.0 pg    MCHC 28.1 (L) 33.6 - 35.0 g/dL    RDW 47.0 35.9 - 50.0 fL    Platelet Count 249 164 - 446 K/uL    MPV 10.6 9.0 - 12.9 fL    Neutrophils-Polys 58.10 44.00 - 72.00 %    Lymphocytes 31.50 22.00 - 41.00 %    Monocytes 7.50 0.00 - 13.40 %    Eosinophils 1.80 0.00 - 6.90 %    Basophils 0.90 0.00 - 1.80 %    Immature Granulocytes 0.20 0.00 - 0.90 %    Nucleated RBC 0.00 /100 WBC    Neutrophils (Absolute) 5.41 2.00 - 7.15 K/uL    Lymphs (Absolute) 2.94 1.00 - 4.80 K/uL    Monos (Absolute) 0.70 0.00 - 0.85 K/uL    Eos (Absolute) 0.17 0.00 - 0.51 K/uL    Baso (Absolute) 0.08 0.00 - 0.12 K/uL    Immature Granulocytes (abs) 0.02 0.00 - 0.11 K/uL    NRBC (Absolute) 0.00 K/uL    Hypochromia 1+    Complete Metabolic Panel (CMP)   Result Value Ref Range    Sodium 139 135 - 145 mmol/L    Potassium 3.4 (L) 3.6 - 5.5 mmol/L    Chloride 104 96 - 112 mmol/L    Co2 25 20 - 33 mmol/L    Anion Gap 10.0 7.0 - 16.0    Glucose 150 (H) 65 - 99 mg/dL    Bun 14 8 - 22 mg/dL    Creatinine 0.51 0.50 - 1.40 mg/dL    Calcium 8.9 8.5 - 10.5 mg/dL    AST(SGOT) 16 12 - 45 U/L    ALT(SGPT) 19 2 - 50 U/L    Alkaline Phosphatase 98 30 - 99 U/L    Total Bilirubin 0.2 0.1 - 1.5 mg/dL    Albumin 3.7 3.2 - 4.9 g/dL    Total Protein 7.3 6.0 - 8.2 g/dL    Globulin 3.6 (H) 1.9 - 3.5 g/dL    A-G Ratio 1.0 g/dL   Troponin   Result Value Ref Range    Troponin T <6 6 - 19 ng/L   LIPASE   Result Value Ref Range    Lipase 46 11 - 82 U/L   BETA-HCG QUALITATIVE SERUM   Result Value Ref Range    Beta-Hcg Qualitative Serum Negative Negative   PERIPHERAL SMEAR REVIEW   Result Value Ref Range    Peripheral Smear Review see below    PLATELET ESTIMATE   Result Value Ref Range    Plt Estimation Normal    MORPHOLOGY   Result Value Ref Range    RBC Morphology Present    DIFFERENTIAL COMMENT   Result Value Ref Range    Comments-Diff see below    ESTIMATED GFR    Result Value Ref Range    GFR If African American >60 >60 mL/min/1.73 m 2    GFR If Non African American >60 >60 mL/min/1.73 m 2   TROPONIN   Result Value Ref Range    Troponin T <6 6 - 19 ng/L   EKG (NOW)   Result Value Ref Range    Report       [facility] Emergency Dept.    Test Date:  2021  Pt Name:    PRATIBHA CUELLAR              Department: ER  MRN:        [id number]                      Room:        07  Gender:     Female                       Technician: [id number]  :        1975                   Requested By:ER TRIAGE PROTOCOL  Order #:    [id number]                    Reading MD:    Measurements  Intervals                                Axis  Rate:       78                           P:          34  ND:         153                          QRS:        51  QRSD:       90                           T:          35  QT:         385  QTc:        439    Interpretive Statements  Sinus rhythm  Compared to ECG 2020 10:04:26  T-wave abnormality no longer present           RADIOLOGY  DX-CHEST-PORTABLE (1 VIEW)   Final Result         1.  No acute cardiopulmonary disease.              COURSE & MEDICAL DECISION MAKING  Pertinent Labs & Imaging studies reviewed. (See chart for details)  patient presented emergency department with the above complaints. Heart score is low. Delta troponin is negative. No acute EKG changes. Laboratory evaluation is unremarkable. This point is unclear as to why the patient is having nausea although could certainly been her pizza dinner. I will prescribe her Zofran for ongoing homecare and she is understand return precautions here the ER if needed. She will continue to monitor her blood sugar and take medications as prescribed.    The patient will return for worsening symptoms and is stable at the time of discharge. The patient verbalizes understanding and will comply.    FINAL IMPRESSION  1. Chest pain, unspecified type    2. Nausea            Electronically  signed by: Ollie Mills M.D., 2/11/2021 8:30 PM

## 2021-02-26 ENCOUNTER — TELEPHONE (OUTPATIENT)
Dept: CARDIOLOGY | Facility: MEDICAL CENTER | Age: 46
End: 2021-02-26

## 2021-02-26 ENCOUNTER — HOSPITAL ENCOUNTER (OUTPATIENT)
Dept: RADIOLOGY | Facility: MEDICAL CENTER | Age: 46
End: 2021-02-26
Attending: NURSE PRACTITIONER
Payer: MEDICAID

## 2021-02-26 DIAGNOSIS — R07.9 CHEST PAIN, UNSPECIFIED TYPE: ICD-10-CM

## 2021-02-26 PROCEDURE — 700111 HCHG RX REV CODE 636 W/ 250 OVERRIDE (IP)

## 2021-02-26 PROCEDURE — 93018 CV STRESS TEST I&R ONLY: CPT | Performed by: INTERNAL MEDICINE

## 2021-02-26 PROCEDURE — A9502 TC99M TETROFOSMIN: HCPCS

## 2021-02-26 PROCEDURE — 78452 HT MUSCLE IMAGE SPECT MULT: CPT | Mod: 26 | Performed by: INTERNAL MEDICINE

## 2021-02-26 RX ORDER — REGADENOSON 0.08 MG/ML
0.4 INJECTION, SOLUTION INTRAVENOUS ONCE
Status: COMPLETED | OUTPATIENT
Start: 2021-02-26 | End: 2021-02-26

## 2021-02-26 RX ORDER — REGADENOSON 0.08 MG/ML
INJECTION, SOLUTION INTRAVENOUS
Status: COMPLETED
Start: 2021-02-26 | End: 2021-02-26

## 2021-02-26 RX ADMIN — REGADENOSON 0.4 MG: 0.08 INJECTION, SOLUTION INTRAVENOUS at 09:05

## 2021-02-26 NOTE — TELEPHONE ENCOUNTER
----- Message from CHANCE Vasquez sent at 2/26/2021 11:04 AM PST -----  Normal stress imaging. Great news. Her chest pains are most likely not her heart. Recommend PCP review for non-cardiac chest pain causes. SC

## 2021-03-03 PROCEDURE — 93248 EXT ECG>7D<15D REV&INTERPJ: CPT | Performed by: INTERNAL MEDICINE

## 2021-03-05 ENCOUNTER — PATIENT MESSAGE (OUTPATIENT)
Dept: CARDIOLOGY | Facility: MEDICAL CENTER | Age: 46
End: 2021-03-05

## 2021-03-11 ENCOUNTER — HOSPITAL ENCOUNTER (EMERGENCY)
Facility: MEDICAL CENTER | Age: 46
End: 2021-03-11
Attending: EMERGENCY MEDICINE
Payer: MEDICAID

## 2021-03-11 VITALS
RESPIRATION RATE: 15 BRPM | SYSTOLIC BLOOD PRESSURE: 120 MMHG | TEMPERATURE: 98.3 F | WEIGHT: 274.91 LBS | HEIGHT: 64 IN | DIASTOLIC BLOOD PRESSURE: 71 MMHG | BODY MASS INDEX: 46.93 KG/M2 | HEART RATE: 68 BPM | OXYGEN SATURATION: 94 %

## 2021-03-11 DIAGNOSIS — M54.12 CERVICAL RADICULOPATHY: ICD-10-CM

## 2021-03-11 DIAGNOSIS — M79.602 LEFT ARM PAIN: ICD-10-CM

## 2021-03-11 LAB — EKG IMPRESSION: NORMAL

## 2021-03-11 PROCEDURE — 99284 EMERGENCY DEPT VISIT MOD MDM: CPT

## 2021-03-11 PROCEDURE — 93005 ELECTROCARDIOGRAM TRACING: CPT | Performed by: EMERGENCY MEDICINE

## 2021-03-11 PROCEDURE — A9270 NON-COVERED ITEM OR SERVICE: HCPCS | Performed by: EMERGENCY MEDICINE

## 2021-03-11 PROCEDURE — 700102 HCHG RX REV CODE 250 W/ 637 OVERRIDE(OP): Performed by: EMERGENCY MEDICINE

## 2021-03-11 RX ORDER — IBUPROFEN 600 MG/1
600 TABLET ORAL ONCE
Status: COMPLETED | OUTPATIENT
Start: 2021-03-11 | End: 2021-03-11

## 2021-03-11 RX ORDER — METHYLPREDNISOLONE 4 MG/1
TABLET ORAL
Qty: 21 TABLET | Refills: 0 | Status: SHIPPED | OUTPATIENT
Start: 2021-03-11 | End: 2021-05-11

## 2021-03-11 RX ADMIN — IBUPROFEN 600 MG: 600 TABLET, FILM COATED ORAL at 07:02

## 2021-03-11 ASSESSMENT — FIBROSIS 4 INDEX: FIB4 SCORE: 0.66

## 2021-03-11 ASSESSMENT — PAIN DESCRIPTION - DESCRIPTORS: DESCRIPTORS: SHARP;SHOOTING

## 2021-03-11 NOTE — ED PROVIDER NOTES
"ED Provider Note    Scribed for Juan Galvez M.D. by Jayant Thornton. 3/11/2021  6:43 AM    Primary care provider: CHANCE Birmingham  Means of arrival: EMS  History obtained from: Patient  History limited by: None    CHIEF COMPLAINT  Chief Complaint   Patient presents with    Arm Pain     pt states she awoke around 0400 with shooting sharp L arm pain, dizziness, and felt like her \"heart was racing.\" Pt states pain has been giong on for a few days but it reached the worst it's ever been this morning. PT rates pain 8/10       HPI  Chanelle Ortiz is a 45 y.o. female who presents to the Emergency Department for acute, intermittent episodes of sharp left arm pain, dizziness, and heart palpitations onset 3 days ago. Patient reports having episodes for the last couple days, but this morning woke up at 4AM with another episode which was significantly more intense than previous. She describes having shooting pains down her arm as well. There are no known alleviating or exacerbating factors. Patient denies any nausea, vomiting, or diarrhea.     REVIEW OF SYSTEMS  Pertinent positives include left arm pain, dizziness, and heart palpitations.   Pertinent negatives include no nausea, vomiting, or diarrhea.  See HPI for further details.       PAST MEDICAL HISTORY   has a past medical history of Anxiety and depression (2016), Arrhythmia, Asthma, Back pain, Diabetes (2008), H/O Hypertension - resolved, Hx of Bronchitis, Hyperthyroidism (2008), Hypothyroidism, after radioactive ablation of thyroid (2008), POLLY on CPAP (2018), Pneumonia, and Psychiatric problem - unspecified.    SURGICAL HISTORY   has a past surgical history that includes other abdominal surgery (2002); gyn surgery (2004); other; primary c section; ovarian cystectomy (1992); cholecystectomy; and primary c section.    SOCIAL HISTORY  Social History     Tobacco Use    Smoking status: Never Smoker    Smokeless tobacco: Never Used   Substance Use Topics    " "Alcohol use: No    Drug use: No      Social History     Substance and Sexual Activity   Drug Use No       FAMILY HISTORY  Family History   Problem Relation Age of Onset    Other Mother         sleep apnea    Heart Failure Sister     Diabetes Sister     Heart Disease Sister     Hypertension Sister     Stroke Sister     Hyperlipidemia Sister     Heart Attack Maternal Grandfather     Diabetes Daughter     Psychiatric Illness Daughter         bipolar    Psychiatric Illness Son         autism       CURRENT MEDICATIONS  Current Outpatient Medications   Medication Instructions    albuterol 108 (90 Base) MCG/ACT Aero Soln inhalation aerosol 2 Puffs, Inhalation, EVERY 6 HOURS PRN    aspirin EC (ECOTRIN) 324 mg, Oral, ONCE, 4 tabs = 324 mg    Jardiance 25 mg, Oral, DAILY    levothyroxine (SYNTHROID) 200 mcg, Oral, EACH MORNING ON EMPTY STOMACH    lisinopril (PRINIVIL) 5 mg, Oral, DAILY    metformin (GLUCOPHAGE) 1,000 mg, Oral, EVERY EVENING, Pt is to take 1 tablet twice a day, but only takes this medication once a day    metroNIDAZOLE (FLAGYL) 500 mg, Oral, TWO TIMES DAILY, 7 day supply    ondansetron (ZOFRAN ODT) 4 mg, Oral, EVERY 6 HOURS PRN    vitamin D (Ergocalciferol) (DRISDOL) 50,000 Units, Oral, EVERY SUN         ALLERGIES  Allergies   Allergen Reactions    Morphine Vomiting and Nausea     \"I think\" \"I dunno what my reaction was, the nurse just said my oxygen levels were going way to low on it\" \"heart races\"       PHYSICAL EXAM  VITAL SIGNS: /74   Pulse 73   Temp 36.2 °C (97.1 °F) (Temporal)   Resp 18   Ht 1.626 m (5' 4\")   Wt 125 kg (274 lb 14.6 oz)   SpO2 97%   BMI 47.19 kg/m²     Nursing note and vitals reviewed.  Constitutional: Well-developed and well-nourished. No distress.   HENT: Head is normocephalic and atraumatic. Oropharynx is clear and moist without exudate or erythema.   Eyes: Pupils are equal, round, and reactive to light. Conjunctiva are normal.   Cardiovascular: Normal rate and regular " rhythm. No murmur heard. Normal radial pulses.  Pulmonary/Chest: Breath sounds normal. No wheezes or rales.   Abdominal: Soft and non-tender. No distention    Musculoskeletal: Extremities exhibit normal range of motion without edema or tenderness.   Neurological: Awake, alert and oriented to person, place, and time. No focal deficits noted.  Skin: Skin is warm and dry. No rash.   Psychiatric: Normal mood and affect. Appropriate for clinical situation.    DIAGNOSTIC STUDIES / PROCEDURES    EKG Interpretation  Interpreted by me as below    LABS  Results for orders placed or performed during the hospital encounter of 21   EKG   Result Value Ref Range    Report       [facility] Emergency Dept.    Test Date:  2021  Pt Name:    PRATIBHA CUELLAR              Department: ER  MRN:        [id number]                      Room:       [facility]  Gender:     Female                       Technician: [id number]  :        1975                   Requested By:PATRICK DEWITT  Order #:    [id number]                    Reading MD: PATRICK DEWITT MD    Measurements  Intervals                                Axis  Rate:       65                           P:          42  KS:         154                          QRS:        50  QRSD:       96                           T:          21  QT:         397  QTc:        413    Interpretive Statements  Sinus rhythm  Compared to ECG 2021 20:17:05  No significant changes  Electronically Signed On 3- 6:57:23 PST by PATRICK DEWITT MD       All labs reviewed by me.    COURSE & MEDICAL DECISION MAKING  Nursing notes, VS, PMSFHx reviewed in chart.     Review of past medical records shows the patient was seen here 1 month ago for chest pain. She had a cardiac stress test on 21 which was normal.     6:43 AM - Patient seen and examined at bedside. Patient will be treated with Motrin 600 m.  Ordered EKG to evaluate her symptoms.     EKG demonstrates no evidence  of ischemia.  I feel her overall clinical presentation is most likely consistent with cervical radiculopathy.  Patient is treated with a Medrol dose pack.  The patient has had an extensive cardiac evaluation including recent stress test.  She does not have any chest pain.  Therefore I do not feel that further work-up is indicated.    The patient will return for new or worsening symptoms and is stable at the time of discharge.    The patient is referred to a primary physician for blood pressure management, diabetic screening, and for all other preventative health concerns.    DISPOSITION:  Patient will be discharged home in stable condition.    FOLLOW UP:  CHANCE [place]  [address]  [place] [id number] [phone]    Schedule an appointment as soon as possible for a visit       [facility], Emergency Dept  [address] [address] [phone]    If symptoms worsen      OUTPATIENT MEDICATIONS:  New Prescriptions    METHYLPREDNISOLONE (MEDROL) 4 MG TAB    Take as per the package instructions.       FINAL IMPRESSION  1. Left arm pain    2. Cervical radiculopathy          Jayant FRANCO (Dede), am scribing for, and in the presence of, Juan Galvez M.D..    Electronically signed by: Jayant Thornton (Dede), 3/11/2021    Juan FRANCO M.D. personally performed the services described in this documentation, as scribed by Jayant Thornton in my presence, and it is both accurate and complete.    The note accurately reflects work and decisions made by me.  Juan Galvez M.D.  3/12/2021  4:08 AM

## 2021-03-11 NOTE — ED NOTES
"Pt discharged home. Pt is A/O x 4. Pt is ambulatory to the [facility] with a steady. Patient in possession of belongings. Pt provided discharge education and information pertaining to medications and follow up appointments. Discussed signs and symptoms to return to the ER, patient verbalized understanding. Pt received copy of discharge instructions and verbalized understanding.     /71   Pulse 68   Temp 36.8 °C (98.3 °F) (Temporal)   Resp 15   Ht 1.626 m (5' 4\")   Wt 125 kg (274 lb 14.6 oz)   SpO2 94%   BMI 47.19 kg/m²     "

## 2021-03-11 NOTE — ED TRIAGE NOTES
"Chief Complaint   Patient presents with   • Arm Pain     pt states she awoke around 0400 with shooting sharp L arm pain, dizziness, and felt like her \"heart was racing.\" Pt states pain has been giong on for a few days but it reached the worst it's ever been this morning. PT rates pain 8/10       EMS to triage for above complaint. PT endorses history of DM type 2, HTN, and a \"thyroid problem.  Educated on triage process, encourage to inform staff of any changes.     /74   Pulse 73   Temp 36.2 °C (97.1 °F) (Temporal)   Resp 18   Ht 1.626 m (5' 4\")   Wt 125 kg (274 lb 14.6 oz)   SpO2 97%   BMI 47.19 kg/m²     "

## 2021-04-17 ENCOUNTER — HOSPITAL ENCOUNTER (EMERGENCY)
Facility: MEDICAL CENTER | Age: 46
End: 2021-04-18
Attending: EMERGENCY MEDICINE
Payer: MEDICAID

## 2021-04-17 DIAGNOSIS — R42 DIZZINESS: ICD-10-CM

## 2021-04-17 LAB — EKG IMPRESSION: NORMAL

## 2021-04-17 PROCEDURE — 93005 ELECTROCARDIOGRAM TRACING: CPT | Performed by: EMERGENCY MEDICINE

## 2021-04-17 PROCEDURE — 85007 BL SMEAR W/DIFF WBC COUNT: CPT

## 2021-04-17 PROCEDURE — 85379 FIBRIN DEGRADATION QUANT: CPT

## 2021-04-17 PROCEDURE — 82077 ASSAY SPEC XCP UR&BREATH IA: CPT

## 2021-04-17 PROCEDURE — 99284 EMERGENCY DEPT VISIT MOD MDM: CPT

## 2021-04-17 PROCEDURE — 80053 COMPREHEN METABOLIC PANEL: CPT

## 2021-04-17 PROCEDURE — 85027 COMPLETE CBC AUTOMATED: CPT

## 2021-04-17 PROCEDURE — 84439 ASSAY OF FREE THYROXINE: CPT

## 2021-04-17 PROCEDURE — 84481 FREE ASSAY (FT-3): CPT

## 2021-04-17 PROCEDURE — 93005 ELECTROCARDIOGRAM TRACING: CPT

## 2021-04-17 PROCEDURE — 84443 ASSAY THYROID STIM HORMONE: CPT

## 2021-04-17 ASSESSMENT — FIBROSIS 4 INDEX: FIB4 SCORE: 0.66

## 2021-04-18 ENCOUNTER — APPOINTMENT (OUTPATIENT)
Dept: RADIOLOGY | Facility: MEDICAL CENTER | Age: 46
End: 2021-04-18
Attending: EMERGENCY MEDICINE
Payer: MEDICAID

## 2021-04-18 VITALS
BODY MASS INDEX: 46.95 KG/M2 | WEIGHT: 275 LBS | HEIGHT: 64 IN | OXYGEN SATURATION: 97 % | TEMPERATURE: 96.7 F | SYSTOLIC BLOOD PRESSURE: 131 MMHG | RESPIRATION RATE: 20 BRPM | HEART RATE: 69 BPM | DIASTOLIC BLOOD PRESSURE: 70 MMHG

## 2021-04-18 LAB
ALBUMIN SERPL BCP-MCNC: 3.7 G/DL (ref 3.2–4.9)
ALBUMIN/GLOB SERPL: 1 G/DL
ALP SERPL-CCNC: 106 U/L (ref 30–99)
ALT SERPL-CCNC: 15 U/L (ref 2–50)
ANION GAP SERPL CALC-SCNC: 8 MMOL/L (ref 7–16)
ANISOCYTOSIS BLD QL SMEAR: ABNORMAL
AST SERPL-CCNC: 23 U/L (ref 12–45)
BASOPHILS # BLD AUTO: 1.7 % (ref 0–1.8)
BASOPHILS # BLD: 0.2 K/UL (ref 0–0.12)
BILIRUB SERPL-MCNC: 0.2 MG/DL (ref 0.1–1.5)
BUN SERPL-MCNC: 10 MG/DL (ref 8–22)
CALCIUM SERPL-MCNC: 8.7 MG/DL (ref 8.5–10.5)
CHLORIDE SERPL-SCNC: 101 MMOL/L (ref 96–112)
CO2 SERPL-SCNC: 25 MMOL/L (ref 20–33)
CREAT SERPL-MCNC: 0.59 MG/DL (ref 0.5–1.4)
D DIMER PPP IA.FEU-MCNC: 0.5 UG/ML (FEU) (ref 0–0.5)
EOSINOPHIL # BLD AUTO: 0.11 K/UL (ref 0–0.51)
EOSINOPHIL NFR BLD: 0.9 % (ref 0–6.9)
ERYTHROCYTE [DISTWIDTH] IN BLOOD BY AUTOMATED COUNT: 52 FL (ref 35.9–50)
ETHANOL BLD-MCNC: <10.1 MG/DL (ref 0–10)
GLOBULIN SER CALC-MCNC: 3.8 G/DL (ref 1.9–3.5)
GLUCOSE SERPL-MCNC: 149 MG/DL (ref 65–99)
HCT VFR BLD AUTO: 39 % (ref 37–47)
HGB BLD-MCNC: 11.2 G/DL (ref 12–16)
LYMPHOCYTES # BLD AUTO: 2.49 K/UL (ref 1–4.8)
LYMPHOCYTES NFR BLD: 20.9 % (ref 22–41)
MACROCYTES BLD QL SMEAR: ABNORMAL
MANUAL DIFF BLD: NORMAL
MCH RBC QN AUTO: 21.3 PG (ref 27–33)
MCHC RBC AUTO-ENTMCNC: 28.7 G/DL (ref 33.6–35)
MCV RBC AUTO: 74.1 FL (ref 81.4–97.8)
MICROCYTES BLD QL SMEAR: ABNORMAL
MONOCYTES # BLD AUTO: 0.42 K/UL (ref 0–0.85)
MONOCYTES NFR BLD AUTO: 3.5 % (ref 0–13.4)
MORPHOLOGY BLD-IMP: NORMAL
NEUTROPHILS # BLD AUTO: 8.69 K/UL (ref 2–7.15)
NEUTROPHILS NFR BLD: 73 % (ref 44–72)
NRBC # BLD AUTO: 0 K/UL
NRBC BLD-RTO: 0 /100 WBC
OVALOCYTES BLD QL SMEAR: NORMAL
PLATELET # BLD AUTO: 240 K/UL (ref 164–446)
PLATELET BLD QL SMEAR: NORMAL
POLYCHROMASIA BLD QL SMEAR: NORMAL
POTASSIUM SERPL-SCNC: 4 MMOL/L (ref 3.6–5.5)
PROT SERPL-MCNC: 7.5 G/DL (ref 6–8.2)
RBC # BLD AUTO: 5.26 M/UL (ref 4.2–5.4)
RBC BLD AUTO: PRESENT
SODIUM SERPL-SCNC: 134 MMOL/L (ref 135–145)
STOMATOCYTES BLD QL SMEAR: NORMAL
T3FREE SERPL-MCNC: 2.08 PG/ML (ref 2–4.4)
T4 FREE SERPL-MCNC: 1.15 NG/DL (ref 0.93–1.7)
TSH SERPL DL<=0.005 MIU/L-ACNC: 4.57 UIU/ML (ref 0.38–5.33)
WBC # BLD AUTO: 11.9 K/UL (ref 4.8–10.8)

## 2021-04-18 PROCEDURE — 71045 X-RAY EXAM CHEST 1 VIEW: CPT

## 2021-04-18 PROCEDURE — 70450 CT HEAD/BRAIN W/O DYE: CPT

## 2021-04-18 PROCEDURE — 700105 HCHG RX REV CODE 258: Performed by: EMERGENCY MEDICINE

## 2021-04-18 RX ORDER — SODIUM CHLORIDE 9 MG/ML
1000 INJECTION, SOLUTION INTRAVENOUS ONCE
Status: COMPLETED | OUTPATIENT
Start: 2021-04-18 | End: 2021-04-18

## 2021-04-18 RX ADMIN — SODIUM CHLORIDE 1000 ML: 9 INJECTION, SOLUTION INTRAVENOUS at 00:45

## 2021-04-18 NOTE — ED TRIAGE NOTES
Chief Complaint   Patient presents with   • Dizziness     x3 days   • N/V     X3 days     Pt BIBA from home for above complaints. Pt also reports cough for the past week. Pt reports palpitations when she walks. Pt is diabetic. . VSS.

## 2021-04-18 NOTE — ED PROVIDER NOTES
ED Provider Note    CHIEF COMPLAINT  Chief Complaint   Patient presents with    Dizziness     x3 days    N/V     X3 days       HPI  Chanelle Ortiz is a 45 y.o. female who presents to the emergency department with complaint of worsening lightheaded dizziness, nausea, vomiting. Past medical history as documented below. The patient states that she has been generally unwell over the last year. She is been working with her primary care physician at [facility] due to the above symptoms that have not been chronic but again flared over the last three days.    REVIEW OF SYSTEMS  See HPI for further details. All other systems are negative.     PAST MEDICAL HISTORY   has a past medical history of Anxiety and depression (2016), Arrhythmia, Asthma, Back pain, Diabetes (2008), H/O Hypertension - resolved, Hx of Bronchitis, Hyperthyroidism (2008), Hypothyroidism, after radioactive ablation of thyroid (2008), POLLY on CPAP (2018), Pneumonia, and Psychiatric problem - unspecified.    SOCIAL HISTORY  Social History     Tobacco Use    Smoking status: Never Smoker    Smokeless tobacco: Never Used   Substance and Sexual Activity    Alcohol use: No    Drug use: No    Sexual activity: Not on file     Comment: .  Tuboligation in 2004.        SURGICAL HISTORY   has a past surgical history that includes other abdominal surgery (2002); gyn surgery (2004); other; primary c section; ovarian cystectomy (1992); cholecystectomy; and primary c section.    CURRENT MEDICATIONS  Home Medications       Reviewed by Behzad Herrera R.N. (Registered Nurse) on 04/17/21 at 2323  Med List Status: Not Addressed     Medication Last Dose Status   albuterol 108 (90 Base) MCG/ACT Aero Soln inhalation aerosol  Active   aspirin EC (ECOTRIN) 81 MG Tablet Delayed Response  Active   JARDIANCE 25 MG Tab  Active   levothyroxine (SYNTHROID) 200 MCG Tab  Active   lisinopril (PRINIVIL) 5 MG Tab  Active   metformin (GLUCOPHAGE) 1000 MG tablet  Active  "  methylPREDNISolone (MEDROL) 4 MG Tab  Active   metroNIDAZOLE (FLAGYL) 500 MG Tab  Active   ondansetron (ZOFRAN ODT) 4 MG TABLET DISPERSIBLE  Active   vitamin D, Ergocalciferol, (DRISDOL) 1.25 MG (90245 UT) Cap capsule  Active                    ALLERGIES  Allergies   Allergen Reactions    Morphine Vomiting and Nausea     \"I think\" \"I dunno what my reaction was, the nurse just said my oxygen levels were going way to low on it\" \"heart races\"       PHYSICAL EXAM  VITAL SIGNS: /68   Pulse 80   Temp 35.9 °C (96.7 °F) (Temporal)   Resp 18   Ht 1.626 m (5' 4\")   Wt 125 kg (275 lb)   SpO2 96%   BMI 47.20 kg/m²  @MARTY[[id number]::@   Pulse ox interpretation: I interpret this pulse ox as normal.  Constitutional: Alert in no apparent distress.  HENT: No signs of trauma, Bilateral external ears normal, Nose normal.   Eyes: Pupils are equal and reactive  Neck: Normal range of motion, No tenderness, Supple  Cardiovascular: Regular rate and rhythm, no murmurs.   Thorax & Lungs: Normal breath sounds, No respiratory distress, No wheezing, No chest tenderness.   Abdomen: Bowel sounds normal, Soft, No tenderness  Skin: Warm, Dry, No erythema, No rash.   Extremities: Intact distal pulses, No edema, No tenderness, No cyanosis,  Negative Jase's sign.   Musculoskeletal: Good range of motion in all major joints. No tenderness to palpation or major deformities noted.   Neurologic: Alert , Normal motor function, Normal sensory function, No focal deficits noted. No nystagmus.   Psychiatric: Affect normal, Judgment normal, Mood normal.       DIAGNOSTIC STUDIES / PROCEDURES      LABS  Results for orders placed or performed during the hospital encounter of 04/17/21   CBC WITH DIFFERENTIAL   Result Value Ref Range    WBC 11.9 (H) 4.8 - 10.8 K/uL    RBC 5.26 4.20 - 5.40 M/uL    Hemoglobin 11.2 (L) 12.0 - 16.0 g/dL    Hematocrit 39.0 37.0 - 47.0 %    MCV 74.1 (L) 81.4 - 97.8 fL    MCH 21.3 (L) 27.0 - 33.0 pg    MCHC 28.7 (L) 33.6 - 35.0 " g/dL    RDW 52.0 (H) 35.9 - 50.0 fL    Platelet Count 240 164 - 446 K/uL    Neutrophils-Polys 73.00 (H) 44.00 - 72.00 %    Lymphocytes 20.90 (L) 22.00 - 41.00 %    Monocytes 3.50 0.00 - 13.40 %    Eosinophils 0.90 0.00 - 6.90 %    Basophils 1.70 0.00 - 1.80 %    Nucleated RBC 0.00 /100 WBC    Neutrophils (Absolute) 8.69 (H) 2.00 - 7.15 K/uL    Lymphs (Absolute) 2.49 1.00 - 4.80 K/uL    Monos (Absolute) 0.42 0.00 - 0.85 K/uL    Eos (Absolute) 0.11 0.00 - 0.51 K/uL    Baso (Absolute) 0.20 (H) 0.00 - 0.12 K/uL    NRBC (Absolute) 0.00 K/uL    Anisocytosis 1+     Macrocytosis 1+     Microcytosis 1+    COMP METABOLIC PANEL   Result Value Ref Range    Sodium 134 (L) 135 - 145 mmol/L    Potassium 4.0 3.6 - 5.5 mmol/L    Chloride 101 96 - 112 mmol/L    Co2 25 20 - 33 mmol/L    Anion Gap 8.0 7.0 - 16.0    Glucose 149 (H) 65 - 99 mg/dL    Bun 10 8 - 22 mg/dL    Creatinine 0.59 0.50 - 1.40 mg/dL    Calcium 8.7 8.5 - 10.5 mg/dL    AST(SGOT) 23 12 - 45 U/L    ALT(SGPT) 15 2 - 50 U/L    Alkaline Phosphatase 106 (H) 30 - 99 U/L    Total Bilirubin 0.2 0.1 - 1.5 mg/dL    Albumin 3.7 3.2 - 4.9 g/dL    Total Protein 7.5 6.0 - 8.2 g/dL    Globulin 3.8 (H) 1.9 - 3.5 g/dL    A-G Ratio 1.0 g/dL   DIAGNOSTIC ALCOHOL   Result Value Ref Range    Diagnostic Alcohol <10.1 0.0 - 10.0 mg/dL   D-DIMER   Result Value Ref Range    D-Dimer Screen 0.50 0.00 - 0.50 ug/mL (FEU)   TSH   Result Value Ref Range    TSH 4.570 0.380 - 5.330 uIU/mL   T3 FREE   Result Value Ref Range    T3,Free 2.08 2.00 - 4.40 pg/mL   FREE THYROXINE   Result Value Ref Range    Free T-4 1.15 0.93 - 1.70 ng/dL   ESTIMATED GFR   Result Value Ref Range    GFR If African American >60 >60 mL/min/1.73 m 2    GFR If Non African American >60 >60 mL/min/1.73 m 2   DIFFERENTIAL MANUAL   Result Value Ref Range    Manual Diff Status PERFORMED    PERIPHERAL SMEAR REVIEW   Result Value Ref Range    Peripheral Smear Review see below    PLATELET ESTIMATE   Result Value Ref Range    Plt  Estimation Normal    MORPHOLOGY   Result Value Ref Range    RBC Morphology Present     Polychromia 1+     Ovalocytes 1+     Stomatocytes 1+    EKG   Result Value Ref Range    Report       [facility] Emergency Dept.    Test Date:  2021  Pt Name:    PRATIBHA CUELLAR              Department: ER  MRN:        [id number]                      Room:        26  Gender:     Female                       Technician: [id number]  :        [id number]                   Requested By:ER TRIAGE PROTOCOL  Order #:    [id number]                    Reading MD:    Measurements  Intervals                                Axis  Rate:       80                           P:          70  SD:         143                          QRS:        85  QRSD:       89                           T:          54  QT:         373  QTc:        431    Interpretive Statements  Sinus rhythm  Compared to ECG 2021 06:48:52  No significant changes           RADIOLOGY  CT-HEAD W/O   Final Result         1.  No acute intracranial abnormality.      DX-CHEST-PORTABLE (1 VIEW)   Final Result         1.  No acute cardiopulmonary disease.            COURSE & MEDICAL DECISION MAKING  Pertinent Labs & Imaging studies reviewed. (See chart for details)  45-year-old presented to the emergency room with acute on chronic symptomatology is document above. Patient has had significant workup by primary care physician as well as cardiology. Cardiology workup is included stress test as well as mild patch cardiac Holter monitoring. All this is been negative today. She does note that she has had slight change in her medications with decreasing dosing of her Synthroid. Thyroid numbers appear stable tonight. CT imaging of the head was completed given lightheaded dizziness which is been intermittent and not correlated by exam or history. This is benign. At this point will discharge the patient home with ongoing outpatient workup by PCP.       The patient will  return for worsening symptoms and is stable at the time of discharge. The patient verbalizes understanding and will comply.    FINAL IMPRESSION  1. Dizziness            Electronically signed by: Ollie Mills M.D., 4/18/2021 12:15 AM

## 2021-05-07 ENCOUNTER — HOSPITAL ENCOUNTER (EMERGENCY)
Dept: HOSPITAL 8 - ED | Age: 46
Discharge: HOME | End: 2021-05-07
Payer: MEDICAID

## 2021-05-07 VITALS — DIASTOLIC BLOOD PRESSURE: 81 MMHG | SYSTOLIC BLOOD PRESSURE: 129 MMHG

## 2021-05-07 VITALS — BODY MASS INDEX: 47.31 KG/M2 | WEIGHT: 277.12 LBS | HEIGHT: 64 IN

## 2021-05-07 DIAGNOSIS — R10.13: Primary | ICD-10-CM

## 2021-05-07 DIAGNOSIS — R42: ICD-10-CM

## 2021-05-07 DIAGNOSIS — E03.9: ICD-10-CM

## 2021-05-07 DIAGNOSIS — E11.9: ICD-10-CM

## 2021-05-07 LAB
<PLATELET ESTIMATE>: ADEQUATE
<PLT MORPHOLOGY>: (no result)
ALBUMIN SERPL-MCNC: 3.3 G/DL (ref 3.4–5)
ALP SERPL-CCNC: 101 U/L (ref 45–117)
ALT SERPL-CCNC: 16 U/L (ref 12–78)
ANION GAP SERPL CALC-SCNC: 4 MMOL/L (ref 5–15)
ANISOCYTOSIS BLD QL SMEAR: (no result)
BASOPHILS # BLD AUTO: 0.1 X10^3/UL (ref 0–0.1)
BASOPHILS NFR BLD AUTO: 1 % (ref 0–1)
BILIRUB DIRECT SERPL-MCNC: NORMAL MG/DL
BILIRUB SERPL-MCNC: 0.3 MG/DL (ref 0.2–1)
CALCIUM SERPL-MCNC: 8.5 MG/DL (ref 8.5–10.1)
CHLORIDE SERPL-SCNC: 107 MMOL/L (ref 98–107)
CREAT SERPL-MCNC: 0.73 MG/DL (ref 0.55–1.02)
EOSINOPHIL # BLD AUTO: 0.1 X10^3/UL (ref 0–0.4)
EOSINOPHIL NFR BLD AUTO: 1 % (ref 1–7)
ERYTHROCYTE [DISTWIDTH] IN BLOOD BY AUTOMATED COUNT: 19.2 % (ref 9.6–15.2)
HYPOCHROMIA BLD QL SMEAR: (no result)
LYMPHOCYTES # BLD AUTO: 2 X10^3/UL (ref 1–3.4)
LYMPHOCYTES NFR BLD AUTO: 21 % (ref 22–44)
MCH RBC QN AUTO: 21.2 PG (ref 27–34.8)
MCHC RBC AUTO-ENTMCNC: 30.9 G/DL (ref 32.4–35.8)
MD: (no result)
MICROCYTES BLD QL SMEAR: (no result)
MONOCYTES # BLD AUTO: 0.6 X10^3/UL (ref 0.2–0.8)
MONOCYTES NFR BLD AUTO: 6 % (ref 2–9)
NEUTROPHILS # BLD AUTO: 6.5 X10^3/UL (ref 1.8–6.8)
NEUTROPHILS NFR BLD AUTO: 70 % (ref 42–75)
OVALOCYTES BLD QL SMEAR: (no result)
PLATELET # BLD AUTO: 238 X10^3/UL (ref 130–400)
PMV BLD AUTO: 9.2 FL (ref 7.4–10.4)
POLYCHROMASIA BLD QL SMEAR: (no result)
PROT SERPL-MCNC: 8 G/DL (ref 6.4–8.2)
RBC # BLD AUTO: 5.06 X10^6/UL (ref 3.82–5.3)
T4 FREE SERPL-MCNC: 0.96 NG/DL (ref 0.76–1.46)
TROPONIN I SERPL-MCNC: < 0.015 NG/ML (ref 0–0.04)

## 2021-05-07 PROCEDURE — 76700 US EXAM ABDOM COMPLETE: CPT

## 2021-05-07 PROCEDURE — 83690 ASSAY OF LIPASE: CPT

## 2021-05-07 PROCEDURE — 36415 COLL VENOUS BLD VENIPUNCTURE: CPT

## 2021-05-07 PROCEDURE — 71045 X-RAY EXAM CHEST 1 VIEW: CPT

## 2021-05-07 PROCEDURE — 99285 EMERGENCY DEPT VISIT HI MDM: CPT

## 2021-05-07 PROCEDURE — 84484 ASSAY OF TROPONIN QUANT: CPT

## 2021-05-07 PROCEDURE — 80053 COMPREHEN METABOLIC PANEL: CPT

## 2021-05-07 PROCEDURE — 70450 CT HEAD/BRAIN W/O DYE: CPT

## 2021-05-07 PROCEDURE — 93005 ELECTROCARDIOGRAM TRACING: CPT

## 2021-05-07 PROCEDURE — 84439 ASSAY OF FREE THYROXINE: CPT

## 2021-05-07 PROCEDURE — 85025 COMPLETE CBC W/AUTO DIFF WBC: CPT

## 2021-05-07 PROCEDURE — 84443 ASSAY THYROID STIM HORMONE: CPT

## 2021-05-11 ENCOUNTER — OFFICE VISIT (OUTPATIENT)
Dept: CARDIOLOGY | Facility: MEDICAL CENTER | Age: 46
End: 2021-05-11
Payer: MEDICAID

## 2021-05-11 VITALS
HEART RATE: 71 BPM | BODY MASS INDEX: 47.55 KG/M2 | RESPIRATION RATE: 16 BRPM | DIASTOLIC BLOOD PRESSURE: 72 MMHG | SYSTOLIC BLOOD PRESSURE: 122 MMHG | OXYGEN SATURATION: 97 % | HEIGHT: 64 IN | WEIGHT: 278.5 LBS

## 2021-05-11 DIAGNOSIS — R07.89 OTHER CHEST PAIN: ICD-10-CM

## 2021-05-11 DIAGNOSIS — G47.33 OSA ON CPAP: ICD-10-CM

## 2021-05-11 DIAGNOSIS — R00.2 PALPITATIONS: ICD-10-CM

## 2021-05-11 DIAGNOSIS — R73.03 PREDIABETES: ICD-10-CM

## 2021-05-11 DIAGNOSIS — E66.01 MORBID OBESITY WITH BMI OF 45.0-49.9, ADULT (HCC): ICD-10-CM

## 2021-05-11 PROCEDURE — 99214 OFFICE O/P EST MOD 30 MIN: CPT | Performed by: NURSE PRACTITIONER

## 2021-05-11 RX ORDER — METOPROLOL SUCCINATE 25 MG/1
12.5 TABLET, EXTENDED RELEASE ORAL EVERY EVENING
Qty: 45 TABLET | Refills: 3 | Status: SHIPPED | OUTPATIENT
Start: 2021-05-11 | End: 2022-05-30

## 2021-05-11 RX ORDER — LISINOPRIL 5 MG/1
5 TABLET ORAL DAILY
Qty: 90 TABLET | Refills: 3 | Status: SHIPPED | OUTPATIENT
Start: 2021-05-11 | End: 2022-05-30

## 2021-05-11 RX ORDER — CALCIUM CITRATE/VITAMIN D3 200MG-6.25
TABLET ORAL
COMMUNITY
Start: 2021-05-02 | End: 2021-06-10

## 2021-05-11 RX ORDER — LEVOTHYROXINE SODIUM 0.12 MG/1
125 TABLET ORAL
COMMUNITY
Start: 2021-04-05 | End: 2021-06-10

## 2021-05-11 RX ORDER — MECLIZINE HCL 12.5 MG/1
12.5 TABLET ORAL 3 TIMES DAILY PRN
Qty: 30 TABLET | Refills: 0 | Status: SHIPPED | OUTPATIENT
Start: 2021-05-11

## 2021-05-11 RX ORDER — LANOLIN ALCOHOL/MO/W.PET/CERES
325 CREAM (GRAM) TOPICAL DAILY
COMMUNITY
Start: 2021-04-02

## 2021-05-11 RX ORDER — LINAGLIPTIN 5 MG/1
5 TABLET, FILM COATED ORAL DAILY
COMMUNITY
Start: 2021-05-07

## 2021-05-11 ASSESSMENT — ENCOUNTER SYMPTOMS
ORTHOPNEA: 0
CLAUDICATION: 0
MYALGIAS: 0
ABDOMINAL PAIN: 0
PND: 0
FEVER: 0
DIZZINESS: 1
SHORTNESS OF BREATH: 0
COUGH: 0
PALPITATIONS: 1

## 2021-05-11 ASSESSMENT — FIBROSIS 4 INDEX: FIB4 SCORE: 1.11

## 2021-06-10 ENCOUNTER — APPOINTMENT (OUTPATIENT)
Dept: RADIOLOGY | Facility: MEDICAL CENTER | Age: 46
End: 2021-06-10
Attending: EMERGENCY MEDICINE
Payer: MEDICAID

## 2021-06-10 ENCOUNTER — HOSPITAL ENCOUNTER (OUTPATIENT)
Facility: MEDICAL CENTER | Age: 46
End: 2021-06-17
Attending: EMERGENCY MEDICINE | Admitting: STUDENT IN AN ORGANIZED HEALTH CARE EDUCATION/TRAINING PROGRAM
Payer: MEDICAID

## 2021-06-10 DIAGNOSIS — K29.60 EMPHYSEMATOUS GASTRITIS: ICD-10-CM

## 2021-06-10 DIAGNOSIS — R11.2 NON-INTRACTABLE VOMITING WITH NAUSEA, UNSPECIFIED VOMITING TYPE: ICD-10-CM

## 2021-06-10 DIAGNOSIS — R19.7 DIARRHEA, UNSPECIFIED TYPE: ICD-10-CM

## 2021-06-10 DIAGNOSIS — R10.9 ABDOMINAL PAIN, UNSPECIFIED ABDOMINAL LOCATION: ICD-10-CM

## 2021-06-10 LAB
ALBUMIN SERPL BCP-MCNC: 3.5 G/DL (ref 3.2–4.9)
ALBUMIN/GLOB SERPL: 1.1 G/DL
ALP SERPL-CCNC: 95 U/L (ref 30–99)
ALT SERPL-CCNC: 13 U/L (ref 2–50)
ANION GAP SERPL CALC-SCNC: 10 MMOL/L (ref 7–16)
ANISOCYTOSIS BLD QL SMEAR: ABNORMAL
AST SERPL-CCNC: 17 U/L (ref 12–45)
B-OH-BUTYR SERPL-MCNC: 0.33 MMOL/L (ref 0.02–0.27)
BASE EXCESS BLDV CALC-SCNC: -2 MMOL/L
BASOPHILS # BLD AUTO: 0 % (ref 0–1.8)
BASOPHILS # BLD: 0 K/UL (ref 0–0.12)
BILIRUB SERPL-MCNC: 0.3 MG/DL (ref 0.1–1.5)
BODY TEMPERATURE: ABNORMAL CENTIGRADE
BUN SERPL-MCNC: 11 MG/DL (ref 8–22)
CALCIUM SERPL-MCNC: 7 MG/DL (ref 8.5–10.5)
CHLORIDE SERPL-SCNC: 109 MMOL/L (ref 96–112)
CO2 SERPL-SCNC: 22 MMOL/L (ref 20–33)
CREAT SERPL-MCNC: 0.86 MG/DL (ref 0.5–1.4)
EOSINOPHIL # BLD AUTO: 0.25 K/UL (ref 0–0.51)
EOSINOPHIL NFR BLD: 2.7 % (ref 0–6.9)
ERYTHROCYTE [DISTWIDTH] IN BLOOD BY AUTOMATED COUNT: 52.1 FL (ref 35.9–50)
GIANT PLATELETS BLD QL SMEAR: NORMAL
GLOBULIN SER CALC-MCNC: 3.2 G/DL (ref 1.9–3.5)
GLUCOSE SERPL-MCNC: 194 MG/DL (ref 65–99)
HCG SERPL QL: NEGATIVE
HCO3 BLDV-SCNC: 24 MMOL/L (ref 24–28)
HCT VFR BLD AUTO: 35.7 % (ref 37–47)
HGB BLD-MCNC: 10.2 G/DL (ref 12–16)
HYPOCHROMIA BLD QL SMEAR: ABNORMAL
LACTATE BLD-SCNC: 1.8 MMOL/L (ref 0.5–2)
LIPASE SERPL-CCNC: 20 U/L (ref 11–82)
LYMPHOCYTES # BLD AUTO: 0.96 K/UL (ref 1–4.8)
LYMPHOCYTES NFR BLD: 10.6 % (ref 22–41)
MANUAL DIFF BLD: NORMAL
MCH RBC QN AUTO: 21.9 PG (ref 27–33)
MCHC RBC AUTO-ENTMCNC: 28.6 G/DL (ref 33.6–35)
MCV RBC AUTO: 76.8 FL (ref 81.4–97.8)
MICROCYTES BLD QL SMEAR: ABNORMAL
MONOCYTES # BLD AUTO: 0.4 K/UL (ref 0–0.85)
MONOCYTES NFR BLD AUTO: 4.4 % (ref 0–13.4)
MORPHOLOGY BLD-IMP: NORMAL
NEUTROPHILS # BLD AUTO: 7.49 K/UL (ref 2–7.15)
NEUTROPHILS NFR BLD: 78.8 % (ref 44–72)
NEUTS BAND NFR BLD MANUAL: 3.5 % (ref 0–10)
NRBC # BLD AUTO: 0 K/UL
NRBC BLD-RTO: 0 /100 WBC
OVALOCYTES BLD QL SMEAR: NORMAL
PCO2 BLDV: 48.9 MMHG (ref 41–51)
PH BLDV: 7.31 [PH] (ref 7.31–7.45)
PLATELET # BLD AUTO: 214 K/UL (ref 164–446)
PLATELET BLD QL SMEAR: NORMAL
PMV BLD AUTO: 11.7 FL (ref 9–12.9)
PO2 BLDV: 23.3 MMHG (ref 25–40)
POIKILOCYTOSIS BLD QL SMEAR: NORMAL
POLYCHROMASIA BLD QL SMEAR: NORMAL
POTASSIUM SERPL-SCNC: 3.4 MMOL/L (ref 3.6–5.5)
PROT SERPL-MCNC: 6.7 G/DL (ref 6–8.2)
RBC # BLD AUTO: 4.65 M/UL (ref 4.2–5.4)
RBC BLD AUTO: PRESENT
SAO2 % BLDV: 30.2 %
SODIUM SERPL-SCNC: 141 MMOL/L (ref 135–145)
WBC # BLD AUTO: 9.1 K/UL (ref 4.8–10.8)

## 2021-06-10 PROCEDURE — 83690 ASSAY OF LIPASE: CPT

## 2021-06-10 PROCEDURE — 96375 TX/PRO/DX INJ NEW DRUG ADDON: CPT

## 2021-06-10 PROCEDURE — C9803 HOPD COVID-19 SPEC COLLECT: HCPCS | Performed by: EMERGENCY MEDICINE

## 2021-06-10 PROCEDURE — 84703 CHORIONIC GONADOTROPIN ASSAY: CPT

## 2021-06-10 PROCEDURE — 85027 COMPLETE CBC AUTOMATED: CPT

## 2021-06-10 PROCEDURE — 700117 HCHG RX CONTRAST REV CODE 255: Performed by: EMERGENCY MEDICINE

## 2021-06-10 PROCEDURE — 700111 HCHG RX REV CODE 636 W/ 250 OVERRIDE (IP): Performed by: EMERGENCY MEDICINE

## 2021-06-10 PROCEDURE — 83735 ASSAY OF MAGNESIUM: CPT

## 2021-06-10 PROCEDURE — 96372 THER/PROPH/DIAG INJ SC/IM: CPT

## 2021-06-10 PROCEDURE — 74177 CT ABD & PELVIS W/CONTRAST: CPT

## 2021-06-10 PROCEDURE — 82010 KETONE BODYS QUAN: CPT

## 2021-06-10 PROCEDURE — 99220 PR INITIAL OBSERVATION CARE,LEVL III: CPT | Performed by: STUDENT IN AN ORGANIZED HEALTH CARE EDUCATION/TRAINING PROGRAM

## 2021-06-10 PROCEDURE — 99285 EMERGENCY DEPT VISIT HI MDM: CPT

## 2021-06-10 PROCEDURE — 85007 BL SMEAR W/DIFF WBC COUNT: CPT

## 2021-06-10 PROCEDURE — 0241U HCHG SARS-COV-2 COVID-19 NFCT DS RESP RNA 4 TRGT MIC: CPT

## 2021-06-10 PROCEDURE — 700102 HCHG RX REV CODE 250 W/ 637 OVERRIDE(OP): Performed by: EMERGENCY MEDICINE

## 2021-06-10 PROCEDURE — 82803 BLOOD GASES ANY COMBINATION: CPT

## 2021-06-10 PROCEDURE — 84100 ASSAY OF PHOSPHORUS: CPT

## 2021-06-10 PROCEDURE — 80053 COMPREHEN METABOLIC PANEL: CPT

## 2021-06-10 PROCEDURE — 74022 RADEX COMPL AQT ABD SERIES: CPT

## 2021-06-10 PROCEDURE — A9270 NON-COVERED ITEM OR SERVICE: HCPCS | Performed by: EMERGENCY MEDICINE

## 2021-06-10 PROCEDURE — 83605 ASSAY OF LACTIC ACID: CPT

## 2021-06-10 PROCEDURE — 87493 C DIFF AMPLIFIED PROBE: CPT

## 2021-06-10 PROCEDURE — G0378 HOSPITAL OBSERVATION PER HR: HCPCS

## 2021-06-10 PROCEDURE — 700105 HCHG RX REV CODE 258: Performed by: EMERGENCY MEDICINE

## 2021-06-10 RX ORDER — SODIUM CHLORIDE 9 MG/ML
1000 INJECTION, SOLUTION INTRAVENOUS ONCE
Status: COMPLETED | OUTPATIENT
Start: 2021-06-10 | End: 2021-06-10

## 2021-06-10 RX ORDER — ONDANSETRON 2 MG/ML
4 INJECTION INTRAMUSCULAR; INTRAVENOUS ONCE
Status: COMPLETED | OUTPATIENT
Start: 2021-06-10 | End: 2021-06-10

## 2021-06-10 RX ORDER — ALBUTEROL SULFATE 90 UG/1
2 AEROSOL, METERED RESPIRATORY (INHALATION) EVERY 6 HOURS PRN
Status: DISCONTINUED | OUTPATIENT
Start: 2021-06-10 | End: 2021-06-17 | Stop reason: HOSPADM

## 2021-06-10 RX ORDER — POTASSIUM CHLORIDE 20 MEQ/1
40 TABLET, EXTENDED RELEASE ORAL EVERY 6 HOURS
Status: DISPENSED | OUTPATIENT
Start: 2021-06-11 | End: 2021-06-11

## 2021-06-10 RX ORDER — DICYCLOMINE HYDROCHLORIDE 10 MG/ML
20 INJECTION INTRAMUSCULAR ONCE
Status: COMPLETED | OUTPATIENT
Start: 2021-06-10 | End: 2021-06-10

## 2021-06-10 RX ORDER — PANTOPRAZOLE SODIUM 40 MG/10ML
80 INJECTION, POWDER, LYOPHILIZED, FOR SOLUTION INTRAVENOUS ONCE
Status: COMPLETED | OUTPATIENT
Start: 2021-06-11 | End: 2021-06-11

## 2021-06-10 RX ORDER — LISINOPRIL 10 MG/1
5 TABLET ORAL DAILY
Status: DISCONTINUED | OUTPATIENT
Start: 2021-06-11 | End: 2021-06-11

## 2021-06-10 RX ORDER — METOCLOPRAMIDE HYDROCHLORIDE 5 MG/ML
10 INJECTION INTRAMUSCULAR; INTRAVENOUS ONCE
Status: COMPLETED | OUTPATIENT
Start: 2021-06-10 | End: 2021-06-10

## 2021-06-10 RX ORDER — OMEPRAZOLE 20 MG/1
20 CAPSULE, DELAYED RELEASE ORAL ONCE
Status: COMPLETED | OUTPATIENT
Start: 2021-06-10 | End: 2021-06-10

## 2021-06-10 RX ORDER — KETOROLAC TROMETHAMINE 30 MG/ML
15 INJECTION, SOLUTION INTRAMUSCULAR; INTRAVENOUS ONCE
Status: COMPLETED | OUTPATIENT
Start: 2021-06-10 | End: 2021-06-10

## 2021-06-10 RX ADMIN — SODIUM CHLORIDE 1000 ML: 9 INJECTION, SOLUTION INTRAVENOUS at 18:18

## 2021-06-10 RX ADMIN — ONDANSETRON 4 MG: 2 INJECTION INTRAMUSCULAR; INTRAVENOUS at 18:17

## 2021-06-10 RX ADMIN — LIDOCAINE HYDROCHLORIDE 30 ML: 20 SOLUTION OROPHARYNGEAL at 18:17

## 2021-06-10 RX ADMIN — IOHEXOL 100 ML: 350 INJECTION, SOLUTION INTRAVENOUS at 22:57

## 2021-06-10 RX ADMIN — METOCLOPRAMIDE 10 MG: 5 INJECTION, SOLUTION INTRAMUSCULAR; INTRAVENOUS at 18:17

## 2021-06-10 RX ADMIN — DICYCLOMINE HYDROCHLORIDE 20 MG: 20 INJECTION, SOLUTION INTRAMUSCULAR at 21:38

## 2021-06-10 RX ADMIN — KETOROLAC TROMETHAMINE 15 MG: 30 INJECTION, SOLUTION INTRAMUSCULAR; INTRAVENOUS at 21:40

## 2021-06-10 RX ADMIN — OMEPRAZOLE 20 MG: 20 CAPSULE, DELAYED RELEASE ORAL at 18:17

## 2021-06-10 ASSESSMENT — FIBROSIS 4 INDEX: FIB4 SCORE: 1.11

## 2021-06-11 ENCOUNTER — PATIENT OUTREACH (OUTPATIENT)
Dept: HEALTH INFORMATION MANAGEMENT | Facility: OTHER | Age: 46
End: 2021-06-11

## 2021-06-11 PROBLEM — K29.60 EMPHYSEMATOUS GASTRITIS: Status: ACTIVE | Noted: 2021-06-11

## 2021-06-11 LAB
C DIFF DNA SPEC QL NAA+PROBE: NEGATIVE
C DIFF TOX GENS STL QL NAA+PROBE: NEGATIVE
CRP SERPL HS-MCNC: 1.85 MG/DL (ref 0–0.75)
ERYTHROCYTE [SEDIMENTATION RATE] IN BLOOD BY WESTERGREN METHOD: 11 MM/HOUR (ref 0–25)
FERRITIN SERPL-MCNC: 11.9 NG/ML (ref 10–291)
FLUAV RNA SPEC QL NAA+PROBE: NEGATIVE
FLUBV RNA SPEC QL NAA+PROBE: NEGATIVE
GLUCOSE BLD-MCNC: 110 MG/DL (ref 65–99)
GLUCOSE BLD-MCNC: 111 MG/DL (ref 65–99)
GLUCOSE BLD-MCNC: 117 MG/DL (ref 65–99)
GLUCOSE BLD-MCNC: 131 MG/DL (ref 65–99)
IRON SATN MFR SERPL: 5 % (ref 15–55)
IRON SERPL-MCNC: 19 UG/DL (ref 40–170)
MAGNESIUM SERPL-MCNC: 2 MG/DL (ref 1.5–2.5)
PHOSPHATE SERPL-MCNC: 2.8 MG/DL (ref 2.5–4.5)
PROCALCITONIN SERPL-MCNC: 0.17 NG/ML
RSV RNA SPEC QL NAA+PROBE: NEGATIVE
SARS-COV-2 RNA RESP QL NAA+PROBE: NOTDETECTED
SPECIMEN SOURCE: NORMAL
TIBC SERPL-MCNC: 359 UG/DL (ref 250–450)
UIBC SERPL-MCNC: 340 UG/DL (ref 110–370)

## 2021-06-11 PROCEDURE — 82728 ASSAY OF FERRITIN: CPT

## 2021-06-11 PROCEDURE — 99226 PR SUBSEQUENT OBSERVATION CARE,LEVEL III: CPT | Performed by: INTERNAL MEDICINE

## 2021-06-11 PROCEDURE — 87040 BLOOD CULTURE FOR BACTERIA: CPT

## 2021-06-11 PROCEDURE — G0378 HOSPITAL OBSERVATION PER HR: HCPCS

## 2021-06-11 PROCEDURE — 700101 HCHG RX REV CODE 250: Performed by: STUDENT IN AN ORGANIZED HEALTH CARE EDUCATION/TRAINING PROGRAM

## 2021-06-11 PROCEDURE — 96375 TX/PRO/DX INJ NEW DRUG ADDON: CPT

## 2021-06-11 PROCEDURE — 700111 HCHG RX REV CODE 636 W/ 250 OVERRIDE (IP): Performed by: STUDENT IN AN ORGANIZED HEALTH CARE EDUCATION/TRAINING PROGRAM

## 2021-06-11 PROCEDURE — 700105 HCHG RX REV CODE 258: Performed by: INTERNAL MEDICINE

## 2021-06-11 PROCEDURE — 700105 HCHG RX REV CODE 258: Performed by: STUDENT IN AN ORGANIZED HEALTH CARE EDUCATION/TRAINING PROGRAM

## 2021-06-11 PROCEDURE — 96365 THER/PROPH/DIAG IV INF INIT: CPT

## 2021-06-11 PROCEDURE — 700102 HCHG RX REV CODE 250 W/ 637 OVERRIDE(OP): Performed by: STUDENT IN AN ORGANIZED HEALTH CARE EDUCATION/TRAINING PROGRAM

## 2021-06-11 PROCEDURE — 36415 COLL VENOUS BLD VENIPUNCTURE: CPT

## 2021-06-11 PROCEDURE — 82962 GLUCOSE BLOOD TEST: CPT | Mod: 91

## 2021-06-11 PROCEDURE — 700111 HCHG RX REV CODE 636 W/ 250 OVERRIDE (IP): Performed by: INTERNAL MEDICINE

## 2021-06-11 PROCEDURE — 85652 RBC SED RATE AUTOMATED: CPT

## 2021-06-11 PROCEDURE — 96376 TX/PRO/DX INJ SAME DRUG ADON: CPT

## 2021-06-11 PROCEDURE — 96366 THER/PROPH/DIAG IV INF ADDON: CPT

## 2021-06-11 PROCEDURE — 96368 THER/DIAG CONCURRENT INF: CPT

## 2021-06-11 PROCEDURE — 700105 HCHG RX REV CODE 258: Performed by: EMERGENCY MEDICINE

## 2021-06-11 PROCEDURE — C9113 INJ PANTOPRAZOLE SODIUM, VIA: HCPCS | Performed by: STUDENT IN AN ORGANIZED HEALTH CARE EDUCATION/TRAINING PROGRAM

## 2021-06-11 PROCEDURE — C9113 INJ PANTOPRAZOLE SODIUM, VIA: HCPCS | Performed by: EMERGENCY MEDICINE

## 2021-06-11 PROCEDURE — 83540 ASSAY OF IRON: CPT

## 2021-06-11 PROCEDURE — 84145 PROCALCITONIN (PCT): CPT

## 2021-06-11 PROCEDURE — A9270 NON-COVERED ITEM OR SERVICE: HCPCS | Performed by: STUDENT IN AN ORGANIZED HEALTH CARE EDUCATION/TRAINING PROGRAM

## 2021-06-11 PROCEDURE — 700111 HCHG RX REV CODE 636 W/ 250 OVERRIDE (IP): Performed by: EMERGENCY MEDICINE

## 2021-06-11 PROCEDURE — 96372 THER/PROPH/DIAG INJ SC/IM: CPT | Mod: XU

## 2021-06-11 PROCEDURE — 86140 C-REACTIVE PROTEIN: CPT

## 2021-06-11 PROCEDURE — 83550 IRON BINDING TEST: CPT

## 2021-06-11 RX ORDER — POLYETHYLENE GLYCOL 3350 17 G/17G
1 POWDER, FOR SOLUTION ORAL
Status: DISCONTINUED | OUTPATIENT
Start: 2021-06-11 | End: 2021-06-17 | Stop reason: HOSPADM

## 2021-06-11 RX ORDER — SODIUM CHLORIDE AND POTASSIUM CHLORIDE 150; 900 MG/100ML; MG/100ML
INJECTION, SOLUTION INTRAVENOUS CONTINUOUS
Status: DISPENSED | OUTPATIENT
Start: 2021-06-11 | End: 2021-06-14

## 2021-06-11 RX ORDER — PROCHLORPERAZINE EDISYLATE 5 MG/ML
5-10 INJECTION INTRAMUSCULAR; INTRAVENOUS EVERY 4 HOURS PRN
Status: DISCONTINUED | OUTPATIENT
Start: 2021-06-11 | End: 2021-06-17 | Stop reason: HOSPADM

## 2021-06-11 RX ORDER — ONDANSETRON 4 MG/1
4 TABLET, ORALLY DISINTEGRATING ORAL EVERY 4 HOURS PRN
Status: DISCONTINUED | OUTPATIENT
Start: 2021-06-11 | End: 2021-06-17 | Stop reason: HOSPADM

## 2021-06-11 RX ORDER — IBUPROFEN 800 MG/1
800 TABLET ORAL 3 TIMES DAILY PRN
Status: DISCONTINUED | OUTPATIENT
Start: 2021-06-14 | End: 2021-06-11

## 2021-06-11 RX ORDER — KETOROLAC TROMETHAMINE 30 MG/ML
30 INJECTION, SOLUTION INTRAMUSCULAR; INTRAVENOUS EVERY 6 HOURS
Status: DISCONTINUED | OUTPATIENT
Start: 2021-06-11 | End: 2021-06-11

## 2021-06-11 RX ORDER — PANTOPRAZOLE SODIUM 40 MG/10ML
40 INJECTION, POWDER, LYOPHILIZED, FOR SOLUTION INTRAVENOUS 2 TIMES DAILY
Status: DISCONTINUED | OUTPATIENT
Start: 2021-06-11 | End: 2021-06-11

## 2021-06-11 RX ORDER — HEPARIN SODIUM 5000 [USP'U]/ML
5000 INJECTION, SOLUTION INTRAVENOUS; SUBCUTANEOUS EVERY 8 HOURS
Status: DISCONTINUED | OUTPATIENT
Start: 2021-06-11 | End: 2021-06-15

## 2021-06-11 RX ORDER — INSULIN LISPRO 100 [IU]/ML
1-6 INJECTION, SOLUTION INTRAVENOUS; SUBCUTANEOUS
Status: DISCONTINUED | OUTPATIENT
Start: 2021-06-11 | End: 2021-06-17 | Stop reason: HOSPADM

## 2021-06-11 RX ORDER — PROMETHAZINE HYDROCHLORIDE 25 MG/1
12.5-25 SUPPOSITORY RECTAL EVERY 4 HOURS PRN
Status: DISCONTINUED | OUTPATIENT
Start: 2021-06-11 | End: 2021-06-17 | Stop reason: HOSPADM

## 2021-06-11 RX ORDER — ONDANSETRON 2 MG/ML
4 INJECTION INTRAMUSCULAR; INTRAVENOUS EVERY 4 HOURS PRN
Status: DISCONTINUED | OUTPATIENT
Start: 2021-06-11 | End: 2021-06-17 | Stop reason: HOSPADM

## 2021-06-11 RX ORDER — ACETAMINOPHEN 325 MG/1
650 TABLET ORAL EVERY 6 HOURS PRN
Status: DISCONTINUED | OUTPATIENT
Start: 2021-06-11 | End: 2021-06-17 | Stop reason: HOSPADM

## 2021-06-11 RX ORDER — AMOXICILLIN 250 MG
2 CAPSULE ORAL 2 TIMES DAILY
Status: DISCONTINUED | OUTPATIENT
Start: 2021-06-11 | End: 2021-06-17 | Stop reason: HOSPADM

## 2021-06-11 RX ORDER — PROMETHAZINE HYDROCHLORIDE 25 MG/1
12.5-25 TABLET ORAL EVERY 4 HOURS PRN
Status: DISCONTINUED | OUTPATIENT
Start: 2021-06-11 | End: 2021-06-17 | Stop reason: HOSPADM

## 2021-06-11 RX ORDER — BISACODYL 10 MG
10 SUPPOSITORY, RECTAL RECTAL
Status: DISCONTINUED | OUTPATIENT
Start: 2021-06-11 | End: 2021-06-17 | Stop reason: HOSPADM

## 2021-06-11 RX ORDER — DEXTROSE MONOHYDRATE 25 G/50ML
50 INJECTION, SOLUTION INTRAVENOUS
Status: DISCONTINUED | OUTPATIENT
Start: 2021-06-11 | End: 2021-06-17 | Stop reason: HOSPADM

## 2021-06-11 RX ADMIN — PIPERACILLIN AND TAZOBACTAM 3.38 G: 3; .375 INJECTION, POWDER, LYOPHILIZED, FOR SOLUTION INTRAVENOUS; PARENTERAL at 18:23

## 2021-06-11 RX ADMIN — POTASSIUM CHLORIDE AND SODIUM CHLORIDE: 900; 150 INJECTION, SOLUTION INTRAVENOUS at 16:14

## 2021-06-11 RX ADMIN — ACETAMINOPHEN 650 MG: 325 TABLET, FILM COATED ORAL at 16:13

## 2021-06-11 RX ADMIN — PIPERACILLIN SODIUM AND TAZOBACTAM SODIUM 3.38 G: 3; .375 INJECTION, POWDER, FOR SOLUTION INTRAVENOUS at 02:30

## 2021-06-11 RX ADMIN — HEPARIN SODIUM 5000 UNITS: 5000 INJECTION, SOLUTION INTRAVENOUS; SUBCUTANEOUS at 21:51

## 2021-06-11 RX ADMIN — ONDANSETRON 4 MG: 4 TABLET, ORALLY DISINTEGRATING ORAL at 05:45

## 2021-06-11 RX ADMIN — HEPARIN SODIUM 5000 UNITS: 5000 INJECTION, SOLUTION INTRAVENOUS; SUBCUTANEOUS at 13:41

## 2021-06-11 RX ADMIN — PANTOPRAZOLE SODIUM 8 MG/HR: 40 INJECTION, POWDER, FOR SOLUTION INTRAVENOUS at 16:13

## 2021-06-11 RX ADMIN — ONDANSETRON 4 MG: 2 INJECTION INTRAMUSCULAR; INTRAVENOUS at 13:41

## 2021-06-11 RX ADMIN — SODIUM CHLORIDE 125 MG: 9 INJECTION, SOLUTION INTRAVENOUS at 18:23

## 2021-06-11 RX ADMIN — PIPERACILLIN AND TAZOBACTAM 3.38 G: 3; .375 INJECTION, POWDER, LYOPHILIZED, FOR SOLUTION INTRAVENOUS; PARENTERAL at 05:31

## 2021-06-11 RX ADMIN — PANTOPRAZOLE SODIUM 8 MG/HR: 40 INJECTION, POWDER, FOR SOLUTION INTRAVENOUS at 00:13

## 2021-06-11 RX ADMIN — PANTOPRAZOLE SODIUM 40 MG: 40 INJECTION, POWDER, LYOPHILIZED, FOR SOLUTION INTRAVENOUS at 05:31

## 2021-06-11 RX ADMIN — PIPERACILLIN AND TAZOBACTAM 3.38 G: 3; .375 INJECTION, POWDER, LYOPHILIZED, FOR SOLUTION INTRAVENOUS; PARENTERAL at 09:21

## 2021-06-11 RX ADMIN — POTASSIUM CHLORIDE 40 MEQ: 1500 TABLET, EXTENDED RELEASE ORAL at 04:52

## 2021-06-11 RX ADMIN — PANTOPRAZOLE SODIUM 8 MG/HR: 40 INJECTION, POWDER, FOR SOLUTION INTRAVENOUS at 04:45

## 2021-06-11 RX ADMIN — PANTOPRAZOLE SODIUM 80 MG: 40 INJECTION, POWDER, LYOPHILIZED, FOR SOLUTION INTRAVENOUS at 00:55

## 2021-06-11 RX ADMIN — KETOROLAC TROMETHAMINE 30 MG: 30 INJECTION, SOLUTION INTRAMUSCULAR; INTRAVENOUS at 02:27

## 2021-06-11 RX ADMIN — POTASSIUM CHLORIDE AND SODIUM CHLORIDE: 900; 150 INJECTION, SOLUTION INTRAVENOUS at 04:51

## 2021-06-11 ASSESSMENT — ENCOUNTER SYMPTOMS
EYES NEGATIVE: 1
HEADACHES: 0
BLURRED VISION: 1
SHORTNESS OF BREATH: 0
FEVER: 0
COUGH: 0
RESPIRATORY NEGATIVE: 1
PSYCHIATRIC NEGATIVE: 1
SORE THROAT: 0
NAUSEA: 1
CARDIOVASCULAR NEGATIVE: 1
WEAKNESS: 1
CHILLS: 1
MYALGIAS: 0
NEUROLOGICAL NEGATIVE: 1
FEVER: 1
CONSTIPATION: 0
DIZZINESS: 0
DEPRESSION: 0
BLOOD IN STOOL: 0
DOUBLE VISION: 1
ABDOMINAL PAIN: 1
NERVOUS/ANXIOUS: 0
CHILLS: 0
DIARRHEA: 1
MEMORY LOSS: 0
PALPITATIONS: 1
DIAPHORESIS: 1
VOMITING: 1

## 2021-06-11 ASSESSMENT — PATIENT HEALTH QUESTIONNAIRE - PHQ9
5. POOR APPETITE OR OVEREATING: NOT AT ALL
SUM OF ALL RESPONSES TO PHQ QUESTIONS 1-9: 4
8. MOVING OR SPEAKING SO SLOWLY THAT OTHER PEOPLE COULD HAVE NOTICED. OR THE OPPOSITE, BEING SO FIGETY OR RESTLESS THAT YOU HAVE BEEN MOVING AROUND A LOT MORE THAN USUAL: NOT AT ALL
9. THOUGHTS THAT YOU WOULD BE BETTER OFF DEAD, OR OF HURTING YOURSELF: NOT AT ALL
SUM OF ALL RESPONSES TO PHQ9 QUESTIONS 1 AND 2: 2
1. LITTLE INTEREST OR PLEASURE IN DOING THINGS: NOT AT ALL
4. FEELING TIRED OR HAVING LITTLE ENERGY: SEVERAL DAYS
7. TROUBLE CONCENTRATING ON THINGS, SUCH AS READING THE NEWSPAPER OR WATCHING TELEVISION: NOT AT ALL
2. FEELING DOWN, DEPRESSED, IRRITABLE, OR HOPELESS: MORE THAN HALF THE DAYS
6. FEELING BAD ABOUT YOURSELF - OR THAT YOU ARE A FAILURE OR HAVE LET YOURSELF OR YOUR FAMILY DOWN: SEVERAL DAYS
3. TROUBLE FALLING OR STAYING ASLEEP OR SLEEPING TOO MUCH: NOT AT ALL

## 2021-06-11 ASSESSMENT — COGNITIVE AND FUNCTIONAL STATUS - GENERAL
TOILETING: A LITTLE
DAILY ACTIVITIY SCORE: 18
PERSONAL GROOMING: A LITTLE
STANDING UP FROM CHAIR USING ARMS: A LITTLE
MOBILITY SCORE: 18
EATING MEALS: A LITTLE
SUGGESTED CMS G CODE MODIFIER MOBILITY: CK
CLIMB 3 TO 5 STEPS WITH RAILING: A LITTLE
DRESSING REGULAR LOWER BODY CLOTHING: A LITTLE
WALKING IN HOSPITAL ROOM: A LITTLE
MOVING TO AND FROM BED TO CHAIR: A LITTLE
SUGGESTED CMS G CODE MODIFIER DAILY ACTIVITY: CK
MOVING FROM LYING ON BACK TO SITTING ON SIDE OF FLAT BED: A LITTLE
DRESSING REGULAR UPPER BODY CLOTHING: A LITTLE
TURNING FROM BACK TO SIDE WHILE IN FLAT BAD: A LITTLE
HELP NEEDED FOR BATHING: A LITTLE

## 2021-06-11 ASSESSMENT — LIFESTYLE VARIABLES
ALCOHOL_USE: NO
TOTAL SCORE: 0
DOES PATIENT WANT TO STOP DRINKING: NO
EVER FELT BAD OR GUILTY ABOUT YOUR DRINKING: NO
HAVE PEOPLE ANNOYED YOU BY CRITICIZING YOUR DRINKING: NO
CONSUMPTION TOTAL: NEGATIVE
AVERAGE NUMBER OF DAYS PER WEEK YOU HAVE A DRINK CONTAINING ALCOHOL: 0
HOW MANY TIMES IN THE PAST YEAR HAVE YOU HAD 5 OR MORE DRINKS IN A DAY: 0
SUBSTANCE_ABUSE: 0
ON A TYPICAL DAY WHEN YOU DRINK ALCOHOL HOW MANY DRINKS DO YOU HAVE: 0
HAVE YOU EVER FELT YOU SHOULD CUT DOWN ON YOUR DRINKING: NO
TOTAL SCORE: 0
EVER HAD A DRINK FIRST THING IN THE MORNING TO STEADY YOUR NERVES TO GET RID OF A HANGOVER: NO
TOTAL SCORE: 0

## 2021-06-11 ASSESSMENT — PAIN DESCRIPTION - PAIN TYPE
TYPE: ACUTE PAIN

## 2021-06-11 NOTE — PROGRESS NOTES
45 yof likely with emphysematous gastritis  Emergent surgery not indicated  Recommend supportive care and PPI medications  Surgery service will see in AM

## 2021-06-11 NOTE — ED TRIAGE NOTES
Chief Complaint   Patient presents with   • N/V   • Abdominal Pain     onset yesterday, increased today     Pt arrived via [facility] with c/o abd pain and n/v.  Pain states pain started after she ate some spaghetti.  Her son is also not feeling well after eating the spaghetti.  Pt had hypotensive episode.  Received 1300ml ns pta and zofran 4mg.  VSS upon arrival

## 2021-06-11 NOTE — DIETARY
NUTRITION SERVICES: BMI - Pt with BMI >40 (=Body mass index is 50.49 kg/m².), Class III obesity. Weight loss counseling not appropriate in acute care setting. RECOMMEND - If appropriate at DC please refer to outpatient nutrition services for weight management.

## 2021-06-11 NOTE — PROGRESS NOTES
Assumed care of pt from Er. Pt is laying in bed, call light within reach, bed lowered and locked, fall education reinforced. Pt is A&Ox4 and on 1L of oxygen via nasal cannula. Pt lung sounds are clear in all lobes, bowel sounds are normoactive in all four quadrants, heart sounds are within defined limits. PT IV is clean,dry,intact,and patent and infusing the appropriate fluids. Skin intact. Pt is up stand-by to ambulate. CDIFF rule out.

## 2021-06-11 NOTE — CONSULTS
Date of Service:6/11/21    Consult Requested By: Angella Sánchez M.D.    Reason for Consultation: abnormal CT abdominal pain    History of Present Illness:   Chanelle Ortiz is a 45 y.o.female admitted 6/10/2021.  With an underlying history of type 2 diabetes asthma anxiety depression hypertension hypothyroidism and iron deficiency anemia along with obstructive sleep apnea on CPAP seen who is morbidly obese has a BMI of 49 underwent Covid infection last year.  She apparently presented with abdominal discomfort and pain associated with nausea vomiting for about 2 days.  Proximally 3 months ago she underwent multiple tooth extractions and then subsequent was treating her discomfort with the use of 600 mg of ibuprofen fairly frequently to control her discomfort.  Yesterday she presented with stabbing sharp pain in the epigastric region radiating to her back and bilateral on either side of the abdomen about 7 out of 10 in severity she could not express any type of exacerbating or relieving factors.  She did try to take some ibuprofen to relieve the discomfort with little resolve.  The pain finally significantly increased and subsequently she came to the ER for further evaluation.  She experienced some diaphoresis on presentation.  In the ER CT scan of the abdomen demonstrated fat stranding with possible infiltration of the wall of the gastric suggestive of emphysematous gastritis there is also portal vein gas is appreciated and gas within the mesenteric veins around the stomach.    Review Of Systems:  Constitutional: Positive for chills, diaphoresis, fever and malaise/fatigue.   HENT: Negative for ear pain and sore throat.    Eyes: Positive for blurred vision and double vision.   Respiratory: Negative for cough and shortness of breath.    Cardiovascular: Positive for palpitations. Negative for chest pain.   Gastrointestinal: Positive for abdominal pain, diarrhea, nausea and vomiting. Negative for constipation.    Genitourinary: Negative for dysuria and frequency.   Musculoskeletal: Negative for joint pain and myalgias.   Skin: Negative for itching and rash.   Neurological: Positive for weakness (Generalized). Negative for dizziness and headaches.   Psychiatric/Behavioral: Negative for depression, memory loss and substance abuse. The patient is not nervous/anxious.      PMH:   Past Medical History:   Diagnosis Date   • Anxiety and depression     after ex went to [facility]   • Arrhythmia     notes occasional rapid or prominent heart beat, at night    • Asthma    • Back pain    • Diabetes     on and off meds (due to concern about heart beat changes)   • Emphysematous gastritis 2021   • H/O Hypertension - resolved     Patient states prior HTN, but resolved after changes in other medications (but off HTN meds).    • Hx of Bronchitis    • Hyperthyroidism     Treated with Radioactive iodine, at [facility] in    • Hypothyroidism, after radioactive ablation of thyroid    • POLLY on CPAP     gets headaches, if not using CPAP.    • Pneumonia    • Psychiatric problem - unspecified     pt notes anxiety and depression, with counseller - pt unclear on details.           PSH:  Past Surgical History:   Procedure Laterality Date   • GYN SURGERY      tuboligation, bilateral, during past .    • OTHER ABDOMINAL SURGERY      cholesystectomy   • OVARIAN CYSTECTOMY      unknown details or laterality.   • CHOLECYSTECTOMY     • OTHER      tonsillectomy   • PRIMARY C SECTION       - , ,    • PRIMARY C SECTION         FAMILY HX:  Family History   Problem Relation Age of Onset   • Other Mother         sleep apnea   • Heart Failure Sister    • Diabetes Sister    • Heart Disease Sister    • Hypertension Sister    • Stroke Sister    • Hyperlipidemia Sister    • Heart Attack Maternal Grandfather    • Diabetes Daughter    • Psychiatric Illness Daughter         bipolar   • Psychiatric  "Illness Son         autism       SOCIAL HX:  Social History     Socioeconomic History   • Marital status:      Spouse name: Not on file   • Number of children: Not on file   • Years of education: Not on file   • Highest education level: Not on file   Occupational History   • Not on file   Tobacco Use   • Smoking status: Never Smoker   • Smokeless tobacco: Never Used   Vaping Use   • Vaping Use: Never used   Substance and Sexual Activity   • Alcohol use: No   • Drug use: No   • Sexual activity: Not on file     Comment: .  Tuboligation in 2004.    Other Topics Concern   • Not on file   Social History Narrative   • Not on file     Social Determinants of Health     Financial Resource Strain: Low Risk    • Difficulty of Paying Living Expenses: Not very hard   Food Insecurity:    • Worried About Running Out of Food in the Last Year:    • Ran Out of Food in the Last Year:    Transportation Needs:    • Lack of Transportation (Medical):    • Lack of Transportation (Non-Medical):    Physical Activity:    • Days of Exercise per Week:    • Minutes of Exercise per Session:    Stress:    • Feeling of Stress :    Social Connections:    • Frequency of Communication with Friends and Family:    • Frequency of Social Gatherings with Friends and Family:    • Attends Spiritism Services:    • Active Member of Clubs or Organizations:    • Attends Club or Organization Meetings:    • Marital Status:    Intimate Partner Violence:    • Fear of Current or Ex-Partner:    • Emotionally Abused:    • Physically Abused:    • Sexually Abused:      Social History     Tobacco Use   Smoking Status Never Smoker   Smokeless Tobacco Never Used     Social History     Substance and Sexual Activity   Alcohol Use No       Allergies/Intolerances:  Allergies   Allergen Reactions   • Morphine Vomiting and Nausea     \"I think\" \"I dunno what my reaction was, the nurse just said my oxygen levels were going way to low on it\" \"heart races\" "       History reviewed with the patient    Other Current Medications:    Current Facility-Administered Medications:   •  acetaminophen (Tylenol) tablet 650 mg, 650 mg, Oral, Q6HRS PRN, Maximus Estrada M.D.  •  senna-docusate (PERICOLACE or SENOKOT S) 8.6-50 MG per tablet 2 tablet, 2 tablet, Oral, BID **AND** polyethylene glycol/lytes (MIRALAX) PACKET 1 Packet, 1 Packet, Oral, QDAY PRN **AND** magnesium hydroxide (MILK OF MAGNESIA) suspension 30 mL, 30 mL, Oral, QDAY PRN **AND** bisacodyl (DULCOLAX) suppository 10 mg, 10 mg, Rectal, QDAY PRN, Maximus Estrada M.D.  •  0.9 % NaCl with KCl 20 mEq infusion, , Intravenous, Continuous, Maximus Estrada M.D., Last Rate: 100 mL/hr at 06/11/21 0451, New Bag at 06/11/21 0451  •  heparin injection 5,000 Units, 5,000 Units, Subcutaneous, Q8HRS, Maximus Estrada M.D.  •  ondansetron (ZOFRAN) syringe/vial injection 4 mg, 4 mg, Intravenous, Q4HRS PRN, Maximus Estrada M.D.  •  ondansetron (ZOFRAN ODT) dispertab 4 mg, 4 mg, Oral, Q4HRS PRN, Maximus Estrada M.D., 4 mg at 06/11/21 0545  •  promethazine (PHENERGAN) tablet 12.5-25 mg, 12.5-25 mg, Oral, Q4HRS PRN, Maximus Estrada M.D.  •  promethazine (PHENERGAN) suppository 12.5-25 mg, 12.5-25 mg, Rectal, Q4HRS PRN, Maximus Estrada M.D.  •  prochlorperazine (COMPAZINE) injection 5-10 mg, 5-10 mg, Intravenous, Q4HRS PRN, Maximus Estrada M.D.  •  insulin lispro (AdmeLOG) injection, 1-6 Units, Subcutaneous, 4X/DAY ACHS **AND** POC blood glucose manual result, , , Q AC AND BEDTIME(S) **AND** NOTIFY MD and PharmD, , , Once **AND** glucose 4 g chewable tablet 16 g, 16 g, Oral, Q15 MIN PRN **AND** dextrose 50% (D50W) injection 50 mL, 50 mL, Intravenous, Q15 MIN PRN, Maximus Estrada M.D.  •  Pharmacy Consult Request ...Pain Management Review 1 Each, 1 Each, Other, PHARMACY TO DOSE, Maximus Estrada M.D.  •  [COMPLETED] piperacillin-tazobactam (ZOSYN) 3.375 g in  mL IVPB, 3.375 g, Intravenous, Once, Stopped at 06/11/21 0601 **AND** piperacillin-tazobactam (ZOSYN) 3.375 g in  " mL IVPB, 3.375 g, Intravenous, Q8HRS, Maximus Estrada M.D., Last Rate: 25 mL/hr at 21, 3.375 g at 21  •  Special Contact Isolation, , , CONTINUOUS **AND** [COMPLETED] C Diff by PCR rflx Toxin, , , Once **AND** Pharmacy Consult Request - C. difficile panel, 1 Each, Other, PHARMACY TO DOSE, Juan Hardin M.D.  •  pantoprazole (PROTONIX) 80 mg in  mL Infusion, 8 mg/hr, Intravenous, Continuous, Juan Hardin M.D., Last Rate: 25 mL/hr at 21, 8 mg/hr at 21  •  potassium chloride SA (Kdur) tablet 40 mEq, 40 mEq, Oral, Q6HRS, Maximus Estrada M.D., 40 mEq at 21  •  albuterol inhaler 2 Puff, 2 Puff, Inhalation, Q6HRS PRN, Maximus Estrada M.D.  [unfilled]    Most Recent Vital Signs:  /92   Pulse (!) 59   Temp 36.1 °C (96.9 °F) (Temporal)   Resp 17   Ht 1.6 m (5' 3\")   Wt (!) 129 kg (285 lb)   SpO2 100%   BMI 50.49 kg/m²   Temp  Av.2 °C (97.1 °F)  Min: 36 °C (96.8 °F)  Max: 36.4 °C (97.5 °F)    Physical Exam:  General: Nontoxic, no acute distress obese  HEENT: sclera anicteric, PERRL, EOMI, MMM, no oral lesions  Neck: supple, no lymphadenopathy  Chest: CTAB, no r/r/w, normal work of breathing.  Cardiac: Regular, no murmurs no gallops heard  Abdomen: , soft, tender in the epigastric region to palpation there is no rebound or guarding.  Bowel sounds are hypoactive., non-distended, no HSM  Extremities: No edema. No joint swelling.  Skin: no rashes or erythema  Neuro: Alert and oriented times 3, non-focal exam    Pertinent Lab Results:  Recent Labs     06/10/21  1753   WBC 9.1      Recent Labs     06/10/21  1753   HEMOGLOBIN 10.2*   HEMATOCRIT 35.7*   MCV 76.8*   MCH 21.9*   ANISOCYTOSIS 1+   PLATELETCT 214         Recent Labs     06/10/21  1753   SODIUM 141   POTASSIUM 3.4*   CHLORIDE 109   CO2 22   CREATININE 0.86        Recent Labs     06/10/21  1753   ALBUMIN 3.5      Recent Labs     06/10/21  1753   ASTSGOT 17   ALTSGPT 13 " "  TBILIRUBIN 0.3   ALKPHOSPHAT 95   GLOBULIN 3.2       [unfilled]      Pertinent Micro:  Results     Procedure Component Value Units Date/Time    C Diff by PCR rflx Toxin [[id number]] Collected: 06/10/21 2133    Order Status: Completed Specimen: Stool Updated: 06/11/21 0718     C Diff by PCR Negative     Comment: C. difficile NOT detected by PCR.  Treatment not indicated per guidelines.  Repeat testing not indicated within 7 days.          027-NAP1-BI Presumptive Negative     Comment: Presumptive 027/NAP1/BI target DNA sequences are NOT DETECTED.       Narrative:      Special Contact Isolation  Does this patient have risk factors for C-diff?->Yes  C-Diff Risk Factors->antibiotic exposure    COV-2, FLU A/B, AND RSV BY PCR (2-4 HOURS CEPInstabugID): Collect NP swab in [facility] [[id number]] Collected: 06/10/21 0047    Order Status: Completed Specimen: Respirate Updated: 06/11/21 0239     Influenza virus A RNA Negative     Influenza virus B, PCR Negative     RSV, PCR Negative     SARS-CoV-2 by PCR NotDetected     Comment: PATIENTS: Important information regarding your results and instructions can  be found at [email or web address]   \"After your  Covid-19 Test\"    [facility] providers: PLEASE REFER TO DE-ESCALATION AND RETESTING PROTOCOL  on [email or web address]    **The [facility] GeneXpert Xpress SARS-CoV-2 RT-PCR Test has been made  available for use under the Emergency Use Authorization (EUA) only.          SARS-CoV-2 Source NP Swab    Narrative:      Special Contact Isolation  Have you been in close contact with a person who is suspected  or known to be positive for COVID-19 within the last 30 days  (e.g. last seen that person < 30 days ago)->Unknown    BLOOD CULTURE [[id number]]     Order Status: Sent Specimen: Blood from Peripheral     BLOOD CULTURE [[id number]]     Order Status: Sent Specimen: Blood from Peripheral         Blood Culture   Date Value Ref Range Status   12/22/2010 No growth after 5 days of " incubation.  Final        Studies:   Results for orders placed during the hospital encounter of 01/17/08    DX-ANKLE 3+ VIEWS    Impression  IMPRESSION:    NO ACUTE BONY ABNORMALITY.    RWK/idris      Read By LAURA COUGHLIN MD on Jan 17 2008  9:48AM  : JIL Transcription Date: Jan 17 2008 10:25PM  THIS DOCUMENT HAS BEEN ELECTRONICALLY SIGNED BY: LAURA COUGHLIN MD on Jan 18 2008  8:27AM      Results for orders placed during the hospital encounter of 11/17/18    DX-CHEST-2 VIEWS    Impression  Normal chest.          INTERPRETING LOCATION: [address] [place] [address], [facility], [id number]         Results for orders placed during the hospital encounter of 07/14/19    DX-FOOT-COMPLETE 3+ RIGHT    Impression  1.  No acute traumatic bony injury.           Results for orders placed during the hospital encounter of 03/23/20    DX-KNEE 2- LEFT    Impression  1.  Small knee effusion.    2.  Moderate osteoarthritis.                 CT-ABDOMEN-PELVIS WITH   Final Result           1.  Portal venous gas and gas within mesenteric veins around the stomach.   2.  There could be intramural air within the stomach and there is mild perigastric fat stranding raising concern for emphysematous gastritis. There may also be tiny focus of free air seen.   3.  Multiple dilated small bowel loops which are nonspecific and could be related to obstruction, ileus or enteritis.       These findings were discussed with SERGIO URIBE on 6/10/2021 11:33 PM.                  IMPRESSION:     Gastritis  Iron def  Abnormal CT scan      PLAN:     She was evaluated by surgery and felt to be no need for emergent surgical intervention.  She does have a significant NSAID use abuse history with her teeth extraction and her discomfort that she recently tried to treat with ibuprofen.  She may have developed an ulcer which have then subsequently demonstrates only abnormal findings on the CT scan.  She denies any type of hematemesis coffee-ground emesis melena  rectal bleeding.  Presently would recommend treating her supportively with IV fluids IV Protonix drip with the plans for possible upper endoscopy in the near future.  There is air appreciated within the portal vein subsequently there is apprehension about possibly worsening the scenario with the upper endoscopy.  Antibiotics have been initiated and serial imaging should be performed to evaluate for possible resolution of the abnormal findings on CT scan.  I will discuss further plan moving forward with surgery.    Iron deficiency anemia   She has had heavy menses for several months now she states that this has recently changed from her previous ones lasting about a week now in length.  Most recently her last menses was in early May.  Check iron levels and recommend supplementation.    Any further questions or concerns please feel free of [phone].      Discussed with IM. Will continue to follow    Rafa Vaughn M.D.

## 2021-06-11 NOTE — PROGRESS NOTES
Bedside report received.  Assessment complete.  A&O x 4. Patient calls appropriately.  Patient ambulates with STBA assist. Bed alarm n/a.   Patient has 1/10 pain. Declines pain interventions.   Denies N&V. Tolerating NPO diet.  + void, + flatus, + BM 6/11.  Patient denies SOB.  SCD's off.  Patient pleasant with staff.  Review plan with of care with patient. Call light and personal belongings with in reach. Hourly rounding in place. All needs met at this time.

## 2021-06-11 NOTE — ED NOTES
Patient medicated per MAR. Patient back to room. Room and bathroom cleaned by housekeeping.     Patient hooked back up to monitors.

## 2021-06-11 NOTE — H&P
Hospital Medicine History & Physical Note    Date of Service  6/11/2021    Primary Care Physician  MARIAN [place].    Consultants  General surgery-Dr. Wilson per ER physician.  Will see patient in the morning.    Code Status  Full Code    Chief Complaint  Chief Complaint   Patient presents with   • N/V   • Abdominal Pain     onset yesterday, increased today       History of Presenting Illness  45 y.o. female with a history of diabetes mellitus type 2, asthma, anxiety and depression, hypertension, hypothyroidism, iron deficiency anemia, POLLY on CPAP, morbid obesity BMI 49, and COVID-19 infection last year, who presented 6/10/2021 with abdominal pain associated with nausea and vomiting.  Patient reports that yesterday, she started experiencing sharp stabbing pain in the epigastrium radiating to to the back bilaterally and also lower abdomen rating 7/10.  Patient reports she was laying on her couch relaxing when this occurred with no exacerbating or alleviating factors.  She was again resting today, when she started experiencing the same pain in higher intensity 10/10 at approximately 4 PM today.  She laid down with no significant improvement in fact got worse and started having some nausea and vomiting.  She reports having some fevers and chills earlier which improved after arrival in the ER.  She also reports having had some sweating and shaking associated with nausea and vomiting prompting her to call EMS.  She reports some new onset diarrhea but denies any constipation.  She denies having had the symptoms before.  She denies previously being diagnosed with diabetic gastroparesis in the past.  She reports history of cholecystectomy as well as C-sections and bilateral tubal ligation.    In the ER, CT scan of the abdomen showed fat stranding with possible air infiltration of the wall of the gastrium concerning for emphysematous gastritis.  Patient was started on Zosyn and pantoprazole drip per ER physician.  ER  physician also consulted with Dr. Wilson who recommended medical management for now and also consideration for GI consultation.     Review of Systems  Review of Systems   Constitutional: Positive for chills, diaphoresis, fever and malaise/fatigue.   HENT: Negative for ear pain and sore throat.    Eyes: Positive for blurred vision and double vision.   Respiratory: Negative for cough and shortness of breath.    Cardiovascular: Positive for palpitations. Negative for chest pain.   Gastrointestinal: Positive for abdominal pain, diarrhea, nausea and vomiting. Negative for constipation.   Genitourinary: Negative for dysuria and frequency.   Musculoskeletal: Negative for joint pain and myalgias.   Skin: Negative for itching and rash.   Neurological: Positive for weakness (Generalized). Negative for dizziness and headaches.   Psychiatric/Behavioral: Negative for depression, memory loss and substance abuse. The patient is not nervous/anxious.        Past Medical History   has a past medical history of Anxiety and depression (2016), Arrhythmia, Asthma, Back pain, Diabetes (2008), H/O Hypertension - resolved, Hx of Bronchitis, Hyperthyroidism (2008), Hypothyroidism, after radioactive ablation of thyroid (2008), POLLY on CPAP (2018), Pneumonia, and Psychiatric problem - unspecified.    Surgical History   has a past surgical history that includes other abdominal surgery (2002); gyn surgery (2004); other; primary c section; ovarian cystectomy (1992); cholecystectomy; and primary c section.     Family History  family history includes Diabetes in her daughter and sister; Heart Attack in her maternal grandfather; Heart Disease in her sister; Heart Failure in her sister; Hyperlipidemia in her sister; Hypertension in her sister; Other in her mother; Psychiatric Illness in her daughter and son; Stroke in her sister.     Social History   reports that she has never smoked. She has never used smokeless tobacco. She reports that she does not  "drink alcohol and does not use drugs.    Allergies  Allergies   Allergen Reactions   • Morphine Vomiting and Nausea     \"I think\" \"I dunno what my reaction was, the nurse just said my oxygen levels were going way to low on it\" \"heart races\"       Medications  Prior to Admission Medications   Prescriptions Last Dose Informant Patient Reported? Taking?   TRADJENTA 5 MG Tab tablet 6/10/2021 at am Patient Yes No   Sig: Take 5 mg by mouth every day.   albuterol 108 (90 Base) MCG/ACT Aero Soln inhalation aerosol >7 days at unknown Patient No No   Sig: Inhale 2 Puffs by mouth every 6 hours as needed for Shortness of Breath.   ferrous sulfate 325 (65 Fe) MG EC tablet 6/9/2021 at Unknown time Patient Yes No   Sig: Take 325 mg by mouth every day.   lisinopril (PRINIVIL) 5 MG Tab 6/9/2021 at Unknown time Patient No No   Sig: Take 1 tablet by mouth every day.   meclizine (ANTIVERT) 12.5 MG Tab Not Taking at Unknown time Patient No No   Sig: Take 1 tablet by mouth 3 times a day as needed for Dizziness.   Patient not taking: Reported on 6/10/2021   metformin (GLUCOPHAGE) 1000 MG tablet >7 days at PM Patient Yes No   Sig: Take 1,000 mg by mouth every evening.   metoprolol SR (TOPROL XL) 25 MG TABLET SR 24 HR Not Taking at Unknown time Patient No No   Sig: Take 0.5 Tablets by mouth every evening.   Patient not taking: Reported on 6/10/2021   vitamin D, Ergocalciferol, (DRISDOL) 1.25 MG (51859 UT) Cap capsule 6/9/2021 at Unknown time Patient Yes No   Sig: Take 50,000 Units by mouth every Sunday.      Facility-Administered Medications: None       Physical Exam  Temp:  [36.4 °C (97.5 °F)] 36.4 °C (97.5 °F)  Pulse:  [52-66] 59  Resp:  [12-20] 16  BP: (121-141)/(61-75) 139/67  SpO2:  [91 %-99 %] 91 %    Physical Exam  Vitals and nursing note reviewed.   Constitutional:       General: She is not in acute distress.     Appearance: Normal appearance. She is well-developed. She is obese. She is not ill-appearing or diaphoretic.   HENT:      " Head: Normocephalic and atraumatic.      Right Ear: External ear normal.      Left Ear: External ear normal.      Nose: Nose normal.      Mouth/Throat:      Pharynx: Oropharynx is clear. No oropharyngeal exudate or posterior oropharyngeal erythema.   Eyes:      General: No scleral icterus.        Right eye: No discharge.         Left eye: No discharge.      Conjunctiva/sclera: Conjunctivae normal.      Pupils: Pupils are equal, round, and reactive to light.   Neck:      Thyroid: No thyromegaly.      Vascular: No JVD.      Trachea: No tracheal deviation.   Cardiovascular:      Rate and Rhythm: Normal rate and regular rhythm.      Pulses: Normal pulses.      Heart sounds: Normal heart sounds. No murmur heard.   No friction rub. No gallop.    Pulmonary:      Effort: Pulmonary effort is normal. No respiratory distress.      Breath sounds: Normal breath sounds. No stridor. No wheezing or rales.   Abdominal:      General: There is no distension.      Palpations: Abdomen is soft. There is no mass.      Tenderness: There is abdominal tenderness (Significant tenderness to light touch diffusely.  Worse tenderness to deep palpation of the epigastrium.). There is no guarding or rebound.      Comments: Hypoactive bowel sounds   Musculoskeletal:         General: No tenderness or deformity.      Cervical back: Neck supple.      Right lower leg: No edema.      Left lower leg: No edema.   Lymphadenopathy:      Cervical: No cervical adenopathy.   Skin:     General: Skin is warm and dry.      Coloration: Skin is not pale.      Findings: No erythema or rash.   Neurological:      Mental Status: She is alert and oriented to person, place, and time.      Sensory: No sensory deficit.      Motor: No weakness or abnormal muscle tone.   Psychiatric:         Behavior: Behavior normal.         Thought Content: Thought content normal.         Judgment: Judgment normal.         Laboratory:  Recent Labs     06/10/21  1753   WBC 9.1   RBC 4.65    HEMOGLOBIN 10.2*   HEMATOCRIT 35.7*   MCV 76.8*   MCH 21.9*   MCHC 28.6*   RDW 52.1*   PLATELETCT 214   MPV 11.7     Recent Labs     06/10/21  1753   SODIUM 141   POTASSIUM 3.4*   CHLORIDE 109   CO2 22   GLUCOSE 194*   BUN 11   CREATININE 0.86   CALCIUM 7.0*     Recent Labs     06/10/21  1753   ALTSGPT 13   ASTSGOT 17   ALKPHOSPHAT 95   TBILIRUBIN 0.3   LIPASE 20   GLUCOSE 194*         No results for input(s): NTPROBNP in the last 72 hours.      No results for input(s): TROPONINT in the last 72 hours.    Imaging:  CT-ABDOMEN-PELVIS WITH   Final Result         1.  Portal venous gas and gas within mesenteric veins around the stomach.   2.  There could be intramural air within the stomach and there is mild perigastric fat stranding raising concern for emphysematous gastritis. There may also be tiny focus of free air seen.   3.  Multiple dilated small bowel loops which are nonspecific and could be related to obstruction, ileus or enteritis.      These findings were discussed with SERGIO URIBE on 6/10/2021 11:33 PM.            DX-ABDOMEN COMPLETE WITH AP OR PA CXR   Final Result      1.  No acute cardiopulmonary abnormality identified.      2.  Enlargement of the cardiac silhouette              I have personally reviewed the CT scan of the abdomen and pelvis.  Per my read there is significant amount of food material within the proximal stomach.  There is also air within the portal venous vasculature.      Abdominal x-ray per my review shows no free air.  No signs of bowel obstruction.    Assessment/Plan:  I anticipate this patient is appropriate for observation status at this time.    * Emphysematous gastritis- (present on admission)  Assessment & Plan  I discussed the patient's case with the ER patient, Dr. Cisse, who has discussed the case with Dr. Wilson of general surgery and Dr. Salazar of gastroenterology.  Both agreed to see the patient in the morning.    Admit to medical floor for  observation.  Continue Zosyn and pantoprazole IV.  Keep n.p.o. except for ice chips.  Severe abdominal exams.  Follow-up gastroenterology and general surgery recommendations.    Hypokalemia- (present on admission)  Assessment & Plan  - most recent serum potassium: 3.40 mmol/L (06/10/2021 17:53:00)  - most recent serum magnesium: 2.00 mg/dL (06/10/2021 17:53:00)  - most recent creatinine clearance: 81.6 mL/min/1.73 m²(06/10/2021 17:53:00)    Replace for calcium goal greater than 4.  Magnesium goal greater than 2.    Iron deficiency anemia- (present on admission)  Assessment & Plan  As per history.  Slight decrease but relatively stable.  - hemoglobin: 10.20 g/dL (06/10/2021 17:53:00) down from 11.20 g/dL (04/17/2021 23:28:00)  - baseline hemoglobin: 11.1 g/dL (14 encounters over 710 days)  - currently on: heparin 5000 UNT/ML 5000 unit(s) 0600,1400,2200    Hold home ferrous sulfate.    Hypertension- (present on admission)  Assessment & Plan  As per history.  Blood pressure goal less than 130/80.    Continue home lisinopril and metoprolol.    POLLY on CPAP- (present on admission)  Assessment & Plan  Patient is compliant with CPAP.    Continue home CPAP.    Hx of radioactive iodine thyroid ablation- (present on admission)  Assessment & Plan  As per history became hypothyroid.  However, not on levothyroxine at this time.    Continue to monitor.    Morbid obesity with BMI of 45.0-49.9, adult (HCC)- (present on admission)  Assessment & Plan  BMI 49 consistent with morbid obesity.      I counseled the patient on healthy lifestyle changes including healthy diet and regular exercise.

## 2021-06-11 NOTE — CARE PLAN
Problem: Knowledge Deficit - Standard  Goal: Patient and family/care givers will demonstrate understanding of plan of care, disease process/condition, diagnostic tests and medications  Outcome: Progressing    Shift Goals  Clinical Goals: NPO  Patient Goals: Pain control    Progress made toward(s) clinical / shift goals:  Pt on bowel rest educated on POC, verbalizes understanding. Pt verbalizes better pain control now on prophylactic medications.     Patient is not progressing towards the following goals:

## 2021-06-11 NOTE — PROGRESS NOTES
Spiritual Care Note    Patient Information     Patient's Name: Chanelle Ortiz   MRN: [id number]    YOB: 1975   Age and Gender: 45 y.o. female   Service Area: [facility]   Room (and Bed): [place] [address]   Ethnicity or Nationality:     Primary Language: English   Pentecostal/Spiritual preference: Hindu   Place of Residence: [place]   Family/Friends/Others Present: No   Clinical Team Present: No   Medical Diagnosis(-es)/Procedure(s): Nausea/Vomiting   Code Status: Full Code    Date of Admission: 6/10/2021   Length of Stay: 0 days        Spiritual Care Provider Information:  Name of Spiritual Care Provider: Nicolasa Bone  Title of Spiritual Care Provider: Associate   Phone Number: [phone]  E-mail: [email or web address]  Total time : 10 minutes    Spiritual Screen Results:    Gen Nursing  Spiritual Screen  Was spiritual care education provided to the patient?: Yes     Palliative Care  PC Pentecostal/Spiritual Screening  Was spiritual care education provided to the patient?: Yes      Encounter/Request Information  Encounter/Request Type   Visited With: Patient  Nature of the Visit: Initial, On shift  General Visit: Yes  Referral From/ Origin of Request: Epic nursing    Religous Needs/Values  Pentecostal Needs Visit  Pentecostal Needs: Prayer    Spiritual Assessment     Spiritual Care Encounters    Observations/Symptoms: Accepting, Anxious, Thankfulness    Interaction/Conversation: Pt is slightly anxiouss about the possibility of surgery. She requested prayer for that concern and for general healing, and thanked the .    Assessment: Need    Need: Seeking Spiritual Assistance and Support    Interventions: Compassionate presence, prayer.    Outcomes: Spiritual Comfort    Plan: Weekly Visits    Notes:

## 2021-06-11 NOTE — ED PROVIDER NOTES
ED Provider Note    Scribed for Juan Hardin M.D. by Lizet Hugo. 6/10/2021, 5:51 PM.    Primary care provider: [facility]  Means of arrival: EMS  History obtained from: Patient   History limited by: None     CHIEF COMPLAINT  Chief Complaint   Patient presents with   • N/V   • Abdominal Pain     onset yesterday, increased today       HPI  Chanelle Ortiz is a 45 y.o. female who presents to the Emergency Department for evaluation of abdominal pain onset yesterday. She states that the pain has worsened today which prompted her to come to the ED for evaluation. She also describes her pain as a burning sensation. She has been taking anti inflammatories for her abdominal pain but did not help to alleviate her symptoms. Her pain is exacerbated after eating. She experiences associated nausea, vomiting, fever, chills, diarrhea, cough, and shortness of breath. She had one episode of vomiting. She denies associated hematemesis or chest pain. Her highest recorded blood sugar level is 156.     REVIEW OF SYSTEMS  Pertinent positives include abdominal pain, nausea, vomiting, fever, chills, diarrhea, cough, and shortness of breath. Pertinent negatives include hematemesis or chest pain. All other systems negative.    PAST MEDICAL HISTORY   has a past medical history of Anxiety and depression (2016), Arrhythmia, Asthma, Back pain, Diabetes (2008), H/O Hypertension - resolved, Hx of Bronchitis, Hyperthyroidism (2008), Hypothyroidism, after radioactive ablation of thyroid (2008), POLLY on CPAP (2018), Pneumonia, and Psychiatric problem - unspecified.    SURGICAL HISTORY   has a past surgical history that includes other abdominal surgery (2002); gyn surgery (2004); other; primary c section; ovarian cystectomy (1992); cholecystectomy; and primary c section.    SOCIAL HISTORY  Social History     Tobacco Use   • Smoking status: Never Smoker   • Smokeless tobacco: Never Used   Vaping Use   • Vaping Use: Never  "used   Substance Use Topics   • Alcohol use: No   • Drug use: No      Social History     Substance and Sexual Activity   Drug Use No       FAMILY HISTORY  Family History   Problem Relation Age of Onset   • Other Mother         sleep apnea   • Heart Failure Sister    • Diabetes Sister    • Heart Disease Sister    • Hypertension Sister    • Stroke Sister    • Hyperlipidemia Sister    • Heart Attack Maternal Grandfather    • Diabetes Daughter    • Psychiatric Illness Daughter         bipolar   • Psychiatric Illness Son         autism       CURRENT MEDICATIONS  Current Outpatient Medications   Medication Instructions   • albuterol 108 (90 Base) MCG/ACT Aero Soln inhalation aerosol 2 Puffs, Inhalation, EVERY 6 HOURS PRN   • ferrous sulfate 325 (65 Fe) MG EC tablet TAKE 1 TABLET BY MOUTH TWICE A DAY   • levothyroxine (SYNTHROID) 125 mcg, Oral, EVERY DAY   • lisinopril (PRINIVIL) 5 mg, Oral, DAILY   • meclizine (ANTIVERT) 12.5 mg, Oral, 3 TIMES DAILY PRN   • metformin (GLUCOPHAGE) 1,000 mg, Oral, EVERY EVENING, Pt is to take 1 tablet twice a day, but only takes this medication once a day   • metoprolol SR (TOPROL XL) 12.5 mg, Oral, EVERY EVENING   • TRADJENTA 5 MG Tab tablet No dose, route, or frequency recorded.   • TRUE METRIX BLOOD GLUCOSE TEST strip No dose, route, or frequency recorded.   • vitamin D (Ergocalciferol) (DRISDOL) 50,000 Units, Oral, EVERY SUN     ALLERGIES  Allergies   Allergen Reactions   • Morphine Vomiting and Nausea     \"I think\" \"I dunno what my reaction was, the nurse just said my oxygen levels were going way to low on it\" \"heart races\"       PHYSICAL EXAM  VITAL SIGNS: /61   Pulse (!) 53   Temp 36.4 °C (97.5 °F) (Oral)   Resp 18   Wt (!) 129 kg (285 lb)   SpO2 92%   BMI 48.92 kg/m²     Constitutional: Well developed, Well nourished, mild distress.   HENT: Normocephalic, Atraumatic, mask in place.  Eyes: No scleral icterus. Conjunctiva normal, No discharge.   Neck: Supple, No stridor "   Cardiovascular: Normal heart rate, Normal rhythm, No murmurs, equal pulses.   Pulmonary: Normal breath sounds, No respiratory distress, No wheezing, No rales, No rhonchi.  Abdomen: Diffuse tenderness of the abdomen, greatest in the epigastric region. Soft, No masses, no rebound, no guarding.   Musculoskeletal: No major deformities noted, No tenderness.   Skin: Warm, Dry, No erythema, No rash.   Neurologic: Alert & oriented x 3, Normal motor function,  No focal deficits noted.   Psychiatric: Affect normal, Judgment normal, Mood normal.     LABS  Results for orders placed or performed during the hospital encounter of 06/10/21   CBC WITH DIFFERENTIAL   Result Value Ref Range    WBC 9.1 4.8 - 10.8 K/uL    RBC 4.65 4.20 - 5.40 M/uL    Hemoglobin 10.2 (L) 12.0 - 16.0 g/dL    Hematocrit 35.7 (L) 37.0 - 47.0 %    MCV 76.8 (L) 81.4 - 97.8 fL    MCH 21.9 (L) 27.0 - 33.0 pg    MCHC 28.6 (L) 33.6 - 35.0 g/dL    RDW 52.1 (H) 35.9 - 50.0 fL    Platelet Count 214 164 - 446 K/uL    MPV 11.7 9.0 - 12.9 fL    Neutrophils-Polys 78.80 (H) 44.00 - 72.00 %    Lymphocytes 10.60 (L) 22.00 - 41.00 %    Monocytes 4.40 0.00 - 13.40 %    Eosinophils 2.70 0.00 - 6.90 %    Basophils 0.00 0.00 - 1.80 %    Nucleated RBC 0.00 /100 WBC    Neutrophils (Absolute) 7.49 (H) 2.00 - 7.15 K/uL    Lymphs (Absolute) 0.96 (L) 1.00 - 4.80 K/uL    Monos (Absolute) 0.40 0.00 - 0.85 K/uL    Eos (Absolute) 0.25 0.00 - 0.51 K/uL    Baso (Absolute) 0.00 0.00 - 0.12 K/uL    NRBC (Absolute) 0.00 K/uL    Hypochromia 1+     Anisocytosis 1+     Microcytosis 1+    COMP METABOLIC PANEL   Result Value Ref Range    Sodium 141 135 - 145 mmol/L    Potassium 3.4 (L) 3.6 - 5.5 mmol/L    Chloride 109 96 - 112 mmol/L    Co2 22 20 - 33 mmol/L    Anion Gap 10.0 7.0 - 16.0    Glucose 194 (H) 65 - 99 mg/dL    Bun 11 8 - 22 mg/dL    Creatinine 0.86 0.50 - 1.40 mg/dL    Calcium 7.0 (L) 8.5 - 10.5 mg/dL    AST(SGOT) 17 12 - 45 U/L    ALT(SGPT) 13 2 - 50 U/L    Alkaline Phosphatase 95  30 - 99 U/L    Total Bilirubin 0.3 0.1 - 1.5 mg/dL    Albumin 3.5 3.2 - 4.9 g/dL    Total Protein 6.7 6.0 - 8.2 g/dL    Globulin 3.2 1.9 - 3.5 g/dL    A-G Ratio 1.1 g/dL   LIPASE   Result Value Ref Range    Lipase 20 11 - 82 U/L   LACTIC ACID   Result Value Ref Range    Lactic Acid 1.8 0.5 - 2.0 mmol/L   BETA-HYDROXYBUTYRIC ACID   Result Value Ref Range    beta-Hydroxybutyric Acid 0.33 (H) 0.02 - 0.27 mmol/L   VENOUS BLOOD GAS   Result Value Ref Range    Venous Bg Ph 7.31 7.31 - 7.45    Venous Bg Pco2 48.9 41.0 - 51.0 mmHg    Venous Bg Po2 23.3 (L) 25.0 - 40.0 mmHg    Venous Bg O2 Saturation 30.2 %    Venous Bg Hco3 24 24 - 28 mmol/L    Venous Bg Base Excess -2 mmol/L    Body Temp see below Centigrade   ESTIMATED GFR   Result Value Ref Range    GFR If African American >60 >60 mL/min/1.73 m 2    GFR If Non African American >60 >60 mL/min/1.73 m 2   DIFFERENTIAL MANUAL   Result Value Ref Range    Bands-Stabs 3.50 0.00 - 10.00 %    Manual Diff Status PERFORMED    PERIPHERAL SMEAR REVIEW   Result Value Ref Range    Peripheral Smear Review see below    PLATELET ESTIMATE   Result Value Ref Range    Plt Estimation Normal    MORPHOLOGY   Result Value Ref Range    RBC Morphology Present     Giant Platelets 1+     Polychromia 1+     Poikilocytosis 1+     Ovalocytes 1+    HCG QUAL SERUM   Result Value Ref Range    Beta-Hcg Qualitative Serum Negative Negative   COV-2, FLU A/B, AND RSV BY PCR (2-4 HOURS CEPHEID): Collect NP swab in VTM    Specimen: Respirate   Result Value Ref Range    SARS-CoV-2 Source NP Swab    MAGNESIUM   Result Value Ref Range    Magnesium 2.0 1.5 - 2.5 mg/dL   PHOSPHORUS   Result Value Ref Range    Phosphorus 2.8 2.5 - 4.5 mg/dL      All labs reviewed by me.    RADIOLOGY  CT-ABDOMEN-PELVIS WITH   Final Result         1.  Portal venous gas and gas within mesenteric veins around the stomach.   2.  There could be intramural air within the stomach and there is mild perigastric fat stranding raising concern for  emphysematous gastritis. There may also be tiny focus of free air seen.   3.  Multiple dilated small bowel loops which are nonspecific and could be related to obstruction, ileus or enteritis.      These findings were discussed with SERGIO URIBE on 6/10/2021 11:33 PM.            DX-ABDOMEN COMPLETE WITH AP OR PA CXR   Final Result      1.  No acute cardiopulmonary abnormality identified.      2.  Enlargement of the cardiac silhouette        The radiologist's interpretation of all radiological studies have been reviewed by me.    COURSE & MEDICAL DECISION MAKING  Pertinent Labs & Imaging studies reviewed. (See chart for details)    5:51 PM - Patient seen and examined at bedside. Patient will be treated with Reglan 10 mg, Zofran 4 mg, Omeprazole 20 mg, and GI Cocktail 30 mL. The patient will receive IV fluids for vomiting. Ordered DX-Abdomen, Lactic Acid, Lipase, CMP, CBC w/ Diff, Venous Gas Blood, and Beta-Hydroxybutyric Acid to evaluate her symptoms. The differential diagnoses include but are not limited to: gastritis, gastroenteritis, or pancreatitis.      8:45 PM - Patient was reevaluated at bedside. Ordered Toradol 15 mg and Bentyl 20 mg to treat the patient.     There was a well improved response to IV fluids after patient reevaluation.  1125  1130 discussed the case with Dr. Wilson general surgery who feels that this is not a surgical emergency at this point there should be treated first with medical management with a PPI.  He would like GI consulted as well.  She is asked that patient be admitted to the medical service and he will consult on the patient and see them in the morning.    Discussed the case with GI Dr. Salazar.  He did agree with the administration of PPI.  He will consult on the patient.    Discussed the case with Dr. Estrada hospitalist who is agreed to consult on hospitalization    CRITICAL CARE  I provided critical care services, which included medication orders, frequent reevaluations of  the patient's condition and response to treatment, ordering and reviewing test results, and discussing the case with various consultants.  The critical care time associated with the care of the patient was35 minutes. Review chart for interventions. This time is exclusive of any other billable procedures.         Medical Decision Making: Patient presents with severe epigastric abdominal pain vomiting and diarrhea.  Patient's pain was slightly out of proportion to her labs and vital signs CT of the abdomen pelvis was done and showed emphysematous gastritis with pneumobilia.  At this point time I suspect this is likely from just.  Gastritis or ulcer from the patient's NSAID use.  Patient will be given empiric antibiotics and started on PPI.     DISPOSITION:  Patient will be hospitalized by Dr. Estrada in guarded condition.       FINAL IMPRESSION  1. Emphysematous gastritis    2. Non-intractable vomiting with nausea, unspecified vomiting type    3. Abdominal pain, unspecified abdominal location    4. Diarrhea, unspecified type          I, Lizet Hugo (Dede), am scribing for, and in the presence of, Juan Hardin M.D.    Electronically signed by: Lizet Hugo (Dede), 6/10/2021    IJuan M.D. personally performed the services described in this documentation, as scribed by Lizet Hugo in my presence, and it is both accurate and complete. C.    The note accurately reflects work and decisions made by me.  Juan Hardin M.D.  6/11/2021  2:09 AM

## 2021-06-11 NOTE — PROGRESS NOTES
Hospital Medicine Daily Progress Note    Date of Service  6/11/2021    Chief Complaint  45 y.o. female admitted 6/10/2021 with *abdominal pain    Hospital Course  45-year-old female with history of diabetes, obesity hypertension hypothyroidism and obstructive sleep apnea presented 6/10 with abdominal pain, started the day.  For admission with epigastric pain and nausea vomiting with no improvement, denied any fever or chills no shortness of breath or chest pain, patient has had taking ibuprofen for pain almost every day more than 600 mg daily for last couple months, denied significant drinking alcohol or smoking, on admission labs showed iron deficiency anemia with normal white blood cell, normal lipase and normal vital signs however CT scan showed portal venous gas with emphysematous gastritis, case was evaluated by general surgeon Dr. Gypsy Daniels who does not feel patient needs emergency surgery, Dr. Rafa Vaughn GI evaluated the patient and recommended continue pantoprazole drip with antibiotics and possible EGD in the near future.       Interval Problem Update  -Evaluated examined the patient at bedside, vital signs are stable, she has epigastric pain  -Labs reviewed no leukocytosis, iron deficiency, iron IV was ordered.  -Patient was evaluated by GI and surgery, continue PPI drip with Zosyn  -Images daily  -Close monitoring     Consultants/Specialty  GI  General surgeon    Code Status  Full Code    Disposition  Home after treating the gastritis and stabilization    Review of Systems  Review of Systems   Constitutional: Positive for malaise/fatigue. Negative for chills and fever.   HENT: Negative.    Eyes: Negative.    Respiratory: Negative.    Cardiovascular: Negative.    Gastrointestinal: Positive for abdominal pain, nausea and vomiting. Negative for blood in stool, constipation and melena.   Genitourinary: Negative.    Skin: Negative.    Neurological: Negative.    Psychiatric/Behavioral: Negative.          Physical Exam  Temp:  [36 °C (96.8 °F)-36.4 °C (97.5 °F)] 36.1 °C (96.9 °F)  Pulse:  [52-66] 59  Resp:  [12-20] 17  BP: (121-161)/(61-92) 157/92  SpO2:  [91 %-100 %] 100 %    Physical Exam  Constitutional:       Appearance: She is obese. She is not ill-appearing.   HENT:      Head: Atraumatic.   Eyes:      General: No scleral icterus.  Cardiovascular:      Rate and Rhythm: Normal rate.      Heart sounds: No murmur heard.     Pulmonary:      Effort: Pulmonary effort is normal. No respiratory distress.      Breath sounds: No wheezing.   Abdominal:      General: There is distension.      Palpations: Abdomen is soft. There is no mass.      Tenderness: There is abdominal tenderness. There is no guarding.   Musculoskeletal:         General: No swelling or deformity.      Cervical back: No rigidity.      Right lower leg: No edema.      Left lower leg: No edema.   Skin:     Coloration: Skin is pale. Skin is not jaundiced.      Findings: No bruising or lesion.   Neurological:      General: No focal deficit present.      Mental Status: She is alert and oriented to person, place, and time. Mental status is at baseline.      Cranial Nerves: No cranial nerve deficit.      Motor: No weakness.         Fluids    Intake/Output Summary (Last 24 hours) at 6/11/2021 1603  Last data filed at 6/11/2021 1330  Gross per 24 hour   Intake 1809.58 ml   Output --   Net 1809.58 ml       Laboratory  Recent Labs     06/10/21  1753   WBC 9.1   RBC 4.65   HEMOGLOBIN 10.2*   HEMATOCRIT 35.7*   MCV 76.8*   MCH 21.9*   MCHC 28.6*   RDW 52.1*   PLATELETCT 214   MPV 11.7     Recent Labs     06/10/21  1753   SODIUM 141   POTASSIUM 3.4*   CHLORIDE 109   CO2 22   GLUCOSE 194*   BUN 11   CREATININE 0.86   CALCIUM 7.0*                   Imaging  CT-ABDOMEN-PELVIS WITH   Final Result         1.  Portal venous gas and gas within mesenteric veins around the stomach.   2.  There could be intramural air within the stomach and there is mild perigastric fat  stranding raising concern for emphysematous gastritis. There may also be tiny focus of free air seen.   3.  Multiple dilated small bowel loops which are nonspecific and could be related to obstruction, ileus or enteritis.      These findings were discussed with SERGIO URIBE on 6/10/2021 11:33 PM.            DX-ABDOMEN COMPLETE WITH AP OR PA CXR   Final Result      1.  No acute cardiopulmonary abnormality identified.      2.  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    Iron deficiency anemia- (present on admission)  Assessment & Plan  Iron panel showed iron deficiency  IV iron was ordered  Patient states she has history of heavy.  Family history of colon cancer with her dad when he was around 60s   patient might need follow-up with GI for colonoscopy as outpatient.    Hypokalemia- (present on admission)  Assessment & Plan  Labs daily  Replace as needed    Hypertension- (present on admission)  Assessment & Plan  Hold home medication due to possible sepsis  Close monitoring    POLLY on CPAP- (present on admission)  Assessment & Plan  Patient is compliant with CPAP.  Continue home CPAP.    Hx of radioactive iodine thyroid ablation- (present on admission)  Assessment & Plan  Continue home medications    Morbid obesity with BMI of 45.0-49.9, adult (HCC)- (present on admission)  Assessment & Plan  BMI 49 consistent with morbid obesity.    I counseled the patient on healthy lifestyle changes including healthy diet and regular exercise.  Consider bariatric referral as outpatient       VTE prophylaxis: Heparin and SCDs

## 2021-06-11 NOTE — HOSPITAL COURSE
45-year-old female with history of diabetes, obesity hypertension hypothyroidism and obstructive sleep apnea presented 6/10 with abdominal pain, n/v. Pls refer to H&P for further details. CT scan showed portal venous gas with emphysematous gastritis, case was evaluated by general surgeon Dr. Gypsy Daniels who does not felt patient needs emergency surgery, Dr. Rafa Vaughn GI evaluated the patient and recommended continue pantoprazole drip with antibiotics. 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 Biopsy results pending. She was started on iron infusion too. Antibiotic discontinued 6/15.

## 2021-06-11 NOTE — ASSESSMENT & PLAN NOTE
Iron panel showed iron deficiency  IV iron was ordered  Patient states she has history of heavy.  Biopsy pending.  Concerned for linitis plastica

## 2021-06-11 NOTE — ASSESSMENT & PLAN NOTE
BMI 49 consistent with morbid obesity.    Counseled the patient on healthy lifestyle changes including healthy diet and regular exercise.  Consider bariatric referral as outpatient

## 2021-06-11 NOTE — PROGRESS NOTES
2 Rn skin check complete  Pt has a right AC PIV with dressing CDI  Pt has a left forearm PIV with dressing CDI  Pt sacrum is pink and blanching  All bony prominences intact

## 2021-06-11 NOTE — ASSESSMENT & PLAN NOTE
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  Biopsy per Dr. Castellon benign   She will need repeat EGD in 4 weeks  Continue pain management.     "

## 2021-06-11 NOTE — PROGRESS NOTES
Progress Note    Author:  Gypsy Daniels MD    Date & Time:   6/11/2021   12:36 PM          Patient ID:             Name:             Chanelle Ortiz   YOB: 1975  Age:                 45 y.o.  female   MRN:               [id number]    ________________________________________________________________________  [facility] Surgery Service       Surgery consult dictated  Events:  ----------  Per patient epigastric pain is severe and unchanged  Denies melena     Exam:       Vitals:    06/11/21 0755   BP: 157/92   Pulse: (!) 59   Resp: 17   Temp: 36.1 °C (96.9 °F)   SpO2: 100%     SpO2  Min: 91 %  Max: 100 %  O2 (LPM)  Min: 0  Max: 2  Temp  Min: 36 °C (96.8 °F)  Max: 36.4 °C (97.5 °F)    Intake/Output Summary (Last 24 hours) at 6/11/2021 1236  Last data filed at 6/11/2021 0930  Gross per 24 hour   Intake 1809.58 ml   Output --   Net 1809.58 ml     Output by Drain (mL) 06/09/21 0700 - 06/09/21 1859 06/09/21 1900 - 06/10/21 0659 06/10/21 0700 - 06/10/21 1859 06/10/21 1900 - 06/11/21 0659 06/11/21 0700 - 06/11/21 1236   Patient has no LDAs of requested type attached.       DIET ORDERS (From admission to next 24h)     Start     Ordered    06/11/21 0100  Diet NPO  ALL MEALS     Question:  Restrict to:  Answer:  Ice Chips    06/11/21 0059                        Physical Exam  Nursing note reviewed.   Constitutional:       Appearance: Normal appearance. Alert, oriented x 4.NAD    Pulmonary:      Effort: Pulmonary effort is normal.      Breath sounds: No wheezes or stridor  Abdominal:      General: Abdomen is soft,  Mild Tenderness in epigastrium, no rebound           Recent Labs     06/10/21  1753   SODIUM 141   POTASSIUM 3.4*   CHLORIDE 109   CO2 22   BUN 11   CREATININE 0.86   MAGNESIUM 2.0   PHOSPHORUS 2.8   CALCIUM 7.0*       Recent Labs     06/10/21  1753   ALTSGPT 13   ASTSGOT 17   ALKPHOSPHAT 95   TBILIRUBIN 0.3   LIPASE 20   GLUCOSE 194*       Recent Labs     06/10/21  1753   RBC 4.65    HEMOGLOBIN 10.2*   HEMATOCRIT 35.7*   PLATELETCT 214       Recent Labs     06/10/21  1753   WBC 9.1   NEUTSPOLYS 78.80*   LYMPHOCYTES 10.60*   MONOCYTES 4.40   EOSINOPHILS 2.70   BASOPHILS 0.00   ASTSGOT 17   ALTSGPT 13   ALKPHOSPHAT 95   TBILIRUBIN 0.3         ________________________________________________________________________      Patient Active Problem List   Diagnosis   • Other chest pain   • Hypothyroidism   • Morbid obesity with BMI of 45.0-49.9, adult (HCC)   • Hx of radioactive iodine thyroid ablation   • POLLY on CPAP   • Hypertension   • Hx of Anxiety and depression   • Iron deficiency anemia   • Headache   • Low vitamin D level   • Prediabetes   • Palpitations   • Emphysematous gastritis   • Hypokalemia       Acute Issues/Plan:  Emphysematous gastritis, recent frequent NSAID use  Abdominal exam benign, pain localized to epigastrium. No sign of sepsis or perforation  CPM per GI; protonix, IV abx, NPO  possible upper endoscopy in the upcoming days  Surgery will follow closely

## 2021-06-12 ENCOUNTER — APPOINTMENT (OUTPATIENT)
Dept: RADIOLOGY | Facility: MEDICAL CENTER | Age: 46
End: 2021-06-12
Attending: INTERNAL MEDICINE
Payer: MEDICAID

## 2021-06-12 PROBLEM — E83.51 HYPOCALCEMIA: Status: ACTIVE | Noted: 2021-06-12

## 2021-06-12 LAB
ALBUMIN SERPL BCP-MCNC: 3.4 G/DL (ref 3.2–4.9)
ALBUMIN/GLOB SERPL: 1.1 G/DL
ALP SERPL-CCNC: 86 U/L (ref 30–99)
ALT SERPL-CCNC: 18 U/L (ref 2–50)
ANION GAP SERPL CALC-SCNC: 8 MMOL/L (ref 7–16)
AST SERPL-CCNC: 20 U/L (ref 12–45)
BASOPHILS # BLD AUTO: 0.4 % (ref 0–1.8)
BASOPHILS # BLD: 0.06 K/UL (ref 0–0.12)
BILIRUB SERPL-MCNC: 0.3 MG/DL (ref 0.1–1.5)
BUN SERPL-MCNC: 5 MG/DL (ref 8–22)
CALCIUM SERPL-MCNC: 6.9 MG/DL (ref 8.5–10.5)
CHLORIDE SERPL-SCNC: 107 MMOL/L (ref 96–112)
CO2 SERPL-SCNC: 24 MMOL/L (ref 20–33)
CREAT SERPL-MCNC: 0.58 MG/DL (ref 0.5–1.4)
CRP SERPL HS-MCNC: 6.47 MG/DL (ref 0–0.75)
EOSINOPHIL # BLD AUTO: 0.02 K/UL (ref 0–0.51)
EOSINOPHIL NFR BLD: 0.1 % (ref 0–6.9)
ERYTHROCYTE [DISTWIDTH] IN BLOOD BY AUTOMATED COUNT: 54.4 FL (ref 35.9–50)
GLOBULIN SER CALC-MCNC: 3 G/DL (ref 1.9–3.5)
GLUCOSE BLD-MCNC: 108 MG/DL (ref 65–99)
GLUCOSE BLD-MCNC: 113 MG/DL (ref 65–99)
GLUCOSE BLD-MCNC: 140 MG/DL (ref 65–99)
GLUCOSE BLD-MCNC: 141 MG/DL (ref 65–99)
GLUCOSE SERPL-MCNC: 142 MG/DL (ref 65–99)
HCT VFR BLD AUTO: 36.7 % (ref 37–47)
HGB BLD-MCNC: 10.2 G/DL (ref 12–16)
IMM GRANULOCYTES # BLD AUTO: 0.09 K/UL (ref 0–0.11)
IMM GRANULOCYTES NFR BLD AUTO: 0.6 % (ref 0–0.9)
LYMPHOCYTES # BLD AUTO: 2.05 K/UL (ref 1–4.8)
LYMPHOCYTES NFR BLD: 12.9 % (ref 22–41)
MAGNESIUM SERPL-MCNC: 2.1 MG/DL (ref 1.5–2.5)
MCH RBC QN AUTO: 21.5 PG (ref 27–33)
MCHC RBC AUTO-ENTMCNC: 27.8 G/DL (ref 33.6–35)
MCV RBC AUTO: 77.4 FL (ref 81.4–97.8)
MONOCYTES # BLD AUTO: 1 K/UL (ref 0–0.85)
MONOCYTES NFR BLD AUTO: 6.3 % (ref 0–13.4)
NEUTROPHILS # BLD AUTO: 12.62 K/UL (ref 2–7.15)
NEUTROPHILS NFR BLD: 79.7 % (ref 44–72)
NRBC # BLD AUTO: 0 K/UL
NRBC BLD-RTO: 0 /100 WBC
PLATELET # BLD AUTO: 173 K/UL (ref 164–446)
PMV BLD AUTO: 12 FL (ref 9–12.9)
POTASSIUM SERPL-SCNC: 3.4 MMOL/L (ref 3.6–5.5)
PROCALCITONIN SERPL-MCNC: 0.16 NG/ML
PROT SERPL-MCNC: 6.4 G/DL (ref 6–8.2)
RBC # BLD AUTO: 4.74 M/UL (ref 4.2–5.4)
SODIUM SERPL-SCNC: 139 MMOL/L (ref 135–145)
WBC # BLD AUTO: 15.8 K/UL (ref 4.8–10.8)

## 2021-06-12 PROCEDURE — 700111 HCHG RX REV CODE 636 W/ 250 OVERRIDE (IP): Performed by: STUDENT IN AN ORGANIZED HEALTH CARE EDUCATION/TRAINING PROGRAM

## 2021-06-12 PROCEDURE — 700111 HCHG RX REV CODE 636 W/ 250 OVERRIDE (IP): Performed by: INTERNAL MEDICINE

## 2021-06-12 PROCEDURE — 700105 HCHG RX REV CODE 258: Performed by: EMERGENCY MEDICINE

## 2021-06-12 PROCEDURE — 700111 HCHG RX REV CODE 636 W/ 250 OVERRIDE (IP): Performed by: EMERGENCY MEDICINE

## 2021-06-12 PROCEDURE — 96366 THER/PROPH/DIAG IV INF ADDON: CPT

## 2021-06-12 PROCEDURE — 74018 RADEX ABDOMEN 1 VIEW: CPT

## 2021-06-12 PROCEDURE — 96367 TX/PROPH/DG ADDL SEQ IV INF: CPT

## 2021-06-12 PROCEDURE — 85025 COMPLETE CBC W/AUTO DIFF WBC: CPT

## 2021-06-12 PROCEDURE — G0378 HOSPITAL OBSERVATION PER HR: HCPCS

## 2021-06-12 PROCEDURE — 84145 PROCALCITONIN (PCT): CPT

## 2021-06-12 PROCEDURE — A9270 NON-COVERED ITEM OR SERVICE: HCPCS | Performed by: STUDENT IN AN ORGANIZED HEALTH CARE EDUCATION/TRAINING PROGRAM

## 2021-06-12 PROCEDURE — 36415 COLL VENOUS BLD VENIPUNCTURE: CPT

## 2021-06-12 PROCEDURE — 83735 ASSAY OF MAGNESIUM: CPT

## 2021-06-12 PROCEDURE — 700105 HCHG RX REV CODE 258: Performed by: STUDENT IN AN ORGANIZED HEALTH CARE EDUCATION/TRAINING PROGRAM

## 2021-06-12 PROCEDURE — 86140 C-REACTIVE PROTEIN: CPT

## 2021-06-12 PROCEDURE — 700101 HCHG RX REV CODE 250: Performed by: STUDENT IN AN ORGANIZED HEALTH CARE EDUCATION/TRAINING PROGRAM

## 2021-06-12 PROCEDURE — 96372 THER/PROPH/DIAG INJ SC/IM: CPT

## 2021-06-12 PROCEDURE — 700102 HCHG RX REV CODE 250 W/ 637 OVERRIDE(OP): Performed by: STUDENT IN AN ORGANIZED HEALTH CARE EDUCATION/TRAINING PROGRAM

## 2021-06-12 PROCEDURE — 99225 PR SUBSEQUENT OBSERVATION CARE,LEVEL II: CPT | Performed by: STUDENT IN AN ORGANIZED HEALTH CARE EDUCATION/TRAINING PROGRAM

## 2021-06-12 PROCEDURE — 80053 COMPREHEN METABOLIC PANEL: CPT

## 2021-06-12 PROCEDURE — C9113 INJ PANTOPRAZOLE SODIUM, VIA: HCPCS | Performed by: EMERGENCY MEDICINE

## 2021-06-12 PROCEDURE — 700105 HCHG RX REV CODE 258: Performed by: INTERNAL MEDICINE

## 2021-06-12 PROCEDURE — 82962 GLUCOSE BLOOD TEST: CPT

## 2021-06-12 RX ADMIN — POTASSIUM CHLORIDE AND SODIUM CHLORIDE: 900; 150 INJECTION, SOLUTION INTRAVENOUS at 00:23

## 2021-06-12 RX ADMIN — SODIUM CHLORIDE 125 MG: 9 INJECTION, SOLUTION INTRAVENOUS at 17:58

## 2021-06-12 RX ADMIN — PANTOPRAZOLE SODIUM 8 MG/HR: 40 INJECTION, POWDER, FOR SOLUTION INTRAVENOUS at 13:18

## 2021-06-12 RX ADMIN — PIPERACILLIN AND TAZOBACTAM 3.38 G: 3; .375 INJECTION, POWDER, LYOPHILIZED, FOR SOLUTION INTRAVENOUS; PARENTERAL at 09:18

## 2021-06-12 RX ADMIN — HEPARIN SODIUM 5000 UNITS: 5000 INJECTION, SOLUTION INTRAVENOUS; SUBCUTANEOUS at 05:11

## 2021-06-12 RX ADMIN — POTASSIUM CHLORIDE AND SODIUM CHLORIDE: 900; 150 INJECTION, SOLUTION INTRAVENOUS at 17:58

## 2021-06-12 RX ADMIN — PIPERACILLIN AND TAZOBACTAM 3.38 G: 3; .375 INJECTION, POWDER, LYOPHILIZED, FOR SOLUTION INTRAVENOUS; PARENTERAL at 00:23

## 2021-06-12 RX ADMIN — HEPARIN SODIUM 5000 UNITS: 5000 INJECTION, SOLUTION INTRAVENOUS; SUBCUTANEOUS at 21:56

## 2021-06-12 RX ADMIN — HEPARIN SODIUM 5000 UNITS: 5000 INJECTION, SOLUTION INTRAVENOUS; SUBCUTANEOUS at 15:46

## 2021-06-12 RX ADMIN — CALCIUM GLUCONATE 1 G: 98 INJECTION, SOLUTION INTRAVENOUS at 07:23

## 2021-06-12 RX ADMIN — PIPERACILLIN AND TAZOBACTAM 3.38 G: 3; .375 INJECTION, POWDER, LYOPHILIZED, FOR SOLUTION INTRAVENOUS; PARENTERAL at 17:58

## 2021-06-12 RX ADMIN — PANTOPRAZOLE SODIUM 8 MG/HR: 40 INJECTION, POWDER, FOR SOLUTION INTRAVENOUS at 02:29

## 2021-06-12 RX ADMIN — ACETAMINOPHEN 650 MG: 325 TABLET, FILM COATED ORAL at 22:00

## 2021-06-12 RX ADMIN — POTASSIUM CHLORIDE AND SODIUM CHLORIDE: 900; 150 INJECTION, SOLUTION INTRAVENOUS at 06:14

## 2021-06-12 ASSESSMENT — ENCOUNTER SYMPTOMS
EYES NEGATIVE: 1
CONSTITUTIONAL NEGATIVE: 1
NEUROLOGICAL NEGATIVE: 1
CARDIOVASCULAR NEGATIVE: 1
RESPIRATORY NEGATIVE: 1
BLOOD IN STOOL: 0
ABDOMINAL PAIN: 1
CONSTIPATION: 0
FEVER: 0
VOMITING: 1
CHILLS: 0
PSYCHIATRIC NEGATIVE: 1
NAUSEA: 1
MUSCULOSKELETAL NEGATIVE: 1

## 2021-06-12 ASSESSMENT — PAIN DESCRIPTION - PAIN TYPE
TYPE: ACUTE PAIN

## 2021-06-12 NOTE — CARE PLAN
"  Problem: Knowledge Deficit - Standard  Goal: Patient and family/care givers will demonstrate understanding of plan of care, disease process/condition, diagnostic tests and medications  Outcome: Progressing     Problem: Pain - Standard  Goal: Alleviation of pain or a reduction in pain to the patient’s comfort goal  Outcome: Progressing       Shift Goals  Clinical Goals: Pain control  Patient Goals: Diet     Progress made toward(s) clinical / shift goals:  Pt educated on pain scale, MAR and pain interventions. Given heat pack, states \" it helps\". Pt diet advance to fulls no N/V per pt.     Patient is not progressing towards the following goals:      "

## 2021-06-12 NOTE — PROGRESS NOTES
Progress Note    Author:  Gypsy Daniels MD    Date & Time:   6/12/2021   4:13 PM          Patient ID:             Name:             Chanelle Ortiz   YOB: 1975  Age:                 45 y.o.  female   MRN:               [id number]    ________________________________________________________________________  [facility]     Patient having much less pain  Denies vomiting  No dark tarry stools    Exam:       Vitals:    06/12/21 1528   BP: 135/79   Pulse: 71   Resp: 17   Temp: 36.1 °C (97 °F)   SpO2: 99%     SpO2  Min: 91 %  Max: 100 %  O2 (LPM)  Min: 0  Max: 2  Temp  Min: 36 °C (96.8 °F)  Max: 37.4 °C (99.3 °F)    Intake/Output Summary (Last 24 hours) at 6/12/2021 1613  Last data filed at 6/12/2021 0830  Gross per 24 hour   Intake 3662.5 ml   Output 0 ml   Net 3662.5 ml     Output by Drain (mL) 06/10/21 0700 - 06/10/21 1859 06/10/21 1900 - 06/11/21 0659 06/11/21 0700 - 06/11/21 1859 06/11/21 1900 - 06/12/21 0659 06/12/21 0700 - 06/12/21 1613   Patient has no LDAs of requested type attached.       DIET ORDERS (From admission to next 24h)     Start     Ordered    06/12/21 1442  Diet Order Diet: Clear Liquid  ALL MEALS     Question:  Diet:  Answer:  Clear Liquid    06/12/21 1441                        Physical Exam  Nursing note reviewed.   Constitutional:       Appearance: Normal appearance. Alert, oriented x 4.NAD    Cardiovascular:      Rate and Rhythm: Normal rate and regular rhythm. Extremities warm, well perfused no edema.   Pulmonary:      Effort: Pulmonary effort is normal.      Breath sounds:  No wheezes or stridor  Abdominal:      General: Abdomen is soft,  Mild Tenderness epigastrium           Recent Labs     06/10/21  1753 06/12/21  0438   SODIUM 141 139   POTASSIUM 3.4* 3.4*   CHLORIDE 109 107   CO2 22 24   BUN 11 5*   CREATININE 0.86 0.58   MAGNESIUM 2.0 2.1   PHOSPHORUS 2.8  --    CALCIUM 7.0* 6.9*       Recent Labs     06/10/21  1753 06/12/21  0431    ALTSGPT 13 18   ASTSGOT 17 20   ALKPHOSPHAT 95 86   TBILIRUBIN 0.3 0.3   LIPASE 20  --    GLUCOSE 194* 142*       Recent Labs     06/10/21  1753 06/11/21  1207 06/12/21  0438   RBC 4.65  --  4.74   HEMOGLOBIN 10.2*  --  10.2*   HEMATOCRIT 35.7*  --  36.7*   PLATELETCT 214  --  173   IRON  --  19*  --    FERRITIN  --  11.9  --    TOTIRONBC  --  359  --        Recent Labs     06/10/21  1753 06/12/21  0438   WBC 9.1 15.8*   NEUTSPOLYS 78.80* 79.70*   LYMPHOCYTES 10.60* 12.90*   MONOCYTES 4.40 6.30   EOSINOPHILS 2.70 0.10   BASOPHILS 0.00 0.40   ASTSGOT 17 20   ALTSGPT 13 18   ALKPHOSPHAT 95 86   TBILIRUBIN 0.3 0.3         ________________________________________________________________________      Patient Active Problem List   Diagnosis   • Other chest pain   • Hypothyroidism   • Morbid obesity with BMI of 45.0-49.9, adult (HCC)   • Hx of radioactive iodine thyroid ablation   • POLLY on CPAP   • Hypertension   • Hx of Anxiety and depression   • Iron deficiency anemia   • Headache   • Low vitamin D level   • Prediabetes   • Palpitations   • Emphysematous gastritis   • Hypokalemia   • Hypocalcemia       Acute Issues/Plan:  Continue liquids  IV abx  Protonix  No indication for surgery

## 2021-06-12 NOTE — PROGRESS NOTES
0612: Angel from lab called with critical Ca+ of 6.9.Critical lab result read back to Angel.   Corrected Ca+ is 7.38   0613: MD paged   Dr. Barrios notified of critical lab result at 0620. Critical lab result read back by MD.

## 2021-06-12 NOTE — PROGRESS NOTES
Gastroenterology Progress Note     Author: Rafa Vaughn M.D.   Date & Time Created: 6/12/2021 11:30 AM    Chief Complaint:  Gastritis  Abdominal pain    Interval History:  abd pain better today   No BM today   Feels better   No acute events last night    Review of Systems:  Review of Systems   Constitutional: Negative.    HENT: Negative.    Eyes: Negative.    Respiratory: Negative.    Cardiovascular: Negative.    Gastrointestinal: Positive for abdominal pain.   Genitourinary: Negative.    Musculoskeletal: Negative.        Physical Exam:  Physical Exam  Constitutional:       Appearance: Normal appearance.   Eyes:      Pupils: Pupils are equal, round, and reactive to light.   Cardiovascular:      Rate and Rhythm: Normal rate and regular rhythm.      Pulses: Normal pulses.      Heart sounds: Normal heart sounds.   Pulmonary:      Effort: Pulmonary effort is normal.      Breath sounds: Normal breath sounds.   Abdominal:      General: Bowel sounds are normal.      Palpations: Abdomen is soft.      Comments: Obese but no signs of guarding or tenderness on exam   Neurological:      General: No focal deficit present.      Mental Status: She is alert.         Labs:          Recent Labs     06/10/21  1753 06/12/21  0438   SODIUM 141 139   POTASSIUM 3.4* 3.4*   CHLORIDE 109 107   CO2 22 24   BUN 11 5*   CREATININE 0.86 0.58   MAGNESIUM 2.0 2.1   PHOSPHORUS 2.8  --    CALCIUM 7.0* 6.9*     Recent Labs     06/10/21  1753 06/12/21  0438   ALTSGPT 13 18   ASTSGOT 17 20   ALKPHOSPHAT 95 86   TBILIRUBIN 0.3 0.3   LIPASE 20  --    GLUCOSE 194* 142*     Recent Labs     06/10/21  1753 06/11/21  1207 06/12/21 0438   RBC 4.65  --  4.74   HEMOGLOBIN 10.2*  --  10.2*   HEMATOCRIT 35.7*  --  36.7*   PLATELETCT 214  --  173   IRON  --  19*  --    FERRITIN  --  11.9  --    TOTIRONBC  --  359  --      Recent Labs     06/10/21  1753 06/12/21  0438   WBC 9.1 15.8*   NEUTSPOLYS 78.80* 79.70*   LYMPHOCYTES 10.60* 12.90*   MONOCYTES 4.40  6.30   EOSINOPHILS 2.70 0.10   BASOPHILS 0.00 0.40   ASTSGOT 17 20   ALTSGPT 13 18   ALKPHOSPHAT 95 86   TBILIRUBIN 0.3 0.3     Hemodynamics:  Temp (24hrs), Av.5 °C (97.7 °F), Min:36.1 °C (97 °F), Max:37.4 °C (99.3 °F)  Temperature: 36.2 °C (97.2 °F)  Pulse  Av.8  Min: 52  Max: 85   Blood Pressure: 131/71     Respiratory:    Respiration: 16, Pulse Oximetry: 98 %     Work Of Breathing / Effort: Mild     Fluids:    Intake/Output Summary (Last 24 hours) at 2021 1130  Last data filed at 2021 0830  Gross per 24 hour   Intake 3662.5 ml   Output 0 ml   Net 3662.5 ml       GI/Nutrition:  Orders Placed This Encounter   Procedures   • Diet NPO     Standing Status:   Standing     Number of Occurrences:   1     Order Specific Question:   Restrict to:     Answer:   Ice Chips [2]     Medical Decision Making, by Problem:  Active Hospital Problems    Diagnosis    • *Emphysematous gastritis [K29.60]    • Hypokalemia [E87.6]    • Morbid obesity with BMI of 45.0-49.9, adult (HCC) [E66.01, Z68.42]    • Iron deficiency anemia [D50.9]    • Hx of radioactive iodine thyroid ablation [Z92.3]    • Hypertension [I10]    • POLLY on CPAP [G47.33, Z99.89]        Plan:  Continue supportive care   IV antibiotics  IV Pantoprazole  Add Carafate slurry 1 gm TID   Slowly consider liquid diet  Will need EGD in a few days  Seems to be doing well clinically continue supportive care      Quality-Core Measures

## 2021-06-12 NOTE — PROGRESS NOTES
Bedside report received.  Assessment complete.  A&O x 4. Patient calls appropriately.  Pt RMA this am while awake.   Patient ambulates with STBA assist. Bed alarm n/a.   Patient has 1/10 pain. Pt given heat packs.   Denies N&V. NPO w/ice chips diet.  Skin CDI with scattered bruising on low abdomen.   + void, + flatus, + BM 6/11.  Patient denies SOB.  SCD's off.  Review plan with of care with patient. Call light and personal belongings with in reach. Hourly rounding in place. All needs met at this time.

## 2021-06-12 NOTE — PROGRESS NOTES
Pt is A+Ox4   Complains of 5/10 pain. Heat pack provided per pt request   VSS, 1 liter O2   NPO w/ ice chips per MD order    Denies N/V/D    AC/HS FSBS: 131   LBM 06/11 +void   Ambulating with little to no staff assistance. Pt just needs help unplugging and de-tangling tubing and cords.   Skin is generally CDI w/ scattered bruising on the lower abdomen  Moisture noted in skin folds and pannus   PIV infusing per MAR   Pt updated on POC, appropriate fall precautions in place, all needs met at this time   Hourly rounding in place

## 2021-06-12 NOTE — PROGRESS NOTES
Hospital Medicine Daily Progress Note    Date of Service  6/12/2021    Chief Complaint  45 y.o. female admitted 6/10/2021 with *abdominal pain    Hospital Course  45-year-old female with obesity, DM, HTN, hypothyroidism, anxiety, depression and POLLY on CPAP presented 6/10 with worsening abdominal pain, N/V x 2 days with no exacerbating or alleviating factor. Reported fever and chills prior to arrival; no shortness of breath or chest pain. Patient has had taking ibuprofen for pain almost every day more than 600 mg daily for last couple months; denied significant drinking alcohol or smoking. On admission, labs showed ARMINDA with normal white blood cell, normal lipase and normal vital signs; however CT scan showed portal venous gas with emphysematous gastritis, case was evaluated by general surgeon Dr. Gypsy Daniels who does not feel patient needs emergency surgery, Dr. Rafa Vaughn GI evaluated the patient and recommended continue pantoprazole drip with antibiotics and possible EGD in the near future.     Interval Problem Update  6/12 on GSU:  Vitals reviewed; afebrile.  The rest of the vitals within normal parameters on 1L O2  Pain scale reported - 0-1 this AM  Blood glucose in last 24hrs - around 100s   I/O -spontaneously voiding; no BM; passing flatus    At bedside, appeared a bit unease. Current, conservative management discussed. Reports Abd pain and distension being a bit better.     GI follow up note appreciated     Labs reviewed   Procalcitonin 0.16  K 3.4  Ca 6.9 (supplemented)  WBC 9.1 > 15.8  Hb10.2    Blood culture x2 no growth to date     Consultants/Specialty  GI  General surgeon    Code Status  Full Code    Disposition  Home after treating the gastritis and stabilization    Discussed with patient, patient's nurse and with multidisciplinary team during rounds including , pharmacist and charge nurse.      Review of Systems  Review of Systems   Constitutional: Positive for malaise/fatigue.  Negative for chills and fever.   HENT: Negative.    Eyes: Negative.    Respiratory: Negative.    Cardiovascular: Negative.    Gastrointestinal: Positive for abdominal pain, nausea and vomiting. Negative for blood in stool, constipation and melena.   Genitourinary: Negative.    Skin: Negative.    Neurological: Negative.    Psychiatric/Behavioral: Negative.         Physical Exam  Temp:  [36.1 °C (97 °F)-37.4 °C (99.3 °F)] 36.2 °C (97.2 °F)  Pulse:  [67-85] 70  Resp:  [16-17] 16  BP: (129-134)/(67-73) 131/71  SpO2:  [95 %-98 %] 98 %    Physical Exam  Constitutional:       Appearance: She is obese. She is not ill-appearing.   HENT:      Head: Atraumatic.   Eyes:      General: No scleral icterus.  Cardiovascular:      Rate and Rhythm: Normal rate.      Heart sounds: No murmur heard.     Pulmonary:      Effort: Pulmonary effort is normal. No respiratory distress.      Breath sounds: No wheezing.   Abdominal:      General: There is distension.      Palpations: Abdomen is soft. There is no mass.      Tenderness: There is abdominal tenderness. There is no guarding.   Musculoskeletal:         General: No swelling or deformity.      Cervical back: No rigidity.      Right lower leg: No edema.      Left lower leg: No edema.   Skin:     Coloration: Skin is pale. Skin is not jaundiced.      Findings: No bruising or lesion.   Neurological:      General: No focal deficit present.      Mental Status: She is alert and oriented to person, place, and time. Mental status is at baseline.      Cranial Nerves: No cranial nerve deficit.      Motor: No weakness.         Fluids    Intake/Output Summary (Last 24 hours) at 6/12/2021 1441  Last data filed at 6/12/2021 0830  Gross per 24 hour   Intake 3662.5 ml   Output 0 ml   Net 3662.5 ml       Laboratory  Recent Labs     06/10/21  1753 06/12/21  0438   WBC 9.1 15.8*   RBC 4.65 4.74   HEMOGLOBIN 10.2* 10.2*   HEMATOCRIT 35.7* 36.7*   MCV 76.8* 77.4*   MCH 21.9* 21.5*   MCHC 28.6* 27.8*   RDW  52.1* 54.4*   PLATELETCT 214 173   MPV 11.7 12.0     Recent Labs     06/10/21  1753 06/12/21  0438   SODIUM 141 139   POTASSIUM 3.4* 3.4*   CHLORIDE 109 107   CO2 22 24   GLUCOSE 194* 142*   BUN 11 5*   CREATININE 0.86 0.58   CALCIUM 7.0* 6.9*                   Imaging  [id number] VIEW   Final Result         No specific finding to suggest small bowel obstruction.      CT-ABDOMEN-PELVIS WITH   Final Result         1.  Portal venous gas and gas within mesenteric veins around the stomach.   2.  There could be intramural air within the stomach and there is mild perigastric fat stranding raising concern for emphysematous gastritis. There may also be tiny focus of free air seen.   3.  Multiple dilated small bowel loops which are nonspecific and could be related to obstruction, ileus or enteritis.      These findings were discussed with SERGIO URIBE on 6/10/2021 11:33 PM.            DX-ABDOMEN COMPLETE WITH AP OR PA CXR   Final Result      1.  No acute cardiopulmonary abnormality identified.      2.  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    Hypocalcemia  Assessment & Plan  Supplemented IV  Will monitor    Hypokalemia- (present on admission)  Assessment & Plan  Labs daily  Replace as needed    Iron deficiency anemia- (present on admission)  Assessment & Plan  Iron panel showed iron deficiency  IV iron was ordered  Patient states she has history of heavy.  Family history of colon cancer with her dad when he was around 60s   patient might need follow-up with GI for colonoscopy as outpatient.    Hypertension- (present on admission)  Assessment &  Plan  Hold home medication due to possible sepsis  Close monitoring    POLLY on CPAP- (present on admission)  Assessment & Plan  Patient is compliant with CPAP.  Continue home CPAP.    Hx of radioactive iodine thyroid ablation- (present on admission)  Assessment & Plan  Continue home medications    Morbid obesity with BMI of 45.0-49.9, adult (HCC)- (present on admission)  Assessment & Plan  BMI 49 consistent with morbid obesity.    I counseled the patient on healthy lifestyle changes including healthy diet and regular exercise.  Consider bariatric referral as outpatient       VTE prophylaxis: Heparin and SCDs

## 2021-06-12 NOTE — CONSULTS
DATE OF SERVICE:  2021     REASON FOR SURGERY CONSULTATION:  Abdominal pain.     HISTORY OF PRESENT ILLNESS:  The patient is a 45-year-old female with type 2   diabetes with 2 days of abdominal pain located in her epigastrium with   associated vomiting.  She describes the pain as sharp, stabbing pain with   radiation to her back and across her upper abdomen.  She denies hematemesis,   melena or dark tarry stools.  She has been taking 600 mg of ibuprofen   frequently throughout the day for the past few weeks after multiple teeth   extractions.  She underwent a CT abdomen and pelvis in the emergency   department that showed perigastric fat stranding with intramural gas within   the stomach wall.  There is also gas within the mesenteric vein surrounding   the stomach.  Multiple dilated loops of small bowel consistent with ileus.  No   obvious pneumoperitoneum or free fluid.     PAST MEDICAL HISTORY:  Class 3 morbid obesity with BMI of 50, anxiety, asthma,   diabetes, hypertension, hypothyroidism, obstructive sleep apnea.     SURGERIES:  Include tubal ligation, , cholecystectomy in .     FAMILY HISTORY:  No bleeding diathesis or problems with anesthesia.     SOCIAL HISTORY:  Denies tobacco, alcohol or illicit drugs.     REVIEW OF SYSTEMS:  Positive for abdominal pain, vomiting.  No unintentional   weight loss, early satiety.  Denies chest pain, shortness of breath.  All   other review of systems on a 12-point review according to AMA guidelines are   negative except for that stated in the HPI.     MEDICATIONS:  Including ibuprofen, albuterol, iron, Synthroid, lisinopril,   metformin, metoprolol.     ALLERGIES:  MORPHINE.     PHYSICAL EXAMINATION:  VITAL SIGNS:  The patient's temperature is 98, pulse 71, blood pressure   129/72, sats 97% on room air.  CONSTITUTIONAL:  The patient is alert and oriented x4.  Mild distress,   nontoxic, nondiaphoretic appearing.  HEENT:  Normocephalic, atraumatic.  Pupils  equally round and reactive to   light.  NECK:  No JVD, no lymphadenopathy.  CARDIOVASCULAR:  Regular sinus rhythm.  EXTREMITIES:  Warm.  No edema.  PULMONARY/THORAX:  Normal respiratory effort.  No wheezes or stridor.  ABDOMEN:  Soft, nondistended.  She has some diffuse tenderness mostly in the   epigastrium without rebound.  No palpable ventral hernias appreciated.  SKIN:  Warm and well perfused.  No petechia or rashes.  NEUROLOGIC:  Cranial nerves II-XII grossly intact.  Normal sensation and   strength in bilateral upper and lower extremities.     LABORATORY DATA:  White count is 9.1, hemoglobin 10, hematocrit 37, platelets   214, sodium is 141, potassium 3.4, chloride 109, bicarb 22, BUN is 11,   creatinine 0.8, calcium 7, AST 17, ALT 13, alk phos 95, total bilirubin 0.3,   albumin 3.5, lactic acid 1.8.     DIAGNOSTIC DATA:  CT findings as stated in the HPI.     ASSESSMENT AND PLAN:  This is a 45-year-old female with epigastric pain, NSAID   use.  CT findings consistent with severe gastritis.  Emphysematous changes   likely secondary to superimposed bacterial infection.  The patient is   currently not showing any signs of sepsis or perforation.  She was   hemodynamically stable with no metabolic acidosis and her pain is controlled   currently.  Recommend continue to keep the patient n.p.o.  She is on IV Zosyn   and IV pantoprazole as per GI recommendations.  Currently, there is no   indication for surgical intervention.  Appreciate recommendations from GI and   surgery.  Plan is to follow along closely.        ______________________________  MD MIRACLE Zacarias/ROSANA    DD:  06/11/2021 20:46  DT:  06/11/2021 21:41    Job#:  [id number]

## 2021-06-12 NOTE — CARE PLAN
The patient is Stable - Low risk of patient condition declining or worsening    Shift Goals  Clinical Goals: Pain control and walking   Patient Goals: Pain control    Progress made toward(s) clinical / shift goals: Pt has been ambulating well to bathroom and in room. Pain is well controlled at this time.       Problem: Knowledge Deficit - Standard  Goal: Patient and family/care givers will demonstrate understanding of plan of care, disease process/condition, diagnostic tests and medications  Outcome: Progressing     Problem: Pain - Standard  Goal: Alleviation of pain or a reduction in pain to the patient’s comfort goal  Outcome: Progressing

## 2021-06-13 LAB
ALBUMIN SERPL BCP-MCNC: 3 G/DL (ref 3.2–4.9)
ALBUMIN/GLOB SERPL: 0.9 G/DL
ALP SERPL-CCNC: 89 U/L (ref 30–99)
ALT SERPL-CCNC: 12 U/L (ref 2–50)
ANION GAP SERPL CALC-SCNC: 8 MMOL/L (ref 7–16)
AST SERPL-CCNC: 15 U/L (ref 12–45)
BASOPHILS # BLD AUTO: 0.7 % (ref 0–1.8)
BASOPHILS # BLD: 0.11 K/UL (ref 0–0.12)
BILIRUB SERPL-MCNC: 0.3 MG/DL (ref 0.1–1.5)
BUN SERPL-MCNC: 3 MG/DL (ref 8–22)
CALCIUM SERPL-MCNC: 7.4 MG/DL (ref 8.5–10.5)
CHLORIDE SERPL-SCNC: 104 MMOL/L (ref 96–112)
CO2 SERPL-SCNC: 26 MMOL/L (ref 20–33)
CREAT SERPL-MCNC: 0.7 MG/DL (ref 0.5–1.4)
EOSINOPHIL # BLD AUTO: 0.06 K/UL (ref 0–0.51)
EOSINOPHIL NFR BLD: 0.4 % (ref 0–6.9)
ERYTHROCYTE [DISTWIDTH] IN BLOOD BY AUTOMATED COUNT: 53.8 FL (ref 35.9–50)
GLOBULIN SER CALC-MCNC: 3.3 G/DL (ref 1.9–3.5)
GLUCOSE BLD-MCNC: 100 MG/DL (ref 65–99)
GLUCOSE BLD-MCNC: 104 MG/DL (ref 65–99)
GLUCOSE BLD-MCNC: 116 MG/DL (ref 65–99)
GLUCOSE BLD-MCNC: 141 MG/DL (ref 65–99)
GLUCOSE SERPL-MCNC: 115 MG/DL (ref 65–99)
HCT VFR BLD AUTO: 34.8 % (ref 37–47)
HGB BLD-MCNC: 9.8 G/DL (ref 12–16)
IMM GRANULOCYTES # BLD AUTO: 0.12 K/UL (ref 0–0.11)
IMM GRANULOCYTES NFR BLD AUTO: 0.8 % (ref 0–0.9)
LYMPHOCYTES # BLD AUTO: 2.86 K/UL (ref 1–4.8)
LYMPHOCYTES NFR BLD: 18.4 % (ref 22–41)
MCH RBC QN AUTO: 21.9 PG (ref 27–33)
MCHC RBC AUTO-ENTMCNC: 28.2 G/DL (ref 33.6–35)
MCV RBC AUTO: 77.9 FL (ref 81.4–97.8)
MONOCYTES # BLD AUTO: 0.87 K/UL (ref 0–0.85)
MONOCYTES NFR BLD AUTO: 5.6 % (ref 0–13.4)
NEUTROPHILS # BLD AUTO: 11.56 K/UL (ref 2–7.15)
NEUTROPHILS NFR BLD: 74.1 % (ref 44–72)
NRBC # BLD AUTO: 0 K/UL
NRBC BLD-RTO: 0 /100 WBC
PLATELET # BLD AUTO: 176 K/UL (ref 164–446)
PMV BLD AUTO: 10.7 FL (ref 9–12.9)
POTASSIUM SERPL-SCNC: 3.3 MMOL/L (ref 3.6–5.5)
PROT SERPL-MCNC: 6.3 G/DL (ref 6–8.2)
RBC # BLD AUTO: 4.47 M/UL (ref 4.2–5.4)
SODIUM SERPL-SCNC: 138 MMOL/L (ref 135–145)
WBC # BLD AUTO: 15.6 K/UL (ref 4.8–10.8)

## 2021-06-13 PROCEDURE — 99225 PR SUBSEQUENT OBSERVATION CARE,LEVEL II: CPT | Performed by: STUDENT IN AN ORGANIZED HEALTH CARE EDUCATION/TRAINING PROGRAM

## 2021-06-13 PROCEDURE — 700111 HCHG RX REV CODE 636 W/ 250 OVERRIDE (IP): Performed by: EMERGENCY MEDICINE

## 2021-06-13 PROCEDURE — 700105 HCHG RX REV CODE 258: Performed by: EMERGENCY MEDICINE

## 2021-06-13 PROCEDURE — 94660 CPAP INITIATION&MGMT: CPT

## 2021-06-13 PROCEDURE — 700101 HCHG RX REV CODE 250: Performed by: STUDENT IN AN ORGANIZED HEALTH CARE EDUCATION/TRAINING PROGRAM

## 2021-06-13 PROCEDURE — 700111 HCHG RX REV CODE 636 W/ 250 OVERRIDE (IP): Performed by: STUDENT IN AN ORGANIZED HEALTH CARE EDUCATION/TRAINING PROGRAM

## 2021-06-13 PROCEDURE — 85025 COMPLETE CBC W/AUTO DIFF WBC: CPT

## 2021-06-13 PROCEDURE — 700105 HCHG RX REV CODE 258: Performed by: STUDENT IN AN ORGANIZED HEALTH CARE EDUCATION/TRAINING PROGRAM

## 2021-06-13 PROCEDURE — A9270 NON-COVERED ITEM OR SERVICE: HCPCS | Performed by: STUDENT IN AN ORGANIZED HEALTH CARE EDUCATION/TRAINING PROGRAM

## 2021-06-13 PROCEDURE — 82962 GLUCOSE BLOOD TEST: CPT | Mod: 91

## 2021-06-13 PROCEDURE — 700102 HCHG RX REV CODE 250 W/ 637 OVERRIDE(OP): Performed by: STUDENT IN AN ORGANIZED HEALTH CARE EDUCATION/TRAINING PROGRAM

## 2021-06-13 PROCEDURE — 80053 COMPREHEN METABOLIC PANEL: CPT

## 2021-06-13 PROCEDURE — G0378 HOSPITAL OBSERVATION PER HR: HCPCS

## 2021-06-13 PROCEDURE — 96376 TX/PRO/DX INJ SAME DRUG ADON: CPT

## 2021-06-13 PROCEDURE — 96372 THER/PROPH/DIAG INJ SC/IM: CPT

## 2021-06-13 PROCEDURE — C9113 INJ PANTOPRAZOLE SODIUM, VIA: HCPCS | Performed by: EMERGENCY MEDICINE

## 2021-06-13 PROCEDURE — 700111 HCHG RX REV CODE 636 W/ 250 OVERRIDE (IP): Performed by: INTERNAL MEDICINE

## 2021-06-13 PROCEDURE — 36415 COLL VENOUS BLD VENIPUNCTURE: CPT

## 2021-06-13 PROCEDURE — 700105 HCHG RX REV CODE 258: Performed by: INTERNAL MEDICINE

## 2021-06-13 PROCEDURE — 96366 THER/PROPH/DIAG IV INF ADDON: CPT

## 2021-06-13 RX ORDER — POTASSIUM CHLORIDE 20 MEQ/1
40 TABLET, EXTENDED RELEASE ORAL ONCE
Status: COMPLETED | OUTPATIENT
Start: 2021-06-13 | End: 2021-06-13

## 2021-06-13 RX ORDER — SUCRALFATE 1 G/1
1 TABLET ORAL EVERY 6 HOURS
Status: DISCONTINUED | OUTPATIENT
Start: 2021-06-14 | End: 2021-06-17 | Stop reason: HOSPADM

## 2021-06-13 RX ADMIN — PIPERACILLIN AND TAZOBACTAM 3.38 G: 3; .375 INJECTION, POWDER, LYOPHILIZED, FOR SOLUTION INTRAVENOUS; PARENTERAL at 16:19

## 2021-06-13 RX ADMIN — HEPARIN SODIUM 5000 UNITS: 5000 INJECTION, SOLUTION INTRAVENOUS; SUBCUTANEOUS at 13:29

## 2021-06-13 RX ADMIN — ACETAMINOPHEN 650 MG: 325 TABLET, FILM COATED ORAL at 21:41

## 2021-06-13 RX ADMIN — PANTOPRAZOLE SODIUM 8 MG/HR: 40 INJECTION, POWDER, FOR SOLUTION INTRAVENOUS at 22:03

## 2021-06-13 RX ADMIN — DOCUSATE SODIUM 50 MG AND SENNOSIDES 8.6 MG 2 TABLET: 8.6; 5 TABLET, FILM COATED ORAL at 05:36

## 2021-06-13 RX ADMIN — POTASSIUM CHLORIDE AND SODIUM CHLORIDE: 900; 150 INJECTION, SOLUTION INTRAVENOUS at 16:16

## 2021-06-13 RX ADMIN — ACETAMINOPHEN 650 MG: 325 TABLET, FILM COATED ORAL at 08:29

## 2021-06-13 RX ADMIN — HEPARIN SODIUM 5000 UNITS: 5000 INJECTION, SOLUTION INTRAVENOUS; SUBCUTANEOUS at 21:37

## 2021-06-13 RX ADMIN — PANTOPRAZOLE SODIUM 8 MG/HR: 40 INJECTION, POWDER, FOR SOLUTION INTRAVENOUS at 01:46

## 2021-06-13 RX ADMIN — POTASSIUM CHLORIDE AND SODIUM CHLORIDE: 900; 150 INJECTION, SOLUTION INTRAVENOUS at 05:36

## 2021-06-13 RX ADMIN — SODIUM CHLORIDE 250 MG: 9 INJECTION, SOLUTION INTRAVENOUS at 17:39

## 2021-06-13 RX ADMIN — HEPARIN SODIUM 5000 UNITS: 5000 INJECTION, SOLUTION INTRAVENOUS; SUBCUTANEOUS at 05:36

## 2021-06-13 RX ADMIN — ONDANSETRON 4 MG: 2 INJECTION INTRAMUSCULAR; INTRAVENOUS at 14:10

## 2021-06-13 RX ADMIN — PIPERACILLIN AND TAZOBACTAM 3.38 G: 3; .375 INJECTION, POWDER, LYOPHILIZED, FOR SOLUTION INTRAVENOUS; PARENTERAL at 08:29

## 2021-06-13 RX ADMIN — DOCUSATE SODIUM 50 MG AND SENNOSIDES 8.6 MG 2 TABLET: 8.6; 5 TABLET, FILM COATED ORAL at 17:21

## 2021-06-13 RX ADMIN — PANTOPRAZOLE SODIUM 8 MG/HR: 40 INJECTION, POWDER, FOR SOLUTION INTRAVENOUS at 12:32

## 2021-06-13 RX ADMIN — POTASSIUM CHLORIDE 40 MEQ: 1500 TABLET, EXTENDED RELEASE ORAL at 08:28

## 2021-06-13 ASSESSMENT — PAIN DESCRIPTION - PAIN TYPE
TYPE: ACUTE PAIN
TYPE: ACUTE PAIN

## 2021-06-13 ASSESSMENT — ENCOUNTER SYMPTOMS
EYES NEGATIVE: 1
RESPIRATORY NEGATIVE: 1
CONSTITUTIONAL NEGATIVE: 1
CONSTIPATION: 0
NAUSEA: 1
ABDOMINAL PAIN: 1
NEUROLOGICAL NEGATIVE: 1
FEVER: 0
BLOOD IN STOOL: 0
PSYCHIATRIC NEGATIVE: 1
VOMITING: 1
CHILLS: 0
MUSCULOSKELETAL NEGATIVE: 1
CARDIOVASCULAR NEGATIVE: 1

## 2021-06-13 NOTE — PROGRESS NOTES
Bedside report received. Assessment completed.  Pt is A&O x4. Pt on room air.   Medicating for pain PRN per MAR Pain 4/10  Denies nausea.   - numbness, - tingling.  Skin intact   LDA CDI   Last BM 6/11/21, +flatus, +void.  Diabetic clears diet. Tolerates well.   Pt up independently.  Call light and belongings within reach. All needs met at this time. Fall Precautions and hourly rounding in place.

## 2021-06-13 NOTE — CARE PLAN
The patient is Stable - Low risk of patient condition declining or worsening    Shift Goals  Clinical Goals: No nausea w/ advanced diet   Patient Goals: Diet     Progress made toward(s) clinical / shift goals: Pt is tolerating the diet when eating slowly. Complained of slight nausea when eating dinner, denies medication.       Problem: Knowledge Deficit - Standard  Goal: Patient and family/care givers will demonstrate understanding of plan of care, disease process/condition, diagnostic tests and medications  Outcome: Progressing     Problem: Pain - Standard  Goal: Alleviation of pain or a reduction in pain to the patient’s comfort goal  Outcome: Progressing

## 2021-06-13 NOTE — PROGRESS NOTES
Patient having much less pain  Denies dark tarry stools or melena  Mild epigastric tenderness on exam      -improving clinically  -continue medical management for emphysematous gastritis  -advance diet

## 2021-06-13 NOTE — CARE PLAN
Problem: Knowledge Deficit - Standard  Goal: Patient and family/care givers will demonstrate understanding of plan of care, disease process/condition, diagnostic tests and medications  Outcome: Progressing  Patient has understanding of POC.  Problem: Pain - Standard  Goal: Alleviation of pain or a reduction in pain to the patient’s comfort goal  Flowsheets (Taken 6/13/2021 1106)  Pain Rating Scale (NPRS): 3  Patient voices needs appropriately.   The patient is Stable - Low risk of patient condition declining or worsening    Shift Goals  Clinical Goals: No nausea w/ advanced diet   Patient Goals: Diet

## 2021-06-13 NOTE — PROGRESS NOTES
Hospital Medicine Daily Progress Note    Date of Service  6/13/2021    Chief Complaint  45 y.o. female admitted 6/10/2021 with *abdominal pain    Hospital Course  45-year-old female with obesity, DM, HTN, hypothyroidism, anxiety, depression and POLLY on CPAP presented 6/10 with worsening abdominal pain, N/V x 2 days with no exacerbating or alleviating factor. Reported fever and chills prior to arrival; no shortness of breath or chest pain. Patient has had taking ibuprofen for pain almost every day more than 600 mg daily for last couple months; denied significant drinking alcohol or smoking. On admission, labs showed ARMINDA with normal white blood cell, normal lipase and normal vital signs; however CT scan showed portal venous gas with emphysematous gastritis, case was evaluated by general surgeon Dr. Gypsy Daniels who does not feel patient needs emergency surgery, Dr. Rafa Vaughn GI evaluated the patient and recommended continue pantoprazole drip with antibiotics and possible EGD in the near future.     With minimal abdominal pain, clear liquid diet started on 6/12.     Interval Problem Update  6/13  Vitals reviewed; afebrile.  The rest of the vitals within normal parameters.  Pain scale reported - 0-3 this AM  Blood glucose in last 24hrs - around 100s     At bedside, appeared comfortable. Reported tolerating diet (finishing her meal) but c/o discomfort in epigastrium after the food. No BM since 6/11.    GI follow up and Surgery note appreciated     Labs reviewed   K 3.3 (supplemented)  Ca 7.4  WBC 9.1 > 15.8 > 15.6  Hb10.2 > 9.8    Blood culture x2 no growth to date     Consultants/Specialty  GI  General surgeon    Code Status  Full Code    Disposition  Home after treating the gastritis and stabilization    Discussed with patient, patient's nurse and with multidisciplinary team during rounds including , pharmacist and charge nurse.      I have performed the physical examination, and reviewed updated ROS  and plan today 6/13/2021.  In review of yesterday's note, there are no new changes except as documented above or bolded below.    Review of Systems  Review of Systems   Constitutional: Positive for malaise/fatigue. Negative for chills and fever.   HENT: Negative.    Eyes: Negative.    Respiratory: Negative.    Cardiovascular: Negative.    Gastrointestinal: Positive for abdominal pain, nausea and vomiting. Negative for blood in stool, constipation and melena.   Genitourinary: Negative.    Skin: Negative.    Neurological: Negative.    Psychiatric/Behavioral: Negative.         Physical Exam  Temp:  [36.1 °C (96.9 °F)-36.6 °C (97.8 °F)] 36.1 °C (97 °F)  Pulse:  [67-86] 67  Resp:  [16-18] 17  BP: (105-137)/(60-85) 105/67  SpO2:  [94 %-99 %] 95 %    Physical Exam  Constitutional:       Appearance: She is obese. She is not ill-appearing.   HENT:      Head: Atraumatic.   Eyes:      General: No scleral icterus.  Cardiovascular:      Rate and Rhythm: Normal rate.      Heart sounds: No murmur heard.     Pulmonary:      Effort: Pulmonary effort is normal. No respiratory distress.      Breath sounds: No wheezing.   Abdominal:      General: There is distension.      Palpations: Abdomen is soft. There is no mass.      Tenderness: There is abdominal tenderness. There is no guarding.   Musculoskeletal:         General: No swelling or deformity.      Cervical back: No rigidity.      Right lower leg: No edema.      Left lower leg: No edema.   Skin:     Coloration: Skin is pale. Skin is not jaundiced.      Findings: No bruising or lesion.   Neurological:      General: No focal deficit present.      Mental Status: She is alert and oriented to person, place, and time. Mental status is at baseline.      Cranial Nerves: No cranial nerve deficit.      Motor: No weakness.         Fluids    Intake/Output Summary (Last 24 hours) at 6/13/2021 1228  Last data filed at 6/13/2021 0900  Gross per 24 hour   Intake 3307.5 ml   Output 0 ml   Net  3307.5 ml       Laboratory  Recent Labs     06/10/21  1753 06/12/21  0438 06/13/21  0553   WBC 9.1 15.8* 15.6*   RBC 4.65 4.74 4.47   HEMOGLOBIN 10.2* 10.2* 9.8*   HEMATOCRIT 35.7* 36.7* 34.8*   MCV 76.8* 77.4* 77.9*   MCH 21.9* 21.5* 21.9*   MCHC 28.6* 27.8* 28.2*   RDW 52.1* 54.4* 53.8*   PLATELETCT 214 173 176   MPV 11.7 12.0 10.7     Recent Labs     06/10/21  1753 06/12/21  0438 06/13/21  0553   SODIUM 141 139 138   POTASSIUM 3.4* 3.4* 3.3*   CHLORIDE 109 107 104   CO2 22 24 26   GLUCOSE 194* 142* 115*   BUN 11 5* 3*   CREATININE 0.86 0.58 0.70   CALCIUM 7.0* 6.9* 7.4*                   Imaging  [id number] VIEW   Final Result         No specific finding to suggest small bowel obstruction.      CT-ABDOMEN-PELVIS WITH   Final Result         1.  Portal venous gas and gas within mesenteric veins around the stomach.   2.  There could be intramural air within the stomach and there is mild perigastric fat stranding raising concern for emphysematous gastritis. There may also be tiny focus of free air seen.   3.  Multiple dilated small bowel loops which are nonspecific and could be related to obstruction, ileus or enteritis.      These findings were discussed with SERGIO URIBE on 6/10/2021 11:33 PM.            DX-ABDOMEN COMPLETE WITH AP OR PA CXR   Final Result      1.  No acute cardiopulmonary abnormality identified.      2.  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    Hypocalcemia  Assessment & Plan  Supplemented IV  Will monitor    Hypokalemia- (present on admission)  Assessment & Plan  Labs daily  Replace as needed    Iron deficiency  anemia- (present on admission)  Assessment & Plan  Iron panel showed iron deficiency  IV iron was ordered  Patient states she has history of heavy.  Family history of colon cancer with her dad when he was around 60s   patient might need follow-up with GI for colonoscopy as outpatient.    Hypertension- (present on admission)  Assessment & Plan  Hold home medication due to possible sepsis  Close monitoring    POLLY on CPAP- (present on admission)  Assessment & Plan  Patient is compliant with CPAP.  Continue home CPAP.    Hx of radioactive iodine thyroid ablation- (present on admission)  Assessment & Plan  Continue home medications    Morbid obesity with BMI of 45.0-49.9, adult (HCC)- (present on admission)  Assessment & Plan  BMI 49 consistent with morbid obesity.    Counseled the patient on healthy lifestyle changes including healthy diet and regular exercise.  Consider bariatric referral as outpatient       VTE prophylaxis: Heparin and SCDs

## 2021-06-13 NOTE — PROGRESS NOTES
Pt is A+Ox4   Complains of 8/10 pain, medicated per MAR    VSS, CPAP at bedside for NOC   Diabetic clear liquids               Denies N/V/D               AC/HS FSBS: 140  LBM 06/11 +void   Ambulating with little to no staff assistance  Skin is generally CDI w/ scattered bruising on the lower abdomen  Moisture noted in skin folds and pannus   PIV infusing per MAR   Pt updated on POC, appropriate fall precautions in place, all needs met at this time   Hourly rounding in place

## 2021-06-14 ENCOUNTER — ANESTHESIA (OUTPATIENT)
Dept: SURGERY | Facility: MEDICAL CENTER | Age: 46
End: 2021-06-14
Payer: MEDICAID

## 2021-06-14 ENCOUNTER — ANESTHESIA EVENT (OUTPATIENT)
Dept: SURGERY | Facility: MEDICAL CENTER | Age: 46
End: 2021-06-14
Payer: MEDICAID

## 2021-06-14 LAB
ANION GAP SERPL CALC-SCNC: 8 MMOL/L (ref 7–16)
BUN SERPL-MCNC: 2 MG/DL (ref 8–22)
CALCIUM SERPL-MCNC: 7.9 MG/DL (ref 8.5–10.5)
CHLORIDE SERPL-SCNC: 104 MMOL/L (ref 96–112)
CO2 SERPL-SCNC: 23 MMOL/L (ref 20–33)
CREAT SERPL-MCNC: 0.6 MG/DL (ref 0.5–1.4)
GLUCOSE BLD-MCNC: 119 MG/DL (ref 65–99)
GLUCOSE BLD-MCNC: 138 MG/DL (ref 65–99)
GLUCOSE BLD-MCNC: 86 MG/DL (ref 65–99)
GLUCOSE BLD-MCNC: 92 MG/DL (ref 65–99)
GLUCOSE BLD-MCNC: 98 MG/DL (ref 65–99)
GLUCOSE SERPL-MCNC: 108 MG/DL (ref 65–99)
HCG UR QL: NEGATIVE
PATHOLOGY CONSULT NOTE: NORMAL
POTASSIUM SERPL-SCNC: 3.6 MMOL/L (ref 3.6–5.5)
SODIUM SERPL-SCNC: 135 MMOL/L (ref 135–145)

## 2021-06-14 PROCEDURE — 160009 HCHG ANES TIME/MIN: Performed by: INTERNAL MEDICINE

## 2021-06-14 PROCEDURE — A9270 NON-COVERED ITEM OR SERVICE: HCPCS | Performed by: STUDENT IN AN ORGANIZED HEALTH CARE EDUCATION/TRAINING PROGRAM

## 2021-06-14 PROCEDURE — 700111 HCHG RX REV CODE 636 W/ 250 OVERRIDE (IP): Performed by: INTERNAL MEDICINE

## 2021-06-14 PROCEDURE — 700111 HCHG RX REV CODE 636 W/ 250 OVERRIDE (IP): Performed by: EMERGENCY MEDICINE

## 2021-06-14 PROCEDURE — 96366 THER/PROPH/DIAG IV INF ADDON: CPT | Mod: XU

## 2021-06-14 PROCEDURE — 160048 HCHG OR STATISTICAL LEVEL 1-5: Performed by: INTERNAL MEDICINE

## 2021-06-14 PROCEDURE — 700105 HCHG RX REV CODE 258: Performed by: STUDENT IN AN ORGANIZED HEALTH CARE EDUCATION/TRAINING PROGRAM

## 2021-06-14 PROCEDURE — 36415 COLL VENOUS BLD VENIPUNCTURE: CPT

## 2021-06-14 PROCEDURE — C9113 INJ PANTOPRAZOLE SODIUM, VIA: HCPCS | Performed by: EMERGENCY MEDICINE

## 2021-06-14 PROCEDURE — 160203 HCHG ENDO MINUTES - 1ST 30 MINS LEVEL 4: Performed by: INTERNAL MEDICINE

## 2021-06-14 PROCEDURE — 700101 HCHG RX REV CODE 250: Performed by: ANESTHESIOLOGY

## 2021-06-14 PROCEDURE — 82962 GLUCOSE BLOOD TEST: CPT

## 2021-06-14 PROCEDURE — 96376 TX/PRO/DX INJ SAME DRUG ADON: CPT | Mod: XU

## 2021-06-14 PROCEDURE — 160035 HCHG PACU - 1ST 60 MINS PHASE I: Performed by: INTERNAL MEDICINE

## 2021-06-14 PROCEDURE — 160002 HCHG RECOVERY MINUTES (STAT): Performed by: INTERNAL MEDICINE

## 2021-06-14 PROCEDURE — 88305 TISSUE EXAM BY PATHOLOGIST: CPT

## 2021-06-14 PROCEDURE — 80048 BASIC METABOLIC PNL TOTAL CA: CPT

## 2021-06-14 PROCEDURE — 700111 HCHG RX REV CODE 636 W/ 250 OVERRIDE (IP): Performed by: STUDENT IN AN ORGANIZED HEALTH CARE EDUCATION/TRAINING PROGRAM

## 2021-06-14 PROCEDURE — 88312 SPECIAL STAINS GROUP 1: CPT

## 2021-06-14 PROCEDURE — 96372 THER/PROPH/DIAG INJ SC/IM: CPT | Mod: XU

## 2021-06-14 PROCEDURE — 700102 HCHG RX REV CODE 250 W/ 637 OVERRIDE(OP): Performed by: STUDENT IN AN ORGANIZED HEALTH CARE EDUCATION/TRAINING PROGRAM

## 2021-06-14 PROCEDURE — 99232 SBSQ HOSP IP/OBS MODERATE 35: CPT | Performed by: STUDENT IN AN ORGANIZED HEALTH CARE EDUCATION/TRAINING PROGRAM

## 2021-06-14 PROCEDURE — 81025 URINE PREGNANCY TEST: CPT

## 2021-06-14 PROCEDURE — 94660 CPAP INITIATION&MGMT: CPT

## 2021-06-14 PROCEDURE — 700105 HCHG RX REV CODE 258: Performed by: EMERGENCY MEDICINE

## 2021-06-14 PROCEDURE — G0378 HOSPITAL OBSERVATION PER HR: HCPCS

## 2021-06-14 PROCEDURE — 700105 HCHG RX REV CODE 258: Performed by: INTERNAL MEDICINE

## 2021-06-14 PROCEDURE — 700105 HCHG RX REV CODE 258: Performed by: ANESTHESIOLOGY

## 2021-06-14 PROCEDURE — 700111 HCHG RX REV CODE 636 W/ 250 OVERRIDE (IP): Performed by: ANESTHESIOLOGY

## 2021-06-14 RX ORDER — SODIUM CHLORIDE, SODIUM LACTATE, POTASSIUM CHLORIDE, CALCIUM CHLORIDE 600; 310; 30; 20 MG/100ML; MG/100ML; MG/100ML; MG/100ML
INJECTION, SOLUTION INTRAVENOUS
Status: DISCONTINUED | OUTPATIENT
Start: 2021-06-14 | End: 2021-06-14 | Stop reason: SURG

## 2021-06-14 RX ORDER — MIDAZOLAM HYDROCHLORIDE 1 MG/ML
INJECTION INTRAMUSCULAR; INTRAVENOUS PRN
Status: DISCONTINUED | OUTPATIENT
Start: 2021-06-14 | End: 2021-06-14 | Stop reason: SURG

## 2021-06-14 RX ORDER — DIPHENHYDRAMINE HYDROCHLORIDE 50 MG/ML
12.5 INJECTION INTRAMUSCULAR; INTRAVENOUS
Status: DISCONTINUED | OUTPATIENT
Start: 2021-06-14 | End: 2021-06-14 | Stop reason: HOSPADM

## 2021-06-14 RX ORDER — ONDANSETRON 2 MG/ML
4 INJECTION INTRAMUSCULAR; INTRAVENOUS
Status: DISCONTINUED | OUTPATIENT
Start: 2021-06-14 | End: 2021-06-14 | Stop reason: HOSPADM

## 2021-06-14 RX ORDER — LIDOCAINE HYDROCHLORIDE 20 MG/ML
INJECTION, SOLUTION EPIDURAL; INFILTRATION; INTRACAUDAL; PERINEURAL PRN
Status: DISCONTINUED | OUTPATIENT
Start: 2021-06-14 | End: 2021-06-14 | Stop reason: SURG

## 2021-06-14 RX ORDER — HYDRALAZINE HYDROCHLORIDE 20 MG/ML
5 INJECTION INTRAMUSCULAR; INTRAVENOUS
Status: DISCONTINUED | OUTPATIENT
Start: 2021-06-14 | End: 2021-06-14 | Stop reason: HOSPADM

## 2021-06-14 RX ORDER — HALOPERIDOL 5 MG/ML
1 INJECTION INTRAMUSCULAR
Status: DISCONTINUED | OUTPATIENT
Start: 2021-06-14 | End: 2021-06-14 | Stop reason: HOSPADM

## 2021-06-14 RX ORDER — LABETALOL HYDROCHLORIDE 5 MG/ML
5 INJECTION, SOLUTION INTRAVENOUS
Status: DISCONTINUED | OUTPATIENT
Start: 2021-06-14 | End: 2021-06-14 | Stop reason: HOSPADM

## 2021-06-14 RX ADMIN — PIPERACILLIN AND TAZOBACTAM 3.38 G: 3; .375 INJECTION, POWDER, LYOPHILIZED, FOR SOLUTION INTRAVENOUS; PARENTERAL at 00:12

## 2021-06-14 RX ADMIN — PROPOFOL 50 MG: 10 INJECTION, EMULSION INTRAVENOUS at 15:23

## 2021-06-14 RX ADMIN — SODIUM CHLORIDE 250 MG: 9 INJECTION, SOLUTION INTRAVENOUS at 18:10

## 2021-06-14 RX ADMIN — SODIUM CHLORIDE, POTASSIUM CHLORIDE, SODIUM LACTATE AND CALCIUM CHLORIDE: 600; 310; 30; 20 INJECTION, SOLUTION INTRAVENOUS at 15:20

## 2021-06-14 RX ADMIN — PIPERACILLIN AND TAZOBACTAM 3.38 G: 3; .375 INJECTION, POWDER, LYOPHILIZED, FOR SOLUTION INTRAVENOUS; PARENTERAL at 07:48

## 2021-06-14 RX ADMIN — PIPERACILLIN AND TAZOBACTAM 3.38 G: 3; .375 INJECTION, POWDER, LYOPHILIZED, FOR SOLUTION INTRAVENOUS; PARENTERAL at 17:24

## 2021-06-14 RX ADMIN — ACETAMINOPHEN 650 MG: 325 TABLET, FILM COATED ORAL at 20:33

## 2021-06-14 RX ADMIN — ONDANSETRON 4 MG: 2 INJECTION INTRAMUSCULAR; INTRAVENOUS at 17:28

## 2021-06-14 RX ADMIN — ONDANSETRON 4 MG: 4 TABLET, ORALLY DISINTEGRATING ORAL at 03:10

## 2021-06-14 RX ADMIN — SUCRALFATE 1 G: 1 TABLET ORAL at 00:12

## 2021-06-14 RX ADMIN — PANTOPRAZOLE SODIUM 8 MG/HR: 40 INJECTION, POWDER, FOR SOLUTION INTRAVENOUS at 23:17

## 2021-06-14 RX ADMIN — PANTOPRAZOLE SODIUM 8 MG/HR: 40 INJECTION, POWDER, FOR SOLUTION INTRAVENOUS at 10:45

## 2021-06-14 RX ADMIN — SUCRALFATE 1 G: 1 TABLET ORAL at 17:26

## 2021-06-14 RX ADMIN — MIDAZOLAM HYDROCHLORIDE 2 MG: 1 INJECTION, SOLUTION INTRAMUSCULAR; INTRAVENOUS at 15:20

## 2021-06-14 RX ADMIN — LIDOCAINE HYDROCHLORIDE 100 MG: 20 INJECTION, SOLUTION EPIDURAL; INFILTRATION; INTRACAUDAL at 15:23

## 2021-06-14 RX ADMIN — HEPARIN SODIUM 5000 UNITS: 5000 INJECTION, SOLUTION INTRAVENOUS; SUBCUTANEOUS at 20:34

## 2021-06-14 RX ADMIN — PIPERACILLIN AND TAZOBACTAM 3.38 G: 3; .375 INJECTION, POWDER, LYOPHILIZED, FOR SOLUTION INTRAVENOUS; PARENTERAL at 23:07

## 2021-06-14 ASSESSMENT — PAIN DESCRIPTION - PAIN TYPE
TYPE: ACUTE PAIN
TYPE: ACUTE PAIN
TYPE: ACUTE PAIN;SURGICAL PAIN
TYPE: SURGICAL PAIN
TYPE: SURGICAL PAIN

## 2021-06-14 ASSESSMENT — ENCOUNTER SYMPTOMS
CARDIOVASCULAR NEGATIVE: 1
CHILLS: 0
NAUSEA: 1
EYES NEGATIVE: 1
NEUROLOGICAL NEGATIVE: 1
BLOOD IN STOOL: 0
VOMITING: 1
FEVER: 0
PSYCHIATRIC NEGATIVE: 1
ABDOMINAL PAIN: 1
RESPIRATORY NEGATIVE: 1
CONSTIPATION: 0

## 2021-06-14 ASSESSMENT — PAIN SCALES - GENERAL: PAIN_LEVEL: 0

## 2021-06-14 NOTE — ANESTHESIA PREPROCEDURE EVALUATION
Relevant Problems   ANESTHESIA   (positive) POLLY on CPAP      NEURO   (positive) Headache      CARDIAC   (positive) Hypertension      ENDO   (positive) Hypothyroidism       Physical Exam    Airway   Mallampati: II  TM distance: >3 FB  Neck ROM: full       Cardiovascular - normal exam     Dental - normal exam           Pulmonary   Breath sounds clear to auscultation     Abdominal   (+) obese     Neurological - normal exam                 Anesthesia Plan    ASA 3   ASA physical status 3 criteria: morbid obesity - BMI greater than or equal to 40    Plan - MAC               Induction: intravenous      Pertinent diagnostic labs and testing reviewed    Informed Consent:    Anesthetic plan and risks discussed with patient.

## 2021-06-14 NOTE — CARE PLAN
The patient is Stable - Low risk of patient condition declining or worsening    Shift Goals  Clinical Goals: Pain controlled and under 6/10  Patient Goals: Diet     Progress made toward(s) clinical / shift goals:    Problem: Knowledge Deficit - Standard  Goal: Patient and family/care givers will demonstrate understanding of plan of care, disease process/condition, diagnostic tests and medications  Outcome: Progressing  Patient has an understanding of POC.  Problem: Pain - Standard  Goal: Alleviation of pain or a reduction in pain to the patient’s comfort goal  Outcome: Progressing  Patient voices needs appropriately.

## 2021-06-14 NOTE — OR NURSING
1536 Pt received to pacu, asleep with spontaneous respirations noted. Vitals signs taken and stable. No distress. Report received from Manolo RUTHERFORD and Dr. Hagen.    1610  Pt waking up, coughing frequently.    1612  Vitals stable, hand off report called to Monique RUTHERFORD for patient to return to room.    1618  Pt transferred to [id number] via gurney by patient escort. No belongings with patient.

## 2021-06-14 NOTE — PROGRESS NOTES
Bedside report received. Assessment completed.  Pt is A&O x4. Pt on room air.   Medicating for pain PRN per MAR Pain 0/10  Denies N/V/D  Skin intact         LDA CDI   Last BM 6/13/21, +flatus, +void.  NPO since midnight.   Pt up independently.  Call light and belongings within reach. All needs met at this time. Fall Precautions and hourly rounding in place.

## 2021-06-14 NOTE — ANESTHESIA POSTPROCEDURE EVALUATION
Patient: Chanelle Ortiz    Procedure Summary     Date: 06/14/21 Room / Location: [facility] ROOM 26 / SURGERY SAME DAY [facility]    Anesthesia Start: 1520 Anesthesia Stop: 1539    Procedures:       GASTROSCOPY (N/A Esophagus)      GASTROSCOPY, WITH BIOPSY (N/A Esophagus) Diagnosis: (GASTRIC LINING SUSPICIOUS FOR LINITIS PLASTICA/ OTHER MALIGNANCY WITH BIOPSIES TAKEN)    Surgeons: Seven Castellon M.D. Responsible Provider: Yarely Hagen M.D.    Anesthesia Type: MAC ASA Status: 3          Final Anesthesia Type: MAC  Last vitals  BP   Blood Pressure: 135/80    Temp   36.9 °C (98.5 °F)    Pulse   67   Resp   18    SpO2   96 %      Anesthesia Post Evaluation    Patient location during evaluation: PACU  Patient participation: complete - patient participated  Level of consciousness: awake  Pain score: 0    Airway patency: patent  Anesthetic complications: no  Cardiovascular status: adequate  Respiratory status: acceptable  Hydration status: acceptable    PONV: none          No complications documented.     Nurse Pain Score: 6 (NPRS)

## 2021-06-14 NOTE — PROGRESS NOTES
Gastroenterology Progress Note     Author: Rafa Vaughn M.D.   Date & Time Created: 6/13/2021 5:51 PM    Chief Complaint:  Gastritis  Abdominal pain    Interval History:  abd pain better today   No BM today   Feels better   No acute events last night    Review of Systems:  Review of Systems   Constitutional: Negative.    HENT: Negative.    Eyes: Negative.    Respiratory: Negative.    Cardiovascular: Negative.    Gastrointestinal: Positive for abdominal pain.   Genitourinary: Negative.    Musculoskeletal: Negative.        Physical Exam:  Physical Exam  Constitutional:       Appearance: Normal appearance.   Eyes:      Pupils: Pupils are equal, round, and reactive to light.   Cardiovascular:      Rate and Rhythm: Normal rate and regular rhythm.      Pulses: Normal pulses.      Heart sounds: Normal heart sounds.   Pulmonary:      Effort: Pulmonary effort is normal.      Breath sounds: Normal breath sounds.   Abdominal:      General: Bowel sounds are normal.      Palpations: Abdomen is soft.      Comments: Obese but no signs of guarding or tenderness on exam   Neurological:      General: No focal deficit present.      Mental Status: She is alert.         Labs:          Recent Labs     06/10/21  1753 06/12/21  0438 06/13/21  0553   SODIUM 141 139 138   POTASSIUM 3.4* 3.4* 3.3*   CHLORIDE 109 107 104   CO2 22 24 26   BUN 11 5* 3*   CREATININE 0.86 0.58 0.70   MAGNESIUM 2.0 2.1  --    PHOSPHORUS 2.8  --   --    CALCIUM 7.0* 6.9* 7.4*     Recent Labs     06/10/21  1753 06/12/21  0438 06/13/21  0553   ALTSGPT 13 18 12   ASTSGOT 17 20 15   ALKPHOSPHAT 95 86 89   TBILIRUBIN 0.3 0.3 0.3   LIPASE 20  --   --    GLUCOSE 194* 142* 115*     Recent Labs     06/10/21  1753 06/11/21  1207 06/12/21  0438 06/13/21  0553   RBC 4.65  --  4.74 4.47   HEMOGLOBIN 10.2*  --  10.2* 9.8*   HEMATOCRIT 35.7*  --  36.7* 34.8*   PLATELETCT 214  --  173 176   IRON  --  19*  --   --    FERRITIN  --  11.9  --   --    TOTIRONBC  --  359  --   --       Recent Labs     06/10/21  1753 21  0438 21  0553   WBC 9.1 15.8* 15.6*   NEUTSPOLYS 78.80* 79.70* 74.10*   LYMPHOCYTES 10.60* 12.90* 18.40*   MONOCYTES 4.40 6.30 5.60   EOSINOPHILS 2.70 0.10 0.40   BASOPHILS 0.00 0.40 0.70   ASTSGOT 17 20 15   ALTSGPT 13 18 12   ALKPHOSPHAT 95 86 89   TBILIRUBIN 0.3 0.3 0.3     Hemodynamics:  Temp (24hrs), Av.4 °C (97.5 °F), Min:36.1 °C (96.9 °F), Max:36.7 °C (98.1 °F)  Temperature: 36.7 °C (98.1 °F)  Pulse  Av  Min: 52  Max: 86   Blood Pressure: 120/70     Respiratory:    Respiration: 17, Pulse Oximetry: 95 %     Work Of Breathing / Effort: Mild     Fluids:    Intake/Output Summary (Last 24 hours) at 2021 1130  Last data filed at 2021 0830  Gross per 24 hour   Intake 3662.5 ml   Output 0 ml   Net 3662.5 ml       GI/Nutrition:  Orders Placed This Encounter   Procedures   • Diet Order Diet: Full Liquid     Standing Status:   Standing     Number of Occurrences:   1     Order Specific Question:   Diet:     Answer:   Full Liquid [11]     Medical Decision Making, by Problem:  Active Hospital Problems    Diagnosis    • *Emphysematous gastritis [K29.60]    • Hypokalemia [E87.6]    • Morbid obesity with BMI of 45.0-49.9, adult (HCC) [E66.01, Z68.42]    • Iron deficiency anemia [D50.9]    • Hx of radioactive iodine thyroid ablation [Z92.3]    • Hypertension [I10]    • POLLY on CPAP [G47.33, Z99.89]        Plan:  Continue supportive care   IV antibiotics  IV Pantoprazole  Add Carafate slurry 1 gm TID   Slowly consider liquid diet  Will need EGD tomorrow ,npo after midnight and consent  Seems to be doing well clinically continue supportive care      Quality-Core Measures

## 2021-06-14 NOTE — ANESTHESIA TIME REPORT
Anesthesia Start and Stop Event Times     Date Time Event    6/14/2021 1453 Ready for Procedure     1520 Anesthesia Start     1539 Anesthesia Stop        Responsible Staff  06/14/21    Name Role Begin End    Yarely Hagen M.D. Anesth 1520 1539        Preop Diagnosis (Free Text):  Pre-op Diagnosis     Abdominal pain, gastritis        Preop Diagnosis (Codes):    Post op Diagnosis  Linitis plastica      Premium Reason  A. 3PM - 7AM    Comments:

## 2021-06-14 NOTE — PROGRESS NOTES
Hospital Medicine Daily Progress Note    Date of Service  6/14/2021    Chief Complaint  45 y.o. female admitted 6/10/2021 with *abdominal pain    Hospital Course  45-year-old female with obesity, DM, HTN, hypothyroidism, anxiety, depression and POLLY on CPAP presented 6/10 with worsening abdominal pain, N/V x 2 days with no exacerbating or alleviating factor. Reported fever and chills prior to arrival; no shortness of breath or chest pain. Patient has had taking ibuprofen for pain almost every day more than 600 mg daily for last couple months; denied significant drinking alcohol or smoking. On admission, labs showed ARMINDA with normal white blood cell, normal lipase and normal vital signs; however CT scan showed portal venous gas with emphysematous gastritis, case was evaluated by general surgeon Dr. Gypsy Daniels who does not feel patient needs emergency surgery, Dr. Rafa Vaughn GI evaluated the patient and recommended continue pantoprazole drip with antibiotics and possible EGD in the near future.     With minimal abdominal pain, clear liquid diet started on 6/12. Diet advanced to full on 6/13 - tolerating but some nausea and early satiety.     Interval Problem Update  6/14  Vitals reviewed; afebrile.  The rest of the vitals within normal parameters.  Pain scale reported - none this AM  Blood glucose in last 24hrs - around 100s     At bedside, appeared comfortable but concern about discomfort and early fullness with oral intake. Current plan with EGD discussed.    GI follow up and Surgery note appreciated     Labs reviewed   K 3.6  Ca 7.9    WBC 9.1 > 15.8 > 15.6  Hb10.2 > 9.8    Blood culture x2 no growth to date     Consultants/Specialty  GI  General surgeon    Code Status  Full Code    Disposition  Home after treating the gastritis and stabilization    Discussed with patient, patient's nurse and with multidisciplinary team during rounds including , pharmacist and charge nurse.      I have performed  the physical examination, and reviewed updated ROS and plan today 6/14/2021.  In review of yesterday's note, there are no new changes except as documented above or bolded below.    Review of Systems  Review of Systems   Constitutional: Positive for malaise/fatigue. Negative for chills and fever.   HENT: Negative.    Eyes: Negative.    Respiratory: Negative.    Cardiovascular: Negative.    Gastrointestinal: Positive for abdominal pain, nausea and vomiting. Negative for blood in stool, constipation and melena.   Genitourinary: Negative.    Skin: Negative.    Neurological: Negative.    Psychiatric/Behavioral: Negative.         Physical Exam  Temp:  [36.3 °C (97.4 °F)-36.9 °C (98.5 °F)] 36.9 °C (98.5 °F)  Pulse:  [62-86] 67  Resp:  [17-18] 18  BP: (110-142)/(68-80) 135/80  SpO2:  [93 %-97 %] 96 %    Physical Exam  Constitutional:       Appearance: She is obese. She is not ill-appearing.   HENT:      Head: Atraumatic.   Eyes:      General: No scleral icterus.  Cardiovascular:      Rate and Rhythm: Normal rate.      Heart sounds: No murmur heard.     Pulmonary:      Effort: Pulmonary effort is normal. No respiratory distress.      Breath sounds: No wheezing.   Abdominal:      General: There is distension.      Palpations: Abdomen is soft. There is no mass.      Tenderness: There is abdominal tenderness. There is no guarding.   Musculoskeletal:         General: No swelling or deformity.      Cervical back: No rigidity.      Right lower leg: No edema.      Left lower leg: No edema.   Skin:     Coloration: Skin is pale. Skin is not jaundiced.      Findings: No bruising or lesion.   Neurological:      General: No focal deficit present.      Mental Status: She is alert and oriented to person, place, and time. Mental status is at baseline.      Cranial Nerves: No cranial nerve deficit.      Motor: No weakness.         Fluids    Intake/Output Summary (Last 24 hours) at 6/14/2021 1526  Last data filed at 6/14/2021 0805  Gross per  "24 hour   Intake 3365 ml   Output 0 ml   Net 3365 ml       Laboratory  Recent Labs     06/12/21  0438 06/13/21  0553   WBC 15.8* 15.6*   RBC 4.74 4.47   HEMOGLOBIN 10.2* 9.8*   HEMATOCRIT 36.7* 34.8*   MCV 77.4* 77.9*   MCH 21.5* 21.9*   MCHC 27.8* 28.2*   RDW 54.4* 53.8*   PLATELETCT 173 176   MPV 12.0 10.7     Recent Labs     06/12/21  0438 06/13/21  0553 06/14/21  1019   SODIUM 139 138 135   POTASSIUM 3.4* 3.3* 3.6   CHLORIDE 107 104 104   CO2 24 26 23   GLUCOSE 142* 115* 108*   BUN 5* 3* 2*   CREATININE 0.58 0.70 0.60   CALCIUM 6.9* 7.4* 7.9*                   Imaging  [id number] VIEW   Final Result         No specific finding to suggest small bowel obstruction.      CT-ABDOMEN-PELVIS WITH   Final Result         1.  Portal venous gas and gas within mesenteric veins around the stomach.   2.  There could be intramural air within the stomach and there is mild perigastric fat stranding raising concern for emphysematous gastritis. There may also be tiny focus of free air seen.   3.  Multiple dilated small bowel loops which are nonspecific and could be related to obstruction, ileus or enteritis.      These findings were discussed with SERGIO URIBE on 6/10/2021 11:33 PM.            DX-ABDOMEN COMPLETE WITH AP OR PA CXR   Final Result      1.  No acute cardiopulmonary abnormality identified.      2.  Enlargement of the cardiac silhouette           Assessment/Plan  * Emphysematous gastritis- (present on admission)  Assessment & Plan  Recent using ibuprofen so often plus daily consuming of \"Diet Coke\"  Came with abdominal pain and vomiting  CT scan showed callus on portal vein with emphysematous gastritis  Zosyn with pantoprazole drip  General surgeon does not think she needs emergency surgery at this time  GI consulted - EGD today    Hypocalcemia  Assessment & Plan  Supplemented IV  Will monitor    Hypokalemia- (present on admission)  Assessment & Plan  Labs daily  Replace as needed    Iron deficiency anemia- " (present on admission)  Assessment & Plan  Iron panel showed iron deficiency  IV iron was ordered  Patient states she has history of heavy.  Family history of colon cancer with her dad when he was around 60s   patient might need follow-up with GI for colonoscopy as outpatient.    Hypertension- (present on admission)  Assessment & Plan  Hold home medication due to possible sepsis  Close monitoring    POLLY on CPAP- (present on admission)  Assessment & Plan  Patient is compliant with CPAP.  Continue home CPAP.    Hx of radioactive iodine thyroid ablation- (present on admission)  Assessment & Plan  Continue home medications    Morbid obesity with BMI of 45.0-49.9, adult (HCC)- (present on admission)  Assessment & Plan  BMI 49 consistent with morbid obesity.    Counseled the patient on healthy lifestyle changes including healthy diet and regular exercise.  Consider bariatric referral as outpatient       VTE prophylaxis: Heparin and SCDs

## 2021-06-14 NOTE — CARE PLAN
The patient is Stable - Low risk of patient condition declining or worsening    Shift Goals  Clinical Goals: Pain controlled and under 6/10  Patient Goals: Diet     Progress made toward(s) clinical / shift goals:  Pain is well controlled with prescribed medications as well as the use of hot packs.       Problem: Knowledge Deficit - Standard  Goal: Patient and family/care givers will demonstrate understanding of plan of care, disease process/condition, diagnostic tests and medications  Outcome: Progressing     Problem: Pain - Standard  Goal: Alleviation of pain or a reduction in pain to the patient’s comfort goal  Outcome: Progressing

## 2021-06-14 NOTE — PROGRESS NOTES
Progress Note    Author:  Gypsy Daniels MD    Date & Time:   6/14/2021   9:29 AM          Patient ID:             Name:             Chanelle Ortiz   YOB: 1975  Age:                 45 y.o.  female   MRN:               [id number]    ________________________________________________________________________  [facility]            Exam:       Vitals:    06/14/21 0805   BP: 110/70   Pulse: 67   Resp: 18   Temp: 36.3 °C (97.4 °F)   SpO2: 97%     SpO2  Min: 91 %  Max: 100 %  O2 (LPM)  Min: 0  Max: 2  Temp  Min: 36 °C (96.8 °F)  Max: 37.4 °C (99.3 °F)    Intake/Output Summary (Last 24 hours) at 6/14/2021 0929  Last data filed at 6/14/2021 0805  Gross per 24 hour   Intake 3365 ml   Output 0 ml   Net 3365 ml     Output by Drain (mL) 06/12/21 0700 - 06/12/21 1859 06/12/21 1900 - 06/13/21 0659 06/13/21 0700 - 06/13/21 1859 06/13/21 1900 - 06/14/21 0659 06/14/21 0700 - 06/14/21 0929   Patient has no LDAs of requested type attached.       DIET ORDERS (From admission to next 24h)     Start     Ordered    06/13/21 8832  Diet NPO  AT MIDNIGHT     Question:  Restrict to:  Answer:  Ice Chips    06/13/21 4993                        Physical Exam  Nursing note reviewed.   Constitutional:       Appearance: Normal appearance. Alert, oriented x 4.NAD    Cardiovascular:      Rate and Rhythm: Normal rate and regular rhythm. Extremities warm, well perfused no edema.   Pulmonary:      Effort: Pulmonary effort is normal.      Breath sounds: Normal breath sounds. No wheezes or stridor  Abdominal:      General: Abdomen is soft,  non-tender           Recent Labs     06/12/21  0438 06/13/21  0553   SODIUM 139 138   POTASSIUM 3.4* 3.3*   CHLORIDE 107 104   CO2 24 26   BUN 5* 3*   CREATININE 0.58 0.70   MAGNESIUM 2.1  --    CALCIUM 6.9* 7.4*       Recent Labs     06/12/21  0438 06/13/21  0553   ALTSGPT 18 12   ASTSGOT 20 15   ALKPHOSPHAT 86 89   TBILIRUBIN 0.3 0.3   GLUCOSE 142* 115*       Recent  Labs     06/11/21  1207 06/12/21  0438 06/13/21  0553   RBC  --  4.74 4.47   HEMOGLOBIN  --  10.2* 9.8*   HEMATOCRIT  --  36.7* 34.8*   PLATELETCT  --  173 176   IRON 19*  --   --    FERRITIN 11.9  --   --    TOTIRONBC 359  --   --        Recent Labs     06/12/21  0438 06/13/21  0553   WBC 15.8* 15.6*   NEUTSPOLYS 79.70* 74.10*   LYMPHOCYTES 12.90* 18.40*   MONOCYTES 6.30 5.60   EOSINOPHILS 0.10 0.40   BASOPHILS 0.40 0.70   ASTSGOT 20 15   ALTSGPT 18 12   ALKPHOSPHAT 86 89   TBILIRUBIN 0.3 0.3         ________________________________________________________________________      Patient Active Problem List   Diagnosis   • Other chest pain   • Hypothyroidism   • Morbid obesity with BMI of 45.0-49.9, adult (HCC)   • Hx of radioactive iodine thyroid ablation   • POLLY on CPAP   • Hypertension   • Hx of Anxiety and depression   • Iron deficiency anemia   • Headache   • Low vitamin D level   • Prediabetes   • Palpitations   • Emphysematous gastritis   • Hypokalemia   • Hypocalcemia       Acute Issues/Plan:  Patient having much less pain        -improving clinically  -continue medical management for emphysematous gastritis  -GI plan is for uppper endoscopy, will follow up on findings

## 2021-06-14 NOTE — PROGRESS NOTES
Pt is A+Ox4   Complains of 5/10 pain, medicated per MAR    VSS, CPAP at bedside for NOC   Diabetic full liquids               Denies N/V/D.Will be NPO at 0000              AC/HS FSBS: 141  LBM 06/13 +void   Ambulating without staff assistance   Skin is generally CDI w/ scattered bruising on the lower abdomen  Moisture noted in skin folds and pannus   PIV infusing per MAR   Pt updated on POC, appropriate fall precautions in place, all needs met at this time   Hourly rounding in place

## 2021-06-15 ENCOUNTER — PATIENT OUTREACH (OUTPATIENT)
Dept: HEALTH INFORMATION MANAGEMENT | Facility: OTHER | Age: 46
End: 2021-06-15

## 2021-06-15 LAB
BASOPHILS # BLD AUTO: 0.7 % (ref 0–1.8)
BASOPHILS # BLD: 0.07 K/UL (ref 0–0.12)
EOSINOPHIL # BLD AUTO: 0.17 K/UL (ref 0–0.51)
EOSINOPHIL NFR BLD: 1.7 % (ref 0–6.9)
ERYTHROCYTE [DISTWIDTH] IN BLOOD BY AUTOMATED COUNT: 54.5 FL (ref 35.9–50)
GLUCOSE BLD-MCNC: 121 MG/DL (ref 65–99)
GLUCOSE BLD-MCNC: 158 MG/DL (ref 65–99)
GLUCOSE BLD-MCNC: 99 MG/DL (ref 65–99)
HCT VFR BLD AUTO: 33.9 % (ref 37–47)
HGB BLD-MCNC: 9.6 G/DL (ref 12–16)
IMM GRANULOCYTES # BLD AUTO: 0.11 K/UL (ref 0–0.11)
IMM GRANULOCYTES NFR BLD AUTO: 1.1 % (ref 0–0.9)
LYMPHOCYTES # BLD AUTO: 2.07 K/UL (ref 1–4.8)
LYMPHOCYTES NFR BLD: 21.2 % (ref 22–41)
MCH RBC QN AUTO: 22 PG (ref 27–33)
MCHC RBC AUTO-ENTMCNC: 28.3 G/DL (ref 33.6–35)
MCV RBC AUTO: 77.8 FL (ref 81.4–97.8)
MONOCYTES # BLD AUTO: 0.56 K/UL (ref 0–0.85)
MONOCYTES NFR BLD AUTO: 5.7 % (ref 0–13.4)
NEUTROPHILS # BLD AUTO: 6.78 K/UL (ref 2–7.15)
NEUTROPHILS NFR BLD: 69.6 % (ref 44–72)
NRBC # BLD AUTO: 0.02 K/UL
NRBC BLD-RTO: 0.2 /100 WBC
PLATELET # BLD AUTO: 180 K/UL (ref 164–446)
PMV BLD AUTO: 11.3 FL (ref 9–12.9)
RBC # BLD AUTO: 4.36 M/UL (ref 4.2–5.4)
WBC # BLD AUTO: 9.8 K/UL (ref 4.8–10.8)

## 2021-06-15 PROCEDURE — 700105 HCHG RX REV CODE 258: Performed by: EMERGENCY MEDICINE

## 2021-06-15 PROCEDURE — 700102 HCHG RX REV CODE 250 W/ 637 OVERRIDE(OP): Performed by: STUDENT IN AN ORGANIZED HEALTH CARE EDUCATION/TRAINING PROGRAM

## 2021-06-15 PROCEDURE — 700105 HCHG RX REV CODE 258: Performed by: INTERNAL MEDICINE

## 2021-06-15 PROCEDURE — 94660 CPAP INITIATION&MGMT: CPT

## 2021-06-15 PROCEDURE — A9270 NON-COVERED ITEM OR SERVICE: HCPCS | Performed by: STUDENT IN AN ORGANIZED HEALTH CARE EDUCATION/TRAINING PROGRAM

## 2021-06-15 PROCEDURE — 96366 THER/PROPH/DIAG IV INF ADDON: CPT

## 2021-06-15 PROCEDURE — 700111 HCHG RX REV CODE 636 W/ 250 OVERRIDE (IP): Performed by: INTERNAL MEDICINE

## 2021-06-15 PROCEDURE — 36415 COLL VENOUS BLD VENIPUNCTURE: CPT

## 2021-06-15 PROCEDURE — 700105 HCHG RX REV CODE 258: Performed by: STUDENT IN AN ORGANIZED HEALTH CARE EDUCATION/TRAINING PROGRAM

## 2021-06-15 PROCEDURE — 82962 GLUCOSE BLOOD TEST: CPT | Mod: 91

## 2021-06-15 PROCEDURE — C9113 INJ PANTOPRAZOLE SODIUM, VIA: HCPCS | Performed by: EMERGENCY MEDICINE

## 2021-06-15 PROCEDURE — 700111 HCHG RX REV CODE 636 W/ 250 OVERRIDE (IP): Performed by: EMERGENCY MEDICINE

## 2021-06-15 PROCEDURE — G0378 HOSPITAL OBSERVATION PER HR: HCPCS

## 2021-06-15 PROCEDURE — 85025 COMPLETE CBC W/AUTO DIFF WBC: CPT

## 2021-06-15 PROCEDURE — 700111 HCHG RX REV CODE 636 W/ 250 OVERRIDE (IP): Performed by: STUDENT IN AN ORGANIZED HEALTH CARE EDUCATION/TRAINING PROGRAM

## 2021-06-15 PROCEDURE — 99226 PR SUBSEQUENT OBSERVATION CARE,LEVEL III: CPT | Performed by: INTERNAL MEDICINE

## 2021-06-15 PROCEDURE — 96372 THER/PROPH/DIAG INJ SC/IM: CPT

## 2021-06-15 RX ADMIN — ACETAMINOPHEN 650 MG: 325 TABLET, FILM COATED ORAL at 04:57

## 2021-06-15 RX ADMIN — SUCRALFATE 1 G: 1 TABLET ORAL at 11:31

## 2021-06-15 RX ADMIN — INSULIN LISPRO 1 UNITS: 100 INJECTION, SOLUTION INTRAVENOUS; SUBCUTANEOUS at 20:26

## 2021-06-15 RX ADMIN — PANTOPRAZOLE SODIUM 8 MG/HR: 40 INJECTION, POWDER, FOR SOLUTION INTRAVENOUS at 09:43

## 2021-06-15 RX ADMIN — HEPARIN SODIUM 5000 UNITS: 5000 INJECTION, SOLUTION INTRAVENOUS; SUBCUTANEOUS at 04:56

## 2021-06-15 RX ADMIN — SUCRALFATE 1 G: 1 TABLET ORAL at 17:30

## 2021-06-15 RX ADMIN — SODIUM CHLORIDE 250 MG: 9 INJECTION, SOLUTION INTRAVENOUS at 18:25

## 2021-06-15 RX ADMIN — SUCRALFATE 1 G: 1 TABLET ORAL at 04:56

## 2021-06-15 RX ADMIN — PIPERACILLIN AND TAZOBACTAM 3.38 G: 3; .375 INJECTION, POWDER, LYOPHILIZED, FOR SOLUTION INTRAVENOUS; PARENTERAL at 07:41

## 2021-06-15 ASSESSMENT — ENCOUNTER SYMPTOMS
ABDOMINAL PAIN: 1
BACK PAIN: 0
RESPIRATORY NEGATIVE: 1
SHORTNESS OF BREATH: 0
NAUSEA: 0
BLOOD IN STOOL: 0
CARDIOVASCULAR NEGATIVE: 1
FEVER: 0
NECK PAIN: 0
CONSTIPATION: 0
SORE THROAT: 0
HEADACHES: 0
CHILLS: 0
DIARRHEA: 0
VOMITING: 0
NAUSEA: 1
CONSTITUTIONAL NEGATIVE: 1
FOCAL WEAKNESS: 0
HEARTBURN: 0
PALPITATIONS: 0

## 2021-06-15 ASSESSMENT — PAIN DESCRIPTION - PAIN TYPE
TYPE: ACUTE PAIN

## 2021-06-15 NOTE — CARE PLAN
Problem: Knowledge Deficit - Standard  Goal: Patient and family/care givers will demonstrate understanding of plan of care, disease process/condition, diagnostic tests and medications  Outcome: Progressing     Problem: Pain - Standard  Goal: Alleviation of pain or a reduction in pain to the patient’s comfort goal  Outcome: Progressing     The patient is Stable - Low risk of patient condition declining or worsening    Shift Goals  Clinical Goals: Pain Management   Patient Goals: Rest

## 2021-06-15 NOTE — PROGRESS NOTES
[facility] Medicine Daily Progress Note    Date of Service  6/15/2021    Chief Complaint  45 y.o. female admitted 6/10/2021 with nausea vomiting, abdominal pain    Hospital Course  45-year-old female with history of diabetes, obesity hypertension hypothyroidism and obstructive sleep apnea presented 6/10 with abdominal pain, n/v. Pls refer to H&P for further details. CT scan showed portal venous gas with emphysematous gastritis, case was evaluated by general surgeon Dr. Gypsy Daniels who does not felt patient needs emergency surgery, Dr. Rafa Vaughn GI evaluated the patient and recommended continue pantoprazole drip with antibiotics. 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 Biopsy results pending. She was started on iron infusion too. Antibiotic discontinued 6/15.       Interval Problem Update  Still bloated and tender. Feeling better on empty stomach. hgb remain stable. Continue to monitor. Pending biopsy.     Consultants/Specialty  Surgery  GI    Code Status  Full Code    Disposition  Inpatient     Review of Systems  Review of Systems   Constitutional: Positive for malaise/fatigue. Negative for chills and fever.   HENT: Negative for sore throat.    Respiratory: Negative for shortness of breath.    Cardiovascular: Negative for chest pain, palpitations and leg swelling.   Gastrointestinal: Positive for abdominal pain and nausea. Negative for blood in stool, constipation, diarrhea, melena and vomiting.   Genitourinary: Negative for urgency.   Musculoskeletal: Negative for back pain and neck pain.   Neurological: Negative for focal weakness and headaches.        Physical Exam  Temp:  [36.2 °C (97.2 °F)-37.3 °C (99.1 °F)] 37 °C (98.6 °F)  Pulse:   [56-76] 65  Resp:  [16-20] 17  BP: (106-152)/(50-96) 148/84  SpO2:  [88 %-100 %] 98 %    Physical Exam  Vitals and nursing note reviewed.   Constitutional:       Appearance: She is obese. She is ill-appearing.   HENT:      Head: Normocephalic and atraumatic.   Eyes:      General: No scleral icterus.  Cardiovascular:      Rate and Rhythm: Normal rate.      Heart sounds: No murmur heard.     Pulmonary:      Effort: Pulmonary effort is normal. No respiratory distress.      Breath sounds: No wheezing.   Abdominal:      General: Bowel sounds are normal. There is distension.      Palpations: Abdomen is soft. There is no mass.      Tenderness: There is abdominal tenderness. There is no guarding.   Musculoskeletal:         General: No swelling or tenderness.      Right lower leg: No edema.      Left lower leg: No edema.   Skin:     Coloration: Skin is pale.      Findings: No erythema.   Neurological:      Mental Status: She is oriented to person, place, and time.      Cranial Nerves: No cranial nerve deficit.   Psychiatric:         Mood and Affect: Mood normal.         Behavior: Behavior normal.         Thought Content: Thought content normal.         Judgment: Judgment normal.         Fluids    Intake/Output Summary (Last 24 hours) at 6/15/2021 1027  Last data filed at 6/15/2021 0940  Gross per 24 hour   Intake 1816.25 ml   Output 0 ml   Net 1816.25 ml       Laboratory  Recent Labs     06/13/21  0553 06/15/21  0459   WBC 15.6* 9.8   RBC 4.47 4.36   HEMOGLOBIN 9.8* 9.6*   HEMATOCRIT 34.8* 33.9*   MCV 77.9* 77.8*   MCH 21.9* 22.0*   MCHC 28.2* 28.3*   RDW 53.8* 54.5*   PLATELETCT 176 180   MPV 10.7 11.3     Recent Labs     06/13/21  0553 06/14/21  1019   SODIUM 138 135   POTASSIUM 3.3* 3.6   CHLORIDE 104 104   CO2 26 23   GLUCOSE 115* 108*   BUN 3* 2*   CREATININE 0.70 0.60   CALCIUM 7.4* 7.9*                   Imaging  [id number] VIEW   Final Result         No specific finding to suggest small bowel obstruction.     "  CT-ABDOMEN-PELVIS WITH   Final Result         1.  Portal venous gas and gas within mesenteric veins around the stomach.   2.  There could be intramural air within the stomach and there is mild perigastric fat stranding raising concern for emphysematous gastritis. There may also be tiny focus of free air seen.   3.  Multiple dilated small bowel loops which are nonspecific and could be related to obstruction, ileus or enteritis.      These findings were discussed with SERGIO URIBE on 6/10/2021 11:33 PM.            DX-ABDOMEN COMPLETE WITH AP OR PA CXR   Final Result      1.  No acute cardiopulmonary abnormality identified.      2.  Enlargement of the cardiac silhouette           Assessment/Plan  * Emphysematous gastritis- (present on admission)  Assessment & Plan  Recent using ibuprofen so often plus daily consuming of \"Diet Coke\"  CT scan showed callus on portal vein with emphysematous gastritis  Zosyn with pantoprazole drip on admission  EGD 6/15/2021 showed marked edema, erosions, nodularity involving the stomach, concern for linitis plastica.  Biopsy pending    Hypocalcemia- (present on admission)  Assessment & Plan  Supplemented IV  Will monitor    Hypokalemia- (present on admission)  Assessment & Plan  Labs daily  Replace as needed    Iron deficiency anemia- (present on admission)  Assessment & Plan  Iron panel showed iron deficiency  IV iron was ordered  Patient states she has history of heavy.  Biopsy pending.  Concerned for linitis plastica    Hypertension- (present on admission)  Assessment & Plan  Lisinopril 5 mg daily, metoprolol SR 25 mg daily at home.  Hold them due to concern for GI bleed.    POLLY on CPAP- (present on admission)  Assessment & Plan  Patient is compliant with CPAP.  Continue home CPAP.    Hx of radioactive iodine thyroid ablation- (present on admission)  Assessment & Plan  Continue home medications    Morbid obesity with BMI of 45.0-49.9, adult (HCC)- (present on " admission)  Assessment & Plan  BMI 49 consistent with morbid obesity.    Counseled the patient on healthy lifestyle changes including healthy diet and regular exercise.  Consider bariatric referral as outpatient       VTE prophylaxis: SCD, due to concern for GI bleed

## 2021-06-15 NOTE — PROGRESS NOTES
Progress Note    Author:  Gypsy Daniels MD    Date & Time:   6/15/2021   11:56 AM          Patient ID:             Name:             Chanelle Ortiz   YOB: 1975  Age:                 45 y.o.  female   MRN:               [id number]    ________________________________________________________________________  [facility]     Events:  ----------   Upper GI w/ biopsies 6/14    Exam:       Vitals:    06/15/21 0725   BP: 148/84   Pulse: 65   Resp: 17   Temp: 37 °C (98.6 °F)   SpO2: 98%     SpO2  Min: 88 %  Max: 100 %  O2 (LPM)  Min: 0  Max: 5  Temp  Min: 36 °C (96.8 °F)  Max: 37.4 °C (99.3 °F)    Intake/Output Summary (Last 24 hours) at 6/15/2021 1156  Last data filed at 6/15/2021 0940  Gross per 24 hour   Intake 1816.25 ml   Output 0 ml   Net 1816.25 ml     Output by Drain (mL) 06/13/21 0700 - 06/13/21 1859 06/13/21 1900 - 06/14/21 0659 06/14/21 0700 - 06/14/21 1859 06/14/21 1900 - 06/15/21 0659 06/15/21 0700 - 06/15/21 1156   Patient has no LDAs of requested type attached.       DIET ORDERS (From admission to next 24h)     Start     Ordered    06/15/21 0745  Diet Order Diet: Full Liquid  ALL MEALS     Question:  Diet:  Answer:  Full Liquid    06/15/21 0786                        Physical Exam  Nursing note reviewed.   Constitutional:       Appearance: Normal appearance. Alert, oriented x 4.NAD    Cardiovascular:      Rate and Rhythm: Normal rate and regular rhythm. Extremities warm, well perfused no edema.   Pulmonary:      Effort: Pulmonary effort is normal.      Breath sounds:  No wheezes or stridor  Abdominal:      General: Abdomen is soft,  Mild epigastric tenderness      Recent Labs     06/13/21  0553 06/14/21  1019   SODIUM 138 135   POTASSIUM 3.3* 3.6   CHLORIDE 104 104   CO2 26 23   BUN 3* 2*   CREATININE 0.70 0.60   CALCIUM 7.4* 7.9*       Recent Labs     06/13/21  0553 06/14/21  1019   ALTSGPT 12  --    ASTSGOT 15  --    ALKPHOSPHAT 89  --    TBILIRUBIN 0.3   --    GLUCOSE 115* 108*       Recent Labs     06/13/21  0553 06/15/21  0459   RBC 4.47 4.36   HEMOGLOBIN 9.8* 9.6*   HEMATOCRIT 34.8* 33.9*   PLATELETCT 176 180       Recent Labs     06/13/21  0553 06/15/21  0459   WBC 15.6* 9.8   NEUTSPOLYS 74.10* 69.60   LYMPHOCYTES 18.40* 21.20*   MONOCYTES 5.60 5.70   EOSINOPHILS 0.40 1.70   BASOPHILS 0.70 0.70   ASTSGOT 15  --    ALTSGPT 12  --    ALKPHOSPHAT 89  --    TBILIRUBIN 0.3  --          ________________________________________________________________________      Patient Active Problem List   Diagnosis   • Other chest pain   • Hypothyroidism   • Morbid obesity with BMI of 45.0-49.9, adult (HCC)   • Hx of radioactive iodine thyroid ablation   • POLLY on CPAP   • Hypertension   • Hx of Anxiety and depression   • Iron deficiency anemia   • Headache   • Low vitamin D level   • Prediabetes   • Palpitations   • Emphysematous gastritis   • Hypokalemia   • Hypocalcemia       Acute Issues/Plan:  Clnically stable. Patient having much less pain  Upper endoscopy findings noted  Surgery will follow up on biopsy results

## 2021-06-15 NOTE — PROGRESS NOTES
Bedside report received. Assessment completed.  Pt is A&O x4. Pt on room air.   Medicating for pain PRN per MAR Pain 0/10  Denies N/V/D  Skin intact         LDA CDI   Last BM 6/14/21, +flatus, +void.  Clear liquid diet, advance as tolerated.   Pt up independently with a walker.  Call light and belongings within reach. All needs met at this time. Fall Precautions and hourly rounding in place.

## 2021-06-15 NOTE — PROGRESS NOTES
Pt A&Ox4  Patient answers all questions appropriately and demonstrates proper use of call light     Patient rates pain as 3/10 upon assessment, medicated per MAR     Tolerating diet, denies n/v. +bowel sounds, +flatus, LBM 6/13    Saturating >90% on 2L O2 via NC   Patient denies SOB     Pt ambulates with x1 assistance     Patient verbalizes understanding to call when needing assistance     Updated on plan of care. Safety education provided. Bed locked in low. Call light within reach. Rounding in place.

## 2021-06-15 NOTE — OP REPORT
DATE OF SERVICE:  06/14/2021     PHYSICIAN:  Seven Castellon MD     ANESTHESIOLOGIST:  Yarely Hagen MD     MEDICATION:  Deep sedation.     PREOPERATIVE DIAGNOSIS:  Abnormal imaging revealing emphysematous gastritis.     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     Procedure, risks and benefits reviewed thoroughly with the patient.  Risks   limited to bleeding, perforation, side effects of medication were informed.    The patient voiced understanding and agreed to proceed.     DESCRIPTION OF PROCEDURE:  The patient was placed in left lateral position.    After sedation was achieved,  bite block was inserted in the mouth and a   forward viewing gastroscope was passed carefully and easily under direct   visualization into the esophagus.  All esophageal views were normal.  Upon   entering the stomach, there was marked abnormality, starting retroflexed in   the cardia and then into the fundus and then down the proximal body to involve   the entire body and then into the antrum was an area of marked edema,   erosions, ulcerations, erythema from red to purple appearance in a creeping   fashion with a width of approximately 5-6 cm.  It was very suggestive of an   infiltrative process concerning for linitis plastica.  Multiple biopsies were   obtained in all of these sections.  The pyloric valve was spared.  There was   no evidence for any duodenal disease, second and third portions were examined   as well.  The stomach was suctioned.  Videoscope was removed.     COMPLICATIONS:  None.     BLOOD LOSS:  None.     SPECIMENS:  Obtained.     RECOMMENDATIONS:  Worrisome endoscopic findings of the stomach.  Awaiting   biopsies.   In the meantime, the patient should continue her proton pump   inhibitor therapy, Protonix in drip fashion.  Diet of clears may be initiated   and then advance pending the patient's toleration.  We will follow.        ______________________________  MD ROLF Ferrera/JANEE/ENE    DD:  06/14/2021 15:58  DT:  06/14/2021 16:59    Job#:  [id number]

## 2021-06-15 NOTE — CARE PLAN
The patient is Stable - Low risk of patient condition declining or worsening      Problem: Knowledge Deficit - Standard  Goal: Patient and family/care givers will demonstrate understanding of plan of care, disease process/condition, diagnostic tests and medications  Outcome: Progressing  Note: All questions and concerns addressed with patient, patient verbalizes understanding of current plan of care      Problem: Pain - Standard  Goal: Alleviation of pain or a reduction in pain to the patient’s comfort goal  Outcome: Progressing  Note: Patient's pain managed with prescribed medications and nonpharmalogical pain interventions. Education provided to patient about pain rating scale, prescribed medications and nonpharmalogical pain interventions. Patient verbalizes understanding of education, patient able to rest and sleep comfortably throughout the night        Shift Goals  Clinical Goals: Pain Management   Patient Goals: Rest     Progress made toward(s) clinical / shift goals:  Patient able to rest and sleep comfortably throughout the night with effective pain management

## 2021-06-15 NOTE — PROGRESS NOTES
Community Health Worker Intake    • Social determinates of health intake completed.  • Identified barriers to: food insecurity  • Contact information provided to Chanelle Ortiz   • Has PCP appointment scheduled for: Friday, June 18 at 2:00 pm   • Accepted Meds-To-Beds.   • Inpatient assessment completed.    LY Senior spoke with pt at bedside to introduce CCM services. Pt accepted and was agreeable that CHW schedule hospital f/u. For transportation to appts, pt rides the bus. She has utilized [employer] in the past but it did not workout for her. She was also made aware of [facility]'s transportation service. CHW left  with [facility] transportation department requesting transportation for her appt Friday. She expressed food insecurity and was given [employer] bank flyer. No other financial barriers currently. Pt lives at home with her sister and two children. Her support system includes her sister and father who she stays in touch with. No other needs at this time.     Plan: f/u after d/c to give appt reminder and identify any additional needs.

## 2021-06-15 NOTE — PROGRESS NOTES
Gastroenterology Progress Note     Author: Seven Castellon M.D.   Date & Time Created: 6/15/2021 1:51 PM    Chief Complaint:  Abdominal pain    Interval History:  EGD revealing marked inflammation in the entirety of the stomach, worrisome for malignancy with extensive amount of biopsies, reviewed by two pathologist negative for cancer.   Patient's baseline abdominal pain improved but increases with oral intake.    Review of Systems:  Review of Systems   Constitutional: Negative.    Respiratory: Negative.    Cardiovascular: Negative.    Gastrointestinal: Positive for abdominal pain. Negative for blood in stool, constipation, diarrhea, heartburn, melena, nausea and vomiting.       Physical Exam:  Physical Exam  Constitutional:       General: She is not in acute distress.     Appearance: She is obese. She is not toxic-appearing.   HENT:      Head: Normocephalic.      Nose: Nose normal.      Mouth/Throat:      Mouth: Mucous membranes are moist.      Pharynx: Oropharynx is clear.   Eyes:      Pupils: Pupils are equal, round, and reactive to light.   Cardiovascular:      Rate and Rhythm: Normal rate.   Pulmonary:      Effort: Pulmonary effort is normal.   Abdominal:      General: Bowel sounds are normal.      Palpations: Abdomen is soft.   Skin:     General: Skin is warm.   Neurological:      General: No focal deficit present.      Mental Status: She is alert.   Psychiatric:         Mood and Affect: Mood normal.         Behavior: Behavior normal.         Thought Content: Thought content normal.         Judgment: Judgment normal.         Labs:          Recent Labs     06/13/21  0553 06/14/21  1019   SODIUM 138 135   POTASSIUM 3.3* 3.6   CHLORIDE 104 104   CO2 26 23   BUN 3* 2*   CREATININE 0.70 0.60   CALCIUM 7.4* 7.9*     Recent Labs     06/13/21  0553 06/14/21  1019   ALTSGPT 12  --    ASTSGOT 15  --    ALKPHOSPHAT 89  --    TBILIRUBIN 0.3  --    GLUCOSE 115* 108*     Recent Labs     06/13/21  0553 06/15/21  0459   RBC  4.47 4.36   HEMOGLOBIN 9.8* 9.6*   HEMATOCRIT 34.8* 33.9*   PLATELETCT 176 180     Recent Labs     21  0553 06/15/21  0459   WBC 15.6* 9.8   NEUTSPOLYS 74.10* 69.60   LYMPHOCYTES 18.40* 21.20*   MONOCYTES 5.60 5.70   EOSINOPHILS 0.40 1.70   BASOPHILS 0.70 0.70   ASTSGOT 15  --    ALTSGPT 12  --    ALKPHOSPHAT 89  --    TBILIRUBIN 0.3  --      Hemodynamics:  Temp (24hrs), Av.7 °C (98.1 °F), Min:36.2 °C (97.2 °F), Max:37.3 °C (99.1 °F)  Temperature: 37 °C (98.6 °F)  Pulse  Av.5  Min: 52  Max: 86   Blood Pressure: 148/84     Respiratory:    Respiration: 17, Pulse Oximetry: 98 %     Work Of Breathing / Effort: Mild     Fluids:    Intake/Output Summary (Last 24 hours) at 6/15/2021 1351  Last data filed at 6/15/2021 0940  Gross per 24 hour   Intake 1816.25 ml   Output 0 ml   Net 1816.25 ml       GI/Nutrition:  Orders Placed This Encounter   Procedures   • Diet Order Diet: Full Liquid     Standing Status:   Standing     Number of Occurrences:   1     Order Specific Question:   Diet:     Answer:   Full Liquid [11]     Medical Decision Making, by Problem:  Active Hospital Problems    Diagnosis    • *Emphysematous gastritis [K29.60]    • Hypocalcemia [E83.51]    • Hypokalemia [E87.6]    • Morbid obesity with BMI of 45.0-49.9, adult (HCC) [E66.01, Z68.42]    • Iron deficiency anemia [D50.9]    • Hx of radioactive iodine thyroid ablation [Z92.3]    • Hypertension [I10]    • POLLY on CPAP [G47.33, Z99.89]        Plan:  Emphysematous gastritis on CT. EGD revealing marked inflammation in the entirety of the stomach, worrisome for malignancy with extensive amount of biopsies, reviewed by two pathologist negative for cancer.   Patient's baseline abdominal pain improved but increases with oral intake.  Upon discharge, transition to PPI q12, omeprazole 40 mg q12 or equivalent. Email sent to [facility] for repeat EGD in 3-4 weeks to ensure resolution. Discussed importance of complete NSAID abstinence. Avoidance of processed foods  and drinks with any carbonation.     Please call with any questions.     Quality-Core Measures

## 2021-06-16 ENCOUNTER — PHARMACY VISIT (OUTPATIENT)
Dept: PHARMACY | Facility: MEDICAL CENTER | Age: 46
End: 2021-06-16
Payer: COMMERCIAL

## 2021-06-16 PROBLEM — E83.51 HYPOCALCEMIA: Status: RESOLVED | Noted: 2021-06-12 | Resolved: 2021-06-16

## 2021-06-16 LAB
ALBUMIN SERPL BCP-MCNC: 3.2 G/DL (ref 3.2–4.9)
ALBUMIN/GLOB SERPL: 0.9 G/DL
ALP SERPL-CCNC: 93 U/L (ref 30–99)
ALT SERPL-CCNC: 15 U/L (ref 2–50)
ANION GAP SERPL CALC-SCNC: 10 MMOL/L (ref 7–16)
AST SERPL-CCNC: 17 U/L (ref 12–45)
BACTERIA BLD CULT: NORMAL
BACTERIA BLD CULT: NORMAL
BASOPHILS # BLD AUTO: 0.9 % (ref 0–1.8)
BASOPHILS # BLD: 0.08 K/UL (ref 0–0.12)
BILIRUB SERPL-MCNC: 0.2 MG/DL (ref 0.1–1.5)
BUN SERPL-MCNC: 2 MG/DL (ref 8–22)
CALCIUM SERPL-MCNC: 8.5 MG/DL (ref 8.5–10.5)
CHLORIDE SERPL-SCNC: 102 MMOL/L (ref 96–112)
CO2 SERPL-SCNC: 28 MMOL/L (ref 20–33)
CREAT SERPL-MCNC: 0.68 MG/DL (ref 0.5–1.4)
EOSINOPHIL # BLD AUTO: 0.15 K/UL (ref 0–0.51)
EOSINOPHIL NFR BLD: 1.6 % (ref 0–6.9)
ERYTHROCYTE [DISTWIDTH] IN BLOOD BY AUTOMATED COUNT: 53.2 FL (ref 35.9–50)
GLOBULIN SER CALC-MCNC: 3.6 G/DL (ref 1.9–3.5)
GLUCOSE BLD-MCNC: 120 MG/DL (ref 65–99)
GLUCOSE BLD-MCNC: 123 MG/DL (ref 65–99)
GLUCOSE BLD-MCNC: 130 MG/DL (ref 65–99)
GLUCOSE BLD-MCNC: 144 MG/DL (ref 65–99)
GLUCOSE BLD-MCNC: 151 MG/DL (ref 65–99)
GLUCOSE SERPL-MCNC: 133 MG/DL (ref 65–99)
HCT VFR BLD AUTO: 35.8 % (ref 37–47)
HGB BLD-MCNC: 10.1 G/DL (ref 12–16)
IMM GRANULOCYTES # BLD AUTO: 0.15 K/UL (ref 0–0.11)
IMM GRANULOCYTES NFR BLD AUTO: 1.6 % (ref 0–0.9)
LYMPHOCYTES # BLD AUTO: 1.97 K/UL (ref 1–4.8)
LYMPHOCYTES NFR BLD: 21.2 % (ref 22–41)
MCH RBC QN AUTO: 21.8 PG (ref 27–33)
MCHC RBC AUTO-ENTMCNC: 28.2 G/DL (ref 33.6–35)
MCV RBC AUTO: 77.2 FL (ref 81.4–97.8)
MONOCYTES # BLD AUTO: 0.62 K/UL (ref 0–0.85)
MONOCYTES NFR BLD AUTO: 6.7 % (ref 0–13.4)
NEUTROPHILS # BLD AUTO: 6.31 K/UL (ref 2–7.15)
NEUTROPHILS NFR BLD: 68 % (ref 44–72)
NRBC # BLD AUTO: 0.02 K/UL
NRBC BLD-RTO: 0.2 /100 WBC
PLATELET # BLD AUTO: 193 K/UL (ref 164–446)
PMV BLD AUTO: 11 FL (ref 9–12.9)
POTASSIUM SERPL-SCNC: 3.4 MMOL/L (ref 3.6–5.5)
PROT SERPL-MCNC: 6.8 G/DL (ref 6–8.2)
RBC # BLD AUTO: 4.64 M/UL (ref 4.2–5.4)
SIGNIFICANT IND 70042: NORMAL
SIGNIFICANT IND 70042: NORMAL
SITE SITE: NORMAL
SITE SITE: NORMAL
SODIUM SERPL-SCNC: 140 MMOL/L (ref 135–145)
SOURCE SOURCE: NORMAL
SOURCE SOURCE: NORMAL
WBC # BLD AUTO: 9.3 K/UL (ref 4.8–10.8)

## 2021-06-16 PROCEDURE — 700102 HCHG RX REV CODE 250 W/ 637 OVERRIDE(OP): Performed by: STUDENT IN AN ORGANIZED HEALTH CARE EDUCATION/TRAINING PROGRAM

## 2021-06-16 PROCEDURE — A9270 NON-COVERED ITEM OR SERVICE: HCPCS | Performed by: INTERNAL MEDICINE

## 2021-06-16 PROCEDURE — A9270 NON-COVERED ITEM OR SERVICE: HCPCS | Performed by: STUDENT IN AN ORGANIZED HEALTH CARE EDUCATION/TRAINING PROGRAM

## 2021-06-16 PROCEDURE — 700102 HCHG RX REV CODE 250 W/ 637 OVERRIDE(OP): Performed by: INTERNAL MEDICINE

## 2021-06-16 PROCEDURE — 99225 PR SUBSEQUENT OBSERVATION CARE,LEVEL II: CPT | Performed by: INTERNAL MEDICINE

## 2021-06-16 PROCEDURE — 82962 GLUCOSE BLOOD TEST: CPT | Mod: 91

## 2021-06-16 PROCEDURE — 80053 COMPREHEN METABOLIC PANEL: CPT

## 2021-06-16 PROCEDURE — G0378 HOSPITAL OBSERVATION PER HR: HCPCS

## 2021-06-16 PROCEDURE — 85025 COMPLETE CBC W/AUTO DIFF WBC: CPT

## 2021-06-16 PROCEDURE — RXMED WILLOW AMBULATORY MEDICATION CHARGE: Performed by: INTERNAL MEDICINE

## 2021-06-16 PROCEDURE — 96366 THER/PROPH/DIAG IV INF ADDON: CPT

## 2021-06-16 RX ORDER — SUCRALFATE 1 G/1
1 TABLET ORAL EVERY 6 HOURS
Qty: 56 TABLET | Refills: 0 | Status: SHIPPED | OUTPATIENT
Start: 2021-06-16 | End: 2021-06-30

## 2021-06-16 RX ORDER — NICOTINE POLACRILEX 4 MG/1
40 GUM, CHEWING ORAL 2 TIMES DAILY
Qty: 112 TABLET | Refills: 0 | Status: SHIPPED | OUTPATIENT
Start: 2021-06-16 | End: 2021-07-16

## 2021-06-16 RX ORDER — OMEPRAZOLE 20 MG/1
40 CAPSULE, DELAYED RELEASE ORAL 2 TIMES DAILY
Status: DISCONTINUED | OUTPATIENT
Start: 2021-06-16 | End: 2021-06-17 | Stop reason: HOSPADM

## 2021-06-16 RX ORDER — POTASSIUM CHLORIDE 20 MEQ/1
40 TABLET, EXTENDED RELEASE ORAL ONCE
Status: COMPLETED | OUTPATIENT
Start: 2021-06-16 | End: 2021-06-16

## 2021-06-16 RX ADMIN — OMEPRAZOLE 40 MG: 20 CAPSULE, DELAYED RELEASE ORAL at 17:50

## 2021-06-16 RX ADMIN — OMEPRAZOLE 40 MG: 20 CAPSULE, DELAYED RELEASE ORAL at 08:39

## 2021-06-16 RX ADMIN — POTASSIUM CHLORIDE 40 MEQ: 1500 TABLET, EXTENDED RELEASE ORAL at 08:39

## 2021-06-16 RX ADMIN — SUCRALFATE 1 G: 1 TABLET ORAL at 04:45

## 2021-06-16 RX ADMIN — ACETAMINOPHEN 650 MG: 325 TABLET, FILM COATED ORAL at 23:40

## 2021-06-16 RX ADMIN — SUCRALFATE 1 G: 1 TABLET ORAL at 23:39

## 2021-06-16 RX ADMIN — SUCRALFATE 1 G: 1 TABLET ORAL at 17:49

## 2021-06-16 RX ADMIN — ACETAMINOPHEN 650 MG: 325 TABLET, FILM COATED ORAL at 04:45

## 2021-06-16 RX ADMIN — ACETAMINOPHEN 650 MG: 325 TABLET, FILM COATED ORAL at 17:59

## 2021-06-16 RX ADMIN — SUCRALFATE 1 G: 1 TABLET ORAL at 13:01

## 2021-06-16 ASSESSMENT — PAIN DESCRIPTION - PAIN TYPE
TYPE: ACUTE PAIN

## 2021-06-16 ASSESSMENT — ENCOUNTER SYMPTOMS
HEADACHES: 0
NAUSEA: 1
VOMITING: 0
DIARRHEA: 0
BACK PAIN: 0
CHILLS: 0
BLOOD IN STOOL: 0
SHORTNESS OF BREATH: 0
SORE THROAT: 0
PALPITATIONS: 0
CONSTIPATION: 0
FEVER: 0
NECK PAIN: 0
ABDOMINAL PAIN: 1
FOCAL WEAKNESS: 0

## 2021-06-16 NOTE — PROGRESS NOTES
[facility] Medicine Daily Progress Note    Date of Service  6/16/2021    Chief Complaint  45 y.o. female admitted 6/10/2021 with nausea vomiting, abdominal pain    Hospital Course  45-year-old female with history of diabetes, obesity hypertension hypothyroidism and obstructive sleep apnea presented 6/10 with abdominal pain, n/v. Pls refer to H&P for further details. CT scan showed portal venous gas with emphysematous gastritis, case was evaluated by general surgeon Dr. Gypsy Daniels who does not felt patient needs emergency surgery, Dr. Rafa Vaughn GI evaluated the patient and recommended continue pantoprazole drip with antibiotics. 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 Biopsy results pending. She was started on iron infusion too. Antibiotic discontinued 6/15.       Interval Problem Update  Still bloated and tender. Feeling better on empty stomach. hgb remain stable. Continue to monitor. Pending biopsy.   6/16- Dr. Castellon did check with two pathologist and biopsy is benign . hgb stable. Transition to oral. Pt still not able to tolerate food well. Observer overnight. No bleeding. Continue oral for now. Continue pain management     Consultants/Specialty  Surgery  GI    Code Status  Full Code    Disposition  Inpatient     Review of Systems  Review of Systems   Constitutional: Positive for malaise/fatigue. Negative for chills and fever.   HENT: Negative for sore throat.    Respiratory: Negative for shortness of breath.    Cardiovascular: Negative for chest pain, palpitations and leg swelling.   Gastrointestinal: Positive for abdominal pain and nausea. Negative for blood in stool, constipation, diarrhea, melena and vomiting.   Genitourinary:  Negative for urgency.   Musculoskeletal: Negative for back pain and neck pain.   Neurological: Negative for focal weakness and headaches.        Physical Exam  Temp:  [36.4 °C (97.5 °F)-37.4 °C (99.4 °F)] 36.4 °C (97.5 °F)  Pulse:  [60-66] 60  Resp:  [16-17] 17  BP: (118-142)/(66-72) 123/68  SpO2:  [92 %-97 %] 97 %    Physical Exam  Vitals and nursing note reviewed.   Constitutional:       Appearance: She is obese. She is ill-appearing.   HENT:      Head: Normocephalic and atraumatic.   Eyes:      General: No scleral icterus.  Cardiovascular:      Rate and Rhythm: Normal rate.      Heart sounds: No murmur heard.     Pulmonary:      Effort: Pulmonary effort is normal. No respiratory distress.      Breath sounds: No wheezing.   Abdominal:      General: Bowel sounds are normal. There is distension.      Palpations: Abdomen is soft. There is no mass.      Tenderness: There is abdominal tenderness. There is no guarding.   Musculoskeletal:         General: No swelling or tenderness.      Right lower leg: No edema.      Left lower leg: No edema.   Skin:     Coloration: Skin is pale.      Findings: No erythema.   Neurological:      Mental Status: She is oriented to person, place, and time.      Cranial Nerves: No cranial nerve deficit.   Psychiatric:         Mood and Affect: Mood normal.         Behavior: Behavior normal.         Thought Content: Thought content normal.         Judgment: Judgment normal.         Fluids    Intake/Output Summary (Last 24 hours) at 6/16/2021 1519  Last data filed at 6/16/2021 1200  Gross per 24 hour   Intake 1700 ml   Output --   Net 1700 ml       Laboratory  Recent Labs     06/15/21  0459 06/16/21  0436   WBC 9.8 9.3   RBC 4.36 4.64   HEMOGLOBIN 9.6* 10.1*   HEMATOCRIT 33.9* 35.8*   MCV 77.8* 77.2*   MCH 22.0* 21.8*   MCHC 28.3* 28.2*   RDW 54.5* 53.2*   PLATELETCT 180 193   MPV 11.3 11.0     Recent Labs     06/14/21  1019 06/16/21  0436   SODIUM 135 140   POTASSIUM 3.6 3.4*   CHLORIDE 104  "102   CO2 23 28   GLUCOSE 108* 133*   BUN 2* 2*   CREATININE 0.60 0.68   CALCIUM 7.9* 8.5                   Imaging  [id number] VIEW   Final Result         No specific finding to suggest small bowel obstruction.      CT-ABDOMEN-PELVIS WITH   Final Result         1.  Portal venous gas and gas within mesenteric veins around the stomach.   2.  There could be intramural air within the stomach and there is mild perigastric fat stranding raising concern for emphysematous gastritis. There may also be tiny focus of free air seen.   3.  Multiple dilated small bowel loops which are nonspecific and could be related to obstruction, ileus or enteritis.      These findings were discussed with SERGIO URIBE on 6/10/2021 11:33 PM.            DX-ABDOMEN COMPLETE WITH AP OR PA CXR   Final Result      1.  No acute cardiopulmonary abnormality identified.      2.  Enlargement of the cardiac silhouette           Assessment/Plan  * Emphysematous gastritis- (present on admission)  Assessment & Plan  Recent using ibuprofen so often plus daily consuming of \"Diet Coke\"  CT scan showed callus on portal vein with emphysematous gastritis  Zosyn with pantoprazole drip on admission  EGD 6/15/2021 showed marked edema, erosions, nodularity involving the stomach, concern for linitis plastica.  Biopsy per Dr. Castellon benign   She will need repeat EGD in 4 weeks  Continue pain management.       Iron deficiency anemia- (present on admission)  Assessment & Plan  Iron panel showed iron deficiency  IV iron was ordered  Patient states she has history of heavy.  Biopsy pending.  Concerned for linitis plastica    Hypertension- (present on admission)  Assessment & Plan  Lisinopril 5 mg daily, metoprolol SR 25 mg daily at home.  Hold them due to concern for GI bleed.    POLLY on CPAP- (present on admission)  Assessment & Plan  Patient is compliant with CPAP.  Continue home CPAP.    Hx of radioactive iodine thyroid ablation- (present on " admission)  Assessment & Plan  Continue home medications    Morbid obesity with BMI of 45.0-49.9, adult (HCC)- (present on admission)  Assessment & Plan  BMI 49 consistent with morbid obesity.    Counseled the patient on healthy lifestyle changes including healthy diet and regular exercise.  Consider bariatric referral as outpatient       VTE prophylaxis: SCD, due to concern for GI bleed

## 2021-06-16 NOTE — PROGRESS NOTES
Surgery General Daily Progress Note    Date of Service  6/16/2021    Chief Complaint  45 y.o. female admitted 6/10/2021 with emphysematous gastritis. Afebrile, HR 60s. Eating well, no nausea, no abdominal pain      Physical Exam  Temp:  [36.4 °C (97.5 °F)-37.4 °C (99.4 °F)] 36.4 °C (97.5 °F)  Pulse:  [60-66] 60  Resp:  [16-17] 17  BP: (118-142)/(66-72) 123/68  SpO2:  [92 %-97 %] 97 %    Physical Exam  NAD, appears well  Neck supple, no crepitus  Regular heart rate  Normal respiratory effort  Abdomen soft, NT  Ext ROM grossly intact  Skin pink and warm    Fluids    Intake/Output Summary (Last 24 hours) at 6/16/2021 1615  Last data filed at 6/16/2021 1200  Gross per 24 hour   Intake 1700 ml   Output --   Net 1700 ml       Laboratory  Recent Labs     06/15/21  0459 06/16/21  0436   WBC 9.8 9.3   RBC 4.36 4.64   HEMOGLOBIN 9.6* 10.1*   HEMATOCRIT 33.9* 35.8*   MCV 77.8* 77.2*   MCH 22.0* 21.8*   MCHC 28.3* 28.2*   RDW 54.5* 53.2*   PLATELETCT 180 193   MPV 11.3 11.0     Recent Labs     06/14/21  1019 06/16/21  0436   SODIUM 135 140   POTASSIUM 3.6 3.4*   CHLORIDE 104 102   CO2 23 28   GLUCOSE 108* 133*   BUN 2* 2*   CREATININE 0.60 0.68   CALCIUM 7.9* 8.5                     Assessment/Plan  46 y/o female with emphysematous gastritis, s/p EGD with bx, pathology negative   1. Appreciate medical care  2. GI following  3. No indications for surgical intervention  4. Please reconsult with any further questions or concerns

## 2021-06-16 NOTE — PROGRESS NOTES
Pt A&Ox4  Patient answers all questions appropriately and demonstrates proper use of call light      Patient rates pain as 2/10 upon assessment, denies need for pain intervention at this time. Patient resting comfortably in bed      Tolerating diet, denies n/v. +bowel sounds, +flatus, LBM 6/14     Saturating >90% on 2L O2 via NC   Patient denies SOB      Pt ambulates with x1 assistance      Patient verbalizes understanding to call when needing assistance      Updated on plan of care. Safety education provided. Bed locked in low. Call light within reach. Rounding in place.

## 2021-06-16 NOTE — DISCHARGE SUMMARY
Discharge Summary    CHIEF COMPLAINT ON ADMISSION  Chief Complaint   Patient presents with   • N/V   • Abdominal Pain     onset yesterday, increased today       Reason for Admission  ems     Admission Date  6/10/2021    CODE STATUS  Full Code    HPI & HOSPITAL COURSE    45-year-old female with history of diabetes, obesity hypertension hypothyroidism and obstructive sleep apnea presented 6/10 with abdominal pain, n/v. Pls refer to H&P for further details. CT scan showed portal venous gas with emphysematous gastritis, case was evaluated by general surgeon Dr. Gypsy Daniels who does not felt patient needs emergency surgery, Dr. Rafa Vaughn GI evaluated the patient and recommended continue pantoprazole drip with antibiotics. 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 Biopsy results discussed with pathology and irising per Dr. Castellon. She will need to follow up with GI as OP and repeat EGD in 4 weeks to make sure is healing.   While she was in the hospital she was started on iron infusion too. Antibiotic discontinued 6/15.   Hemoglobin remained stable. Surgery signed off   Pt will follow up with GI as OP. I did instructed to continue omeprazole and sucralfate.       Therefore, she is discharged in good and stable condition to home with close outpatient follow-up.    The patient met 2-midnight criteria for an inpatient stay at the time of discharge.    Discharge Date  06/17/2021    FOLLOW UP ITEMS POST DISCHARGE  None     DISCHARGE DIAGNOSES  Principal Problem:    Emphysematous gastritis POA: Yes  Active Problems:    Morbid obesity with BMI of 45.0-49.9, adult (HCC) POA: Yes    Hx of radioactive iodine thyroid ablation POA: Yes    POLLY on CPAP POA:  Yes      Overview: gets headaches, if not using CPAP.     Hypertension POA: Yes      Overview: Patient states prior HTN, but resolved after changes in other medications       (but off HTN meds).     Iron deficiency anemia POA: Yes  Resolved Problems:    Hypokalemia POA: Yes    Hypocalcemia POA: Yes      FOLLOW UP  No future appointments.  [facility]  [address]  [address]  [place] [id number] [phone]    Go on 6/11/2021  Please go to your primary care appointment on Friday, June 18 at 2:00 pm for a hospital follow-up.    Seven Castellon M.D.  [address] [place] [address]  [phone]    In 3 weeks        MEDICATIONS ON DISCHARGE     Medication List      START taking these medications      Instructions   omeprazole 40 MG delayed-release capsule  Commonly known as: PRILOSEC   Take 1 capsule by mouth 2 times a day for 30 days.  Dose: 40 mg     sucralfate 1 GM Tabs  Commonly known as: CARAFATE   Take 1 tablet by mouth every 6 hours for 14 days.  Dose: 1 g        CONTINUE taking these medications      Instructions   albuterol 108 (90 Base) MCG/ACT Aers inhalation aerosol   Inhale 2 Puffs by mouth every 6 hours as needed for Shortness of Breath.  Dose: 2 Puff     ferrous sulfate 325 (65 Fe) MG EC tablet   Take 325 mg by mouth every day.  Dose: 325 mg     lisinopril 5 MG Tabs  Commonly known as: PRINIVIL   Take 1 tablet by mouth every day.  Dose: 5 mg     meclizine 12.5 MG Tabs  Commonly known as: ANTIVERT   Take 1 tablet by mouth 3 times a day as needed for Dizziness.  Dose: 12.5 mg     metformin 1000 MG tablet  Commonly known as: GLUCOPHAGE   Take 1,000 mg by mouth every evening.  Dose: 1,000 mg     metoprolol SR 25 MG Tb24  Commonly known as: TOPROL XL   Take 0.5 Tablets by mouth every evening.  Dose: 12.5 mg     Tradjenta 5 MG Tabs tablet  Generic drug: linagliptin   Take 5 mg by mouth every day.  Dose: 5 mg     vitamin D (Ergocalciferol) 1.25 MG (07391 UT) Caps capsule  Commonly known as:  "DRISDOL   Take 50,000 Units by mouth every Sunday.  Dose: 50,000 Units            Allergies  Allergies   Allergen Reactions   • Morphine Vomiting and Nausea     \"I think\" \"I dunno what my reaction was, the nurse just said my oxygen levels were going way to low on it\" \"heart races\"       DIET  Orders Placed This Encounter   Procedures   • Diet Order Diet: Low Fiber(GI Soft)     Standing Status:   Standing     Number of Occurrences:   1     Order Specific Question:   Diet:     Answer:   Low Fiber(GI Soft) [2]       ACTIVITY  As tolerated.  Weight bearing as tolerated    CONSULTATIONS  General surgery   GI     PROCEDURES  EGD    LABORATORY  Lab Results   Component Value Date    SODIUM 140 06/16/2021    POTASSIUM 3.4 (L) 06/16/2021    CHLORIDE 102 06/16/2021    CO2 28 06/16/2021    GLUCOSE 133 (H) 06/16/2021    BUN 2 (L) 06/16/2021    CREATININE 0.68 06/16/2021    CREATININE 0.7 01/26/2009        Lab Results   Component Value Date    WBC 9.3 06/16/2021    HEMOGLOBIN 10.1 (L) 06/16/2021    HEMATOCRIT 35.8 (L) 06/16/2021    PLATELETCT 193 06/16/2021        Total time of the discharge process exceeds 35 minutes.  "

## 2021-06-16 NOTE — DISCHARGE PLANNING
Meds-to-Beds: Discharge prescription orders listed below delivered to patient's bedside. RN Nelida notified. Patient counseled.       OrtizChanelle stovall   Home Medication Instructions REEMA:[id number]    Printed on:06/16/21 1404   Medication Information                      omeprazole (PRILOSEC) 20 MG Tablet Delayed Response delayed-release tablet  Take 2 Tablets by mouth 2 times a day for 30 days.             sucralfate (CARAFATE) 1 GM Tab  Take 1 tablet by mouth every 6 hours for 14 days.                 Christel Alberts, PharmD

## 2021-06-16 NOTE — CARE PLAN
Problem: Nutrition  Goal: Enteral nutrition will be maintained or improve  Outcome: Progressing     The patient is Stable - Low risk of patient condition declining or worsening    Shift Goals  Clinical Goals: tolerate diet  Patient Goals: Rest    Progress made toward(s) clinical / shift goals:  Pt is increasing diet to GI soft. Pt encouraged to do mild soft food intake.    Patient is not progressing towards the following goals:

## 2021-06-17 ENCOUNTER — PATIENT OUTREACH (OUTPATIENT)
Dept: HEALTH INFORMATION MANAGEMENT | Facility: OTHER | Age: 46
End: 2021-06-17

## 2021-06-17 VITALS
HEIGHT: 63 IN | SYSTOLIC BLOOD PRESSURE: 110 MMHG | RESPIRATION RATE: 16 BRPM | HEART RATE: 89 BPM | DIASTOLIC BLOOD PRESSURE: 68 MMHG | WEIGHT: 285 LBS | OXYGEN SATURATION: 94 % | BODY MASS INDEX: 50.5 KG/M2 | TEMPERATURE: 98.4 F

## 2021-06-17 LAB
ALBUMIN SERPL BCP-MCNC: 3.4 G/DL (ref 3.2–4.9)
ALBUMIN/GLOB SERPL: 0.9 G/DL
ALP SERPL-CCNC: 90 U/L (ref 30–99)
ALT SERPL-CCNC: 19 U/L (ref 2–50)
ANION GAP SERPL CALC-SCNC: 10 MMOL/L (ref 7–16)
AST SERPL-CCNC: 22 U/L (ref 12–45)
BASOPHILS # BLD AUTO: 0.5 % (ref 0–1.8)
BASOPHILS # BLD: 0.06 K/UL (ref 0–0.12)
BILIRUB SERPL-MCNC: 0.3 MG/DL (ref 0.1–1.5)
BUN SERPL-MCNC: 4 MG/DL (ref 8–22)
CALCIUM SERPL-MCNC: 8.5 MG/DL (ref 8.5–10.5)
CHLORIDE SERPL-SCNC: 100 MMOL/L (ref 96–112)
CO2 SERPL-SCNC: 28 MMOL/L (ref 20–33)
CREAT SERPL-MCNC: 0.7 MG/DL (ref 0.5–1.4)
EOSINOPHIL # BLD AUTO: 0.09 K/UL (ref 0–0.51)
EOSINOPHIL NFR BLD: 0.8 % (ref 0–6.9)
ERYTHROCYTE [DISTWIDTH] IN BLOOD BY AUTOMATED COUNT: 53.1 FL (ref 35.9–50)
GLOBULIN SER CALC-MCNC: 3.6 G/DL (ref 1.9–3.5)
GLUCOSE BLD-MCNC: 122 MG/DL (ref 65–99)
GLUCOSE SERPL-MCNC: 132 MG/DL (ref 65–99)
HCT VFR BLD AUTO: 37.8 % (ref 37–47)
HGB BLD-MCNC: 10.7 G/DL (ref 12–16)
IMM GRANULOCYTES # BLD AUTO: 0.18 K/UL (ref 0–0.11)
IMM GRANULOCYTES NFR BLD AUTO: 1.6 % (ref 0–0.9)
LYMPHOCYTES # BLD AUTO: 2.24 K/UL (ref 1–4.8)
LYMPHOCYTES NFR BLD: 20.1 % (ref 22–41)
MCH RBC QN AUTO: 22 PG (ref 27–33)
MCHC RBC AUTO-ENTMCNC: 28.3 G/DL (ref 33.6–35)
MCV RBC AUTO: 77.8 FL (ref 81.4–97.8)
MONOCYTES # BLD AUTO: 0.89 K/UL (ref 0–0.85)
MONOCYTES NFR BLD AUTO: 8 % (ref 0–13.4)
NEUTROPHILS # BLD AUTO: 7.7 K/UL (ref 2–7.15)
NEUTROPHILS NFR BLD: 69 % (ref 44–72)
NRBC # BLD AUTO: 0 K/UL
NRBC BLD-RTO: 0 /100 WBC
PLATELET # BLD AUTO: 210 K/UL (ref 164–446)
PMV BLD AUTO: 11.4 FL (ref 9–12.9)
POTASSIUM SERPL-SCNC: 3.6 MMOL/L (ref 3.6–5.5)
PROT SERPL-MCNC: 7 G/DL (ref 6–8.2)
RBC # BLD AUTO: 4.86 M/UL (ref 4.2–5.4)
SODIUM SERPL-SCNC: 138 MMOL/L (ref 135–145)
WBC # BLD AUTO: 11.2 K/UL (ref 4.8–10.8)

## 2021-06-17 PROCEDURE — A9270 NON-COVERED ITEM OR SERVICE: HCPCS | Performed by: INTERNAL MEDICINE

## 2021-06-17 PROCEDURE — 82962 GLUCOSE BLOOD TEST: CPT

## 2021-06-17 PROCEDURE — 700102 HCHG RX REV CODE 250 W/ 637 OVERRIDE(OP): Performed by: STUDENT IN AN ORGANIZED HEALTH CARE EDUCATION/TRAINING PROGRAM

## 2021-06-17 PROCEDURE — 80053 COMPREHEN METABOLIC PANEL: CPT

## 2021-06-17 PROCEDURE — A9270 NON-COVERED ITEM OR SERVICE: HCPCS | Performed by: STUDENT IN AN ORGANIZED HEALTH CARE EDUCATION/TRAINING PROGRAM

## 2021-06-17 PROCEDURE — 85025 COMPLETE CBC W/AUTO DIFF WBC: CPT

## 2021-06-17 PROCEDURE — 99217 PR OBSERVATION CARE DISCHARGE: CPT | Performed by: INTERNAL MEDICINE

## 2021-06-17 PROCEDURE — G0378 HOSPITAL OBSERVATION PER HR: HCPCS

## 2021-06-17 PROCEDURE — 700102 HCHG RX REV CODE 250 W/ 637 OVERRIDE(OP): Performed by: INTERNAL MEDICINE

## 2021-06-17 RX ADMIN — OMEPRAZOLE 40 MG: 20 CAPSULE, DELAYED RELEASE ORAL at 04:51

## 2021-06-17 RX ADMIN — SUCRALFATE 1 G: 1 TABLET ORAL at 04:51

## 2021-06-17 ASSESSMENT — PAIN DESCRIPTION - PAIN TYPE
TYPE: ACUTE PAIN

## 2021-06-17 NOTE — CARE PLAN
Problem: Knowledge Deficit - Standard  Goal: Patient and family/care givers will demonstrate understanding of plan of care, disease process/condition, diagnostic tests and medications  Outcome: Progressing     Problem: Pain - Standard  Goal: Alleviation of pain or a reduction in pain to the patient’s comfort goal  Outcome: Progressing     Problem: Nutrition  Goal: Enteral nutrition will be maintained or improve  Outcome: Progressing     The patient is Stable - Low risk of patient condition declining or worsening    Shift Goals  Clinical Goals: tolerate diet, discharge  Patient Goals: Rest    Progress made toward(s) clinical / shift goals:  Pt tolerating diet well, no complaints of nausea. Pt discharging this AM.    Patient is not progressing towards the following goals:

## 2021-06-17 NOTE — CARE PLAN
The patient is Stable - Low risk of patient condition declining or worsening      Problem: Knowledge Deficit - Standard  Goal: Patient and family/care givers will demonstrate understanding of plan of care, disease process/condition, diagnostic tests and medications  Outcome: Progressing  Note: All questions and concerns addressed with patient, patient verbalizes understanding of current plan of care      Problem: Pain - Standard  Goal: Alleviation of pain or a reduction in pain to the patient’s comfort goal  Outcome: Progressing  Note: Patient's pain managed with prescribed medications and nonpharmaloigcal pain interventions. Education provided to patient about pain rating scale, prescribed medications and nonpharmalogical pain interventions. Patient verbalizes understanding of current plan of care       Shift Goals  Clinical Goals: Tolerate Diet   Patient Goals: Rest    Progress made toward(s) clinical / shift goals: Patient able to tolerate diet and rest comfortably throughout the night

## 2021-06-17 NOTE — DISCHARGE INSTRUCTIONS
Discharge Instructions    Discharged to home by car with relative. Discharged via wheelchair, hospital escort: Yes.  Special equipment needed: Not Applicable    Be sure to schedule a follow-up appointment with your primary care doctor or any specialists as instructed.     Discharge Plan:        I understand that a diet low in cholesterol, fat, and sodium is recommended for good health. Unless I have been given specific instructions below for another diet, I accept this instruction as my diet prescription.   Other diet: as instructed    Special Instructions:     Discharge Instructions per Irineo Gagnon M.D.  Continue omeprazole 40 mg twice a day  Continue sucralfate for 2 weeks  Avoid NSAIDs (ibuprofen, Motrin, Advil, meloxicam, etc), blood thinner, aspirin    DIET: Healthy, soft food, plenty of fluid, soups, broth for 2 weeks    ACTIVITY: As tolerated    DIAGNOSIS: Emphysematous gastritis    Return to ER if worsening abdominal pain, nausea, vomiting, fever, confusion, severe headache, diarrhea    · Is patient discharged on Warfarin / Coumadin?   No     Depression / Suicide Risk    As you are discharged from this [facility] facility, it is important to learn how to keep safe from harming yourself.    Recognize the warning signs:  · Abrupt changes in personality, positive or negative- including increase in energy   · Giving away possessions  · Change in eating patterns- significant weight changes-  positive or negative  · Change in sleeping patterns- unable to sleep or sleeping all the time   · Unwillingness or inability to communicate  · Depression  · Unusual sadness, discouragement and loneliness  · Talk of wanting to die  · Neglect of personal appearance   · Rebelliousness- reckless behavior  · Withdrawal from people/activities they love  · Confusion- inability to concentrate     If you or a loved one observes any of these behaviors or has concerns about self-harm, here's what you can do:  · Talk about it- your  feelings and reasons for harming yourself  · Remove any means that you might use to hurt yourself (examples: pills, rope, extension cords, firearm)  · Get professional help from the community (Mental Health, Substance Abuse, psychological counseling)  · Do not be alone:Call your Safe Contact- someone whom you trust who will be there for you.  · Call your local CRISIS HOTLINE 334-6210 or 370-112-8367  · Call your local Children's Mobile Crisis Response Team [place] (413) 156-3535 or www[employer]MENTS  · Call the toll free National Suicide Prevention Hotlines   · National Suicide Prevention Lifeline 715-051-LNPG (5945)  · [employer] Line Network 800-SUICIDE (349-2788)        Gastritis, Adult  Gastritis is inflammation of the stomach. There are two kinds of gastritis:  · Acute gastritis. This kind develops suddenly.  · Chronic gastritis. This kind is much more common and lasts for a long time.  Gastritis happens when the lining of the stomach becomes weak or gets damaged. Without treatment, gastritis can lead to stomach bleeding and ulcers.  What are the causes?  This condition may be caused by:  · An infection.  · Drinking too much alcohol.  · Certain medicines. These include steroids, antibiotics, and some over-the-counter medicines, such as aspirin or ibuprofen.  · Having too much acid in the stomach.  · A disease of the intestines or stomach.  · Stress.  · An allergic reaction.  · Crohn's disease.  · Some cancer treatments (radiation).  Sometimes the cause of this condition is not known.  What are the signs or symptoms?  Symptoms of this condition include:  · Pain or a burning sensation in the upper abdomen.  · Nausea.  · Vomiting.  · An uncomfortable feeling of fullness after eating.  · Weight loss.  · Bad breath.  · Blood in your vomit or stools.  In some cases, there are no symptoms.  How is this diagnosed?  This condition may be diagnosed with:  · Your medical history and a description of your  symptoms.  · A physical exam.  · Tests. These can include:  ? Blood tests.  ? Stool tests.  ? A test in which a thin, flexible instrument with a light and a camera is passed down the esophagus and into the stomach (upper endoscopy).  ? A test in which a sample of tissue is taken for testing (biopsy).  How is this treated?  This condition may be treated with medicines. The medicines that are used vary depending on the cause of the gastritis:  · If the condition is caused by a bacterial infection, you may be given antibiotic medicines.  · If the condition is caused by too much acid in the stomach, you may be given medicines called H2 blockers, proton pump inhibitors, or antacids.  Treatment may also involve stopping the use of certain medicines, such as aspirin, ibuprofen, or other NSAIDs.  Follow these instructions at home:  Medicines  · Take over-the-counter and prescription medicines only as told by your health care provider.  · If you were prescribed an antibiotic medicine, take it as told by your health care provider. Do not stop taking the antibiotic even if you start to feel better.  Eating and drinking    · Eat small, frequent meals instead of large meals.  · Avoid foods and drinks that make your symptoms worse.  · Drink enough fluid to keep your urine pale yellow.  Alcohol use  · Do not drink alcohol if:  ? Your health care provider tells you not to drink.  ? You are pregnant, may be pregnant, or are planning to become pregnant.  · If you drink alcohol:  ? Limit your use to:  § 0-1 drink a day for women.  § 0-2 drinks a day for men.  ? Be aware of how much alcohol is in your drink. In the [place] one drink equals one 12 oz bottle of beer (355 mL), one 5 oz glass of wine (148 mL), or one 1½ oz glass of hard liquor (44 mL).  General instructions  · Talk with your health care provider about ways to manage stress, such as getting regular exercise or practicing deep breathing, meditation, or yoga.  · Do not use any  products that contain nicotine or tobacco, such as cigarettes and e-cigarettes. If you need help quitting, ask your health care provider.  · Keep all follow-up visits as told by your health care provider. This is important.  Contact a health care provider if:  · Your symptoms get worse.  · Your symptoms return after treatment.  Get help right away if:  · You vomit blood or material that looks like coffee grounds.  · You have black or dark red stools.  · You are unable to keep fluids down.  · Your abdominal pain gets worse.  · You have a fever.  · You do not feel better after one week.  Summary  · Gastritis is inflammation of the lining of the stomach that can occur suddenly (acute) or develop slowly over time (chronic).  · This condition is diagnosed with a medical history, a physical exam, or tests.  · This condition may be treated with medicines to treat infection or medicines to reduce the amount of acid in your stomach.  · Follow your health care provider's instructions about taking medicines, making changes to your diet, and knowing when to call for help.  This information is not intended to replace advice given to you by your health care provider. Make sure you discuss any questions you have with your health care provider.  Document Released: 12/12/2002 Document Revised: 05/07/2019 Document Reviewed: 05/07/2019  [employer] Patient Education © 2020 [employer]

## 2021-06-17 NOTE — PROGRESS NOTES
6/16: CHW received a call back from [facility] stating that pt will be picked up from home at 1:30 pm on Friday 6/18 for her 2:00 pm appt. Pt notified via VM and text message.

## 2021-06-17 NOTE — PROGRESS NOTES
Pt A&Ox4  Patient answers all questions appropriately and demonstrates proper use of call light      Patient rates pain as 2/10 upon assessment, denies need for pain intervention at this time. Patient resting comfortably in bed      Tolerating diet, denies n/v. +bowel sounds, +flatus, LBM 6/14     Saturating >90% on 2L O2 via NC   Patient denies SOB      Pt ambulates independently with a steady gait       Patient verbalizes understanding to call when needing assistance      Updated on plan of care. Safety education provided. Bed locked in low. Call light within reach. Rounding in place.

## 2021-06-17 NOTE — PROGRESS NOTES
Assessment complete.  AA&Ox4.   RA. Denies SOB.  Denies any pain.  Tolerating CHO GI soft diet. Denies N/V.  + void.   LBM 6/16.   Pt upself.  All needs met at this time. Call light within reach. Pt calls appropriately.

## 2021-06-18 ENCOUNTER — PATIENT OUTREACH (OUTPATIENT)
Dept: HEALTH INFORMATION MANAGEMENT | Facility: OTHER | Age: 46
End: 2021-06-18

## 2021-06-21 NOTE — PROGRESS NOTES
RN Transitional Care Management discharge follow up call completed. Patient has not been able to  all medications at this time, discussed pharmacy delivery options. Patient has questions regarding restarting levothyroxine and DM medications, patient states metformin causes stomach discomfort at times. Last A1C in Epic is from 2020, patient reports most recent A1C is 6.5. Encouraged her to either discuss DM regimen with PCP or request endocrinology referral in order to find regimen that will help her maintain good control without side effects. Patient had discharge follow up appointment scheduled with PCP last Friday, 6/18/21, encouraged patient to call [facility] to schedule follow up appointment. Provided CCM RN contact number for any additional questions/concerns. Please route chart back to RN if additional follow up is needed/requested.     Thank you!

## 2021-06-28 ENCOUNTER — PATIENT OUTREACH (OUTPATIENT)
Dept: HEALTH INFORMATION MANAGEMENT | Facility: OTHER | Age: 46
End: 2021-06-28

## 2021-06-28 NOTE — PROGRESS NOTES
Community Health Worker Follow-Up    • Contact information provided to Chanellenilda Ortiz   • Scheduled Food Delivery: 7/2/21  • Outpatient assessment completed.  • Did the patient receive medications post discharge: Yes    LY Senior reached out to pt via TC to f/u after d/c. Pt stated she was doing okay and had seen her PCP on 6/18. She utilized [facility]'s transportation service and said it went very well. CHW explained to pt how to set-up transportation when she has appts at [facility]. Pt mentioned leaving a VM with [facility] and is waiting for a respond to schedule an appt. She has not been taking her metformin due to the side-effects she still experiences from it and mentioned discussing this with her PCP but did not change her regimen. Pt declined speaking with CCM RN Rita at this time but will reach out for any additional concerns. Pt was reminded of food bank and will be added to delivery this Friday 7/2. Food Rx completed. No other needs at this time.     Plan: f/u with pt for 30-day program 4th visit in a couple weeks.

## 2021-07-06 ENCOUNTER — PATIENT OUTREACH (OUTPATIENT)
Dept: HEALTH INFORMATION MANAGEMENT | Facility: OTHER | Age: 46
End: 2021-07-06

## 2021-07-06 NOTE — PROGRESS NOTES
LY Senior reached out to pt via TC for 30-day program 4th visit. Pt stated she was doing well and was able to schedule an appt with [facility]. Declined any health or medications related questions/concerns. CHW reminded pt of [employer] [facility] hours. No other needs at this time. CHW will d/c from [facility] services due to needs met 7/6.

## 2021-07-16 ENCOUNTER — HOSPITAL ENCOUNTER (EMERGENCY)
Facility: MEDICAL CENTER | Age: 46
End: 2021-07-17
Attending: EMERGENCY MEDICINE
Payer: MEDICAID

## 2021-07-16 ENCOUNTER — APPOINTMENT (OUTPATIENT)
Dept: RADIOLOGY | Facility: MEDICAL CENTER | Age: 46
End: 2021-07-16
Attending: EMERGENCY MEDICINE
Payer: MEDICAID

## 2021-07-16 DIAGNOSIS — R07.9 CHEST PAIN, UNSPECIFIED TYPE: ICD-10-CM

## 2021-07-16 LAB
ALBUMIN SERPL BCP-MCNC: 3.9 G/DL (ref 3.2–4.9)
ALBUMIN/GLOB SERPL: 1.2 G/DL
ALP SERPL-CCNC: 101 U/L (ref 30–99)
ALT SERPL-CCNC: 15 U/L (ref 2–50)
ANION GAP SERPL CALC-SCNC: 12 MMOL/L (ref 7–16)
ANISOCYTOSIS BLD QL SMEAR: ABNORMAL
AST SERPL-CCNC: 17 U/L (ref 12–45)
BASOPHILS # BLD AUTO: 1.1 % (ref 0–1.8)
BASOPHILS # BLD: 0.1 K/UL (ref 0–0.12)
BILIRUB SERPL-MCNC: 0.2 MG/DL (ref 0.1–1.5)
BUN SERPL-MCNC: 12 MG/DL (ref 8–22)
CALCIUM SERPL-MCNC: 8.7 MG/DL (ref 8.5–10.5)
CHLORIDE SERPL-SCNC: 104 MMOL/L (ref 96–112)
CO2 SERPL-SCNC: 24 MMOL/L (ref 20–33)
COMMENT 1642: NORMAL
CREAT SERPL-MCNC: 0.62 MG/DL (ref 0.5–1.4)
EOSINOPHIL # BLD AUTO: 0.2 K/UL (ref 0–0.51)
EOSINOPHIL NFR BLD: 2.3 % (ref 0–6.9)
ERYTHROCYTE [DISTWIDTH] IN BLOOD BY AUTOMATED COUNT: 73.9 FL (ref 35.9–50)
GLOBULIN SER CALC-MCNC: 3.3 G/DL (ref 1.9–3.5)
GLUCOSE SERPL-MCNC: 162 MG/DL (ref 65–99)
HCT VFR BLD AUTO: 40 % (ref 37–47)
HGB BLD-MCNC: 12.2 G/DL (ref 12–16)
IMM GRANULOCYTES # BLD AUTO: 0.04 K/UL (ref 0–0.11)
IMM GRANULOCYTES NFR BLD AUTO: 0.5 % (ref 0–0.9)
LIPASE SERPL-CCNC: 36 U/L (ref 11–82)
LYMPHOCYTES # BLD AUTO: 2.82 K/UL (ref 1–4.8)
LYMPHOCYTES NFR BLD: 31.8 % (ref 22–41)
MACROCYTES BLD QL SMEAR: ABNORMAL
MCH RBC QN AUTO: 25.6 PG (ref 27–33)
MCHC RBC AUTO-ENTMCNC: 30.5 G/DL (ref 33.6–35)
MCV RBC AUTO: 83.9 FL (ref 81.4–97.8)
MICROCYTES BLD QL SMEAR: ABNORMAL
MONOCYTES # BLD AUTO: 0.52 K/UL (ref 0–0.85)
MONOCYTES NFR BLD AUTO: 5.9 % (ref 0–13.4)
MORPHOLOGY BLD-IMP: NORMAL
NEUTROPHILS # BLD AUTO: 5.18 K/UL (ref 2–7.15)
NEUTROPHILS NFR BLD: 58.4 % (ref 44–72)
NRBC # BLD AUTO: 0 K/UL
NRBC BLD-RTO: 0 /100 WBC
NT-PROBNP SERPL IA-MCNC: 22 PG/ML (ref 0–125)
PLATELET # BLD AUTO: 214 K/UL (ref 164–446)
PLATELET BLD QL SMEAR: NORMAL
PMV BLD AUTO: 11.5 FL (ref 9–12.9)
POTASSIUM SERPL-SCNC: 3.5 MMOL/L (ref 3.6–5.5)
PROT SERPL-MCNC: 7.2 G/DL (ref 6–8.2)
RBC # BLD AUTO: 4.77 M/UL (ref 4.2–5.4)
RBC BLD AUTO: PRESENT
SODIUM SERPL-SCNC: 140 MMOL/L (ref 135–145)
TROPONIN T SERPL-MCNC: <6 NG/L (ref 6–19)
WBC # BLD AUTO: 8.9 K/UL (ref 4.8–10.8)

## 2021-07-16 PROCEDURE — 83690 ASSAY OF LIPASE: CPT

## 2021-07-16 PROCEDURE — 85025 COMPLETE CBC W/AUTO DIFF WBC: CPT

## 2021-07-16 PROCEDURE — 93005 ELECTROCARDIOGRAM TRACING: CPT | Performed by: EMERGENCY MEDICINE

## 2021-07-16 PROCEDURE — 84484 ASSAY OF TROPONIN QUANT: CPT

## 2021-07-16 PROCEDURE — 99285 EMERGENCY DEPT VISIT HI MDM: CPT

## 2021-07-16 PROCEDURE — 74022 RADEX COMPL AQT ABD SERIES: CPT

## 2021-07-16 PROCEDURE — 83880 ASSAY OF NATRIURETIC PEPTIDE: CPT

## 2021-07-16 PROCEDURE — 80053 COMPREHEN METABOLIC PANEL: CPT

## 2021-07-16 RX ORDER — ASPIRIN 81 MG/1
324 TABLET, CHEWABLE ORAL ONCE
Status: DISCONTINUED | OUTPATIENT
Start: 2021-07-16 | End: 2021-07-17 | Stop reason: HOSPADM

## 2021-07-16 ASSESSMENT — FIBROSIS 4 INDEX: FIB4 SCORE: 1.081531316818362611

## 2021-07-16 ASSESSMENT — PAIN DESCRIPTION - DESCRIPTORS: DESCRIPTORS: SHARP;OTHER (COMMENT)

## 2021-07-17 VITALS
OXYGEN SATURATION: 97 % | DIASTOLIC BLOOD PRESSURE: 80 MMHG | HEIGHT: 64 IN | WEIGHT: 275 LBS | HEART RATE: 70 BPM | RESPIRATION RATE: 14 BRPM | BODY MASS INDEX: 46.95 KG/M2 | SYSTOLIC BLOOD PRESSURE: 126 MMHG | TEMPERATURE: 97.7 F

## 2021-07-17 LAB
EKG IMPRESSION: NORMAL
TROPONIN T SERPL-MCNC: <6 NG/L (ref 6–19)

## 2021-07-17 NOTE — ED PROVIDER NOTES
ED Provider Note        Primary care provider: CHANCE [place]    I verified that the patient was wearing a mask and I was wearing appropriate PPE every time I entered the room. The patient's mask was on the patient at all times during my encounter except for a brief view of the oropharynx.      CHIEF COMPLAINT  Chief Complaint   Patient presents with   • Chest Pain       HPI  Chanelle Ortiz is a 45 y.o. female who presents to the Emergency Department with chief complaint of chest and abdominal pain.  Patient reports that she was eating dinner this evening when she suddenly got a squeezing sensation in her chest that went somewhat up in the left upper side of her chest but more so went into her mid epigastrium and left lower quadrant of abdomen.  Minor nausea no emesis she states it does hurt when she takes a deep breath but no shortness of breath.  She has had no fevers no chills no cough no congestion no constipation no diarrhea no urination abnormalities pain is not exertional she has had no pain or swelling in her lower extremities no tearing sensation was not maximal at onset she states that she has had similar pain like this in the past.  She also reports that she has not been taking any diabetes medication because her sugars were previously high but recently been low on her check over the last couple days she states they have been between 98 and 100.    REVIEW OF SYSTEMS  10 systems reviewed and otherwise negative, pertinent positives and negatives listed in the history of present illness.    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    SURGICAL HISTORY   has a past  "surgical history that includes other abdominal surgery (2002); gyn surgery (2004); other; primary c section; ovarian cystectomy (1992); cholecystectomy; primary c section; upper gi endoscopy,diagnosis (N/A, 6/14/2021); and upper gi endoscopy,biopsy (N/A, 6/14/2021).    SOCIAL HISTORY  Social History     Tobacco Use   • Smoking status: Never Smoker   • Smokeless tobacco: Never Used   Vaping Use   • Vaping Use: Never used   Substance Use Topics   • Alcohol use: No   • Drug use: No      Social History     Substance and Sexual Activity   Drug Use No       FAMILY HISTORY  Non-Contributory    CURRENT MEDICATIONS  Home Medications    **Home medications have not yet been reviewed for this encounter**         ALLERGIES  Allergies   Allergen Reactions   • Morphine Vomiting and Nausea     \"I think\" \"I dunno what my reaction was, the nurse just said my oxygen levels were going way to low on it\" \"heart races\"       PHYSICAL EXAM  VITAL SIGNS: /66   Pulse 80   Ht 1.626 m (5' 4\")   Wt 125 kg (275 lb)   SpO2 92%   BMI 47.20 kg/m²   Pulse ox interpretation: I interpret this pulse ox as normal.  Constitutional: Alert and oriented x 3, no acute distress  HEENT: Atraumatic normocephalic, pupils are equal round, extraocular movements are intact. The nares is clear, external ears are normal, mouth shows moist mucous membranes  Neck: no obvious JVD or tracheal deviation  Cardiovascular: Regular rate and rhythm no murmur rub or gallop   Thorax & Lungs: No respiratory distress, no wheezes rales or rhonchi, No chest tenderness.   GI: Soft nontender nondistended positive bowel sounds, no peritoneal signs  Skin: Warm dry no obvious acute rash or lesion  Musculoskeletal: Moving all extremities with normal range strength, no acute  deformity  Neurologic: Cranial nerves III through XII are grossly intact, no sensory deficit, no cerebellar dysfunction   Psychiatric: Appropriate affect for situation at this time      DIAGNOSTIC STUDIES / " PROCEDURES  LABS      Results for orders placed or performed during the hospital encounter of 07/16/21   CBC w/ Differential   Result Value Ref Range    WBC 8.9 4.8 - 10.8 K/uL    RBC 4.77 4.20 - 5.40 M/uL    Hemoglobin 12.2 12.0 - 16.0 g/dL    Hematocrit 40.0 37.0 - 47.0 %    MCV 83.9 81.4 - 97.8 fL    MCH 25.6 (L) 27.0 - 33.0 pg    MCHC 30.5 (L) 33.6 - 35.0 g/dL    RDW 73.9 (H) 35.9 - 50.0 fL    Platelet Count 214 164 - 446 K/uL    MPV 11.5 9.0 - 12.9 fL    Neutrophils-Polys 58.40 44.00 - 72.00 %    Lymphocytes 31.80 22.00 - 41.00 %    Monocytes 5.90 0.00 - 13.40 %    Eosinophils 2.30 0.00 - 6.90 %    Basophils 1.10 0.00 - 1.80 %    Immature Granulocytes 0.50 0.00 - 0.90 %    Nucleated RBC 0.00 /100 WBC    Neutrophils (Absolute) 5.18 2.00 - 7.15 K/uL    Lymphs (Absolute) 2.82 1.00 - 4.80 K/uL    Monos (Absolute) 0.52 0.00 - 0.85 K/uL    Eos (Absolute) 0.20 0.00 - 0.51 K/uL    Baso (Absolute) 0.10 0.00 - 0.12 K/uL    Immature Granulocytes (abs) 0.04 0.00 - 0.11 K/uL    NRBC (Absolute) 0.00 K/uL    Anisocytosis 1+     Macrocytosis 1+     Microcytosis 1+    Complete Metabolic Panel (CMP)   Result Value Ref Range    Sodium 140 135 - 145 mmol/L    Potassium 3.5 (L) 3.6 - 5.5 mmol/L    Chloride 104 96 - 112 mmol/L    Co2 24 20 - 33 mmol/L    Anion Gap 12.0 7.0 - 16.0    Glucose 162 (H) 65 - 99 mg/dL    Bun 12 8 - 22 mg/dL    Creatinine 0.62 0.50 - 1.40 mg/dL    Calcium 8.7 8.5 - 10.5 mg/dL    AST(SGOT) 17 12 - 45 U/L    ALT(SGPT) 15 2 - 50 U/L    Alkaline Phosphatase 101 (H) 30 - 99 U/L    Total Bilirubin 0.2 0.1 - 1.5 mg/dL    Albumin 3.9 3.2 - 4.9 g/dL    Total Protein 7.2 6.0 - 8.2 g/dL    Globulin 3.3 1.9 - 3.5 g/dL    A-G Ratio 1.2 g/dL   proBrain Natriuretic Peptide, NT   Result Value Ref Range    NT-proBNP 22 0 - 125 pg/mL   Troponin STAT   Result Value Ref Range    Troponin T <6 6 - 19 ng/L   Lipase   Result Value Ref Range    Lipase 36 11 - 82 U/L   ESTIMATED GFR   Result Value Ref Range    GFR If   American >60 >60 mL/min/1.73 m 2    GFR If Non African American >60 >60 mL/min/1.73 m 2   PERIPHERAL SMEAR REVIEW   Result Value Ref Range    Peripheral Smear Review see below    PLATELET ESTIMATE   Result Value Ref Range    Plt Estimation Normal    MORPHOLOGY   Result Value Ref Range    RBC Morphology Present    DIFFERENTIAL COMMENT   Result Value Ref Range    Comments-Diff see below    TROPONIN   Result Value Ref Range    Troponin T <6 6 - 19 ng/L   EKG   Result Value Ref Range    Report       [facility] Emergency Dept.    Test Date:  2021  Pt Name:    PRATIBHA CUELLAR              Department: ER  MRN:        [id number]                      Room:        13  Gender:     Female                       Technician: [id number]  :        [id number]                   Requested By:DESTINY FROST  Order #:    [id number]                    Reading MD: DESTINY FROST MD    Measurements  Intervals                                Axis  Rate:       75                           P:          42  AL:         160                          QRS:        56  QRSD:       84                           T:          -1  QT:         392  QTc:        438    Interpretive Statements  SINUS RHYTHM  BORDERLINE T ABNORMALITIES, ANTERIOR LEADS  Compared to ECG 2021 23:24:58  T-wave abnormality now present  Electronically Signed On 2021 7:40:30 PDT by DESTINY FROST MD           All labs reviewed by me.      RADIOLOGY  DX-ABDOMEN COMPLETE WITH AP OR PA CXR   Final Result         1. No acute abnormality detected.        The radiologist's interpretation of all radiological studies have been reviewed by me.    COURSE & MEDICAL DECISION MAKING  Pertinent Labs & Imaging studies reviewed. (See chart for details)    9:59 PM - Patient seen and examined at bedside.         Patient noted to have slightly elevated blood pressure likely circumstantial secondary to presenting complaint. Referred to primary care  "physician for further evaluation.      Medical Decision Making: EKG nonischemic troponin negative x2 patient longstanding history of similar presentations abdominal films are unremarkable the rest of her labs are reassuring she had stress test within the last few months that was unremarkable.  Patient given instructions to return for any return of chest pain shortness of breath any worsening abdominal pain any other acute symptoms or concerns she is a heart score of 2 she is discharged in stable and improved condition to follow-up with primary care.  /66   Pulse 80   Ht 1.626 m (5' 4\")   Wt 125 kg (275 lb)   SpO2 92%   BMI 47.20 kg/m²             Wilfrid White M.D.  [address] [address]  [phone]    In 2 days      [facility], Emergency Dept  [address] [address] [phone]    in 12-24 hours if symptoms persist, immediately If symptoms worsen, or if you develop any other symptoms or concerns        FINAL IMPRESSION  1. Chest pain, unspecified type     2.  Abdominal pain    This dictation has been created using voice recognition software and/or scribes. The accuracy of the dictation is limited by the abilities of the software and the expertise of the scribes. I expect there may be some errors of grammar and possibly content. I made every attempt to manually correct the errors within my dictation. However, errors related to voice recognition software and/or scribes may still exist and should be interpreted within the appropriate context.            "

## 2021-07-17 NOTE — ED TRIAGE NOTES
"Chanelle Ortiz  Pt bib EMS. Pt changed into gown and placed on monitor.     Chief Complaint   Patient presents with   • Chest Pain     LEFT sided chest pain that radiates to umbilical region     Per EMS, pt was brought in from home d/t the above complaint. Pt reports LEFT sided chest pain described as sharp and squeezing that radiates to the umbilical region. Pt states pain started around 2000 tonight. Pt reports she has been getting the pain only after eating or drinking something. Pt states \"if I have an empty stomach I don't have any problems, but once I eat something it happens and then the pain sticks around for awhile.\" Per EMS, pt reports increased pain with deep inspiration. EMS reports BS of 135. Pt reports taking 324mg asa prior to EMS arrival. During transport pt was given 4mg zofran IV and 0.4mg nitro sublingual. Pt reports pain went from 6/10 to 5/10 after nitro. VSS upon arrival to ED, NAD noted at this time. PIV established pta, blood drawn and sent to lab.   PMH- DM II; HTN; GERD; hypothyroid  Chart up and ready for ERP now.     "

## 2021-07-17 NOTE — ED NOTES
Pt discharged home. Explained discharge instructions. Questions and comments addressed. Pt verbalized understanding of instructions. Pt advised to follow-up with Oncologist or return to ED for any new or worsening of symptoms. Pt is ambulating well and steady on feet. VS stable. Pt's daughter at bedside and will be driving pt home. '

## 2021-09-08 ENCOUNTER — HOSPITAL ENCOUNTER (EMERGENCY)
Facility: MEDICAL CENTER | Age: 46
End: 2021-09-08
Attending: EMERGENCY MEDICINE
Payer: MEDICAID

## 2021-09-08 VITALS
SYSTOLIC BLOOD PRESSURE: 137 MMHG | RESPIRATION RATE: 16 BRPM | DIASTOLIC BLOOD PRESSURE: 79 MMHG | BODY MASS INDEX: 46.95 KG/M2 | WEIGHT: 275 LBS | HEIGHT: 64 IN | TEMPERATURE: 97.2 F | OXYGEN SATURATION: 96 % | HEART RATE: 75 BPM

## 2021-09-08 DIAGNOSIS — R30.0 DYSURIA: ICD-10-CM

## 2021-09-08 LAB
APPEARANCE UR: CLEAR
BACTERIA #/AREA URNS HPF: ABNORMAL /HPF
BILIRUB UR QL STRIP.AUTO: NEGATIVE
COLOR UR: YELLOW
EPI CELLS #/AREA URNS HPF: ABNORMAL /HPF
GLUCOSE UR STRIP.AUTO-MCNC: NEGATIVE MG/DL
HYALINE CASTS #/AREA URNS LPF: ABNORMAL /LPF
KETONES UR STRIP.AUTO-MCNC: NEGATIVE MG/DL
LEUKOCYTE ESTERASE UR QL STRIP.AUTO: ABNORMAL
MICRO URNS: ABNORMAL
NITRITE UR QL STRIP.AUTO: NEGATIVE
PH UR STRIP.AUTO: 5.5 [PH] (ref 5–8)
PROT UR QL STRIP: NEGATIVE MG/DL
RBC # URNS HPF: ABNORMAL /HPF
RBC UR QL AUTO: NEGATIVE
RENAL EPI CELLS #/AREA URNS HPF: ABNORMAL /HPF
S PYO DNA SPEC NAA+PROBE: NOT DETECTED
SP GR UR STRIP.AUTO: 1.02
TRANS CELLS #/AREA URNS HPF: ABNORMAL /HPF
UROBILINOGEN UR STRIP.AUTO-MCNC: 0.2 MG/DL
WBC #/AREA URNS HPF: ABNORMAL /HPF

## 2021-09-08 PROCEDURE — 700102 HCHG RX REV CODE 250 W/ 637 OVERRIDE(OP): Performed by: EMERGENCY MEDICINE

## 2021-09-08 PROCEDURE — A9270 NON-COVERED ITEM OR SERVICE: HCPCS | Performed by: EMERGENCY MEDICINE

## 2021-09-08 PROCEDURE — 81001 URINALYSIS AUTO W/SCOPE: CPT

## 2021-09-08 PROCEDURE — 87651 STREP A DNA AMP PROBE: CPT

## 2021-09-08 PROCEDURE — 99284 EMERGENCY DEPT VISIT MOD MDM: CPT

## 2021-09-08 RX ORDER — CEFDINIR 300 MG/1
300 CAPSULE ORAL 2 TIMES DAILY
Qty: 20 CAPSULE | Refills: 0 | Status: SHIPPED | OUTPATIENT
Start: 2021-09-08 | End: 2022-08-17

## 2021-09-08 RX ORDER — CEFDINIR 300 MG/1
300 CAPSULE ORAL ONCE
Status: COMPLETED | OUTPATIENT
Start: 2021-09-08 | End: 2021-09-08

## 2021-09-08 RX ADMIN — CEFDINIR 300 MG: 300 CAPSULE ORAL at 12:24

## 2021-09-08 ASSESSMENT — FIBROSIS 4 INDEX: FIB4 SCORE: 0.92

## 2021-09-08 NOTE — ED NOTES
Discharge teaching and paperwork provided regarding dysuria and all questions/concerns answered. VSS, urinary assessment stable. Given information regarding cefdinir Rx. Patient discharged to the care of self and ambulated out of the ED.

## 2021-09-08 NOTE — ED PROVIDER NOTES
ED Provider Note    This patient was cared for during the COVID-19 pandemic. History and physical exam may be limited/truncated by the inherent challenges of PPE and the need to decrease staff exposure to novel coronavirus. Some aspects of disease management may be different to protect staff and help slow the spread of disease. I verified that, if possible, the patient was wearing a mask and I was wearing appropriate PPE every time I encountered the patient.       CHIEF COMPLAINT  Chief Complaint   Patient presents with   • Sore Throat   • Pelvic Pain     vaginal itching   • Flank Pain       HPI  Chanelle Ortiz is a 45 y.o. female who presents with some slight lower abdominal pain and lower back pain and sore throat.  Patient says the symptoms have been going on for about a week.  She does report dysuria as well.  She has had a slight sore throat.  No fevers or chills no cough.        REVIEW OF SYSTEMS  Positive for lower abdominal pain lower back pain dysuria, negative for fevers.     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    SOCIAL HISTORY  Social History     Tobacco Use   • Smoking status: Never Smoker   • Smokeless tobacco: Never Used   Vaping Use   • Vaping Use: Never used   Substance and Sexual Activity   • Alcohol use: No   • Drug use: No   • Sexual activity: Not on file     Comment: .  Tuboligation in 2004.        SURGICAL HISTORY   has a past surgical history that includes other abdominal surgery (2002); gyn surgery (2004); other; primary c section; ovarian cystectomy (1992); cholecystectomy; primary c section; upper gi endoscopy,diagnosis (N/A, 6/14/2021); and  "upper gi endoscopy,biopsy (N/A, 6/14/2021).    CURRENT MEDICATIONS  Home Medications     Reviewed by Domi Lopez R.N. (Registered Nurse) on 09/08/21 at 1001  Med List Status: Partial   Medication Last Dose Status   albuterol 108 (90 Base) MCG/ACT Aero Soln inhalation aerosol  Active   ferrous sulfate 325 (65 Fe) MG EC tablet  Active   lisinopril (PRINIVIL) 5 MG Tab  Active   meclizine (ANTIVERT) 12.5 MG Tab  Active   metformin (GLUCOPHAGE) 1000 MG tablet  Active   metoprolol SR (TOPROL XL) 25 MG TABLET SR 24 HR  Active   TRADJENTA 5 MG Tab tablet  Active   vitamin D, Ergocalciferol, (DRISDOL) 1.25 MG (74455 UT) Cap capsule  Active                ALLERGIES  Allergies   Allergen Reactions   • Morphine Vomiting and Nausea     \"I think\" \"I dunno what my reaction was, the nurse just said my oxygen levels were going way to low on it\" \"heart races\"       PHYSICAL EXAM  VITAL SIGNS: /79   Pulse 75   Temp 36.2 °C (97.2 °F) (Temporal)   Resp 16   Ht 1.626 m (5' 4\")   Wt 125 kg (275 lb)   LMP 12/07/2020   SpO2 96%   BMI 47.20 kg/m²   Constitutional: Alert in no apparent distress.  HENT: Normocephalic, Atraumatic, Bilateral external ears normal. Nose normal.  Posterior pharynx with some slight white exudate on the left peritonsillar pillar  Eyes: Pupils are equal and reactive. Conjunctiva normal, non-icteric.   Heart: Regular rate and rythm, no murmurs.    Abdomen: Soft nontender nondistended.  No CVA tenderness.  Lungs: Clear to auscultation bilaterally.  Skin: Warm, Dry, No erythema, No rash.   Neurologic: Alert, Grossly non-focal.   Psychiatric: Affect normal, Judgment normal, Mood normal, Appears appropriate and not intoxicated.   Extremities: Intact distal pulses, No edema, No tenderness    DIAGNOSTIC STUDIES / PROCEDURES    Results for orders placed or performed during the hospital encounter of 09/08/21   URINALYSIS,CULTURE IF INDICATED    Specimen: Urine   Result Value Ref Range    Color Yellow     " Character Clear     Specific Gravity 1.024 <1.035    Ph 5.5 5.0 - 8.0    Glucose Negative Negative mg/dL    Ketones Negative Negative mg/dL    Protein Negative Negative mg/dL    Bilirubin Negative Negative    Urobilinogen, Urine 0.2 Negative    Nitrite Negative Negative    Leukocyte Esterase Trace (A) Negative    Occult Blood Negative Negative    Micro Urine Req Microscopic    Group A Strep by PCR    Specimen: Throat   Result Value Ref Range    Group A Strep by PCR Not Detected Not Detected   URINE MICROSCOPIC (W/UA)   Result Value Ref Range    WBC 5-10 (A) /hpf    RBC 0-2 /hpf    Bacteria Few (A) None /hpf    Epithelial Cells Moderate (A) /hpf    Epithelial Cells Renal Rare /hpf    Trans Epithelial Cells Few /hpf    Hyaline Cast 0-2 /lpf       COURSE & MEDICAL DECISION MAKING  Pertinent Labs & Imaging studies reviewed. (See chart for details)    This is a 45-year-old female that presents with some lower abdominal pain.  On exam she is nontoxic-appearing she has no acute CVA tenderness her abdomen is soft and nontender.  She is afebrile.  I do not think she has any systemic symptoms concerning for pyelonephritis.  Her urine shows 5-10 white cells and some few bacteria moderate epithelial cells this may be a contaminated urine.  I do think she would benefit from primary care follow-up and a true vaginal exam.  She was evaluated in our [facility] area and therefore the vaginal exam was unable to be performed here.  I will give her empiric antibiotics and have asked her to follow-up with her primary care doctor for complete exam.     The patient will return for new or worsening symptoms and is stable at the time of discharge. Patient was given return precautions. Patient and/or family member verbalizes understanding and will comply.    DISPOSITION:  Patient will be discharged home in stable condition.    FOLLOW UP:  Wilfrid White M.D.  [address] [facility]  [place] [address]  [phone]    Schedule an  appointment as soon as possible for a visit   Please follow up for a gynecologic exam    [facility], Emergency Dept  [address] [facility] [facility] [address] [phone]    Return for worsening pain, fever, vomiting or other concerns        OUTPATIENT MEDICATIONS:  Discharge Medication List as of 9/8/2021 12:21 PM      START taking these medications    Details   cefdinir (OMNICEF) 300 MG Cap Take 1 Capsule by mouth 2 times a day., Disp-20 Capsule, R-0, Normal                 FINAL IMPRESSION  1. Dysuria         2.   3.     This dictation has been creating using voice recognition software. The accuracy of the dictation is limited the abilities of the software.  I expect there may be some errors of grammar and possibly content. I made every attempt to manually correct the errors within my dictation. However errors related to this voice recognition software may still exist and should be interpreted within the appropriate context.        The note accurately reflects work and decisions made by me.  Sulema Veliz M.D.  9/8/2021  1:50 PM

## 2021-09-08 NOTE — ED TRIAGE NOTES
"Chief Complaint   Patient presents with   • Sore Throat   • Pelvic Pain     vaginal itching   • Flank Pain     Pt report dysuria. Reports that she is vaccinated for COVID.   /83   Pulse 84   Temp 36.1 °C (97 °F) (Temporal)   Resp 16   Ht 1.626 m (5' 4\")   Wt 125 kg (275 lb)   SpO2 95%   Pt informed of wait times. Educated on triage process.  Asked to return to triage RN for any new or worsening of symptoms. Thanked for patience.        "

## 2021-10-26 ENCOUNTER — HOSPITAL ENCOUNTER (EMERGENCY)
Facility: MEDICAL CENTER | Age: 46
End: 2021-10-26
Attending: STUDENT IN AN ORGANIZED HEALTH CARE EDUCATION/TRAINING PROGRAM
Payer: MEDICAID

## 2021-10-26 VITALS
HEIGHT: 64 IN | HEART RATE: 76 BPM | RESPIRATION RATE: 16 BRPM | TEMPERATURE: 98 F | OXYGEN SATURATION: 96 % | SYSTOLIC BLOOD PRESSURE: 130 MMHG | DIASTOLIC BLOOD PRESSURE: 79 MMHG | WEIGHT: 270.06 LBS | BODY MASS INDEX: 46.11 KG/M2

## 2021-10-26 DIAGNOSIS — N39.0 ACUTE UTI: ICD-10-CM

## 2021-10-26 LAB
ALBUMIN SERPL BCP-MCNC: 3.9 G/DL (ref 3.2–4.9)
ALBUMIN/GLOB SERPL: 1.1 G/DL
ALP SERPL-CCNC: 86 U/L (ref 30–99)
ALT SERPL-CCNC: 13 U/L (ref 2–50)
ANION GAP SERPL CALC-SCNC: 9 MMOL/L (ref 7–16)
APPEARANCE UR: CLEAR
AST SERPL-CCNC: 12 U/L (ref 12–45)
BACTERIA #/AREA URNS HPF: ABNORMAL /HPF
BASOPHILS # BLD AUTO: 0.7 % (ref 0–1.8)
BASOPHILS # BLD: 0.07 K/UL (ref 0–0.12)
BILIRUB SERPL-MCNC: <0.2 MG/DL (ref 0.1–1.5)
BILIRUB UR QL STRIP.AUTO: NEGATIVE
BUN SERPL-MCNC: 11 MG/DL (ref 8–22)
CALCIUM SERPL-MCNC: 9.1 MG/DL (ref 8.5–10.5)
CHLORIDE SERPL-SCNC: 104 MMOL/L (ref 96–112)
CO2 SERPL-SCNC: 26 MMOL/L (ref 20–33)
COLOR UR: YELLOW
CREAT SERPL-MCNC: 0.47 MG/DL (ref 0.5–1.4)
EOSINOPHIL # BLD AUTO: 0.17 K/UL (ref 0–0.51)
EOSINOPHIL NFR BLD: 1.7 % (ref 0–6.9)
EPI CELLS #/AREA URNS HPF: ABNORMAL /HPF
ERYTHROCYTE [DISTWIDTH] IN BLOOD BY AUTOMATED COUNT: 45.8 FL (ref 35.9–50)
GLOBULIN SER CALC-MCNC: 3.6 G/DL (ref 1.9–3.5)
GLUCOSE SERPL-MCNC: 116 MG/DL (ref 65–99)
GLUCOSE UR STRIP.AUTO-MCNC: NEGATIVE MG/DL
HCT VFR BLD AUTO: 39.1 % (ref 37–47)
HGB BLD-MCNC: 11.9 G/DL (ref 12–16)
HYALINE CASTS #/AREA URNS LPF: ABNORMAL /LPF
IMM GRANULOCYTES # BLD AUTO: 0.05 K/UL (ref 0–0.11)
IMM GRANULOCYTES NFR BLD AUTO: 0.5 % (ref 0–0.9)
KETONES UR STRIP.AUTO-MCNC: NEGATIVE MG/DL
LEUKOCYTE ESTERASE UR QL STRIP.AUTO: ABNORMAL
LIPASE SERPL-CCNC: 35 U/L (ref 11–82)
LYMPHOCYTES # BLD AUTO: 2.79 K/UL (ref 1–4.8)
LYMPHOCYTES NFR BLD: 28.1 % (ref 22–41)
MCH RBC QN AUTO: 24.9 PG (ref 27–33)
MCHC RBC AUTO-ENTMCNC: 30.4 G/DL (ref 33.6–35)
MCV RBC AUTO: 82 FL (ref 81.4–97.8)
MICRO URNS: ABNORMAL
MONOCYTES # BLD AUTO: 0.65 K/UL (ref 0–0.85)
MONOCYTES NFR BLD AUTO: 6.6 % (ref 0–13.4)
NEUTROPHILS # BLD AUTO: 6.19 K/UL (ref 2–7.15)
NEUTROPHILS NFR BLD: 62.4 % (ref 44–72)
NITRITE UR QL STRIP.AUTO: NEGATIVE
NRBC # BLD AUTO: 0 K/UL
NRBC BLD-RTO: 0 /100 WBC
PH UR STRIP.AUTO: 6 [PH] (ref 5–8)
PLATELET # BLD AUTO: 224 K/UL (ref 164–446)
PMV BLD AUTO: 11.9 FL (ref 9–12.9)
POTASSIUM SERPL-SCNC: 3.8 MMOL/L (ref 3.6–5.5)
PROT SERPL-MCNC: 7.5 G/DL (ref 6–8.2)
PROT UR QL STRIP: NEGATIVE MG/DL
RBC # BLD AUTO: 4.77 M/UL (ref 4.2–5.4)
RBC # URNS HPF: ABNORMAL /HPF
RBC UR QL AUTO: ABNORMAL
SODIUM SERPL-SCNC: 139 MMOL/L (ref 135–145)
SP GR UR STRIP.AUTO: 1.02
UROBILINOGEN UR STRIP.AUTO-MCNC: 0.2 MG/DL
WBC # BLD AUTO: 9.9 K/UL (ref 4.8–10.8)
WBC #/AREA URNS HPF: ABNORMAL /HPF

## 2021-10-26 PROCEDURE — A9270 NON-COVERED ITEM OR SERVICE: HCPCS | Performed by: STUDENT IN AN ORGANIZED HEALTH CARE EDUCATION/TRAINING PROGRAM

## 2021-10-26 PROCEDURE — 700102 HCHG RX REV CODE 250 W/ 637 OVERRIDE(OP): Performed by: STUDENT IN AN ORGANIZED HEALTH CARE EDUCATION/TRAINING PROGRAM

## 2021-10-26 PROCEDURE — 85025 COMPLETE CBC W/AUTO DIFF WBC: CPT

## 2021-10-26 PROCEDURE — 99284 EMERGENCY DEPT VISIT MOD MDM: CPT

## 2021-10-26 PROCEDURE — 80053 COMPREHEN METABOLIC PANEL: CPT

## 2021-10-26 PROCEDURE — 81001 URINALYSIS AUTO W/SCOPE: CPT

## 2021-10-26 PROCEDURE — 83690 ASSAY OF LIPASE: CPT

## 2021-10-26 RX ORDER — SULFAMETHOXAZOLE AND TRIMETHOPRIM 800; 160 MG/1; MG/1
1 TABLET ORAL 2 TIMES DAILY
Qty: 20 TABLET | Refills: 0 | Status: SHIPPED | OUTPATIENT
Start: 2021-10-26 | End: 2021-11-05

## 2021-10-26 RX ORDER — SULFAMETHOXAZOLE AND TRIMETHOPRIM 800; 160 MG/1; MG/1
1 TABLET ORAL ONCE
Status: COMPLETED | OUTPATIENT
Start: 2021-10-26 | End: 2021-10-26

## 2021-10-26 RX ADMIN — SULFAMETHOXAZOLE AND TRIMETHOPRIM 1 TABLET: 800; 160 TABLET ORAL at 21:00

## 2021-10-26 ASSESSMENT — FIBROSIS 4 INDEX: FIB4 SCORE: 0.94

## 2021-10-27 NOTE — DISCHARGE INSTRUCTIONS
Take all of the antibiotics we have prescribed and make sure to complete the entire course even if you feel better.       Follow up with your doctor in 3-4 days for a recheck.

## 2021-10-27 NOTE — ED PROVIDER NOTES
"ED Provider Note    CHIEF COMPLAINT  Chief Complaint   Patient presents with   • Abdominal Pain   • Flank Pain       HPI  Chanelle Ortiz is a 46 y.o. female PMH obesity, DM, asthma, hypothyroidism, POLLY, psychiatric history who presents with 2 weeks of dysuria, intermittent abdominal pain which moves all over her body and to her back. States feels similar to prior UTI. Denies N/V/D, no fevers. Denies CP/SOB. Reports sometimes she gets dizzy at work but this is not new and is unchanged. States dysuria started prior to pain. Denies pain now.       REVIEW OF SYSTEMS  See HPI for further details. All other systems are negative.     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    SOCIAL HISTORY  Social History     Tobacco Use   • Smoking status: Never Smoker   • Smokeless tobacco: Never Used   Vaping Use   • Vaping Use: Never used   Substance and Sexual Activity   • Alcohol use: No   • Drug use: No   • Sexual activity: Not on file     Comment: .  Tuboligation in 2004.        SURGICAL HISTORY   has a past surgical history that includes other abdominal surgery (2002); gyn surgery (2004); other; primary c section; ovarian cystectomy (1992); cholecystectomy; primary c section; upper gi endoscopy,diagnosis (N/A, 6/14/2021); and upper gi endoscopy,biopsy (N/A, 6/14/2021).    CURRENT MEDICATIONS  Home Medications    **Home medications have not yet been reviewed for this encounter**         ALLERGIES  Allergies   Allergen Reactions   • Morphine Vomiting and Nausea     \"I think\" \"I dunno what my reaction was, the nurse just said my oxygen levels were going way to low on it\" \"heart races\" " "      PHYSICAL EXAM  VITAL SIGNS: /79   Pulse 76   Temp 36.7 °C (98 °F)   Resp 16   Ht 1.626 m (5' 4\")   Wt 123 kg (270 lb 1 oz)   LMP 10/01/2021   SpO2 96%   BMI 46.36 kg/m²     Pulse ox interpretation: I interpret this pulse ox as normal.  Constitutional: Alert in no apparent distress. Morbidly obese.   HENT: No signs of trauma, Bilateral external ears normal, Nose normal.   Eyes: Pupils are equal and reactive, Conjunctiva normal, Non-icteric.   Neck: Normal range of motion, No tenderness, Supple, No stridor.   Cardiovascular: Regular rate and rhythm, no murmurs.   Thorax & Lungs: Normal breath sounds, No respiratory distress, No wheezing, No chest tenderness.   Abdomen: Soft, No tenderness, No masses, No pulsatile masses. No peritoneal signs.  Skin: Warm, Dry, No erythema, No rash.   Back: No bony tenderness, No CVA tenderness.   Extremities: Intact distal pulses, No edema, No tenderness, No cyanosis.  Musculoskeletal: Good range of motion in all major joints. No major deformities noted.   Neurologic: Alert , Normal motor function, Normal sensory function, No focal deficits noted.   Psychiatric: Affect normal, Judgment normal, Mood normal.       DIAGNOSTIC STUDIES / PROCEDURES      LABS  Results for orders placed or performed during the hospital encounter of 10/26/21   CBC WITH DIFFERENTIAL   Result Value Ref Range    WBC 9.9 4.8 - 10.8 K/uL    RBC 4.77 4.20 - 5.40 M/uL    Hemoglobin 11.9 (L) 12.0 - 16.0 g/dL    Hematocrit 39.1 37.0 - 47.0 %    MCV 82.0 81.4 - 97.8 fL    MCH 24.9 (L) 27.0 - 33.0 pg    MCHC 30.4 (L) 33.6 - 35.0 g/dL    RDW 45.8 35.9 - 50.0 fL    Platelet Count 224 164 - 446 K/uL    MPV 11.9 9.0 - 12.9 fL    Neutrophils-Polys 62.40 44.00 - 72.00 %    Lymphocytes 28.10 22.00 - 41.00 %    Monocytes 6.60 0.00 - 13.40 %    Eosinophils 1.70 0.00 - 6.90 %    Basophils 0.70 0.00 - 1.80 %    Immature Granulocytes 0.50 0.00 - 0.90 %    Nucleated RBC 0.00 /100 WBC    Neutrophils (Absolute) 6.19 " 2.00 - 7.15 K/uL    Lymphs (Absolute) 2.79 1.00 - 4.80 K/uL    Monos (Absolute) 0.65 0.00 - 0.85 K/uL    Eos (Absolute) 0.17 0.00 - 0.51 K/uL    Baso (Absolute) 0.07 0.00 - 0.12 K/uL    Immature Granulocytes (abs) 0.05 0.00 - 0.11 K/uL    NRBC (Absolute) 0.00 K/uL   COMP METABOLIC PANEL   Result Value Ref Range    Sodium 139 135 - 145 mmol/L    Potassium 3.8 3.6 - 5.5 mmol/L    Chloride 104 96 - 112 mmol/L    Co2 26 20 - 33 mmol/L    Anion Gap 9.0 7.0 - 16.0    Glucose 116 (H) 65 - 99 mg/dL    Bun 11 8 - 22 mg/dL    Creatinine 0.47 (L) 0.50 - 1.40 mg/dL    Calcium 9.1 8.5 - 10.5 mg/dL    AST(SGOT) 12 12 - 45 U/L    ALT(SGPT) 13 2 - 50 U/L    Alkaline Phosphatase 86 30 - 99 U/L    Total Bilirubin <0.2 0.1 - 1.5 mg/dL    Albumin 3.9 3.2 - 4.9 g/dL    Total Protein 7.5 6.0 - 8.2 g/dL    Globulin 3.6 (H) 1.9 - 3.5 g/dL    A-G Ratio 1.1 g/dL   LIPASE   Result Value Ref Range    Lipase 35 11 - 82 U/L   URINALYSIS    Specimen: Urine, Clean Catch; Blood   Result Value Ref Range    Color Yellow     Character Clear     Specific Gravity 1.018 <1.035    Ph 6.0 5.0 - 8.0    Glucose Negative Negative mg/dL    Ketones Negative Negative mg/dL    Protein Negative Negative mg/dL    Bilirubin Negative Negative    Urobilinogen, Urine 0.2 Negative    Nitrite Negative Negative    Leukocyte Esterase Trace (A) Negative    Occult Blood Small (A) Negative    Micro Urine Req Microscopic    URINE MICROSCOPIC (W/UA)   Result Value Ref Range    WBC 10-20 (A) /hpf    RBC 2-5 (A) /hpf    Bacteria Few (A) None /hpf    Epithelial Cells Moderate (A) /hpf    Hyaline Cast 0-2 /lpf   ESTIMATED GFR   Result Value Ref Range    GFR If African American >60 >60 mL/min/1.73 m 2    GFR If Non African American >60 >60 mL/min/1.73 m 2         COURSE & MEDICAL DECISION MAKING  Pertinent Labs & Imaging studies reviewed. (See chart for details)    46-year-old female presenting with several weeks of dysuria and intermittent abdominal pain with no fevers and no other  symptoms.  Her vital signs in ED are normal.  No fevers at home and do not suspect sepsis.  UA is consistent with UTI, patient with normal renal function.  No leukocytosis and normal LFTs.  She has no abdominal tenderness on exam to make me suspicious for appendicitis, cholecystitis.  Lack of tenderness or vomiting makes obstruction unlikely.  No indication for abdominal imaging at this time.  Patient with history of diabetes, but no evidence of DKA.  Started on Bactrim, discharged home with return precautions.    The patient will not drink alcohol nor drive with prescribed medications. The patient will return for worsening symptoms and is stable at the time of discharge. The patient verbalizes understanding and will comply.    FINAL IMPRESSION  1. Acute UTI  sulfamethoxazole-trimethoprim (BACTRIM DS) 800-160 MG tablet         Electronically signed by: Bhargavi Araujo M.D., 10/26/2021 8:36 PM

## 2021-10-27 NOTE — ED TRIAGE NOTES
"Chief Complaint   Patient presents with   • Abdominal Pain   • Flank Pain     47 yo female ambulatory to triage for above complaint. Pt reports 7/10 aching generalized abdominal pain radiating to bilateral flanks x 1 wk, reports it feels like when she had a UTI. AOx4, GCS 15.    Educated on triage process, encourage to inform staff of any changes.     /68   Pulse 77   Temp (!) 35.7 °C (96.2 °F) (Temporal)   Resp 15   Ht 1.626 m (5' 4\")   Wt 123 kg (270 lb 1 oz)   LMP 10/01/2021   SpO2 94%   BMI 46.36 kg/m²   "

## 2021-11-08 NOTE — TELEPHONE ENCOUNTER
Attempted to LVM for patient, but mailbox full. Sent [employer] message   From: Miko Blue  To: Kavin Luther  Sent: 11/8/2021 12:58 PM CST  Subject: Blood Draw for Upcoming Wellness Apointment    I am scheduled to meet with Dr. Luther for my annual wellness check on 18Nov21 at 7:30 a.m.. Please place orders in the system so I can report ahead of time for the necessary blood draw to enable completion of all testing needed so the results are available prior to my visit next Thursday. Thanks for your help!

## 2021-12-13 NOTE — ED NOTES
Discharge instructions given with education.  No further questions.   Received fax from pharmacy requesting refill on pts medication(s). Pt was last seen in office on 8/18/2021  and has a follow up scheduled for Visit date not found. Will send request to  Ellie Johnston  for patient.      Requested Prescriptions     Pending Prescriptions Disp Refills    allopurinol (ZYLOPRIM) 100 MG tablet [Pharmacy Med Name: ALLOPURINOL 100 MG TABLET] 90 tablet 1     Sig: TAKE 1 TABLET BY MOUTH EVERY DAY

## 2021-12-24 ENCOUNTER — APPOINTMENT (OUTPATIENT)
Dept: RADIOLOGY | Facility: MEDICAL CENTER | Age: 46
End: 2021-12-24
Attending: EMERGENCY MEDICINE
Payer: MEDICAID

## 2021-12-24 ENCOUNTER — HOSPITAL ENCOUNTER (EMERGENCY)
Facility: MEDICAL CENTER | Age: 46
End: 2021-12-24
Attending: EMERGENCY MEDICINE
Payer: MEDICAID

## 2021-12-24 VITALS
TEMPERATURE: 98.8 F | OXYGEN SATURATION: 95 % | HEART RATE: 76 BPM | RESPIRATION RATE: 20 BRPM | HEIGHT: 64 IN | BODY MASS INDEX: 46.45 KG/M2 | WEIGHT: 272.05 LBS | DIASTOLIC BLOOD PRESSURE: 68 MMHG | SYSTOLIC BLOOD PRESSURE: 118 MMHG

## 2021-12-24 DIAGNOSIS — R09.89 CHEST CONGESTION: ICD-10-CM

## 2021-12-24 DIAGNOSIS — B34.9 VIRAL SYNDROME: ICD-10-CM

## 2021-12-24 DIAGNOSIS — R05.9 COUGH: ICD-10-CM

## 2021-12-24 DIAGNOSIS — R09.81 NASAL CONGESTION: ICD-10-CM

## 2021-12-24 LAB
ALBUMIN SERPL BCP-MCNC: 4 G/DL (ref 3.2–4.9)
ALBUMIN/GLOB SERPL: 1.1 G/DL
ALP SERPL-CCNC: 120 U/L (ref 30–99)
ALT SERPL-CCNC: 18 U/L (ref 2–50)
ANION GAP SERPL CALC-SCNC: 9 MMOL/L (ref 7–16)
ANISOCYTOSIS BLD QL SMEAR: ABNORMAL
AST SERPL-CCNC: 17 U/L (ref 12–45)
BASOPHILS # BLD AUTO: 0.8 % (ref 0–1.8)
BASOPHILS # BLD: 0.06 K/UL (ref 0–0.12)
BILIRUB SERPL-MCNC: <0.2 MG/DL (ref 0.1–1.5)
BUN SERPL-MCNC: 13 MG/DL (ref 8–22)
CALCIUM SERPL-MCNC: 8.7 MG/DL (ref 8.5–10.5)
CHLORIDE SERPL-SCNC: 105 MMOL/L (ref 96–112)
CO2 SERPL-SCNC: 26 MMOL/L (ref 20–33)
COMMENT 1642: NORMAL
CREAT SERPL-MCNC: 0.5 MG/DL (ref 0.5–1.4)
EKG IMPRESSION: NORMAL
EOSINOPHIL # BLD AUTO: 0.25 K/UL (ref 0–0.51)
EOSINOPHIL NFR BLD: 3.4 % (ref 0–6.9)
ERYTHROCYTE [DISTWIDTH] IN BLOOD BY AUTOMATED COUNT: 44 FL (ref 35.9–50)
GLOBULIN SER CALC-MCNC: 3.5 G/DL (ref 1.9–3.5)
GLUCOSE SERPL-MCNC: 154 MG/DL (ref 65–99)
HCT VFR BLD AUTO: 38.9 % (ref 37–47)
HGB BLD-MCNC: 11.5 G/DL (ref 12–16)
HYPOCHROMIA BLD QL SMEAR: ABNORMAL
IMM GRANULOCYTES # BLD AUTO: 0.04 K/UL (ref 0–0.11)
IMM GRANULOCYTES NFR BLD AUTO: 0.5 % (ref 0–0.9)
LYMPHOCYTES # BLD AUTO: 1.76 K/UL (ref 1–4.8)
LYMPHOCYTES NFR BLD: 24 % (ref 22–41)
MCH RBC QN AUTO: 23.3 PG (ref 27–33)
MCHC RBC AUTO-ENTMCNC: 29.6 G/DL (ref 33.6–35)
MCV RBC AUTO: 78.7 FL (ref 81.4–97.8)
MICROCYTES BLD QL SMEAR: ABNORMAL
MONOCYTES # BLD AUTO: 0.82 K/UL (ref 0–0.85)
MONOCYTES NFR BLD AUTO: 11.2 % (ref 0–13.4)
MORPHOLOGY BLD-IMP: NORMAL
NEUTROPHILS # BLD AUTO: 4.39 K/UL (ref 2–7.15)
NEUTROPHILS NFR BLD: 60.1 % (ref 44–72)
NRBC # BLD AUTO: 0 K/UL
NRBC BLD-RTO: 0 /100 WBC
OVALOCYTES BLD QL SMEAR: NORMAL
PLATELET # BLD AUTO: 215 K/UL (ref 164–446)
PLATELET BLD QL SMEAR: NORMAL
PMV BLD AUTO: 11.5 FL (ref 9–12.9)
POIKILOCYTOSIS BLD QL SMEAR: NORMAL
POTASSIUM SERPL-SCNC: 3.7 MMOL/L (ref 3.6–5.5)
PROT SERPL-MCNC: 7.5 G/DL (ref 6–8.2)
RBC # BLD AUTO: 4.94 M/UL (ref 4.2–5.4)
RBC BLD AUTO: PRESENT
SARS-COV-2 RNA RESP QL NAA+PROBE: NOTDETECTED
SODIUM SERPL-SCNC: 140 MMOL/L (ref 135–145)
SPECIMEN SOURCE: NORMAL
WBC # BLD AUTO: 7.3 K/UL (ref 4.8–10.8)

## 2021-12-24 PROCEDURE — 80053 COMPREHEN METABOLIC PANEL: CPT

## 2021-12-24 PROCEDURE — U0003 INFECTIOUS AGENT DETECTION BY NUCLEIC ACID (DNA OR RNA); SEVERE ACUTE RESPIRATORY SYNDROME CORONAVIRUS 2 (SARS-COV-2) (CORONAVIRUS DISEASE [COVID-19]), AMPLIFIED PROBE TECHNIQUE, MAKING USE OF HIGH THROUGHPUT TECHNOLOGIES AS DESCRIBED BY CMS-2020-01-R: HCPCS

## 2021-12-24 PROCEDURE — 85025 COMPLETE CBC W/AUTO DIFF WBC: CPT

## 2021-12-24 PROCEDURE — 99283 EMERGENCY DEPT VISIT LOW MDM: CPT

## 2021-12-24 PROCEDURE — 93005 ELECTROCARDIOGRAM TRACING: CPT | Performed by: EMERGENCY MEDICINE

## 2021-12-24 PROCEDURE — U0005 INFEC AGEN DETEC AMPLI PROBE: HCPCS

## 2021-12-24 PROCEDURE — 71045 X-RAY EXAM CHEST 1 VIEW: CPT

## 2021-12-24 PROCEDURE — 93005 ELECTROCARDIOGRAM TRACING: CPT

## 2021-12-24 RX ORDER — LORATADINE 10 MG/1
10 TABLET ORAL DAILY
Qty: 30 TABLET | Refills: 0 | Status: SHIPPED | OUTPATIENT
Start: 2021-12-24 | End: 2022-01-23

## 2021-12-24 RX ORDER — PSEUDOEPHEDRINE HCL 120 MG/1
120 TABLET, FILM COATED, EXTENDED RELEASE ORAL 2 TIMES DAILY PRN
Qty: 10 TABLET | Refills: 0 | Status: SHIPPED | OUTPATIENT
Start: 2021-12-24 | End: 2021-12-29

## 2021-12-24 RX ORDER — BENZONATATE 200 MG/1
200 CAPSULE ORAL 3 TIMES DAILY PRN
Qty: 30 CAPSULE | Refills: 0 | Status: SHIPPED | OUTPATIENT
Start: 2021-12-24 | End: 2022-01-03

## 2021-12-24 RX ORDER — IBUPROFEN 600 MG/1
600 TABLET ORAL EVERY 8 HOURS PRN
Qty: 30 TABLET | Refills: 0 | Status: SHIPPED | OUTPATIENT
Start: 2021-12-24

## 2021-12-24 ASSESSMENT — FIBROSIS 4 INDEX: FIB4 SCORE: 0.68

## 2021-12-24 NOTE — ED NOTES
Pt given d/c instructions, f/u info and RX x 4 with verbal understanding.  VSS at discharge  Pt ambulatory from the ED w/ steady gait.  All belongings in possession on discharge.  Pt escorted to the lobby by RN.

## 2021-12-24 NOTE — ED TRIAGE NOTES
"Chief Complaint   Patient presents with   • Shortness of Breath   • Difficulty Swallowing   • Allergic Reaction       47 yo F to triage for above complaint. Pt reports about 3 days ago started having SOB and difficulty swallowing after taking night time Afrin. Patient reports she has also been having flu like symptoms. Pt reports feeling like her throat is swollen. Patient reports having L ear pain/ringing. No signs of hives and pt denies fevers at home. Pt has been vaccinated for COVID. GCS 15.     Pt is alert and oriented, speaking in full sentences, follows commands and responds appropriately to questions. NAD. Resp are even and unlabored.      Pt placed in senior lounge. Pt educated on triage process. Pt encouraged to alert staff for any changes.     Patient and staff wearing appropriate PPE    /80   Pulse 77   Temp 36.6 °C (97.9 °F) (Temporal)   Resp 15   Ht 1.626 m (5' 4\")   Wt 123 kg (272 lb 0.8 oz)   SpO2 99% Comment: RA  BMI 46.70 kg/m²   "

## 2021-12-24 NOTE — ED NOTES
Pt vital signs checked @ 0434. Pt resting comfortably in a chair in no apparent distress. Pt had no new medical complaints. Pt advised to alert staff if there are any changes.

## 2021-12-24 NOTE — ED PROVIDER NOTES
ED Provider Note    CHIEF COMPLAINT  Chief Complaint   Patient presents with   • Shortness of Breath   • Difficulty Swallowing   • Allergic Reaction       HPI  Patient is a 46-year-old female presents emergency room for evaluation of body aches, chills, throat discomfort and shortness of breath.  The patient states that several days ago she began having some throat irritation, with some nasal congestion, facial discomfort and throat pain.  She has been having some left ear pressure in addition to what she believed was possible medication reaction after taking Afrin for several days.  She felt like her symptoms were getting worse and came in for further evaluation.  She has had no skin changes, no wheezing, no nausea, vomiting or diarrheal illness.  She has not had any severe allergic reactions in the past.  She denies any difficulty swallowing or acute voice changes.    PPE Note: I personally donned full PPE for all patient encounters during this visit, including being clean-shaven with an N95 respirator mask, gloves, and goggles.     REVIEW OF SYSTEMS  See HPI for further details. All other systems are negative.     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    SOCIAL HISTORY  Social History     Tobacco Use   • Smoking status: Never Smoker   • Smokeless tobacco: Never Used   Vaping Use   • Vaping Use: Never used   Substance and Sexual Activity   • Alcohol use: No   • Drug use: No   • Sexual activity: Not on file     Comment: .  Tuboligation in 2004.        SURGICAL HISTORY   has a past surgical history that includes other abdominal surgery (2002); gyn surgery (2004); other; primary c  "section; ovarian cystectomy (1992); cholecystectomy; primary c section; upper gi endoscopy,diagnosis (N/A, 6/14/2021); and upper gi endoscopy,biopsy (N/A, 6/14/2021).    CURRENT MEDICATIONS  Home Medications     Reviewed by Kitty Sumner R.N. (Registered Nurse) on 12/24/21 at 0217  Med List Status: Partial   Medication Last Dose Status   albuterol 108 (90 Base) MCG/ACT Aero Soln inhalation aerosol  Active   cefdinir (OMNICEF) 300 MG Cap  Active   ferrous sulfate 325 (65 Fe) MG EC tablet  Active   lisinopril (PRINIVIL) 5 MG Tab  Active   meclizine (ANTIVERT) 12.5 MG Tab  Active   metformin (GLUCOPHAGE) 1000 MG tablet  Active   metoprolol SR (TOPROL XL) 25 MG TABLET SR 24 HR  Active   TRADJENTA 5 MG Tab tablet  Active   vitamin D, Ergocalciferol, (DRISDOL) 1.25 MG (75560 UT) Cap capsule  Active                ALLERGIES  Allergies   Allergen Reactions   • Morphine Vomiting and Nausea     \"I think\" \"I dunno what my reaction was, the nurse just said my oxygen levels were going way to low on it\" \"heart races\"       PHYSICAL EXAM  VITAL SIGNS: /56   Pulse 76   Temp 37.1 °C (98.8 °F) (Temporal)   Resp 16   Ht 1.626 m (5' 4\")   Wt 123 kg (272 lb 0.8 oz)   SpO2 96%   BMI 46.70 kg/m²   Pulse ox interpretation: I interpret this pulse ox as normal.  Genl: F sitting in chair comfortably, speaking clearly, appears in no acute distress   Head: NC/AT   ENT: Mucous membranes moist, posterior pharynx thematous, no exudates, no asymmetry, sublingual tissues are soft, no abnormalities of the buccal mucosa.  Nasal congestion without purulence, bilateral TMs are visualized with no evidence of acute otitis.  There is dullness suggesting fluid.  Nares patent bilaterally   Eyes: Normal sclera, pupils equal round reactive to light  Neck: Supple, FROM, no LAD appreciated   Pulmonary: Lungs are clear to auscultation bilaterally  Chest: No TTP  CV:  RRR, no murmur appreciated, pulses 2+ in both upper and lower " extremities,  Abdomen: soft, NT/ND; no rebound/guarding, no masses palpated, no HSM  Musculoskeletal: Pain free ROM of the neck. Moving upper and lower extremities and spontaneous in coordinated fashion  Neuro: A&Ox4 (person, place, time, situation), speech fluent, gait steady, no focal deficits appreciated, No cerebellar signs. Sensation is grossly intact in the distal upper and lower extremities.  5/5 strength in  and dorsiflexion/plantar flexion of the ankles  Psych: Patient has an appropriate affect and behavior  Skin: No rash or lesions.  No pallor or jaundice.  No cyanosis.  Warm and dry.     DIAGNOSTIC STUDIES / PROCEDURES    Results for orders placed or performed during the hospital encounter of 21   EKG   Result Value Ref Range    Report       [facility] Emergency Dept.    Test Date:  2021  Pt Name:    PRATIBHA CUELLAR              Department: ER  MRN:        [id number]                      Room:  Gender:     Female                       Technician: [id number]  :        1975                   Requested By:ER TRIAGE PROTOCOL  Order #:    [id number]                    Reading MD: Paulo Rowe MD    Measurements  Intervals                                Axis  Rate:       74                           P:          72  KS:         148                          QRS:        83  QRSD:       84                           T:          44  QT:         386  QTc:        429    Interpretive Statements  Rate of 74, sinus rhythm, normal intervals, normal axis, no acute ST segment  elevations are noted.  Unchanged from prior EKG from 2021.  Electronically Signed On 2021 6:11:30 PST by Paulo Rowe MD         LABS  Labs Reviewed   CBC WITH DIFFERENTIAL - Abnormal; Notable for the following components:       Result Value    Hemoglobin 11.5 (*)     MCV 78.7 (*)     MCH 23.3 (*)     MCHC 29.6 (*)     All other components within normal limits    Narrative:     Collected By:   COMP  METABOLIC PANEL - Abnormal; Notable for the following components:    Glucose 154 (*)     Alkaline Phosphatase 120 (*)     All other components within normal limits    Narrative:     Collected By:   ESTIMATED GFR    Narrative:     Collected By:   PERIPHERAL SMEAR REVIEW    Narrative:     Collected By:   PLATELET ESTIMATE    Narrative:     Collected By:   MORPHOLOGY    Narrative:     Collected By:   DIFFERENTIAL COMMENT    Narrative:     Collected By:   SARS-COV-2, PCR (IN-HOUSE)     RADIOLOGY  DX-CHEST-PORTABLE (1 VIEW)   Final Result      1.  No acute cardiac or pulmonary abnormalities are identified.        COURSE & MEDICAL DECISION MAKING  Pertinent Labs & Imaging studies reviewed. (See chart for details)    MDM    Initial evaluation at 0604:  Patient is seen and evaluated for symptoms as described above.  Initial assessment she is nontoxic, afebrile with no vital sign abdomen.  She describes several days of overall constitutional complaints consistent with possible viral illness.  There is no findings of acute bacterial illness on the head neck exam and she has no focal consolidations.  In triage lab work have been ordered and this shows no leukocytosis or concerning anemia.  She has no gross electrolyte derangements and chest x-ray is without any cardiopulmonary process consistent with pneumonia.  EKG was obtained in triage and shows no ectopy or concerning ischemia or infarction.  Patient's overall constitutional complaints are not consistent with PE or ACS.      Symptomology most consistent with viral etiology.  I believe her Afrin did cause some element of post reactive swelling and edema that can happen after prolonged use.  I would recommend that she be started on anti-inflammatories to assist with drainage.    Patient received a nonemergent Covid test, can follow-up in the outpatient service.  Prescriptions for symptomatic allergic rhinitis are also provided.  She is given very strict return precautions  and is discharged home in stable condition.    FINAL IMPRESSION  Visit Diagnoses     ICD-10-CM   1. Viral syndrome  B34.9   2. Nasal congestion  R09.81   3. Cough  R05.9   4. Chest congestion  R09.89     Electronically signed by: Jae Bates M.D., 12/24/2021 6:04 AM

## 2022-03-20 ENCOUNTER — HOSPITAL ENCOUNTER (EMERGENCY)
Facility: MEDICAL CENTER | Age: 47
End: 2022-03-20
Attending: EMERGENCY MEDICINE
Payer: COMMERCIAL

## 2022-03-20 ENCOUNTER — APPOINTMENT (OUTPATIENT)
Dept: RADIOLOGY | Facility: MEDICAL CENTER | Age: 47
End: 2022-03-20
Attending: EMERGENCY MEDICINE
Payer: COMMERCIAL

## 2022-03-20 VITALS
WEIGHT: 275 LBS | BODY MASS INDEX: 46.95 KG/M2 | DIASTOLIC BLOOD PRESSURE: 60 MMHG | SYSTOLIC BLOOD PRESSURE: 133 MMHG | HEART RATE: 64 BPM | TEMPERATURE: 98 F | HEIGHT: 64 IN | OXYGEN SATURATION: 95 % | RESPIRATION RATE: 17 BRPM

## 2022-03-20 DIAGNOSIS — N30.00 ACUTE CYSTITIS WITHOUT HEMATURIA: ICD-10-CM

## 2022-03-20 DIAGNOSIS — D64.9 ANEMIA, UNSPECIFIED TYPE: ICD-10-CM

## 2022-03-20 LAB
ALBUMIN SERPL BCP-MCNC: 3.9 G/DL (ref 3.2–4.9)
ALBUMIN/GLOB SERPL: 1.2 G/DL
ALP SERPL-CCNC: 91 U/L (ref 30–99)
ALT SERPL-CCNC: 13 U/L (ref 2–50)
ANION GAP SERPL CALC-SCNC: 12 MMOL/L (ref 7–16)
ANISOCYTOSIS BLD QL SMEAR: ABNORMAL
APPEARANCE UR: ABNORMAL
AST SERPL-CCNC: 9 U/L (ref 12–45)
BACTERIA #/AREA URNS HPF: ABNORMAL /HPF
BASOPHILS # BLD AUTO: 0.9 % (ref 0–1.8)
BASOPHILS # BLD: 0.08 K/UL (ref 0–0.12)
BILIRUB SERPL-MCNC: 0.3 MG/DL (ref 0.1–1.5)
BILIRUB UR QL STRIP.AUTO: NEGATIVE
BUN SERPL-MCNC: 11 MG/DL (ref 8–22)
CALCIUM SERPL-MCNC: 8.9 MG/DL (ref 8.5–10.5)
CHLORIDE SERPL-SCNC: 103 MMOL/L (ref 96–112)
CO2 SERPL-SCNC: 25 MMOL/L (ref 20–33)
COLOR UR: YELLOW
CREAT SERPL-MCNC: 0.59 MG/DL (ref 0.5–1.4)
EKG IMPRESSION: NORMAL
EOSINOPHIL # BLD AUTO: 0.08 K/UL (ref 0–0.51)
EOSINOPHIL NFR BLD: 0.9 % (ref 0–6.9)
EPI CELLS #/AREA URNS HPF: ABNORMAL /HPF
ERYTHROCYTE [DISTWIDTH] IN BLOOD BY AUTOMATED COUNT: 40.9 FL (ref 35.9–50)
GFR SERPLBLD CREATININE-BSD FMLA CKD-EPI: 112 ML/MIN/1.73 M 2
GIANT PLATELETS BLD QL SMEAR: NORMAL
GLOBULIN SER CALC-MCNC: 3.2 G/DL (ref 1.9–3.5)
GLUCOSE SERPL-MCNC: 109 MG/DL (ref 65–99)
GLUCOSE UR STRIP.AUTO-MCNC: NEGATIVE MG/DL
HCG SERPL QL: NEGATIVE
HCT VFR BLD AUTO: 32.7 % (ref 37–47)
HGB BLD-MCNC: 9.2 G/DL (ref 12–16)
HYALINE CASTS #/AREA URNS LPF: ABNORMAL /LPF
HYPOCHROMIA BLD QL SMEAR: ABNORMAL
KETONES UR STRIP.AUTO-MCNC: NEGATIVE MG/DL
LACTATE BLD-SCNC: 0.9 MMOL/L (ref 0.5–2)
LEUKOCYTE ESTERASE UR QL STRIP.AUTO: ABNORMAL
LIPASE SERPL-CCNC: 20 U/L (ref 11–82)
LYMPHOCYTES # BLD AUTO: 2.72 K/UL (ref 1–4.8)
LYMPHOCYTES NFR BLD: 30.6 % (ref 22–41)
MANUAL DIFF BLD: NORMAL
MCH RBC QN AUTO: 20.4 PG (ref 27–33)
MCHC RBC AUTO-ENTMCNC: 28.1 G/DL (ref 33.6–35)
MCV RBC AUTO: 72.3 FL (ref 81.4–97.8)
MICRO URNS: ABNORMAL
MICROCYTES BLD QL SMEAR: ABNORMAL
MONOCYTES # BLD AUTO: 0.32 K/UL (ref 0–0.85)
MONOCYTES NFR BLD AUTO: 3.6 % (ref 0–13.4)
MORPHOLOGY BLD-IMP: NORMAL
NEUTROPHILS # BLD AUTO: 5.7 K/UL (ref 2–7.15)
NEUTROPHILS NFR BLD: 64 % (ref 44–72)
NITRITE UR QL STRIP.AUTO: NEGATIVE
NRBC # BLD AUTO: 0 K/UL
NRBC BLD-RTO: 0 /100 WBC
OVALOCYTES BLD QL SMEAR: NORMAL
PH UR STRIP.AUTO: 5 [PH] (ref 5–8)
PLATELET # BLD AUTO: 200 K/UL (ref 164–446)
PLATELET BLD QL SMEAR: NORMAL
PMV BLD AUTO: 11.7 FL (ref 9–12.9)
POIKILOCYTOSIS BLD QL SMEAR: NORMAL
POLYCHROMASIA BLD QL SMEAR: NORMAL
POTASSIUM SERPL-SCNC: 3.4 MMOL/L (ref 3.6–5.5)
PROT SERPL-MCNC: 7.1 G/DL (ref 6–8.2)
PROT UR QL STRIP: NEGATIVE MG/DL
RBC # BLD AUTO: 4.52 M/UL (ref 4.2–5.4)
RBC # URNS HPF: ABNORMAL /HPF
RBC BLD AUTO: PRESENT
RBC UR QL AUTO: NEGATIVE
SMUDGE CELLS BLD QL SMEAR: NORMAL
SODIUM SERPL-SCNC: 140 MMOL/L (ref 135–145)
SP GR UR STRIP.AUTO: 1.01
UROBILINOGEN UR STRIP.AUTO-MCNC: 0.2 MG/DL
WBC # BLD AUTO: 8.9 K/UL (ref 4.8–10.8)
WBC #/AREA URNS HPF: ABNORMAL /HPF

## 2022-03-20 PROCEDURE — 83690 ASSAY OF LIPASE: CPT

## 2022-03-20 PROCEDURE — 85007 BL SMEAR W/DIFF WBC COUNT: CPT

## 2022-03-20 PROCEDURE — 81001 URINALYSIS AUTO W/SCOPE: CPT

## 2022-03-20 PROCEDURE — 700117 HCHG RX CONTRAST REV CODE 255: Performed by: EMERGENCY MEDICINE

## 2022-03-20 PROCEDURE — 93005 ELECTROCARDIOGRAM TRACING: CPT

## 2022-03-20 PROCEDURE — 85027 COMPLETE CBC AUTOMATED: CPT

## 2022-03-20 PROCEDURE — 74177 CT ABD & PELVIS W/CONTRAST: CPT

## 2022-03-20 PROCEDURE — 700102 HCHG RX REV CODE 250 W/ 637 OVERRIDE(OP): Performed by: EMERGENCY MEDICINE

## 2022-03-20 PROCEDURE — 83605 ASSAY OF LACTIC ACID: CPT

## 2022-03-20 PROCEDURE — A9270 NON-COVERED ITEM OR SERVICE: HCPCS | Performed by: EMERGENCY MEDICINE

## 2022-03-20 PROCEDURE — 84703 CHORIONIC GONADOTROPIN ASSAY: CPT

## 2022-03-20 PROCEDURE — 99284 EMERGENCY DEPT VISIT MOD MDM: CPT

## 2022-03-20 PROCEDURE — 80053 COMPREHEN METABOLIC PANEL: CPT

## 2022-03-20 PROCEDURE — 93005 ELECTROCARDIOGRAM TRACING: CPT | Performed by: EMERGENCY MEDICINE

## 2022-03-20 PROCEDURE — 36415 COLL VENOUS BLD VENIPUNCTURE: CPT

## 2022-03-20 RX ORDER — CEPHALEXIN 500 MG/1
500 CAPSULE ORAL ONCE
Status: COMPLETED | OUTPATIENT
Start: 2022-03-20 | End: 2022-03-20

## 2022-03-20 RX ORDER — CEPHALEXIN 500 MG/1
500 CAPSULE ORAL 3 TIMES DAILY
Qty: 21 CAPSULE | Refills: 0 | Status: SHIPPED | OUTPATIENT
Start: 2022-03-20 | End: 2022-03-27

## 2022-03-20 RX ADMIN — CEPHALEXIN 500 MG: 500 CAPSULE ORAL at 04:30

## 2022-03-20 RX ADMIN — IOHEXOL 100 ML: 350 INJECTION, SOLUTION INTRAVENOUS at 03:26

## 2022-03-20 ASSESSMENT — FIBROSIS 4 INDEX: FIB4 SCORE: 0.86

## 2022-03-20 ASSESSMENT — PAIN DESCRIPTION - DESCRIPTORS: DESCRIPTORS: OTHER (COMMENT)

## 2022-03-20 NOTE — ED NOTES
Lower abd pain radiating to Sai flanks began around 0045, came on suddenly. Tender on palpation to epigastric area.     Reports diarrhea x 1 week, and nausea today.   Burning with urination     AAOx4, resp even and unlabored.

## 2022-03-20 NOTE — ED PROVIDER NOTES
"ED Provider Note    CHIEF COMPLAINT  Chief Complaint   Patient presents with   • Abdominal Pain       HPI  Chanelle Ortiz is a 46 y.o. female who presents for evaluation of a feeling of discomfort and \"chills on the inside\" of her lower abdomen diffusely.  This started around midnight after she got off work.  She notes that she has had loose stools for about a week and had an episode of diarrhea today.  She has been nauseous today but no vomiting.  She notes no fevers and has had no blood in the stool or dark tarry stools.  She notes a history of hypertension and diabetes as well as hypothyroidism.  She does not take anything currently for diabetes however.  She has a history of  sections in the remote past but no other surgeries on her abdomen or pelvis.    REVIEW OF SYSTEMS  Constitutional: No fevers   Skin: No rashes  HEENT: No sore throat, runny nose, sores, trouble swallowing, trouble speaking.  Chest: No pain   Pulm: No shortness of breath, cough, wheezing, stridor, or pain with inspiration/expiration  Gastrointestinal: No vomiting, constipation, bloating, melena, hematochezia   Genitourinary: Some dysuria.  No hematuria  Musculoskeletal: No pain, swelling, weakness  Heme: No bleeding or bruising problems.   Immuno: No hx of recurrent infections    PAST FAM HISTORY  Family History   Problem Relation Age of Onset   • Other Mother         sleep apnea   • Heart Failure Sister    • Diabetes Sister    • Heart Disease Sister    • Hypertension Sister    • Stroke Sister    • Hyperlipidemia Sister    • Heart Attack Maternal Grandfather    • Diabetes Daughter    • Psychiatric Illness Daughter         bipolar   • Psychiatric Illness Son         autism       PAST MEDICAL HISTORY   has a past medical history of Anxiety and depression (), Arrhythmia, Asthma, Back pain, Diabetes (), Emphysematous gastritis (2021), H/O Hypertension - resolved, Hx of Bronchitis, Hyperthyroidism (), " "Hypothyroidism, after radioactive ablation of thyroid (2008), POLLY on CPAP (2018), Pneumonia, and Psychiatric problem - unspecified.    SOCIAL HISTORY  Social History     Tobacco Use   • Smoking status: Never Smoker   • Smokeless tobacco: Never Used   Vaping Use   • Vaping Use: Never used   Substance and Sexual Activity   • Alcohol use: No   • Drug use: No   • Sexual activity: Not on file     Comment: .  Tuboligation in 2004.        SURGICAL HISTORY   has a past surgical history that includes other abdominal surgery (2002); gyn surgery (2004); other; primary c section; ovarian cystectomy (1992); cholecystectomy; primary c section; upper gi endoscopy,diagnosis (N/A, 6/14/2021); and upper gi endoscopy,biopsy (N/A, 6/14/2021).    CURRENT MEDICATIONS  Home Medications    **Home medications have not yet been reviewed for this encounter**         ALLERGIES  Allergies   Allergen Reactions   • Morphine Vomiting and Nausea     \"I think\" \"I dunno what my reaction was, the nurse just said my oxygen levels were going way to low on it\" \"heart races\"       PHYSICAL EXAM  VITAL SIGNS: /60   Pulse 68   Resp 17   Ht 1.626 m (5' 4\")   Wt 125 kg (275 lb)   SpO2 89%   BMI 47.20 kg/m²    Gen: Alert in no apparent distress.  HEENT: No signs of trauma, Bilateral external ears normal, Nose normal. Conjunctiva normal, Non-icteric.   Cardiovascular: Regular rate and rhythm, no murmurs.  Capillary refill less than 3 seconds to all extremities, 2+ distal pulses.  Thorax & Lungs: Normal breath sounds, No respiratory distress, No wheezing bilateral chest rise  Abdomen: Bowel sounds normal, Soft, No tenderness  Skin: Warm, Dry   Extremities: Intact distal pulses, No edema  Neurologic: Alert , no facial droop, grossly normal coordination and strength  Psychiatric: Affect pleasant      LABS  Results for orders placed or performed during the hospital encounter of 03/20/22   CBC WITH DIFFERENTIAL   Result Value Ref Range    WBC 8.9 " 4.8 - 10.8 K/uL    RBC 4.52 4.20 - 5.40 M/uL    Hemoglobin 9.2 (L) 12.0 - 16.0 g/dL    Hematocrit 32.7 (L) 37.0 - 47.0 %    MCV 72.3 (L) 81.4 - 97.8 fL    MCH 20.4 (L) 27.0 - 33.0 pg    MCHC 28.1 (L) 33.6 - 35.0 g/dL    RDW 40.9 35.9 - 50.0 fL    Platelet Count 200 164 - 446 K/uL    MPV 11.7 9.0 - 12.9 fL    Neutrophils-Polys 64.00 44.00 - 72.00 %    Lymphocytes 30.60 22.00 - 41.00 %    Monocytes 3.60 0.00 - 13.40 %    Eosinophils 0.90 0.00 - 6.90 %    Basophils 0.90 0.00 - 1.80 %    Nucleated RBC 0.00 /100 WBC    Neutrophils (Absolute) 5.70 2.00 - 7.15 K/uL    Lymphs (Absolute) 2.72 1.00 - 4.80 K/uL    Monos (Absolute) 0.32 0.00 - 0.85 K/uL    Eos (Absolute) 0.08 0.00 - 0.51 K/uL    Baso (Absolute) 0.08 0.00 - 0.12 K/uL    NRBC (Absolute) 0.00 K/uL    Hypochromia 1+     Anisocytosis 2+ (A)     Microcytosis 2+ (A)    COMP METABOLIC PANEL   Result Value Ref Range    Sodium 140 135 - 145 mmol/L    Potassium 3.4 (L) 3.6 - 5.5 mmol/L    Chloride 103 96 - 112 mmol/L    Co2 25 20 - 33 mmol/L    Anion Gap 12.0 7.0 - 16.0    Glucose 109 (H) 65 - 99 mg/dL    Bun 11 8 - 22 mg/dL    Creatinine 0.59 0.50 - 1.40 mg/dL    Calcium 8.9 8.5 - 10.5 mg/dL    AST(SGOT) 9 (L) 12 - 45 U/L    ALT(SGPT) 13 2 - 50 U/L    Alkaline Phosphatase 91 30 - 99 U/L    Total Bilirubin 0.3 0.1 - 1.5 mg/dL    Albumin 3.9 3.2 - 4.9 g/dL    Total Protein 7.1 6.0 - 8.2 g/dL    Globulin 3.2 1.9 - 3.5 g/dL    A-G Ratio 1.2 g/dL   LIPASE   Result Value Ref Range    Lipase 20 11 - 82 U/L   LACTIC ACID   Result Value Ref Range    Lactic Acid 0.9 0.5 - 2.0 mmol/L   URINALYSIS (UA)    Specimen: Urine   Result Value Ref Range    Color Yellow     Character Cloudy (A)     Specific Gravity 1.013 <1.035    Ph 5.0 5.0 - 8.0    Glucose Negative Negative mg/dL    Ketones Negative Negative mg/dL    Protein Negative Negative mg/dL    Bilirubin Negative Negative    Urobilinogen, Urine 0.2 Negative    Nitrite Negative Negative    Leukocyte Esterase Moderate (A) Negative     Occult Blood Negative Negative    Micro Urine Req Microscopic    HCG QUAL SERUM   Result Value Ref Range    Beta-Hcg Qualitative Serum Negative Negative   URINE MICROSCOPIC (W/UA)   Result Value Ref Range    WBC 20-50 (A) /hpf    RBC 0-2 /hpf    Bacteria Few (A) None /hpf    Epithelial Cells Few /hpf    Hyaline Cast 0-2 /lpf   PLATELET ESTIMATE   Result Value Ref Range    Plt Estimation Normal    MORPHOLOGY   Result Value Ref Range    RBC Morphology Present     Giant Platelets 2+     Polychromia 1+     Poikilocytosis 1+     Ovalocytes 1+     Smudge Cells Few    PERIPHERAL SMEAR REVIEW   Result Value Ref Range    Peripheral Smear Review see below    DIFFERENTIAL MANUAL   Result Value Ref Range    Manual Diff Status PERFORMED    ESTIMATED GFR   Result Value Ref Range    GFR (CKD-EPI) 112 >60 mL/min/1.73 m 2   EKG   Result Value Ref Range    Report       [facility] Emergency Dept[facility]    Test Date:  2022  Pt Name:    PRATIBHA CUELLAR              Department: ER  MRN:        [id number]                      Room:       [facility]  Gender:     Female                       Technician: [id number]  :        [id number]                   Requested By:ER TRIAGE PROTOCOL  Order #:    [id number]                    Reading MD:    Measurements  Intervals                                Axis  Rate:       58                           P:          75  SC:         156                          QRS:        93  QRSD:       94                           T:          47  QT:         424  QTc:        417    Interpretive Statements  SINUS BRADYCARDIA  BORDERLINE RIGHT AXIS DEVIATION  BORDERLINE T ABNORMALITIES, ANTERIOR LEADS  BASELINE WANDER IN LEAD(S) V6  Compared to ECG 2021 02:24:49  T-wave abnormality now present  Sinus rhythm no longer present         RADIOLOGY  CT-ABDOMEN-PELVIS WITH   Final Result      No acute intra-abdominal findings.            COURSE & MEDICAL DECISION MAKING  Patient has for evaluation of fairly  atypical symptoms which seem to be a discomfort in her lower abdomen diffusely.  This was accompanied by nausea and an episode of diarrhea.  Patient has had very little in the way of prodrome over the past week but does states she has been having some loose stools.  She also has had some mild dysuria which may be related.  She does not appear septic or toxic but she will likely need CT imaging as her body habitus precludes a good abdominal exam.    Didox findings are highly suggestive of nonemergent issue and very likely related to urinary tract infection.  Patient continues to appear nontoxic and nonseptic and states understanding the findings.  We will treat her empirically with oral antibiotics which will be started today in the emergency department.  I do not feel she requires inpatient evaluation or treatment at this time.  I did speak with her regarding her anemia, she does relate a history of iron deficiency however it is dropped a bit over two-point since her last reading.  Although this does not meet criteria for transfusion, I asked her to resume her supplemental iron at home and follow-up with her primary care physician for tracking and trending.  FINAL IMPRESSION  1. Acute cystitis without hematuria    2. Anemia, unspecified type        Electronically signed by: Milan Avina M.D., 3/20/2022 1:50 AM

## 2022-04-17 ENCOUNTER — HOSPITAL ENCOUNTER (EMERGENCY)
Facility: MEDICAL CENTER | Age: 47
End: 2022-04-17
Attending: EMERGENCY MEDICINE
Payer: COMMERCIAL

## 2022-04-17 VITALS
WEIGHT: 273.37 LBS | TEMPERATURE: 98.1 F | DIASTOLIC BLOOD PRESSURE: 67 MMHG | OXYGEN SATURATION: 95 % | BODY MASS INDEX: 46.67 KG/M2 | HEART RATE: 70 BPM | RESPIRATION RATE: 18 BRPM | SYSTOLIC BLOOD PRESSURE: 128 MMHG | HEIGHT: 64 IN

## 2022-04-17 DIAGNOSIS — R10.13 EPIGASTRIC PAIN: ICD-10-CM

## 2022-04-17 DIAGNOSIS — Z87.11 HISTORY OF GASTRIC ULCER: ICD-10-CM

## 2022-04-17 DIAGNOSIS — R19.7 DIARRHEA, UNSPECIFIED TYPE: ICD-10-CM

## 2022-04-17 LAB
ALBUMIN SERPL BCP-MCNC: 4.4 G/DL (ref 3.2–4.9)
ALBUMIN/GLOB SERPL: 1.2 G/DL
ALP SERPL-CCNC: 100 U/L (ref 30–99)
ALT SERPL-CCNC: 19 U/L (ref 2–50)
ANION GAP SERPL CALC-SCNC: 11 MMOL/L (ref 7–16)
ANISOCYTOSIS BLD QL SMEAR: ABNORMAL
APPEARANCE UR: CLEAR
AST SERPL-CCNC: 21 U/L (ref 12–45)
BACTERIA #/AREA URNS HPF: ABNORMAL /HPF
BASOPHILS # BLD AUTO: 0.9 % (ref 0–1.8)
BASOPHILS # BLD: 0.09 K/UL (ref 0–0.12)
BILIRUB SERPL-MCNC: 0.3 MG/DL (ref 0.1–1.5)
BILIRUB UR QL STRIP.AUTO: NEGATIVE
BUN SERPL-MCNC: 12 MG/DL (ref 8–22)
CALCIUM SERPL-MCNC: 9.6 MG/DL (ref 8.5–10.5)
CHLORIDE SERPL-SCNC: 103 MMOL/L (ref 96–112)
CO2 SERPL-SCNC: 26 MMOL/L (ref 20–33)
COLOR UR: YELLOW
CREAT SERPL-MCNC: 0.51 MG/DL (ref 0.5–1.4)
EOSINOPHIL # BLD AUTO: 0.17 K/UL (ref 0–0.51)
EOSINOPHIL NFR BLD: 1.7 % (ref 0–6.9)
EPI CELLS #/AREA URNS HPF: ABNORMAL /HPF
ERYTHROCYTE [DISTWIDTH] IN BLOOD BY AUTOMATED COUNT: 61.4 FL (ref 35.9–50)
GFR SERPLBLD CREATININE-BSD FMLA CKD-EPI: 116 ML/MIN/1.73 M 2
GLOBULIN SER CALC-MCNC: 3.7 G/DL (ref 1.9–3.5)
GLUCOSE SERPL-MCNC: 120 MG/DL (ref 65–99)
GLUCOSE UR STRIP.AUTO-MCNC: NEGATIVE MG/DL
HCG SERPL QL: NEGATIVE
HCT VFR BLD AUTO: 42.1 % (ref 37–47)
HGB BLD-MCNC: 12.5 G/DL (ref 12–16)
HYALINE CASTS #/AREA URNS LPF: ABNORMAL /LPF
KETONES UR STRIP.AUTO-MCNC: NEGATIVE MG/DL
LEUKOCYTE ESTERASE UR QL STRIP.AUTO: ABNORMAL
LIPASE SERPL-CCNC: 24 U/L (ref 11–82)
LYMPHOCYTES # BLD AUTO: 2.05 K/UL (ref 1–4.8)
LYMPHOCYTES NFR BLD: 20.9 % (ref 22–41)
MANUAL DIFF BLD: NORMAL
MCH RBC QN AUTO: 22.6 PG (ref 27–33)
MCHC RBC AUTO-ENTMCNC: 29.7 G/DL (ref 33.6–35)
MCV RBC AUTO: 76.1 FL (ref 81.4–97.8)
MICRO URNS: ABNORMAL
MICROCYTES BLD QL SMEAR: ABNORMAL
MONOCYTES # BLD AUTO: 0.25 K/UL (ref 0–0.85)
MONOCYTES NFR BLD AUTO: 2.6 % (ref 0–13.4)
MORPHOLOGY BLD-IMP: NORMAL
NEUTROPHILS # BLD AUTO: 7.24 K/UL (ref 2–7.15)
NEUTROPHILS NFR BLD: 73.9 % (ref 44–72)
NITRITE UR QL STRIP.AUTO: NEGATIVE
NRBC # BLD AUTO: 0 K/UL
NRBC BLD-RTO: 0 /100 WBC
OVALOCYTES BLD QL SMEAR: NORMAL
PH UR STRIP.AUTO: 5 [PH] (ref 5–8)
PLATELET # BLD AUTO: 203 K/UL (ref 164–446)
PLATELET BLD QL SMEAR: NORMAL
POIKILOCYTOSIS BLD QL SMEAR: NORMAL
POTASSIUM SERPL-SCNC: 4 MMOL/L (ref 3.6–5.5)
PROT SERPL-MCNC: 8.1 G/DL (ref 6–8.2)
PROT UR QL STRIP: NEGATIVE MG/DL
RBC # BLD AUTO: 5.53 M/UL (ref 4.2–5.4)
RBC # URNS HPF: ABNORMAL /HPF
RBC BLD AUTO: PRESENT
RBC UR QL AUTO: NEGATIVE
SODIUM SERPL-SCNC: 140 MMOL/L (ref 135–145)
SP GR UR STRIP.AUTO: 1.02
STOMATOCYTES BLD QL SMEAR: NORMAL
UROBILINOGEN UR STRIP.AUTO-MCNC: 0.2 MG/DL
WBC # BLD AUTO: 9.8 K/UL (ref 4.8–10.8)
WBC #/AREA URNS HPF: ABNORMAL /HPF

## 2022-04-17 PROCEDURE — 81001 URINALYSIS AUTO W/SCOPE: CPT

## 2022-04-17 PROCEDURE — 83690 ASSAY OF LIPASE: CPT

## 2022-04-17 PROCEDURE — 84703 CHORIONIC GONADOTROPIN ASSAY: CPT

## 2022-04-17 PROCEDURE — 700102 HCHG RX REV CODE 250 W/ 637 OVERRIDE(OP): Performed by: EMERGENCY MEDICINE

## 2022-04-17 PROCEDURE — 80053 COMPREHEN METABOLIC PANEL: CPT

## 2022-04-17 PROCEDURE — 85025 COMPLETE CBC W/AUTO DIFF WBC: CPT

## 2022-04-17 PROCEDURE — 85007 BL SMEAR W/DIFF WBC COUNT: CPT

## 2022-04-17 PROCEDURE — A9270 NON-COVERED ITEM OR SERVICE: HCPCS | Performed by: EMERGENCY MEDICINE

## 2022-04-17 PROCEDURE — 99284 EMERGENCY DEPT VISIT MOD MDM: CPT

## 2022-04-17 PROCEDURE — 36415 COLL VENOUS BLD VENIPUNCTURE: CPT

## 2022-04-17 RX ORDER — OXYCODONE HYDROCHLORIDE AND ACETAMINOPHEN 5; 325 MG/1; MG/1
1 TABLET ORAL EVERY 4 HOURS PRN
Status: DISCONTINUED | OUTPATIENT
Start: 2022-04-17 | End: 2022-04-17 | Stop reason: HOSPADM

## 2022-04-17 RX ORDER — DICYCLOMINE HCL 20 MG
20 TABLET ORAL 4 TIMES DAILY PRN
Qty: 30 TABLET | Refills: 0 | Status: SHIPPED | OUTPATIENT
Start: 2022-04-17

## 2022-04-17 RX ORDER — OMEPRAZOLE 40 MG/1
40 CAPSULE, DELAYED RELEASE ORAL DAILY
Qty: 30 CAPSULE | Refills: 0 | Status: SHIPPED | OUTPATIENT
Start: 2022-04-17

## 2022-04-17 RX ADMIN — OXYCODONE HYDROCHLORIDE AND ACETAMINOPHEN 1 TABLET: 5; 325 TABLET ORAL at 14:34

## 2022-04-17 ASSESSMENT — LIFESTYLE VARIABLES
AVERAGE NUMBER OF DAYS PER WEEK YOU HAVE A DRINK CONTAINING ALCOHOL: 0
TOTAL SCORE: 0
HAVE YOU EVER FELT YOU SHOULD CUT DOWN ON YOUR DRINKING: NO
HAVE PEOPLE ANNOYED YOU BY CRITICIZING YOUR DRINKING: NO
TOTAL SCORE: 0
ON A TYPICAL DAY WHEN YOU DRINK ALCOHOL HOW MANY DRINKS DO YOU HAVE: 0
DO YOU DRINK ALCOHOL: NO
HOW MANY TIMES IN THE PAST YEAR HAVE YOU HAD 5 OR MORE DRINKS IN A DAY: 0
EVER FELT BAD OR GUILTY ABOUT YOUR DRINKING: NO
TOTAL SCORE: 0
EVER HAD A DRINK FIRST THING IN THE MORNING TO STEADY YOUR NERVES TO GET RID OF A HANGOVER: NO
CONSUMPTION TOTAL: NEGATIVE

## 2022-04-17 ASSESSMENT — FIBROSIS 4 INDEX: FIB4 SCORE: 0.57

## 2022-04-17 NOTE — Clinical Note
Chanelle Ortiz was seen and treated in our emergency department on 4/17/2022.  She may return to work on 04/18/2022.       If you have any questions or concerns, please don't hesitate to call.      Matias Guerrier M.D.

## 2022-04-17 NOTE — ED PROVIDER NOTES
"ED Provider Note    ED Provider Note    Primary care provider: No primary care provider on file.  Means of arrival: Walk-in  History obtained from: Patient    CHIEF COMPLAINT  Chief Complaint   Patient presents with   • Abdominal Pain     \"cramping really bad\" started 2 days ago   • Nausea     No vomiting     Seen at 2:19 PM.   HPI  Chanelle Ortiz is a 46 y.o. female who presents to the Emergency Department for abdominal pain.  Patient has had waxing waning, sometimes intermittent cramping abdominal pain for the past 48 hours.  After breakfast this morning it appeared to get somewhat worse.  She also has had some watery diarrhea for the past 3 weeks.  She did start a new diabetic medication around the time of the start of the diarrhea.  It is dark from time to time, she does not have any tarry stools.  She occasionally feels lightheaded and has some subjective fevers and chills.  She denies any chest pain or shortness of breath, she does have occasional palpitations.  She denies any vomiting, dysuria, rash.  She is no longer on a PPI as she completed the initial course last year and was not told that she needed to be on this indefinitely.  She has not been seen by gastroenterology since her endoscopy last year.  She is on chronic iron supplementation for anemia.  She is status post cholecystectomy.  She still has her appendix and uterus.    REVIEW OF SYSTEMS  See HPI,   Remainder of ROS negative.     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    SURGICAL HISTORY   has a past surgical history that includes other abdominal surgery (2002); gyn surgery (2004); other; primary c section; " "ovarian cystectomy (1992); cholecystectomy; primary c section; upper gi endoscopy,diagnosis (N/A, 6/14/2021); and upper gi endoscopy,biopsy (N/A, 6/14/2021).    SOCIAL HISTORY  Social History     Tobacco Use   • Smoking status: Never Smoker   • Smokeless tobacco: Never Used   Vaping Use   • Vaping Use: Never used   Substance Use Topics   • Alcohol use: No   • Drug use: No      Social History     Substance and Sexual Activity   Drug Use No       FAMILY HISTORY  Family History   Problem Relation Age of Onset   • Other Mother         sleep apnea   • Heart Failure Sister    • Diabetes Sister    • Heart Disease Sister    • Hypertension Sister    • Stroke Sister    • Hyperlipidemia Sister    • Heart Attack Maternal Grandfather    • Diabetes Daughter    • Psychiatric Illness Daughter         bipolar   • Psychiatric Illness Son         autism       CURRENT MEDICATIONS  Reviewed.  See Encounter Summary.     ALLERGIES  Allergies   Allergen Reactions   • Morphine Vomiting and Nausea     \"I think\" \"I dunno what my reaction was, the nurse just said my oxygen levels were going way to low on it\" \"heart races\"       PHYSICAL EXAM  VITAL SIGNS: /67   Pulse 70   Temp 36.7 °C (98.1 °F) (Temporal)   Resp 18   Ht 1.626 m (5' 4\")   Wt 124 kg (273 lb 5.9 oz)   SpO2 95%   BMI 46.92 kg/m²   Constitutional: Awake, alert in no apparent distress.  HENT: Normocephalic, Bilateral external ears normal. Nose normal.   Eyes: Conjunctiva normal, non-icteric, EOMI.    Thorax & Lungs: Easy unlabored respirations, Clear to ascultation bilaterally.  Cardiovascular: Regular rate, Regular rhythm, No murmurs, rubs or gallops. Bilateral pulses symmetrical.   Abdomen:  Soft, minimal diffuse tenderness without rebound or guarding, exam somewhat limited due to BMI, nondistended, normal active bowel sounds.   :    Skin: Visualized skin is  Dry, No erythema, No rash.   Musculoskeletal:   No cyanosis, clubbing or edema. No leg asymmetry. "   Neurologic: Alert, Grossly non-focal.   Psychiatric: Normal affect, Normal mood  Lymphatic:  No cervical LAD        RADIOLOGY  No orders to display         COURSE & MEDICAL DECISION MAKING  Pertinent Labs & Imaging studies reviewed. (See chart for details)    Differential diagnoses include but are not limited to: Gastritis, GERD, pancreatitis, viral syndrome NOS, less likely cystitis, appendicitis.  Exam is fairly benign.  Will initiate laboratory evaluation, at this time I do not feel there is indication for emergent CT.    2:19 PM - Medical record reviewed, patient is status post cholecystectomy, has been seen 3 times in the past year for abdominal pain, 3 CTs unremarkable.  She underwent upper GI last year that showed severe diffuse erosive gastritis, biopsies negative for malignancy or H. Pylori.      Decision Making:  This is a pleasant 46 y.o. year old female who presents with intermittent cramping abdominal pain.  The patient is well-appearing, appears quite comfortable, she does not have any peritoneal signs on examination.  The pain is predominantly in the epigastric region.  She is status post cholecystectomy, she has had several CTs in the past 2 years that did not reveal any acute process.  She does have a history of severe gastritis and ulcers and has stopped taking her PPI and H2 blocker.  Fortunately today her hemoglobin is better than usual, she does not appear to be having any GI bleeding.  Remainder of labs is unremarkable as well, lipase normal, no leukocytosis or leftward shift.  Urinalysis without any compelling evidence of infection and the patient denies any lower urinary tract symptoms.    I do not see any indication to repeat the CT today based on labs, clinical examination.  I will restart her PPI.  Recommend follow with her primary care physician and GI for further management.    Discharge Medications:  Discharge Medication List as of 4/17/2022  3:42 PM      START taking these  medications    Details   omeprazole (PRILOSEC) 40 MG delayed-release capsule Take 1 Capsule by mouth every day., Disp-30 Capsule, R-0, Normal      dicyclomine (BENTYL) 20 MG Tab Take 1 Tablet by mouth 4 times a day as needed.Abdominal crampingDisp-30 Tablet, R-0, Normal             The patient was discharged home (see d/c instructions) was told to return immediately for any signs or symptoms listed, or any worsening at all.  The patient verbally agreed to the discharge precautions and follow-up plan which is documented in EPIC.        FINAL IMPRESSION  1. Epigastric pain    2. Diarrhea, unspecified type    3. History of gastric ulcer

## 2022-04-17 NOTE — ED NOTES
Discharge teaching and paperwork provided regarding gastric ulcers and all questions/concerns answered. VSS, Given information regarding home care and reasons to follow up with ED or primary MD. Patient provided omeprazole and bentyl Rx. Patient discharged to the care of self and ambulated out of the ED.

## 2022-04-17 NOTE — ED TRIAGE NOTES
"Chief Complaint   Patient presents with   • Abdominal Pain     \"cramping really bad\" started 2 days ago   • Nausea     No vomiting     Pt ambulatory to triage for above complaint. Pt reports also having diarrhea for about a month. Pt appears uncomfortable. Pt reports she hasn't had her period in 3 months, but states she had a tubal ligation many years ago.  "

## 2022-05-30 RX ORDER — LISINOPRIL 5 MG/1
5 TABLET ORAL DAILY
Qty: 90 TABLET | Refills: 0 | Status: SHIPPED | OUTPATIENT
Start: 2022-05-30 | End: 2022-09-02

## 2022-05-30 RX ORDER — METOPROLOL SUCCINATE 25 MG/1
TABLET, EXTENDED RELEASE ORAL
Qty: 45 TABLET | Refills: 0 | Status: SHIPPED | OUTPATIENT
Start: 2022-05-30 | End: 2022-09-02

## 2022-07-09 ENCOUNTER — HOSPITAL ENCOUNTER (EMERGENCY)
Facility: MEDICAL CENTER | Age: 47
End: 2022-07-09
Attending: EMERGENCY MEDICINE
Payer: COMMERCIAL

## 2022-07-09 VITALS
HEART RATE: 74 BPM | OXYGEN SATURATION: 95 % | RESPIRATION RATE: 15 BRPM | HEIGHT: 64 IN | SYSTOLIC BLOOD PRESSURE: 116 MMHG | TEMPERATURE: 97.1 F | WEIGHT: 274.03 LBS | BODY MASS INDEX: 46.78 KG/M2 | DIASTOLIC BLOOD PRESSURE: 62 MMHG

## 2022-07-09 DIAGNOSIS — B34.9 VIRAL SYNDROME: ICD-10-CM

## 2022-07-09 LAB
EKG IMPRESSION: NORMAL
SARS-COV-2 RNA RESP QL NAA+PROBE: DETECTED
SPECIMEN SOURCE: ABNORMAL

## 2022-07-09 PROCEDURE — U0005 INFEC AGEN DETEC AMPLI PROBE: HCPCS

## 2022-07-09 PROCEDURE — 93005 ELECTROCARDIOGRAM TRACING: CPT

## 2022-07-09 PROCEDURE — 99283 EMERGENCY DEPT VISIT LOW MDM: CPT

## 2022-07-09 PROCEDURE — 93005 ELECTROCARDIOGRAM TRACING: CPT | Performed by: EMERGENCY MEDICINE

## 2022-07-09 PROCEDURE — U0003 INFECTIOUS AGENT DETECTION BY NUCLEIC ACID (DNA OR RNA); SEVERE ACUTE RESPIRATORY SYNDROME CORONAVIRUS 2 (SARS-COV-2) (CORONAVIRUS DISEASE [COVID-19]), AMPLIFIED PROBE TECHNIQUE, MAKING USE OF HIGH THROUGHPUT TECHNOLOGIES AS DESCRIBED BY CMS-2020-01-R: HCPCS

## 2022-07-09 RX ORDER — OXYMETAZOLINE HYDROCHLORIDE 0.05 G/100ML
2 SPRAY NASAL 2 TIMES DAILY
Qty: 15 ML | Refills: 0 | Status: SHIPPED | OUTPATIENT
Start: 2022-07-09 | End: 2022-07-12

## 2022-07-09 RX ORDER — OXYMETAZOLINE HYDROCHLORIDE 0.05 G/100ML
2 SPRAY NASAL 2 TIMES DAILY
Qty: 15 ML | Refills: 0 | Status: SHIPPED | OUTPATIENT
Start: 2022-07-09 | End: 2022-07-09 | Stop reason: SDUPTHER

## 2022-07-09 RX ORDER — FEXOFENADINE HCL AND PSEUDOEPHEDRINE HCI 180; 240 MG/1; MG/1
1 TABLET, EXTENDED RELEASE ORAL DAILY
Qty: 10 TABLET | Refills: 0 | Status: SHIPPED | OUTPATIENT
Start: 2022-07-09 | End: 2022-07-19

## 2022-07-09 ASSESSMENT — FIBROSIS 4 INDEX: FIB4 SCORE: 1.09

## 2022-07-09 NOTE — ED NOTES
Ambulatory to room from lobby with steady wide based gait. Changed into gown, attached to monitor, chart up for ERP.

## 2022-07-09 NOTE — ED TRIAGE NOTES
Chanelle Ortiz  46 y.o. female  Chief Complaint   Patient presents with   • Flu Like Symptoms     SOB, headache, ear pain, and dry cough. Pt can't sleep because she lays down and has problems breathing. Symptoms started three days ago.      Covid swab ordered and sent.     Vitals:    07/09/22 0348   BP: 115/71   Pulse: 81   Resp: 18   Temp: 36.1 °C (96.9 °F)   SpO2: 98%       Triage process explained to patient, apologized for wait time, and returned to lobby.  Pt informed to notify staff of any change in condition.

## 2022-07-09 NOTE — ED PROVIDER NOTES
ED Provider Note    Scribed for Juan Galvez M.D. by Olivia Jones. 7/9/2022  4:42 AM    Primary care provider: None noted.  Means of arrival: Walk-in  History obtained from: Patient  History limited by: None    CHIEF COMPLAINT  Chief Complaint   Patient presents with    Flu Like Symptoms     SOB, headache, ear pain, and dry cough. Pt can't sleep because she lays down and has problems breathing. Symptoms started three days ago.        HPI  Chanelle Ortiz is a 46 y.o. female who presents to the Emergency Department for Flu like symptoms onset two days ago. Per patient, the first symptom she had was as runny nose and sore throat. She also experienced a dry cough and a body ache which have since resolved. She has associated diarrhea, but denies fever or vomiting. No alleviating or exacerbating factors reported. Patient additionally notes she has history of asthma. She uses an inhaler whenever she needs it; however, the patient notes she currently does not have medication for it.     REVIEW OF SYSTEMS  Pertinent positives include diarrhea.   Pertinent negatives include no fever or vomiting.    All other systems reviewed and negative. See HPI for further details.       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    SURGICAL HISTORY   has a past surgical history that includes other abdominal surgery (2002); gyn surgery (2004); other; primary c section; ovarian cystectomy (1992); cholecystectomy; primary c section; upper gi endoscopy,diagnosis (N/A, 6/14/2021); and upper gi endoscopy,biopsy (N/A, 6/14/2021).    SOCIAL HISTORY  Social History     Tobacco Use    Smoking status: Never  "Smoker    Smokeless tobacco: Never Used   Vaping Use    Vaping Use: Never used   Substance Use Topics    Alcohol use: No    Drug use: No      Social History     Substance and Sexual Activity   Drug Use No       FAMILY HISTORY  Family History   Problem Relation Age of Onset    Other Mother         sleep apnea    Heart Failure Sister     Diabetes Sister     Heart Disease Sister     Hypertension Sister     Stroke Sister     Hyperlipidemia Sister     Heart Attack Maternal Grandfather     Diabetes Daughter     Psychiatric Illness Daughter         bipolar    Psychiatric Illness Son         autism       CURRENT MEDICATIONS  Current Outpatient Medications   Medication Instructions    albuterol 108 (90 Base) MCG/ACT Aero Soln inhalation aerosol 2 Puffs, Inhalation, EVERY 6 HOURS PRN    cefdinir (OMNICEF) 300 mg, Oral, 2 TIMES DAILY    dicyclomine (BENTYL) 20 mg, Oral, 4 TIMES DAILY PRN    ferrous sulfate 325 mg, Oral, DAILY    ibuprofen (MOTRIN) 600 mg, Oral, EVERY 8 HOURS PRN    lisinopril (PRINIVIL) 5 mg, Oral, DAILY    meclizine (ANTIVERT) 12.5 mg, Oral, 3 TIMES DAILY PRN    metformin (GLUCOPHAGE) 1,000 mg, Oral, EVERY EVENING    metoprolol SR (TOPROL XL) 25 MG TABLET SR 24 HR TAKE 1/2 TABLET BY MOUTH EVERY EVENING    omeprazole (PRILOSEC) 40 mg, Oral, DAILY    Tradjenta 5 mg, Oral, DAILY    vitamin D2 (Ergocalciferol) (DRISDOL) 50,000 Units, Oral, EVERY SUN       ALLERGIES  Allergies   Allergen Reactions    Morphine Vomiting and Nausea     \"I think\" \"I dunno what my reaction was, the nurse just said my oxygen levels were going way to low on it\" \"heart races\"       PHYSICAL EXAM  VITAL SIGNS: /71   Pulse 81   Temp 36.1 °C (96.9 °F) (Temporal)   Resp 18   Ht 1.626 m (5' 4\")   Wt 124 kg (274 lb 0.5 oz)   SpO2 98% Comment: Room airr  BMI 47.04 kg/m²     Nursing note and vitals reviewed.  Constitutional: Obese, Well-developed and well-nourished. No distress.   HENT: Head is normocephalic and atraumatic. Nasal " congestion, Oropharynx without exudate or erythema.   Eyes: Pupils are equal, round, and reactive to light. Conjunctiva are normal.   Cardiovascular: Normal rate and regular rhythm. No murmur heard. Normal radial pulses.  Pulmonary/Chest: Breath sounds normal. No wheezes or rales.   Abdominal: Soft and non-tender. No distention    Musculoskeletal: Extremities exhibit normal range of motion without edema or tenderness.   Neurological: Awake, alert and oriented to person, place, and time. No focal deficits noted.  Skin: Skin is warm and dry. No rash.   Psychiatric: Normal mood and affect. Appropriate for clinical situation.    DIAGNOSTIC STUDIES / PROCEDURES    EKG Interpretation  Interpreted by me as below    LABS  Results for orders placed or performed during the hospital encounter of 22   SARS-CoV-2, PCR (24 Hour In-House) Collect NP swab in VTM    Specimen: Nasopharyngeal; Respirate   Result Value Ref Range    SARS-CoV-2 Source NP Swab    EKG (NOW)   Result Value Ref Range    Report       [facility] Emergency Dept.    Test Date:  2022  Pt Name:    PRATIBHA CUELLAR              Department: ER  MRN:        [id number]                      Room:  Gender:     Female                       Technician: [id number]  :        [id number]                   Requested By:ER TRIAGE PROTOCOL  Order #:    [id number]                    Reading MD: PATRICK DEWITT MD    Measurements  Intervals                                Axis  Rate:       78                           P:          56  LA:         149                          QRS:        90  QRSD:       85                           T:          44  QT:         391  QTc:        446    Interpretive Statements  Sinus rhythm  Borderline right axis deviation  Low voltage, precordial leads  Nonspecific T abnormalities, anterior leads  Compared to ECG 2022 01:45:43  Low QRS voltage now present  Sinus bradycardia no longer present  T-wave abnormality still  present  Electronically Signed On 7-9-2022 6:20:07 PDT by PATRICK COHN MD         All labs reviewed by me.    RADIOLOGY  No orders to display     The radiologist's interpretation of all radiological studies have been reviewed by me.    COURSE & MEDICAL DECISION MAKING  Nursing notes, VS, PMSFHx reviewed in chart.     3:48 PM - Ordered EKG to evaluate.    4:42 AM - The patient presents today with signs and symptoms consistent with a viral upper respiratory infection. They have a normal pulse oximetry on room air and a normal pulmonary exam. Therefore, I feel that the likelihood of pneumonia is low. This patient does not demonstrate any clinical evidence of pneumonia, meningitis, appendicitis, or other acute medical emergency. Overall, the patient is very well appearing. I do not feel that this patient would benefit from antibiotics at this time. I have recommended Tylenol and/or ibuprofen for fever. I will rule out COVID-19 and Influenza.    6:00 AM - COVID-19 Swab is still pending. I informed patient for plans of discharge. Patient had the opportunity to ask any questions. The plan for discharge was discussed with them and she was told to return for any new or worsening symptoms. She was also informed of the plans for follow up. Patient is understanding and agreeable to the plan for discharge.    The patient will return for new or worsening symptoms and is stable at the time of discharge.    The patient is referred to a primary physician for blood pressure management, diabetic screening, and for all other preventative health concerns.    DISPOSITION:  Patient will be discharged home in stable condition.    FOLLOW UP:  [facility], Emergency Dept  [address] [address] [phone]    If symptoms worsen      OUTPATIENT MEDICATIONS:  New Prescriptions    No medications on file         FINAL IMPRESSION  1. Viral syndrome          Olivia FRANCO (Dede)garland scribing for,  and in the presence of, Juan Galvez M.D..    Electronically signed by: Olivia Jones (Scribe), 7/9/2022    I, Juan Galvez M.D. personally performed the services described in this documentation, as scribed by Olivia Jones in my presence, and it is both accurate and complete. C    The note accurately reflects work and decisions made by me.  Juan Galvez M.D.  7/9/2022  9:53 AM

## 2022-08-17 ENCOUNTER — HOSPITAL ENCOUNTER (EMERGENCY)
Facility: MEDICAL CENTER | Age: 47
End: 2022-08-17
Attending: EMERGENCY MEDICINE
Payer: COMMERCIAL

## 2022-08-17 VITALS
HEART RATE: 65 BPM | RESPIRATION RATE: 16 BRPM | WEIGHT: 268.3 LBS | OXYGEN SATURATION: 95 % | TEMPERATURE: 97.2 F | BODY MASS INDEX: 46.05 KG/M2 | DIASTOLIC BLOOD PRESSURE: 71 MMHG | SYSTOLIC BLOOD PRESSURE: 134 MMHG

## 2022-08-17 DIAGNOSIS — N39.0 ACUTE UTI: ICD-10-CM

## 2022-08-17 DIAGNOSIS — B37.31 CANDIDA VAGINITIS: ICD-10-CM

## 2022-08-17 LAB
ALBUMIN SERPL BCP-MCNC: 4 G/DL (ref 3.2–4.9)
ALBUMIN/GLOB SERPL: 1.1 G/DL
ALP SERPL-CCNC: 92 U/L (ref 30–99)
ALT SERPL-CCNC: 19 U/L (ref 2–50)
ANION GAP SERPL CALC-SCNC: 9 MMOL/L (ref 7–16)
APPEARANCE UR: CLEAR
AST SERPL-CCNC: 16 U/L (ref 12–45)
BACTERIA #/AREA URNS HPF: ABNORMAL /HPF
BACTERIA GENITAL QL WET PREP: NORMAL
BASOPHILS # BLD AUTO: 0.8 % (ref 0–1.8)
BASOPHILS # BLD: 0.08 K/UL (ref 0–0.12)
BILIRUB SERPL-MCNC: 0.2 MG/DL (ref 0.1–1.5)
BILIRUB UR QL STRIP.AUTO: NEGATIVE
BUN SERPL-MCNC: 14 MG/DL (ref 8–22)
C TRACH DNA GENITAL QL NAA+PROBE: NEGATIVE
CALCIUM SERPL-MCNC: 8.9 MG/DL (ref 8.5–10.5)
CHLORIDE SERPL-SCNC: 104 MMOL/L (ref 96–112)
CO2 SERPL-SCNC: 25 MMOL/L (ref 20–33)
COLOR UR: YELLOW
CREAT SERPL-MCNC: 0.57 MG/DL (ref 0.5–1.4)
EOSINOPHIL # BLD AUTO: 0.12 K/UL (ref 0–0.51)
EOSINOPHIL NFR BLD: 1.1 % (ref 0–6.9)
EPI CELLS #/AREA URNS HPF: NEGATIVE /HPF
ERYTHROCYTE [DISTWIDTH] IN BLOOD BY AUTOMATED COUNT: 40.7 FL (ref 35.9–50)
GFR SERPLBLD CREATININE-BSD FMLA CKD-EPI: 113 ML/MIN/1.73 M 2
GLOBULIN SER CALC-MCNC: 3.5 G/DL (ref 1.9–3.5)
GLUCOSE SERPL-MCNC: 124 MG/DL (ref 65–99)
GLUCOSE UR STRIP.AUTO-MCNC: >=1000 MG/DL
HCG SERPL QL: NEGATIVE
HCT VFR BLD AUTO: 41.8 % (ref 37–47)
HGB BLD-MCNC: 13.6 G/DL (ref 12–16)
HYALINE CASTS #/AREA URNS LPF: ABNORMAL /LPF
IMM GRANULOCYTES # BLD AUTO: 0.03 K/UL (ref 0–0.11)
IMM GRANULOCYTES NFR BLD AUTO: 0.3 % (ref 0–0.9)
KETONES UR STRIP.AUTO-MCNC: NEGATIVE MG/DL
LEUKOCYTE ESTERASE UR QL STRIP.AUTO: ABNORMAL
LIPASE SERPL-CCNC: 45 U/L (ref 11–82)
LYMPHOCYTES # BLD AUTO: 2.63 K/UL (ref 1–4.8)
LYMPHOCYTES NFR BLD: 25 % (ref 22–41)
MCH RBC QN AUTO: 27.4 PG (ref 27–33)
MCHC RBC AUTO-ENTMCNC: 32.5 G/DL (ref 33.6–35)
MCV RBC AUTO: 84.3 FL (ref 81.4–97.8)
MICRO URNS: ABNORMAL
MONOCYTES # BLD AUTO: 0.7 K/UL (ref 0–0.85)
MONOCYTES NFR BLD AUTO: 6.7 % (ref 0–13.4)
N GONORRHOEA DNA GENITAL QL NAA+PROBE: NEGATIVE
NEUTROPHILS # BLD AUTO: 6.94 K/UL (ref 2–7.15)
NEUTROPHILS NFR BLD: 66.1 % (ref 44–72)
NITRITE UR QL STRIP.AUTO: NEGATIVE
NRBC # BLD AUTO: 0 K/UL
NRBC BLD-RTO: 0 /100 WBC
PH UR STRIP.AUTO: 6 [PH] (ref 5–8)
PLATELET # BLD AUTO: 256 K/UL (ref 164–446)
PMV BLD AUTO: 11.3 FL (ref 9–12.9)
POTASSIUM SERPL-SCNC: 3.9 MMOL/L (ref 3.6–5.5)
PROT SERPL-MCNC: 7.5 G/DL (ref 6–8.2)
PROT UR QL STRIP: NEGATIVE MG/DL
RBC # BLD AUTO: 4.96 M/UL (ref 4.2–5.4)
RBC # URNS HPF: ABNORMAL /HPF
RBC UR QL AUTO: NEGATIVE
SIGNIFICANT IND 70042: NORMAL
SITE SITE: NORMAL
SODIUM SERPL-SCNC: 138 MMOL/L (ref 135–145)
SOURCE SOURCE: NORMAL
SP GR UR STRIP.AUTO: 1.04
SPECIMEN SOURCE: NORMAL
UROBILINOGEN UR STRIP.AUTO-MCNC: 0.2 MG/DL
WBC # BLD AUTO: 10.5 K/UL (ref 4.8–10.8)
WBC #/AREA URNS HPF: ABNORMAL /HPF

## 2022-08-17 PROCEDURE — 84703 CHORIONIC GONADOTROPIN ASSAY: CPT

## 2022-08-17 PROCEDURE — 83690 ASSAY OF LIPASE: CPT

## 2022-08-17 PROCEDURE — 87186 SC STD MICRODIL/AGAR DIL: CPT

## 2022-08-17 PROCEDURE — 87591 N.GONORRHOEAE DNA AMP PROB: CPT

## 2022-08-17 PROCEDURE — 85025 COMPLETE CBC W/AUTO DIFF WBC: CPT

## 2022-08-17 PROCEDURE — 80053 COMPREHEN METABOLIC PANEL: CPT

## 2022-08-17 PROCEDURE — 87086 URINE CULTURE/COLONY COUNT: CPT

## 2022-08-17 PROCEDURE — 99284 EMERGENCY DEPT VISIT MOD MDM: CPT

## 2022-08-17 PROCEDURE — 87491 CHLMYD TRACH DNA AMP PROBE: CPT

## 2022-08-17 PROCEDURE — 81001 URINALYSIS AUTO W/SCOPE: CPT

## 2022-08-17 PROCEDURE — 36415 COLL VENOUS BLD VENIPUNCTURE: CPT

## 2022-08-17 PROCEDURE — 87077 CULTURE AEROBIC IDENTIFY: CPT

## 2022-08-17 RX ORDER — FLUCONAZOLE 150 MG/1
150 TABLET ORAL
Qty: 2 TABLET | Refills: 0 | Status: SHIPPED | OUTPATIENT
Start: 2022-08-17

## 2022-08-17 RX ORDER — SULFAMETHOXAZOLE AND TRIMETHOPRIM 800; 160 MG/1; MG/1
1 TABLET ORAL EVERY 12 HOURS
Qty: 6 TABLET | Refills: 0 | Status: SHIPPED | OUTPATIENT
Start: 2022-08-17 | End: 2022-08-20

## 2022-08-17 ASSESSMENT — FIBROSIS 4 INDEX: FIB4 SCORE: 1.09

## 2022-08-17 ASSESSMENT — LIFESTYLE VARIABLES: DO YOU DRINK ALCOHOL: NO

## 2022-08-17 NOTE — ED TRIAGE NOTES
Pt to [facility]  Chief Complaint   Patient presents with    Vaginal Pain     Pt c/o vaginal pain x 3 days denies vaginal discharge     Nausea    Urinary Frequency     X 1 wk     Painful Urination    Abdominal Pain

## 2022-08-17 NOTE — ED PROVIDER NOTES
ED Provider Note    Scribed for Juan Hardin M.D. by Almaz Torre. 8/17/2022, 4:59 PM.    Primary care provider: None reported.  Means of arrival: Walk-in  History obtained from: Patient  History limited by: None    CHIEF COMPLAINT  Chief Complaint   Patient presents with    Vaginal Pain     Pt c/o vaginal pain x 3 days denies vaginal discharge     Nausea    Urinary Frequency     X 1 wk     Painful Urination    Abdominal Pain       HPI  Chanelle Ortiz is a 46 y.o. female with history of hypertension and diabetes who presents to the Emergency Department for evaluation of vaginal pain onset approximately 1 week ago, but acutely worsening 3 days ago. She admits to associated symptoms of dysuria, vaginal pain when urinating, nausea, increased urinary frequency, and suprapubic pain, but denies vaginal discharge or genital sores. She states she has not been checking her blood glucose lately. She endorses having a new sexual partner, but denies concern for STIs or chance of pregnancy. She reports she has been pregnant four times without any complications.  She endorses history of STI many years ago. No alleviating factors were reported. She reports surgical history including cholecystectomy and tonsillectomy.       REVIEW OF SYSTEMS  Pertinent positives include vaginal pain exacerbated by urination, dysuria, nausea, increased urinary frequency, and suprapubic pain. Pertinent negatives include vaginal discharge or genital sores. All other systems negative.    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    SURGICAL HISTORY   has a past surgical history that includes other abdominal  "surgery (2002); gyn surgery (2004); other; primary c section; ovarian cystectomy (1992); cholecystectomy; primary c section; upper gi endoscopy,diagnosis (N/A, 6/14/2021); and upper gi endoscopy,biopsy (N/A, 6/14/2021).    SOCIAL HISTORY  Social History     Tobacco Use    Smoking status: Never    Smokeless tobacco: Never   Vaping Use    Vaping Use: Never used   Substance Use Topics    Alcohol use: No    Drug use: No      Social History     Substance and Sexual Activity   Drug Use No       FAMILY HISTORY  Family History   Problem Relation Age of Onset    Other Mother         sleep apnea    Heart Failure Sister     Diabetes Sister     Heart Disease Sister     Hypertension Sister     Stroke Sister     Hyperlipidemia Sister     Heart Attack Maternal Grandfather     Diabetes Daughter     Psychiatric Illness Daughter         bipolar    Psychiatric Illness Son         autism       CURRENT MEDICATIONS  Current Outpatient Medications   Medication Instructions    albuterol 108 (90 Base) MCG/ACT Aero Soln inhalation aerosol 2 Puffs, Inhalation, EVERY 6 HOURS PRN    dicyclomine (BENTYL) 20 mg, Oral, 4 TIMES DAILY PRN    ferrous sulfate 325 mg, Oral, DAILY    ibuprofen (MOTRIN) 600 mg, Oral, EVERY 8 HOURS PRN    lisinopril (PRINIVIL) 5 mg, Oral, DAILY    meclizine (ANTIVERT) 12.5 mg, Oral, 3 TIMES DAILY PRN    metoprolol SR (TOPROL XL) 25 MG TABLET SR 24 HR TAKE 1/2 TABLET BY MOUTH EVERY EVENING    omeprazole (PRILOSEC) 40 mg, Oral, DAILY    Tradjenta 5 mg, Oral, DAILY    vitamin D2 (Ergocalciferol) (DRISDOL) 50,000 Units, Oral, EVERY SUN       ALLERGIES  Allergies   Allergen Reactions    Morphine Vomiting and Nausea     \"I think\" \"I dunno what my reaction was, the nurse just said my oxygen levels were going way to low on it\" \"heart races\"       PHYSICAL EXAM  VITAL SIGNS: BP (!) 148/78   Pulse 81   Temp 35.9 °C (96.7 °F) (Temporal)   Resp 17   Wt 122 kg (268 lb 4.8 oz)   LMP 06/16/2022 (Approximate)   SpO2 97%   BMI " 46.05 kg/m²     Constitutional: Well developed, Well nourished, mild distress.   HENT: Normocephalic, Atraumatic. Mask in place.  Eyes: Conjunctiva normal, No discharge.   Neck: Supple, No stridor  Cardiovascular: Normal heart rate, Normal rhythm, No murmurs, equal pulses.   Pulmonary: Normal breath sounds, No respiratory distress, No wheezing, No rales, No rhonchi.  Chest: No chest wall tenderness or deformity.   Abdomen: Soft, Mild suprapubic tenderness, No masses, no rebound, no guarding.   Back: No CVA tenderness  Pelvic: Normal external genitalia. Small amount of white discharge in vaginal vault. No cervical motion tenderness. No adnexal tenderness or masses.   Musculoskeletal: No major deformities noted, No tenderness.   Skin: Warm, Dry, No erythema, No rash.   Neurologic: Alert & oriented x 3, Normal motor function,  No focal deficits noted.   Psychiatric: Affect normal, Judgment normal, Mood normal.     LABS  Results for orders placed or performed during the hospital encounter of 08/17/22   CBC WITH DIFFERENTIAL   Result Value Ref Range    WBC 10.5 4.8 - 10.8 K/uL    RBC 4.96 4.20 - 5.40 M/uL    Hemoglobin 13.6 12.0 - 16.0 g/dL    Hematocrit 41.8 37.0 - 47.0 %    MCV 84.3 81.4 - 97.8 fL    MCH 27.4 27.0 - 33.0 pg    MCHC 32.5 (L) 33.6 - 35.0 g/dL    RDW 40.7 35.9 - 50.0 fL    Platelet Count 256 164 - 446 K/uL    MPV 11.3 9.0 - 12.9 fL    Neutrophils-Polys 66.10 44.00 - 72.00 %    Lymphocytes 25.00 22.00 - 41.00 %    Monocytes 6.70 0.00 - 13.40 %    Eosinophils 1.10 0.00 - 6.90 %    Basophils 0.80 0.00 - 1.80 %    Immature Granulocytes 0.30 0.00 - 0.90 %    Nucleated RBC 0.00 /100 WBC    Neutrophils (Absolute) 6.94 2.00 - 7.15 K/uL    Lymphs (Absolute) 2.63 1.00 - 4.80 K/uL    Monos (Absolute) 0.70 0.00 - 0.85 K/uL    Eos (Absolute) 0.12 0.00 - 0.51 K/uL    Baso (Absolute) 0.08 0.00 - 0.12 K/uL    Immature Granulocytes (abs) 0.03 0.00 - 0.11 K/uL    NRBC (Absolute) 0.00 K/uL   COMP METABOLIC PANEL   Result  Value Ref Range    Sodium 138 135 - 145 mmol/L    Potassium 3.9 3.6 - 5.5 mmol/L    Chloride 104 96 - 112 mmol/L    Co2 25 20 - 33 mmol/L    Anion Gap 9.0 7.0 - 16.0    Glucose 124 (H) 65 - 99 mg/dL    Bun 14 8 - 22 mg/dL    Creatinine 0.57 0.50 - 1.40 mg/dL    Calcium 8.9 8.5 - 10.5 mg/dL    AST(SGOT) 16 12 - 45 U/L    ALT(SGPT) 19 2 - 50 U/L    Alkaline Phosphatase 92 30 - 99 U/L    Total Bilirubin 0.2 0.1 - 1.5 mg/dL    Albumin 4.0 3.2 - 4.9 g/dL    Total Protein 7.5 6.0 - 8.2 g/dL    Globulin 3.5 1.9 - 3.5 g/dL    A-G Ratio 1.1 g/dL   LIPASE   Result Value Ref Range    Lipase 45 11 - 82 U/L   HCG QUAL SERUM   Result Value Ref Range    Beta-Hcg Qualitative Serum Negative Negative   URINALYSIS,CULTURE IF INDICATED    Specimen: Urine   Result Value Ref Range    Color Yellow     Character Clear     Specific Gravity 1.037 <1.035    Ph 6.0 5.0 - 8.0    Glucose >=1000 (A) Negative mg/dL    Ketones Negative Negative mg/dL    Protein Negative Negative mg/dL    Bilirubin Negative Negative    Urobilinogen, Urine 0.2 Negative    Nitrite Negative Negative    Leukocyte Esterase Trace (A) Negative    Occult Blood Negative Negative    Micro Urine Req Microscopic    URINE MICROSCOPIC (W/UA)   Result Value Ref Range    WBC 20-50 (A) /hpf    RBC 2-5 (A) /hpf    Bacteria Few (A) None /hpf    Epithelial Cells Negative /hpf    Hyaline Cast 0-2 /lpf   ESTIMATED GFR   Result Value Ref Range    GFR (CKD-EPI) 113 >60 mL/min/1.73 m 2   WET PREP    Specimen: Vaginal; Genital   Result Value Ref Range    Significant Indicator NEG     Source GEN     Site VAGINAL     Wet Prep For Parasites       Few WBCs seen.  Rare yeast.  No motile Trichomonas seen.  No clue cells seen.     Chlamydia/GC, PCR (Genital/Anal swab)   Result Value Ref Range    Source Endo/Cervical      All labs reviewed by me.    COURSE & MEDICAL DECISION MAKING  Pertinent Labs & Imaging studies reviewed. (See chart for details)    PPE Note: I verified that the patient was  wearing a mask and I was wearing appropriate PPE every time I entered the room. The patient's mask was on the patient at all times during my encounter except for a brief view of the oropharynx.     Scribe remained outside the patient's room and did not have any contact with the patient for the duration of patient encounter.     4:59 PM - Patient seen and examined at bedside.  Ordered urine culture, UA, HCG Qual Serum, lipase, CMP, CBC w/ diff, eGFR, urine microscopic, WET PREP, and Chlamydia/GC, and PCR (Genital/Anal swab) to evaluate her symptoms.     5:06 PM Patient was reevaluated at bedside. Performed pelvic exam with female nurse chaperoning.     6:39 PM - I reevaluated the patient at bedside. I discussed the patient's diagnostic study results which show evidence of yeast infection and UTI. I discussed plan for discharge and follow up as outlined below. The patient is stable for discharge at this time and will return for any new or worsening symptoms. Patient verbalizes understanding and support with my plan for discharge.     Medical Decision Making: At this point time I think the patient has dysuria secondary to urinary tract infection and vaginal discomfort from Candida I will infection.  We will treat her with Bactrim as well as Diflucan.  Her blood work is otherwise unremarkable I do not think she has pyelonephritis.    he patient will return for new or worsening symptoms and is stable at the time of discharge.    The patient is referred to a primary physician for blood pressure management, diabetic screening, and for all other preventative health concerns.    DISPOSITION:  Patient will be discharged home in stable condition.    FOLLOW UP:  Your doctor    Schedule an appointment as soon as possible for a visit in 3 days      [facility] - Primary Care  [address] [facility] [address]  [phone]    If you need a doctor    [facility] ([facility] - Primary Care and Family Medicine  [address]  [facility] [address]  [phone]    If you need a doctor      OUTPATIENT MEDICATIONS:  Discharge Medication List as of 8/17/2022  6:51 PM        START taking these medications    Details   fluconazole (DIFLUCAN) 150 MG tablet Take 1 Tablet by mouth every 72 hours., Disp-2 Tablet, R-0, Normal      sulfamethoxazole-trimethoprim (BACTRIM DS) 800-160 MG tablet Take 1 Tablet by mouth every 12 hours for 3 days., Disp-6 Tablet, R-0, Normal              FINAL IMPRESSION  1. Acute UTI    2. Candida vaginitis          Almaz FRANCO (Scribe), am scribing for, and in the presence of, Juan Hardin M.D.    Electronically signed by: Almaz Torre (Scribana lilia), 8/17/2022    Juan FRANCO M.D. personally performed the services described in this documentation, as scribed by Almaz Torre in my presence, and it is both accurate and complete. C.    The note accurately reflects work and decisions made by me.  Juan Hardin M.D.  8/17/2022  9:11 PM

## 2022-08-18 NOTE — ED NOTES
Pt has been provided DC instructions for UTI and vaginal yeast infection.  Prescriptions sent to her [facility] pharmacy. She has been instructed on how to obtain those medications. She is ambulatory on DC

## 2022-08-19 LAB
BACTERIA UR CULT: ABNORMAL
BACTERIA UR CULT: ABNORMAL
SIGNIFICANT IND 70042: ABNORMAL
SITE SITE: ABNORMAL
SOURCE SOURCE: ABNORMAL

## 2022-08-21 NOTE — ED NOTES
ED Positive Culture Follow-up/Notification Note:    Date: 8/21/22     Patient seen in the ED on 8/17/2022 for vaginal pain x 1 week with dysuria, and increased urinary frequency.   1. Acute UTI    2. Candida vaginitis       Discharge Medication List as of 8/17/2022  6:51 PM        START taking these medications    Details   fluconazole (DIFLUCAN) 150 MG tablet Take 1 Tablet by mouth every 72 hours., Disp-2 Tablet, R-0, Normal      sulfamethoxazole-trimethoprim (BACTRIM DS) 800-160 MG tablet Take 1 Tablet by mouth every 12 hours for 3 days., Disp-6 Tablet, R-0, Normal             Allergies: Morphine     Vitals:    08/17/22 1533 08/17/22 1811 08/17/22 1902 08/17/22 1924   BP:  113/63 (!) 146/77 134/71   Pulse:  65  65   Resp:  16  16   Temp:  36.3 °C (97.4 °F)  36.2 °C (97.2 °F)   TempSrc:  Temporal  Temporal   SpO2:  97% 97% 95%   Weight: 122 kg (268 lb 4.8 oz)          Final cultures:   Results       Procedure Component Value Units Date/Time    URINE CULTURE(NEW) [[id number]]  (Abnormal)  (Susceptibility) Collected: 08/17/22 1605    Order Status: Completed Specimen: Urine Updated: 08/19/22 0846     Significant Indicator POS     Source UR     Site -     Culture Result -      Escherichia coli  ,000 cfu/mL  Presumptive identification      Narrative:      Indication for culture:->Patient WITHOUT an indwelling Suggs  catheter in place with new onset of Dysuria, Frequency,  Urgency, and/or Suprapubic pain    Susceptibility       Escherichia coli (1)       Antibiotic Interpretation Microscan   Method Status      Amikacin  [*]  Sensitive <=16 mcg/mL LOLIS Final     Ampicillin Resistant >16 mcg/mL LOLIS Final     Amoxicillin/CA  [*]  Sensitive <=8/4 mcg/mL LOLIS Final     Aztreonam  [*]  Sensitive <=4 mcg/mL LOLIS Final     Ceftolozane+Tazobactam  [*]  Sensitive <=2 mcg/mL LOLIS Final     Ceftriaxone Sensitive <=1 mcg/mL LOLIS Final     Ceftazidime  [*]  Sensitive <=1 mcg/mL LOLIS Final     Cefazolin Sensitive <=2 mcg/mL LOLIS Final       Breakpoints when Cefazolin is used for therapy of infections  other than uncomplicated UTIs due to Enterobacterales are as  follows:  LOLIS and Interpretation:  <=2 S  4 I  >=8 R          Ciprofloxacin Sensitive <=0.25 mcg/mL LOLIS Final     Cefepime Sensitive <=2 mcg/mL LOLIS Final     Cefuroxime Sensitive <=4 mcg/mL LOLIS Final     Ceftazidime+Avibactam  [*]  Sensitive <=4 mcg/mL LOLIS Final     Ampicillin/sulbactam Intermediate 16/8 mcg/mL LOLIS Final     Ertapenem  [*]  Sensitive <=0.5 mcg/mL LOLIS Final     Tobramycin Sensitive <=2 mcg/mL LOLIS Final     Nitrofurantoin Sensitive <=32 mcg/mL LOLIS Final     Gentamicin Sensitive <=2 mcg/mL LOLIS Final     Imipenem  [*]  Sensitive <=1 mcg/mL LOLIS Final     Levofloxacin Sensitive <=0.5 mcg/mL LOLIS Final     Meropenem  [*]  Sensitive <=1 mcg/mL LOLIS Final     Meropenem/Vaborbactam  [*]  Sensitive <=2 mcg/mL LOLIS Final     Minocycline Sensitive <=4 mcg/mL LOLIS Final     Pip/Tazobactam Sensitive <=8 mcg/mL LOLIS Final     Trimeth/Sulfa Sensitive <=0.5/9.5 mcg/mL LOLIS Final     Tetracycline  [*]  Sensitive <=4 mcg/mL LOLIS Final     Tigecycline Sensitive <=2 mcg/mL LOLIS Final              [*]  Suppressed Antibiotic                   Chlamydia/GC, PCR (Genital/Anal swab) [[id number]] Collected: 08/17/22 1709    Order Status: Completed Updated: 08/17/22 2148     C. trachomatis by PCR Negative     N. gonorrhoeae by PCR Negative     Source Endo/Cervical    WET PREP [[id number]] Collected: 08/17/22 1709    Order Status: Completed Specimen: Genital from Vaginal Updated: 08/17/22 1744     Significant Indicator NEG     Source GEN     Site VAGINAL     Wet Prep For Parasites Few WBCs seen.  Rare yeast.  No motile Trichomonas seen.  No clue cells seen.      URINALYSIS,CULTURE IF INDICATED [[id number]]  (Abnormal) Collected: 08/17/22 1605    Order Status: Completed Specimen: Urine Updated: 08/17/22 1627     Color Yellow     Character Clear     Specific Gravity 1.037     Ph 6.0     Glucose >=1000 mg/dL       Ketones Negative mg/dL      Protein Negative mg/dL      Bilirubin Negative     Urobilinogen, Urine 0.2     Nitrite Negative     Leukocyte Esterase Trace     Occult Blood Negative     Micro Urine Req Microscopic    Narrative:      Indication for culture:->Patient WITHOUT an indwelling Suggs  catheter in place with new onset of Dysuria, Frequency,  Urgency, and/or Suprapubic pain    Chlamydia/GC PCR (Urine) [[id number]] Collected: 08/17/22 0000    Order Status: Canceled Specimen: Genital             Plan:   Appropriate antibiotic therapy prescribed. No changes required based upon culture result.      Antonieta Cunningham, PharmD

## 2022-09-14 ENCOUNTER — APPOINTMENT (OUTPATIENT)
Dept: RADIOLOGY | Facility: MEDICAL CENTER | Age: 47
End: 2022-09-14
Attending: EMERGENCY MEDICINE
Payer: COMMERCIAL

## 2022-09-14 ENCOUNTER — HOSPITAL ENCOUNTER (EMERGENCY)
Facility: MEDICAL CENTER | Age: 47
End: 2022-09-14
Attending: EMERGENCY MEDICINE
Payer: COMMERCIAL

## 2022-09-14 VITALS
RESPIRATION RATE: 17 BRPM | TEMPERATURE: 97.5 F | DIASTOLIC BLOOD PRESSURE: 59 MMHG | OXYGEN SATURATION: 94 % | BODY MASS INDEX: 45.92 KG/M2 | WEIGHT: 268.96 LBS | SYSTOLIC BLOOD PRESSURE: 104 MMHG | HEIGHT: 64 IN | HEART RATE: 56 BPM

## 2022-09-14 DIAGNOSIS — R07.9 LEFT-SIDED CHEST PAIN: ICD-10-CM

## 2022-09-14 DIAGNOSIS — R42 DIZZINESS: ICD-10-CM

## 2022-09-14 LAB
ALBUMIN SERPL BCP-MCNC: 4.1 G/DL (ref 3.2–4.9)
ALBUMIN/GLOB SERPL: 1.2 G/DL
ALP SERPL-CCNC: 105 U/L (ref 30–99)
ALT SERPL-CCNC: 14 U/L (ref 2–50)
ANION GAP SERPL CALC-SCNC: 11 MMOL/L (ref 7–16)
AST SERPL-CCNC: 18 U/L (ref 12–45)
BASOPHILS # BLD AUTO: 1 % (ref 0–1.8)
BASOPHILS # BLD: 0.09 K/UL (ref 0–0.12)
BILIRUB SERPL-MCNC: 0.2 MG/DL (ref 0.1–1.5)
BUN SERPL-MCNC: 12 MG/DL (ref 8–22)
CALCIUM SERPL-MCNC: 9 MG/DL (ref 8.5–10.5)
CHLORIDE SERPL-SCNC: 105 MMOL/L (ref 96–112)
CO2 SERPL-SCNC: 23 MMOL/L (ref 20–33)
CREAT SERPL-MCNC: 0.44 MG/DL (ref 0.5–1.4)
EKG IMPRESSION: NORMAL
EOSINOPHIL # BLD AUTO: 0.17 K/UL (ref 0–0.51)
EOSINOPHIL NFR BLD: 1.9 % (ref 0–6.9)
ERYTHROCYTE [DISTWIDTH] IN BLOOD BY AUTOMATED COUNT: 40.4 FL (ref 35.9–50)
GFR SERPLBLD CREATININE-BSD FMLA CKD-EPI: 120 ML/MIN/1.73 M 2
GLOBULIN SER CALC-MCNC: 3.4 G/DL (ref 1.9–3.5)
GLUCOSE SERPL-MCNC: 107 MG/DL (ref 65–99)
HCT VFR BLD AUTO: 40.2 % (ref 37–47)
HGB BLD-MCNC: 13 G/DL (ref 12–16)
IMM GRANULOCYTES # BLD AUTO: 0.02 K/UL (ref 0–0.11)
IMM GRANULOCYTES NFR BLD AUTO: 0.2 % (ref 0–0.9)
LYMPHOCYTES # BLD AUTO: 2.62 K/UL (ref 1–4.8)
LYMPHOCYTES NFR BLD: 28.8 % (ref 22–41)
MCH RBC QN AUTO: 26.9 PG (ref 27–33)
MCHC RBC AUTO-ENTMCNC: 32.3 G/DL (ref 33.6–35)
MCV RBC AUTO: 83.1 FL (ref 81.4–97.8)
MONOCYTES # BLD AUTO: 0.59 K/UL (ref 0–0.85)
MONOCYTES NFR BLD AUTO: 6.5 % (ref 0–13.4)
NEUTROPHILS # BLD AUTO: 5.61 K/UL (ref 2–7.15)
NEUTROPHILS NFR BLD: 61.6 % (ref 44–72)
NRBC # BLD AUTO: 0 K/UL
NRBC BLD-RTO: 0 /100 WBC
PLATELET # BLD AUTO: 204 K/UL (ref 164–446)
PMV BLD AUTO: 11.7 FL (ref 9–12.9)
POTASSIUM SERPL-SCNC: 3.9 MMOL/L (ref 3.6–5.5)
PROT SERPL-MCNC: 7.5 G/DL (ref 6–8.2)
RBC # BLD AUTO: 4.84 M/UL (ref 4.2–5.4)
SODIUM SERPL-SCNC: 139 MMOL/L (ref 135–145)
TROPONIN T SERPL-MCNC: <6 NG/L (ref 6–19)
WBC # BLD AUTO: 9.1 K/UL (ref 4.8–10.8)

## 2022-09-14 PROCEDURE — A9270 NON-COVERED ITEM OR SERVICE: HCPCS | Performed by: EMERGENCY MEDICINE

## 2022-09-14 PROCEDURE — 71045 X-RAY EXAM CHEST 1 VIEW: CPT

## 2022-09-14 PROCEDURE — 700102 HCHG RX REV CODE 250 W/ 637 OVERRIDE(OP): Performed by: EMERGENCY MEDICINE

## 2022-09-14 PROCEDURE — 99283 EMERGENCY DEPT VISIT LOW MDM: CPT

## 2022-09-14 PROCEDURE — 93005 ELECTROCARDIOGRAM TRACING: CPT

## 2022-09-14 PROCEDURE — 80053 COMPREHEN METABOLIC PANEL: CPT

## 2022-09-14 PROCEDURE — 93005 ELECTROCARDIOGRAM TRACING: CPT | Performed by: EMERGENCY MEDICINE

## 2022-09-14 PROCEDURE — 36415 COLL VENOUS BLD VENIPUNCTURE: CPT

## 2022-09-14 PROCEDURE — 85025 COMPLETE CBC W/AUTO DIFF WBC: CPT

## 2022-09-14 PROCEDURE — 84484 ASSAY OF TROPONIN QUANT: CPT

## 2022-09-14 RX ORDER — HYDROCODONE BITARTRATE AND ACETAMINOPHEN 5; 325 MG/1; MG/1
1 TABLET ORAL ONCE
Status: COMPLETED | OUTPATIENT
Start: 2022-09-14 | End: 2022-09-14

## 2022-09-14 RX ADMIN — HYDROCODONE BITARTRATE AND ACETAMINOPHEN 1 TABLET: 5; 325 TABLET ORAL at 18:34

## 2022-09-14 ASSESSMENT — FIBROSIS 4 INDEX: FIB4 SCORE: 0.66

## 2022-09-15 NOTE — ED NOTES
PT walked back to 55 [address] with no assistance needed. Changed into a gown and was put on the monitor.

## 2022-09-15 NOTE — ED TRIAGE NOTES
"Chief Complaint   Patient presents with    Dizziness     Pt reports that she started feeling dizzy today upon standing. Pt also endorses that she has left sided pain of her whole body, including her chest. Pt does not know why she is in pain, \"Maybe I'm overdoing it at work, I work a lot.\" Pt denies trauma.      BP (!) 145/88   Pulse 72   Temp 36.2 °C (97.1 °F) (Temporal)   Resp 16   Ht 1.626 m (5' 4\")   Wt 122 kg (268 lb 15.4 oz)   LMP 08/16/2022 (Within Weeks)   SpO2 95%   BMI 46.17 kg/m²     Pt is ambulatory in and out of triage. Appropriate PPE worn throughout entire encounter. Pt placed back in the lobby and educated about triage process.    EKG performed in triage.  "

## 2022-09-15 NOTE — ED PROVIDER NOTES
"ED Provider Note    CHIEF COMPLAINT  Chief Complaint   Patient presents with    Dizziness     Pt reports that she started feeling dizzy today upon standing. Pt also endorses that she has left sided pain of her whole body, including her chest. Pt does not know why she is in pain, \"Maybe I'm overdoing it at work, I work a lot.\" Pt denies trauma.        HPI  Chanelle Ortiz is a 46 y.o. female who presents for evaluation of left-sided body pain over the past week, she reports this includes her chest, arm, back, abdomen, pelvis and leg.  Today she developed some dizziness described as a lightheaded sensation when she went to stand up.  She has no shortness of breath, no coughing.  No headache, no focal weakness numbness or tingling.  Past medical history includes diabetes, anxiety and depression.  No other acute complaints offered at this time.    REVIEW OF SYSTEMS  Negative for fever, rash, dyspnea, nausea, vomiting, diarrhea, headache, focal weakness, focal numbness, focal tingling. All other systems are negative.     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     Hx of Bronchitis     Hyperthyroidism 2008    Treated with Radioactive iodine, at [place], in 2008    Hypothyroidism, after radioactive ablation of thyroid 2008    POLLY on CPAP 2018    gets headaches, if not using CPAP.     Pneumonia     Psychiatric problem - unspecified     pt notes anxiety and depression, with counseller - pt unclear on details.         FAMILY HISTORY  Family History   Problem Relation Age of Onset    Other Mother       " "  sleep apnea    Heart Failure Sister     Diabetes Sister     Heart Disease Sister     Hypertension Sister     Stroke Sister     Hyperlipidemia Sister     Heart Attack Maternal Grandfather     Diabetes Daughter     Psychiatric Illness Daughter         bipolar    Psychiatric Illness Son         autism       SOCIAL HISTORY  Social History     Tobacco Use    Smoking status: Never    Smokeless tobacco: Never   Vaping Use    Vaping Use: Never used   Substance Use Topics    Alcohol use: No    Drug use: No       SURGICAL HISTORY  Past Surgical History:   Procedure Laterality Date    CA UPPER GI ENDOSCOPY,DIAGNOSIS N/A 2021    Procedure: GASTROSCOPY;  Surgeon: Seven Castellon M.D.;  Location: SURGERY SAME DAY [facility];  Service: Gastroenterology    CA UPPER GI ENDOSCOPY,BIOPSY N/A 2021    Procedure: GASTROSCOPY, WITH BIOPSY;  Surgeon: Seven Castellon M.D.;  Location: SURGERY SAME DAY [facility];  Service: Gastroenterology    GYN SURGERY      tuboligation, bilateral, during past .     OTHER ABDOMINAL SURGERY      cholesystectomy    OVARIAN CYSTECTOMY      unknown details or laterality.    CHOLECYSTECTOMY      OTHER      tonsillectomy    PRIMARY C SECTION       - , ,     PRIMARY C SECTION         CURRENT MEDICATIONS  I personally reviewed the medication list in the charting documentation.     ALLERGIES  Allergies   Allergen Reactions    Morphine Vomiting and Nausea     \"I think\" \"I dunno what my reaction was, the nurse just said my oxygen levels were going way to low on it\" \"heart races\"       MEDICAL RECORD  I have reviewed patient's medical record and pertinent results are listed above.      PHYSICAL EXAM  VITAL SIGNS: BP (!) 145/88   Pulse 72   Temp 36.2 °C (97.1 °F) (Temporal)   Resp 16   Ht 1.626 m (5' 4\")   Wt 122 kg (268 lb 15.4 oz)   LMP 2022 (Within Weeks)   SpO2 95%   BMI 46.17 kg/m²    Constitutional: Well appearing patient in no acute distress.  Awake " and alert, not toxic nor ill in appearance.  HENT: Normocephalic, no obvious evidence of acute trauma.  Eyes: No scleral icterus. Normal conjunctiva   Neck: Comfortable movement without any obvious restriction in the range of motion.  Cardiovascular: Upon ascultation I appreciate a regular heart rhythm and a normal rate.   Thorax & Lungs: Normal nonlabored respirations.  Upon application of the stethoscope for auscultation I find there to be no associated chest wall tenderness.  I appreciate no wheezing, rhonchi or rales. There is normal air movement.    Abdomen: The abdomen is not visibly distended. Upon palpation, I find it to be without tenderness.  No mass appreciated.  Skin: The exposed portions of skin reveal no obvious rash or other abnormalities.  Extremities/Musculoskeletal: No obvious sign of acute trauma. No asymmetric calf tenderness or edema.   Neurologic: Alert & oriented. No focal deficits observed.   Psychiatric: Normal affect appropriate for the clinical situation.    DIAGNOSTIC STUDIES / PROCEDURES    LABS/EKGs     Results for orders placed or performed during the hospital encounter of 09/14/22   CBC with Differential   Result Value Ref Range    WBC 9.1 4.8 - 10.8 K/uL    RBC 4.84 4.20 - 5.40 M/uL    Hemoglobin 13.0 12.0 - 16.0 g/dL    Hematocrit 40.2 37.0 - 47.0 %    MCV 83.1 81.4 - 97.8 fL    MCH 26.9 (L) 27.0 - 33.0 pg    MCHC 32.3 (L) 33.6 - 35.0 g/dL    RDW 40.4 35.9 - 50.0 fL    Platelet Count 204 164 - 446 K/uL    MPV 11.7 9.0 - 12.9 fL    Neutrophils-Polys 61.60 44.00 - 72.00 %    Lymphocytes 28.80 22.00 - 41.00 %    Monocytes 6.50 0.00 - 13.40 %    Eosinophils 1.90 0.00 - 6.90 %    Basophils 1.00 0.00 - 1.80 %    Immature Granulocytes 0.20 0.00 - 0.90 %    Nucleated RBC 0.00 /100 WBC    Neutrophils (Absolute) 5.61 2.00 - 7.15 K/uL    Lymphs (Absolute) 2.62 1.00 - 4.80 K/uL    Monos (Absolute) 0.59 0.00 - 0.85 K/uL    Eos (Absolute) 0.17 0.00 - 0.51 K/uL    Baso (Absolute) 0.09 0.00 - 0.12  K/uL    Immature Granulocytes (abs) 0.02 0.00 - 0.11 K/uL    NRBC (Absolute) 0.00 K/uL   Complete Metabolic Panel (CMP)   Result Value Ref Range    Sodium 139 135 - 145 mmol/L    Potassium 3.9 3.6 - 5.5 mmol/L    Chloride 105 96 - 112 mmol/L    Co2 23 20 - 33 mmol/L    Anion Gap 11.0 7.0 - 16.0    Glucose 107 (H) 65 - 99 mg/dL    Bun 12 8 - 22 mg/dL    Creatinine 0.44 (L) 0.50 - 1.40 mg/dL    Calcium 9.0 8.5 - 10.5 mg/dL    AST(SGOT) 18 12 - 45 U/L    ALT(SGPT) 14 2 - 50 U/L    Alkaline Phosphatase 105 (H) 30 - 99 U/L    Total Bilirubin 0.2 0.1 - 1.5 mg/dL    Albumin 4.1 3.2 - 4.9 g/dL    Total Protein 7.5 6.0 - 8.2 g/dL    Globulin 3.4 1.9 - 3.5 g/dL    A-G Ratio 1.2 g/dL   Troponin   Result Value Ref Range    Troponin T <6 6 - 19 ng/L   ESTIMATED GFR   Result Value Ref Range    GFR (CKD-EPI) 120 >60 mL/min/1.73 m 2   EKG   Result Value Ref Range    Report       [facility] Emergency Dept.    Test Date:  2022  Pt Name:    PRATIBHA CUELLAR              Department: ER  MRN:        [id number]                      Room:  Gender:     Female                       Technician: [id number]  :        [id number]                   Requested By:ER TRIAGE PROTOCOL  Order #:    [id number]                    Reading MD: KUMAR MORENO MD    Measurements  Intervals                                Axis  Rate:       62                           P:          57  KS:         144                          QRS:        66  QRSD:       93                           T:          33  QT:         408  QTc:        415    Interpretive Statements  12 Lead EKG interpreted by me to show: -- Rate 62 -- Rhythm: Normal sinus  rhythm  -- Axis: Normal -- KS and QRS Intervals: Normal -- T waves: No acute changes  --  ST segments: No acute changes -- Ectopy: None. My impression of this EKG:  Does  not indicate acute ischemia at this time.  Electronically Signed On  18:10:06 PDT by KUMAR MORENO MD          RADIOLOGY      DX-CHEST-PORTABLE (1 VIEW)   Final Result      No evidence of acute cardiopulmonary process.            COURSE & MEDICAL DECISION MAKING  I have reviewed any medical record information, laboratory studies and radiographic results as noted above.  Differential diagnoses includes: Dehydration, electrolyte abnormalities, anemia, ACS, pneumothorax    Encounter Summary: This is a very pleasant 46 y.o. female who unfortunately required evaluation in the emergency department today with complaints of lightheadedness today in the setting of essentially left-sided body pain for the past week or so.  History of diabetes.  No focal findings on exam.  Vital signs reveal some hypertension otherwise unremarkable.  Triage ordered blood work and chest x-ray will be reviewed.  She will be treated with Norco and reevaluated -------- very reassuring evaluation.  Negative troponin.  Normal chemistries.  Normal blood counts.  Upon reevaluation the patient is resting comfortably.  At this point she is discharged home in stable condition with no clear urgent or emergent etiologies found to explain her symptoms.  She will follow-up with her primary care physician.  Discharged home in stable condition      DISPOSITION: Discharged home in stable condition      FINAL IMPRESSION  1. Dizziness    2. Left-sided chest pain           This dictation was created using voice recognition software. The accuracy of the dictation is limited to the abilities of the software. I expect there may be some errors of grammar and possibly content. The nursing notes were reviewed and certain aspects of this information were incorporated into this note.    Electronically signed by: Akash Miranda M.D., 9/14/2022 6:10 PM

## 2022-09-15 NOTE — ED NOTES
Pt aox4, skin pink warm and dry, airway patent, rr even and unlabored, nad noted. No new complaints. Pt vss. Pt assisted to wheelchair and to car without new incident or distress. Pt moves all extremities upon command, ambulates to wheelchair with steady gait.

## 2022-11-22 ENCOUNTER — APPOINTMENT (OUTPATIENT)
Dept: RADIOLOGY | Facility: MEDICAL CENTER | Age: 47
End: 2022-11-22
Attending: EMERGENCY MEDICINE
Payer: COMMERCIAL

## 2022-11-22 ENCOUNTER — HOSPITAL ENCOUNTER (EMERGENCY)
Facility: MEDICAL CENTER | Age: 47
End: 2022-11-22
Attending: EMERGENCY MEDICINE
Payer: COMMERCIAL

## 2022-11-22 VITALS
TEMPERATURE: 97.4 F | RESPIRATION RATE: 18 BRPM | BODY MASS INDEX: 46.18 KG/M2 | SYSTOLIC BLOOD PRESSURE: 103 MMHG | HEART RATE: 60 BPM | DIASTOLIC BLOOD PRESSURE: 56 MMHG | HEIGHT: 64 IN | OXYGEN SATURATION: 98 % | WEIGHT: 270.5 LBS

## 2022-11-22 DIAGNOSIS — M79.602 PAIN OF LEFT UPPER EXTREMITY: ICD-10-CM

## 2022-11-22 DIAGNOSIS — M25.562 ACUTE PAIN OF BOTH KNEES: ICD-10-CM

## 2022-11-22 DIAGNOSIS — M25.561 ACUTE PAIN OF BOTH KNEES: ICD-10-CM

## 2022-11-22 LAB
ALBUMIN SERPL BCP-MCNC: 3.8 G/DL (ref 3.2–4.9)
ALBUMIN/GLOB SERPL: 1.2 G/DL
ALP SERPL-CCNC: 88 U/L (ref 30–99)
ALT SERPL-CCNC: 18 U/L (ref 2–50)
ANION GAP SERPL CALC-SCNC: 9 MMOL/L (ref 7–16)
AST SERPL-CCNC: 17 U/L (ref 12–45)
BASOPHILS # BLD AUTO: 0.9 % (ref 0–1.8)
BASOPHILS # BLD: 0.07 K/UL (ref 0–0.12)
BILIRUB SERPL-MCNC: 0.2 MG/DL (ref 0.1–1.5)
BUN SERPL-MCNC: 19 MG/DL (ref 8–22)
CALCIUM SERPL-MCNC: 8.9 MG/DL (ref 8.5–10.5)
CHLORIDE SERPL-SCNC: 106 MMOL/L (ref 96–112)
CO2 SERPL-SCNC: 26 MMOL/L (ref 20–33)
CREAT SERPL-MCNC: 0.53 MG/DL (ref 0.5–1.4)
EOSINOPHIL # BLD AUTO: 0.14 K/UL (ref 0–0.51)
EOSINOPHIL NFR BLD: 1.8 % (ref 0–6.9)
ERYTHROCYTE [DISTWIDTH] IN BLOOD BY AUTOMATED COUNT: 43 FL (ref 35.9–50)
GFR SERPLBLD CREATININE-BSD FMLA CKD-EPI: 115 ML/MIN/1.73 M 2
GLOBULIN SER CALC-MCNC: 3.2 G/DL (ref 1.9–3.5)
GLUCOSE SERPL-MCNC: 125 MG/DL (ref 65–99)
HCT VFR BLD AUTO: 38.5 % (ref 37–47)
HGB BLD-MCNC: 12 G/DL (ref 12–16)
IMM GRANULOCYTES # BLD AUTO: 0.03 K/UL (ref 0–0.11)
IMM GRANULOCYTES NFR BLD AUTO: 0.4 % (ref 0–0.9)
LYMPHOCYTES # BLD AUTO: 2.37 K/UL (ref 1–4.8)
LYMPHOCYTES NFR BLD: 29.7 % (ref 22–41)
MCH RBC QN AUTO: 25.7 PG (ref 27–33)
MCHC RBC AUTO-ENTMCNC: 31.2 G/DL (ref 33.6–35)
MCV RBC AUTO: 82.4 FL (ref 81.4–97.8)
MONOCYTES # BLD AUTO: 0.7 K/UL (ref 0–0.85)
MONOCYTES NFR BLD AUTO: 8.8 % (ref 0–13.4)
NEUTROPHILS # BLD AUTO: 4.68 K/UL (ref 2–7.15)
NEUTROPHILS NFR BLD: 58.4 % (ref 44–72)
NRBC # BLD AUTO: 0 K/UL
NRBC BLD-RTO: 0 /100 WBC
PLATELET # BLD AUTO: 206 K/UL (ref 164–446)
PMV BLD AUTO: 11 FL (ref 9–12.9)
POTASSIUM SERPL-SCNC: 3.7 MMOL/L (ref 3.6–5.5)
PROT SERPL-MCNC: 7 G/DL (ref 6–8.2)
RBC # BLD AUTO: 4.67 M/UL (ref 4.2–5.4)
SODIUM SERPL-SCNC: 141 MMOL/L (ref 135–145)
WBC # BLD AUTO: 8 K/UL (ref 4.8–10.8)

## 2022-11-22 PROCEDURE — 73564 X-RAY EXAM KNEE 4 OR MORE: CPT | Mod: RT

## 2022-11-22 PROCEDURE — 99284 EMERGENCY DEPT VISIT MOD MDM: CPT

## 2022-11-22 PROCEDURE — 73080 X-RAY EXAM OF ELBOW: CPT | Mod: LT

## 2022-11-22 PROCEDURE — 36415 COLL VENOUS BLD VENIPUNCTURE: CPT

## 2022-11-22 PROCEDURE — 85025 COMPLETE CBC W/AUTO DIFF WBC: CPT

## 2022-11-22 PROCEDURE — 73564 X-RAY EXAM KNEE 4 OR MORE: CPT | Mod: LT

## 2022-11-22 PROCEDURE — 80053 COMPREHEN METABOLIC PANEL: CPT

## 2022-11-22 RX ORDER — NAPROXEN 500 MG/1
500 TABLET ORAL 2 TIMES DAILY WITH MEALS
Qty: 20 TABLET | Refills: 0 | Status: SHIPPED | OUTPATIENT
Start: 2022-11-22 | End: 2022-12-02

## 2022-11-22 ASSESSMENT — FIBROSIS 4 INDEX: FIB4 SCORE: 1.11

## 2022-11-22 NOTE — ED TRIAGE NOTES
"Per Triage RN triage note-  \"Left leg pain and arm pain, no trauma, started 2days ago, hurts with movement, no pain during sitting. Leg pain starts at knee then travels down. No increased swelling or redness noted. Pain in arm starts at elbow and travels up to shoulder. No slurred speech, arm drift. Smile equal and even.\"  "

## 2022-11-22 NOTE — ED PROVIDER NOTES
ED Provider Note    CHIEF COMPLAINT  Chief Complaint   Patient presents with    Arm Pain     Left arm. Pain in arm starts at elbow and travels up. No arms drift noted. Pt reports pain increases during activity. The pt denies trauma. No slurred speech or facial droop noted.     Leg Pain     The pt reports left leg and left arm pain for the past 2 days. Pain in leg starts at knee and travels down. No unilateral swelling or redness noted.        HPI  Chanelle Ortiz is a 47-year-old female with a past medical history of anxiety and depression, intermittent back pain, diabetes who presents emergency room for new evolving left-sided arm and bilateral lower knee pain.  Patient denies any traumatic injuries, no recent car accidents or falls.  She works at [employer] says she is on her feet for prolonged periods of time and notes that she is overweight.  She has had some worsening pain discomfort with movement and had some alleviation with rest.  She took anti-inflammatory medications without any substantial improvement and came in for further assessment.  She is denying any numbness, no tingling sensations in the lower extremities or affected arm.  She has not had any exertional chest discomfort, no slurred speech, no sharp shooting pains down the arms or lateral aspects of the legs.  No perceived weakness or change in muscular tone.  She denies any recent fevers, chills, no skin lesions and has not had any substantial new or evolving pains in the last 10 to 12 hours with movement.  She feels somewhat improved while resting in the bed and is able to demonstrate movement of all extremities at this time.    PPE Note: I personally donned full PPE for all patient encounters during this visit, including being clean-shaven with an N95 respirator mask, gloves, and goggles.     REVIEW OF SYSTEMS  See HPI for further details. All other systems are negative.     PAST MEDICAL HISTORY   has a past medical history of Anxiety and  "depression (2016), Arrhythmia, Asthma, Back pain, Diabetes (2008), Emphysematous gastritis (6/11/2021), H/O Hypertension - resolved, Hx of Bronchitis, Hyperthyroidism (2008), Hypothyroidism, after radioactive ablation of thyroid (2008), POLLY on CPAP (2018), Pneumonia, and Psychiatric problem - unspecified.    SOCIAL HISTORY  Social History     Tobacco Use    Smoking status: Never    Smokeless tobacco: Never   Vaping Use    Vaping Use: Never used   Substance and Sexual Activity    Alcohol use: No    Drug use: No    Sexual activity: Not on file     Comment: .  Tuboligation in 2004.      SURGICAL HISTORY   has a past surgical history that includes other abdominal surgery (2002); gyn surgery (2004); other; primary c section; ovarian cystectomy (1992); cholecystectomy; primary c section; upper gi endoscopy,diagnosis (N/A, 6/14/2021); and upper gi endoscopy,biopsy (N/A, 6/14/2021).    CURRENT MEDICATIONS  Home Medications       Reviewed by Abelardo Leonard R.N. (Registered Nurse) on 11/22/22 at 0139  Med List Status: Partial     Medication Last Dose Status   albuterol 108 (90 Base) MCG/ACT Aero Soln inhalation aerosol  Active   dicyclomine (BENTYL) 20 MG Tab  Active   ferrous sulfate 325 (65 Fe) MG EC tablet  Active   fluconazole (DIFLUCAN) 150 MG tablet  Active   ibuprofen (MOTRIN) 600 MG Tab  Active   lisinopril (PRINIVIL) 5 MG Tab  Active   meclizine (ANTIVERT) 12.5 MG Tab  Active   metoprolol SR (TOPROL XL) 25 MG TABLET SR 24 HR  Active   omeprazole (PRILOSEC) 40 MG delayed-release capsule  Active   TRADJENTA 5 MG Tab tablet  Active   vitamin D, Ergocalciferol, (DRISDOL) 1.25 MG (19392 UT) Cap capsule  Active                  ALLERGIES  Allergies   Allergen Reactions    Morphine Vomiting and Nausea     \"I think\" \"I dunno what my reaction was, the nurse just said my oxygen levels were going way to low on it\" \"heart races\"     PHYSICAL EXAM  VITAL SIGNS: /56   Pulse 60   Temp 36.3 °C (97.4 °F) (Temporal)   " "Resp 18   Ht 1.626 m (5' 4\")   Wt 123 kg (270 lb 8.1 oz)   LMP 10/22/2022 (Approximate)   SpO2 98%   BMI 46.43 kg/m²   Pulse ox interpretation: I interpret this pulse ox as normal.  Genl: F laying in bed comfortably, speaking clearly, appears in no distress   Head: NC/AT   Pulmonary: Lungs are clear to auscultation bilaterally  CV:  RRR, no murmur appreciated, pulses 2+ in both upper and lower extremities,  Musculoskeletal:   Left Upper Extremity  - Skin: No abrasions, no lacerations, no ecchymosis, no erythema, edema, warmth. Symmetrical with right  - Motor: Full ROM at elbow without discomfort  - Sensation intact to median/ulnar/radial nerves  - 2+ radial pulse  Bilateral Lower Extremities  - Skin: no abrasions, lacerations or ecchymosis  - Motor: mild discomfort with flexion and extension of right knee, mild discomfort with flexion of right hip.  - Sensation intact to superficial/deep peroneal, tibial, saphenous, sural nerves    DIAGNOSTIC STUDIES / PROCEDURES    LABS  Labs Reviewed   CBC WITH DIFFERENTIAL - Abnormal; Notable for the following components:       Result Value    MCH 25.7 (*)     MCHC 31.2 (*)     All other components within normal limits   COMP METABOLIC PANEL - Abnormal; Notable for the following components:    Glucose 125 (*)     All other components within normal limits   ESTIMATED GFR     RADIOLOGY  DX-ELBOW-COMPLETE 3+ LEFT   Final Result         1.  No acute traumatic bony injury.         DX-KNEE COMPLETE 4+ LEFT   Final Result         1.  No acute traumatic bony injury.   2.  Tricompartmental degenerative changes of the knee      DX-KNEE COMPLETE 4+ RIGHT   Final Result         1.  No acute traumatic bony injury.   2.  Tricompartmental degenerative changes of the knee        COURSE & MEDICAL DECISION MAKING  Pertinent Labs & Imaging studies reviewed. (See chart for details)    DDX: Osteoarthritis, arthritic changes, overuse injury, effusion, septic joint unlikely, bursitis, nondisplaced " fracture, ligamentous injury    MDM  Initial evaluation at 0101:  Patient presents emergency room for symptoms as described above.  The patient describes acute left-sided elbow and bilateral knee discomfort.  This appears to be somewhat related to use, is alleviated with rest, there is no recent traumatic injuries, there is no monoarthropathy or overlying skin changes or any surrounding warmth or erythema.  I was primarily concerned about bursitis, mechanical knee injury or possible avulsion injury or ligamentous injury.  The knees appear grossly stable bilaterally and there is likely to be some element of stress because of the patient's habitus.  X-rays were obtained of all 3 affected extremities and there is no joint effusions, no gross abnormalities at this time.    Patient was reassessed and was resting comfortably in the bed.  Discussion with her regarding the need for conservative therapy including rest as tolerated, elevation of the extremities or compression wraps within Ace bandage as needed.  She finds a somewhat restrictive and will try moving from ibuprofen to naproxen.  Additionally I would recommend follow-up in 7 to 10 days if she continues to have pain that is minimally displaced or nondisplaced fractures will not show up on these acute x-ray images.    FINAL IMPRESSION  Visit Diagnoses     ICD-10-CM   1. Pain of left upper extremity  M79.602   2. Acute pain of both knees  M25.561    M25.562     Electronically signed by: Jae Bates M.D., 11/22/2022 1:01 AM

## 2022-11-22 NOTE — ED NOTES
"Pt discharged home. Pt is A/O x 4, ambulatory with a steady gait. Pt in possession of all belongings. Pt provided discharge education and information pertaining to medications and follow up appointments. Pt received copy of discharge instructions and verbalized understanding. Discussed signs and symptoms to return to the ER, patient verbalized understanding.      /56   Pulse 60   Temp 36.3 °C (97.4 °F) (Temporal)   Resp 18   Ht 1.626 m (5' 4\")   Wt 123 kg (270 lb 8.1 oz)   LMP 10/22/2022 (Approximate)   SpO2 98%   BMI 46.43 kg/m²    "

## 2022-12-05 ENCOUNTER — APPOINTMENT (OUTPATIENT)
Dept: RADIOLOGY | Facility: MEDICAL CENTER | Age: 47
End: 2022-12-05
Attending: EMERGENCY MEDICINE
Payer: COMMERCIAL

## 2022-12-05 ENCOUNTER — HOSPITAL ENCOUNTER (EMERGENCY)
Facility: MEDICAL CENTER | Age: 47
End: 2022-12-05
Attending: EMERGENCY MEDICINE
Payer: COMMERCIAL

## 2022-12-05 VITALS
TEMPERATURE: 97.5 F | HEIGHT: 64 IN | DIASTOLIC BLOOD PRESSURE: 66 MMHG | HEART RATE: 58 BPM | RESPIRATION RATE: 18 BRPM | OXYGEN SATURATION: 96 % | BODY MASS INDEX: 44.9 KG/M2 | WEIGHT: 263 LBS | SYSTOLIC BLOOD PRESSURE: 132 MMHG

## 2022-12-05 DIAGNOSIS — R55 NEAR SYNCOPE: ICD-10-CM

## 2022-12-05 LAB
ALBUMIN SERPL BCP-MCNC: 4.2 G/DL (ref 3.2–4.9)
ALBUMIN/GLOB SERPL: 1.2 G/DL
ALP SERPL-CCNC: 94 U/L (ref 30–99)
ALT SERPL-CCNC: 27 U/L (ref 2–50)
ANION GAP SERPL CALC-SCNC: 8 MMOL/L (ref 7–16)
APTT PPP: 30.5 SEC (ref 24.7–36)
AST SERPL-CCNC: 29 U/L (ref 12–45)
BASOPHILS # BLD AUTO: 1 % (ref 0–1.8)
BASOPHILS # BLD: 0.08 K/UL (ref 0–0.12)
BILIRUB SERPL-MCNC: 0.3 MG/DL (ref 0.1–1.5)
BUN SERPL-MCNC: 16 MG/DL (ref 8–22)
CALCIUM SERPL-MCNC: 9.2 MG/DL (ref 8.5–10.5)
CHLORIDE SERPL-SCNC: 104 MMOL/L (ref 96–112)
CO2 SERPL-SCNC: 28 MMOL/L (ref 20–33)
CREAT SERPL-MCNC: 0.49 MG/DL (ref 0.5–1.4)
EKG IMPRESSION: NORMAL
EOSINOPHIL # BLD AUTO: 0.12 K/UL (ref 0–0.51)
EOSINOPHIL NFR BLD: 1.5 % (ref 0–6.9)
ERYTHROCYTE [DISTWIDTH] IN BLOOD BY AUTOMATED COUNT: 42.5 FL (ref 35.9–50)
GFR SERPLBLD CREATININE-BSD FMLA CKD-EPI: 117 ML/MIN/1.73 M 2
GLOBULIN SER CALC-MCNC: 3.5 G/DL (ref 1.9–3.5)
GLUCOSE SERPL-MCNC: 101 MG/DL (ref 65–99)
HCT VFR BLD AUTO: 43.7 % (ref 37–47)
HGB BLD-MCNC: 13.5 G/DL (ref 12–16)
IMM GRANULOCYTES # BLD AUTO: 0.03 K/UL (ref 0–0.11)
IMM GRANULOCYTES NFR BLD AUTO: 0.4 % (ref 0–0.9)
INR PPP: 1.02 (ref 0.87–1.13)
LYMPHOCYTES # BLD AUTO: 1.79 K/UL (ref 1–4.8)
LYMPHOCYTES NFR BLD: 22.5 % (ref 22–41)
MCH RBC QN AUTO: 25.4 PG (ref 27–33)
MCHC RBC AUTO-ENTMCNC: 30.9 G/DL (ref 33.6–35)
MCV RBC AUTO: 82.1 FL (ref 81.4–97.8)
MONOCYTES # BLD AUTO: 0.5 K/UL (ref 0–0.85)
MONOCYTES NFR BLD AUTO: 6.3 % (ref 0–13.4)
NEUTROPHILS # BLD AUTO: 5.45 K/UL (ref 2–7.15)
NEUTROPHILS NFR BLD: 68.3 % (ref 44–72)
NRBC # BLD AUTO: 0 K/UL
NRBC BLD-RTO: 0 /100 WBC
PLATELET # BLD AUTO: 188 K/UL (ref 164–446)
PMV BLD AUTO: 11.9 FL (ref 9–12.9)
POTASSIUM SERPL-SCNC: 4 MMOL/L (ref 3.6–5.5)
PROT SERPL-MCNC: 7.7 G/DL (ref 6–8.2)
PROTHROMBIN TIME: 13.3 SEC (ref 12–14.6)
RBC # BLD AUTO: 5.32 M/UL (ref 4.2–5.4)
SODIUM SERPL-SCNC: 140 MMOL/L (ref 135–145)
TROPONIN T SERPL-MCNC: 9 NG/L (ref 6–19)
WBC # BLD AUTO: 8 K/UL (ref 4.8–10.8)

## 2022-12-05 PROCEDURE — 85610 PROTHROMBIN TIME: CPT

## 2022-12-05 PROCEDURE — 80053 COMPREHEN METABOLIC PANEL: CPT

## 2022-12-05 PROCEDURE — 99285 EMERGENCY DEPT VISIT HI MDM: CPT

## 2022-12-05 PROCEDURE — 70496 CT ANGIOGRAPHY HEAD: CPT

## 2022-12-05 PROCEDURE — 93005 ELECTROCARDIOGRAM TRACING: CPT | Performed by: EMERGENCY MEDICINE

## 2022-12-05 PROCEDURE — 93005 ELECTROCARDIOGRAM TRACING: CPT

## 2022-12-05 PROCEDURE — 700117 HCHG RX CONTRAST REV CODE 255: Performed by: EMERGENCY MEDICINE

## 2022-12-05 PROCEDURE — 36415 COLL VENOUS BLD VENIPUNCTURE: CPT

## 2022-12-05 PROCEDURE — 71045 X-RAY EXAM CHEST 1 VIEW: CPT

## 2022-12-05 PROCEDURE — 85025 COMPLETE CBC W/AUTO DIFF WBC: CPT

## 2022-12-05 PROCEDURE — 85730 THROMBOPLASTIN TIME PARTIAL: CPT

## 2022-12-05 PROCEDURE — 84484 ASSAY OF TROPONIN QUANT: CPT

## 2022-12-05 RX ADMIN — IOHEXOL 100 ML: 350 INJECTION, SOLUTION INTRAVENOUS at 10:30

## 2022-12-05 ASSESSMENT — FIBROSIS 4 INDEX: FIB4 SCORE: 0.91

## 2022-12-05 NOTE — ED PROVIDER NOTES
ED Provider Note    CHIEF COMPLAINT  Chief Complaint   Patient presents with    Dizziness    Weakness       HPI  Chanelle Ortiz is a 47 y.o. female who presents to the emergency department by ambulance through triage for near syncope.  Patient states she was walking to the bus stop this morning when she started to feel lightheaded as if she might pass out.  Generally weak.  Mild headache.  No visual changes, slurred speech, focal weakness.  States symptoms improve at rest, but worsen when she tries to walk at this time.  No chest pain, palpitations or shortness of breath.  No true syncope.  No abdominal pain or back pain.  Nausea without vomiting.    Denies recent illness or sick contacts.    REVIEW OF SYSTEMS  See HPI for further details. All other systems are negative.     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    SOCIAL HISTORY  Social History     Tobacco Use    Smoking status: Never    Smokeless tobacco: Never   Vaping Use    Vaping Use: Never used   Substance and Sexual Activity    Alcohol use: No    Drug use: No    Sexual activity: Not on file     Comment: .  Tuboligation in 2004.        SURGICAL HISTORY   has a past surgical history that includes other abdominal surgery (2002); gyn surgery (2004); other; primary c section; ovarian cystectomy (1992); cholecystectomy; primary c section; upper gi endoscopy,diagnosis (N/A, 6/14/2021); and upper gi endoscopy,biopsy (N/A, 6/14/2021).    CURRENT MEDICATIONS  Home Medications       Reviewed by Irlanda Perez R.N. (Registered Nurse) on 12/05/22 at 0643  Med List Status: Partial     Medication Last Dose Status   albuterol  "108 (90 Base) MCG/ACT Aero Soln inhalation aerosol  Active   dicyclomine (BENTYL) 20 MG Tab  Active   ferrous sulfate 325 (65 Fe) MG EC tablet  Active   fluconazole (DIFLUCAN) 150 MG tablet  Active   ibuprofen (MOTRIN) 600 MG Tab  Active   lisinopril (PRINIVIL) 5 MG Tab  Active   meclizine (ANTIVERT) 12.5 MG Tab  Active   metoprolol SR (TOPROL XL) 25 MG TABLET SR 24 HR  Active   omeprazole (PRILOSEC) 40 MG delayed-release capsule  Active   TRADJENTA 5 MG Tab tablet  Active   vitamin D, Ergocalciferol, (DRISDOL) 1.25 MG (33472 UT) Cap capsule  Active                    ALLERGIES  Allergies   Allergen Reactions    Morphine Vomiting and Nausea     \"I think\" \"I dunno what my reaction was, the nurse just said my oxygen levels were going way to low on it\" \"heart races\"       PHYSICAL EXAM  VITAL SIGNS: BP (!) 158/81   Pulse 61   Temp 36.2 °C (97.1 °F) (Temporal)   Resp 16   Ht 1.626 m (5' 4\")   Wt 119 kg (263 lb)   LMP 11/07/2022 (Within Days)   SpO2 95%   BMI 45.14 kg/m²   Pulse ox interpretation: I interpret this pulse ox as normal.  Constitutional: Alert in no apparent distress.  HENT: Normocephalic, atraumatic. Bilateral external ears normal, Nose normal. Moist mucous membranes.    Eyes: Pupils are equal and reactive, Conjunctiva normal.  EOMI without nystagmus  Neck: Normal range of motion, Supple.  No meningeal irritation.  Lymphatic: No lymphadenopathy noted.   Cardiovascular: Regular rate and rhythm, no murmurs. Distal pulses intact.  No peripheral edema.  Thorax & Lungs: Normal breath sounds.  No wheezing/rales/ronchi. No increased work of breathing, clipped speech or retractions.   Abdomen: Soft, non-distended, non-tender to palpation. No palpable or pulsatile masses.   Skin: Warm, Dry, No erythema, No rash.   Musculoskeletal: Good range of motion in all major joints.   Neurologic: Alert and orient x4, anxious.  Speech is clear and cohesive.  Cranial nerves II through XII intact bilaterally.  5 out of 5 " strength in 4 extremities with proximal and distal muscle groups.  No pronator drift.  Patient does feel lightheaded when sitting forward, no ataxia.  Finger-nose intact bilaterally.  Straight leg raise intact bilaterally although generally weak bilaterally.   Psychiatric: Anxious otherwise affect normal, Judgment normal, Mood normal.       DIAGNOSTIC STUDIES / PROCEDURES    LABS  Results for orders placed or performed during the hospital encounter of 12/05/22   CBC WITH DIFFERENTIAL   Result Value Ref Range    WBC 8.0 4.8 - 10.8 K/uL    RBC 5.32 4.20 - 5.40 M/uL    Hemoglobin 13.5 12.0 - 16.0 g/dL    Hematocrit 43.7 37.0 - 47.0 %    MCV 82.1 81.4 - 97.8 fL    MCH 25.4 (L) 27.0 - 33.0 pg    MCHC 30.9 (L) 33.6 - 35.0 g/dL    RDW 42.5 35.9 - 50.0 fL    Platelet Count 188 164 - 446 K/uL    MPV 11.9 9.0 - 12.9 fL    Neutrophils-Polys 68.30 44.00 - 72.00 %    Lymphocytes 22.50 22.00 - 41.00 %    Monocytes 6.30 0.00 - 13.40 %    Eosinophils 1.50 0.00 - 6.90 %    Basophils 1.00 0.00 - 1.80 %    Immature Granulocytes 0.40 0.00 - 0.90 %    Nucleated RBC 0.00 /100 WBC    Neutrophils (Absolute) 5.45 2.00 - 7.15 K/uL    Lymphs (Absolute) 1.79 1.00 - 4.80 K/uL    Monos (Absolute) 0.50 0.00 - 0.85 K/uL    Eos (Absolute) 0.12 0.00 - 0.51 K/uL    Baso (Absolute) 0.08 0.00 - 0.12 K/uL    Immature Granulocytes (abs) 0.03 0.00 - 0.11 K/uL    NRBC (Absolute) 0.00 K/uL   COMP METABOLIC PANEL   Result Value Ref Range    Sodium 140 135 - 145 mmol/L    Potassium 4.0 3.6 - 5.5 mmol/L    Chloride 104 96 - 112 mmol/L    Co2 28 20 - 33 mmol/L    Anion Gap 8.0 7.0 - 16.0    Glucose 101 (H) 65 - 99 mg/dL    Bun 16 8 - 22 mg/dL    Creatinine 0.49 (L) 0.50 - 1.40 mg/dL    Calcium 9.2 8.5 - 10.5 mg/dL    AST(SGOT) 29 12 - 45 U/L    ALT(SGPT) 27 2 - 50 U/L    Alkaline Phosphatase 94 30 - 99 U/L    Total Bilirubin 0.3 0.1 - 1.5 mg/dL    Albumin 4.2 3.2 - 4.9 g/dL    Total Protein 7.7 6.0 - 8.2 g/dL    Globulin 3.5 1.9 - 3.5 g/dL    A-G Ratio 1.2 g/dL    TROPONIN   Result Value Ref Range    Troponin T 9 6 - 19 ng/L   PROTHROMBIN TIME   Result Value Ref Range    PT 13.3 12.0 - 14.6 sec    INR 1.02 0.87 - 1.13   APTT   Result Value Ref Range    APTT 30.5 24.7 - 36.0 sec   ESTIMATED GFR   Result Value Ref Range    GFR (CKD-EPI) 117 >60 mL/min/1.73 m 2   EKG   Result Value Ref Range    Report       [facility] Emergency Dept.    Test Date:  2022  Pt Name:    PRATIBHA CUELLAR              Department: ER  MRN:        [id number]                      Room:  Gender:     Female                       Technician: [id number]  :        1975                   Requested By:ER TRIAGE PROTOCOL  Order #:    [id number]                    Reading MD: SAM FUENTES DO    Measurements  Intervals                                Axis  Rate:       63                           P:          57  DE:         141                          QRS:        79  QRSD:       94                           T:          28  QT:         397  QTc:        407    Interpretive Statements  Sinus rhythm  Borderline T abnormalities, anterior leads  Compared to ECG 2022 17:57:06  T-wave abnormality now present  Possible ischemia no longer present  Electronically Signed On 2022 12:50:53 PST by SAM FUENTES DO       RADIOLOGY  CT-CTA HEAD WITH & W/O-POST PROCESS   Final Result      1.  No large vessel occlusion, high-grade stenosis or aneurysm of the [place].   2.  No CT evidence of acute infarct, hemorrhage or mass.      DX-CHEST-PORTABLE (1 VIEW)   Final Result         1.  No acute cardiopulmonary disease.          COURSE & MEDICAL DECISION MAKING  Nursing notes and vital signs were reviewed. (See chart for details)  The patients  records were reviewed, history was obtained from the patient;    ED evaluation for near syncope is unrevealing.  Somewhat symptomatic initially, but neurologically intact and nonfocal.  Hemodynamically stable.  Not orthostatic.  Abdominal  exam is benign.  No chest pain or true syncope.  EKG within normal limits, no ischemia.  Continuous telemetry without ectopy or arrhythmia.  Labs are unremarkable, no leukocytosis, anemia or electrolyte derangement.  CT head negative for headache with dizziness sudden onset making it difficult to walk.  She denies however vertigo at any time.  No vomiting despite nausea.  No clinical or radiographic evidence for pneumonia, pneumothorax or thoracic arctic dissection.  Abdominal exam is benign.  Hemodynamically stable.  Symptomatology improved without much intervention in the emergency department.  Ambulating independently before discharge.    Patient is stable for discharge at this time, anticipatory guidance provided, close follow-up is encouraged, and strict ED return instructions have been detailed. Patient is agreeable to the disposition and plan.      FINAL IMPRESSION  (R55) Near syncope      Electronically signed by: Rayna Riojas D.O., 12/5/2022 12:48 PM      This dictation was created using voice recognition software. The accuracy of the dictation is limited to the abilities of the software. I expect there may be some errors of grammar and possibly content. The nursing notes were reviewed and certain aspects of this information were incorporated into this note.

## 2022-12-05 NOTE — ED TRIAGE NOTES
"Chanelle Ortiz  Chief Complaint   Patient presents with    Dizziness    Weakness     Pt bib EMS, w/c to triage with above complaint. Pt reports walking to bus stop today to go to work. Pt reports after walking about 30min she sat down on bench and had sudden onset of dizziness. Pt denies any recent illness. EMS reports BG of 142. EKG completed in triage.     /84   Pulse 67   Temp 36.2 °C (97.1 °F) (Temporal)   Resp 16   Ht 1.626 m (5' 4\")   Wt 119 kg (263 lb)   LMP 11/07/2022 (Within Days)   SpO2 98%   BMI 45.14 kg/m²     Pt informed of triage process and encouraged to notify staff of any changes or concerns. Pt verbalized understanding of instructions. Apologized for long wait time. Pt placed back in lobby.     "

## 2022-12-05 NOTE — ED NOTES
Tolerating water. Pt ambulated to bathroom slow steady gait. Continues to feel light headed when up.

## 2022-12-05 NOTE — ED NOTES
Pt taken to bathroom by wheelchair. Continues to c/o dizziness. Pt given specimen cup instructed on clean catch urine.

## 2022-12-05 NOTE — DISCHARGE INSTRUCTIONS
Follow-up with primary care 1 to 2 days for reevaluation, medication management close blood pressure monitoring.    Continue home medication as previously indicated.    Encourage oral fluid hydration.  Diet and activity as tolerated.    Return to the emergency department for headache, altered mental status, focal weakness, syncope, chest pain, shortness of breath, palpitations, fever, vomiting or other new concerns.

## 2022-12-05 NOTE — ED NOTES
IV removed. Pt discharged to self will call [facility] for transportation home. Advised to return for worsening symptoms. Pt can follow up with pcp. All questions answered

## 2022-12-13 DIAGNOSIS — I10 PRIMARY HYPERTENSION: ICD-10-CM

## 2022-12-14 RX ORDER — LISINOPRIL 5 MG/1
5 TABLET ORAL DAILY
Qty: 30 TABLET | Refills: 0 | OUTPATIENT
Start: 2022-12-14

## 2022-12-14 NOTE — DISCHARGE INSTRUCTIONS
Follow up with Neurology within next few days    Discharge Instructions    Discharged to home by car with relative. Discharged via wheelchair, hospital escort: Yes.  Special equipment needed: Not Applicable    Be sure to schedule a follow-up appointment with your primary care doctor or any specialists as instructed.     Discharge Plan:   Diet Plan: Discussed  Activity Level: Discussed  Confirmed Follow up Appointment: Appointment Scheduled  Confirmed Symptoms Management: Discussed  Medication Reconciliation Updated: Yes    I understand that a diet low in cholesterol, fat, and sodium is recommended for good health. Unless I have been given specific instructions below for another diet, I accept this instruction as my diet prescription.   Other diet: Regular    Special Instructions: None    · Is patient discharged on Warfarin / Coumadin?   No     Depression / Suicide Risk    As you are discharged from this [facility] facility, it is important to learn how to keep safe from harming yourself.    Recognize the warning signs:  · Abrupt changes in personality, positive or negative- including increase in energy   · Giving away possessions  · Change in eating patterns- significant weight changes-  positive or negative  · Change in sleeping patterns- unable to sleep or sleeping all the time   · Unwillingness or inability to communicate  · Depression  · Unusual sadness, discouragement and loneliness  · Talk of wanting to die  · Neglect of personal appearance   · Rebelliousness- reckless behavior  · Withdrawal from people/activities they love  · Confusion- inability to concentrate     If you or a loved one observes any of these behaviors or has concerns about self-harm, here's what you can do:  · Talk about it- your feelings and reasons for harming yourself  · Remove any means that you might use to hurt yourself (examples: pills, rope, extension cords, firearm)  · Get professional help from the community (Mental Health,  Substance Abuse, psychological counseling)  · Do not be alone:Call your Safe Contact- someone whom you trust who will be there for you.  · Call your local CRISIS HOTLINE 052-9173 or 773-976-9712  · Call your local Children's Mobile Crisis Response Team [place] (026) 211-6239 or www.[employer]  · Call the toll free National Suicide Prevention Hotlines   · National Suicide Prevention Lifeline 518-703-ZOYB (2368)  · National Hope Line Network 800-SUICIDE (208-7471)       PACU

## 2022-12-14 NOTE — TELEPHONE ENCOUNTER
Is the patient due for a refill? Yes    Was the patient seen the past year? Yes    Date of last office visit: 5/11/21    Does the patient have an upcoming appointment?  No   If yes, When?     Provider to refill:SC    Does the patients insurance require a 100 day supply?  No

## 2022-12-23 NOTE — TELEPHONE ENCOUNTER
Called PT to see if she still had [place] - She stated she did so therefore I was unable to schedule a [facility] appointment - CR

## 2023-04-18 ENCOUNTER — HOSPITAL ENCOUNTER (EMERGENCY)
Facility: MEDICAL CENTER | Age: 48
End: 2023-04-19
Attending: EMERGENCY MEDICINE
Payer: COMMERCIAL

## 2023-04-18 DIAGNOSIS — M54.9 PAIN, UPPER BACK: Primary | ICD-10-CM

## 2023-04-18 DIAGNOSIS — M62.838 MUSCLE SPASM: ICD-10-CM

## 2023-04-18 DIAGNOSIS — R07.81 PLEURITIC CHEST PAIN: ICD-10-CM

## 2023-04-18 LAB — EKG IMPRESSION: NORMAL

## 2023-04-18 PROCEDURE — 700101 HCHG RX REV CODE 250: Performed by: EMERGENCY MEDICINE

## 2023-04-18 PROCEDURE — 93005 ELECTROCARDIOGRAM TRACING: CPT | Performed by: EMERGENCY MEDICINE

## 2023-04-18 PROCEDURE — 81001 URINALYSIS AUTO W/SCOPE: CPT

## 2023-04-18 PROCEDURE — 700102 HCHG RX REV CODE 250 W/ 637 OVERRIDE(OP): Performed by: EMERGENCY MEDICINE

## 2023-04-18 PROCEDURE — 84484 ASSAY OF TROPONIN QUANT: CPT

## 2023-04-18 PROCEDURE — 36415 COLL VENOUS BLD VENIPUNCTURE: CPT

## 2023-04-18 PROCEDURE — A9270 NON-COVERED ITEM OR SERVICE: HCPCS | Performed by: EMERGENCY MEDICINE

## 2023-04-18 PROCEDURE — 99285 EMERGENCY DEPT VISIT HI MDM: CPT

## 2023-04-18 PROCEDURE — 85025 COMPLETE CBC W/AUTO DIFF WBC: CPT

## 2023-04-18 PROCEDURE — 80053 COMPREHEN METABOLIC PANEL: CPT

## 2023-04-18 RX ORDER — LIDOCAINE 50 MG/G
1 PATCH TOPICAL EVERY 24 HOURS
Status: DISCONTINUED | OUTPATIENT
Start: 2023-04-18 | End: 2023-04-19 | Stop reason: HOSPADM

## 2023-04-18 RX ORDER — DIAZEPAM 5 MG/1
5 TABLET ORAL ONCE
Status: COMPLETED | OUTPATIENT
Start: 2023-04-18 | End: 2023-04-18

## 2023-04-18 RX ADMIN — LIDOCAINE 1 PATCH: 50 PATCH TOPICAL at 23:19

## 2023-04-18 RX ADMIN — DIAZEPAM 5 MG: 5 TABLET ORAL at 23:19

## 2023-04-18 ASSESSMENT — FIBROSIS 4 INDEX: FIB4 SCORE: 1.395263150541595598

## 2023-04-19 ENCOUNTER — APPOINTMENT (OUTPATIENT)
Dept: RADIOLOGY | Facility: MEDICAL CENTER | Age: 48
End: 2023-04-19
Attending: EMERGENCY MEDICINE
Payer: COMMERCIAL

## 2023-04-19 VITALS
HEART RATE: 60 BPM | RESPIRATION RATE: 17 BRPM | BODY MASS INDEX: 46.1 KG/M2 | HEIGHT: 64 IN | TEMPERATURE: 97.1 F | DIASTOLIC BLOOD PRESSURE: 62 MMHG | SYSTOLIC BLOOD PRESSURE: 127 MMHG | OXYGEN SATURATION: 94 % | WEIGHT: 270 LBS

## 2023-04-19 LAB
ALBUMIN SERPL BCP-MCNC: 3.7 G/DL (ref 3.2–4.9)
ALBUMIN/GLOB SERPL: 1 G/DL
ALP SERPL-CCNC: 118 U/L (ref 30–99)
ALT SERPL-CCNC: 24 U/L (ref 2–50)
ANION GAP SERPL CALC-SCNC: 12 MMOL/L (ref 7–16)
APPEARANCE UR: ABNORMAL
AST SERPL-CCNC: 22 U/L (ref 12–45)
BACTERIA #/AREA URNS HPF: NEGATIVE /HPF
BASOPHILS # BLD AUTO: 0.8 % (ref 0–1.8)
BASOPHILS # BLD: 0.08 K/UL (ref 0–0.12)
BILIRUB SERPL-MCNC: 0.2 MG/DL (ref 0.1–1.5)
BILIRUB UR QL STRIP.AUTO: NEGATIVE
BUN SERPL-MCNC: 18 MG/DL (ref 8–22)
CALCIUM ALBUM COR SERPL-MCNC: 8.9 MG/DL (ref 8.5–10.5)
CALCIUM SERPL-MCNC: 8.7 MG/DL (ref 8.5–10.5)
CHLORIDE SERPL-SCNC: 101 MMOL/L (ref 96–112)
CO2 SERPL-SCNC: 24 MMOL/L (ref 20–33)
COLOR UR: YELLOW
CREAT SERPL-MCNC: 0.68 MG/DL (ref 0.5–1.4)
EOSINOPHIL # BLD AUTO: 0.23 K/UL (ref 0–0.51)
EOSINOPHIL NFR BLD: 2.4 % (ref 0–6.9)
EPI CELLS #/AREA URNS HPF: NEGATIVE /HPF
ERYTHROCYTE [DISTWIDTH] IN BLOOD BY AUTOMATED COUNT: 50.1 FL (ref 35.9–50)
GFR SERPLBLD CREATININE-BSD FMLA CKD-EPI: 108 ML/MIN/1.73 M 2
GLOBULIN SER CALC-MCNC: 3.8 G/DL (ref 1.9–3.5)
GLUCOSE SERPL-MCNC: 158 MG/DL (ref 65–99)
GLUCOSE UR STRIP.AUTO-MCNC: NEGATIVE MG/DL
HCT VFR BLD AUTO: 48.5 % (ref 37–47)
HGB BLD-MCNC: 15.9 G/DL (ref 12–16)
HYALINE CASTS #/AREA URNS LPF: NORMAL /LPF
IMM GRANULOCYTES # BLD AUTO: 0.04 K/UL (ref 0–0.11)
IMM GRANULOCYTES NFR BLD AUTO: 0.4 % (ref 0–0.9)
KETONES UR STRIP.AUTO-MCNC: NEGATIVE MG/DL
LEUKOCYTE ESTERASE UR QL STRIP.AUTO: ABNORMAL
LYMPHOCYTES # BLD AUTO: 2.74 K/UL (ref 1–4.8)
LYMPHOCYTES NFR BLD: 28.4 % (ref 22–41)
MCH RBC QN AUTO: 29.2 PG (ref 27–33)
MCHC RBC AUTO-ENTMCNC: 32.8 G/DL (ref 33.6–35)
MCV RBC AUTO: 89.2 FL (ref 81.4–97.8)
MICRO URNS: ABNORMAL
MONOCYTES # BLD AUTO: 0.67 K/UL (ref 0–0.85)
MONOCYTES NFR BLD AUTO: 6.9 % (ref 0–13.4)
NEUTROPHILS # BLD AUTO: 5.89 K/UL (ref 2–7.15)
NEUTROPHILS NFR BLD: 61.1 % (ref 44–72)
NITRITE UR QL STRIP.AUTO: NEGATIVE
NRBC # BLD AUTO: 0 K/UL
NRBC BLD-RTO: 0 /100 WBC
PH UR STRIP.AUTO: 5.5 [PH] (ref 5–8)
PLATELET # BLD AUTO: 193 K/UL (ref 164–446)
PMV BLD AUTO: 11.3 FL (ref 9–12.9)
POTASSIUM SERPL-SCNC: 3.9 MMOL/L (ref 3.6–5.5)
PROT SERPL-MCNC: 7.5 G/DL (ref 6–8.2)
PROT UR QL STRIP: NEGATIVE MG/DL
RBC # BLD AUTO: 5.44 M/UL (ref 4.2–5.4)
RBC # URNS HPF: NORMAL /HPF
RBC UR QL AUTO: ABNORMAL
SODIUM SERPL-SCNC: 137 MMOL/L (ref 135–145)
SP GR UR STRIP.AUTO: 1.01
TROPONIN T SERPL-MCNC: <6 NG/L (ref 6–19)
UROBILINOGEN UR STRIP.AUTO-MCNC: 0.2 MG/DL
WBC # BLD AUTO: 9.7 K/UL (ref 4.8–10.8)
WBC #/AREA URNS HPF: NORMAL /HPF

## 2023-04-19 PROCEDURE — 96374 THER/PROPH/DIAG INJ IV PUSH: CPT

## 2023-04-19 PROCEDURE — 700117 HCHG RX CONTRAST REV CODE 255: Performed by: EMERGENCY MEDICINE

## 2023-04-19 PROCEDURE — 74175 CTA ABDOMEN W/CONTRAST: CPT

## 2023-04-19 PROCEDURE — 700111 HCHG RX REV CODE 636 W/ 250 OVERRIDE (IP): Mod: UD | Performed by: EMERGENCY MEDICINE

## 2023-04-19 RX ORDER — METHOCARBAMOL 750 MG/1
750 TABLET, FILM COATED ORAL 4 TIMES DAILY PRN
Qty: 20 TABLET | Refills: 0 | Status: SHIPPED | OUTPATIENT
Start: 2023-04-19 | End: 2023-04-24

## 2023-04-19 RX ORDER — KETOROLAC TROMETHAMINE 30 MG/ML
15 INJECTION, SOLUTION INTRAMUSCULAR; INTRAVENOUS ONCE
Status: COMPLETED | OUTPATIENT
Start: 2023-04-19 | End: 2023-04-19

## 2023-04-19 RX ORDER — LIDOCAINE 50 MG/G
1 PATCH TOPICAL EVERY 24 HOURS
Qty: 10 PATCH | Refills: 0 | Status: SHIPPED | OUTPATIENT
Start: 2023-04-19

## 2023-04-19 RX ADMIN — IOHEXOL 100 ML: 350 INJECTION, SOLUTION INTRAVENOUS at 02:15

## 2023-04-19 RX ADMIN — KETOROLAC TROMETHAMINE 15 MG: 30 INJECTION, SOLUTION INTRAMUSCULAR; INTRAVENOUS at 02:15

## 2023-04-19 NOTE — ED PROVIDER NOTES
ED Provider Note    CHIEF COMPLAINT  Chief Complaint   Patient presents with    Back Pain     Pt BIB [facility] reporting mid back pain. States had pain yesterday and it went away, pain returned approx 2 hours ago. States pain wraps around sides into abdomen. Denies any recent trauma. States the pain worsened after she drank some water tonight.        EXTERNAL RECORDS REVIEWED  Other 12/5/2022 evaluated in the ER for near syncopal episode: Labs, EKG, and CTA head unremarkable.    HPI/ROS  LIMITATION TO HISTORY   Select: : None    Chanelle Ortiz is a 47 y.o. female who presents with two hours of sudden and severe upper back pain radiating to the chest. Patient states she had mild pain yesterday located in the upper back. The pain worsened today after drinking water and now radiates to the chest. She also expresses some pain with inspiration, however, denies cough, hemoptysis, h/o PE/DVT, or lower extremity swelling or tenderness. Patient states the pain feels deep inside her back and radiates to her chest. Nothing makes the pain better, inspiration and movement makes the pain worse. She has never had pain like this before. Denies trauma, night sweats, unintentional weight loss, numb/weak extremities, vision change, headache, neck pain, h/o malignancies.    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    SURGICAL HISTORY   has a past surgical history that includes other abdominal surgery (2002); gyn surgery (2004); other; primary c section; ovarian cystectomy (1992); cholecystectomy; primary c section; upper gi endoscopy,diagnosis (N/A, 6/14/2021); and upper gi endoscopy,biopsy (N/A,  "6/14/2021).    FAMILY HISTORY  Family History   Problem Relation Age of Onset    Other Mother         sleep apnea    Heart Failure Sister     Diabetes Sister     Heart Disease Sister     Hypertension Sister     Stroke Sister     Hyperlipidemia Sister     Heart Attack Maternal Grandfather     Diabetes Daughter     Psychiatric Illness Daughter         bipolar    Psychiatric Illness Son         autism       SOCIAL HISTORY  Social History     Tobacco Use    Smoking status: Never    Smokeless tobacco: Never   Vaping Use    Vaping Use: Never used   Substance and Sexual Activity    Alcohol use: No    Drug use: No    Sexual activity: Not on file     Comment: .  Tuboligation in 2004.        CURRENT MEDICATIONS  Home Medications       Reviewed by Sulema Obregon R.N. (Registered Nurse) on 04/18/23 at 2222  Med List Status: Partial     Medication Last Dose Status   albuterol 108 (90 Base) MCG/ACT Aero Soln inhalation aerosol  Active   dicyclomine (BENTYL) 20 MG Tab  Active   ferrous sulfate 325 (65 Fe) MG EC tablet  Active   fluconazole (DIFLUCAN) 150 MG tablet  Active   ibuprofen (MOTRIN) 600 MG Tab  Active   lisinopril (PRINIVIL) 5 MG Tab  Active   meclizine (ANTIVERT) 12.5 MG Tab  Active   metoprolol SR (TOPROL XL) 25 MG TABLET SR 24 HR  Active   omeprazole (PRILOSEC) 40 MG delayed-release capsule  Active   TRADJENTA 5 MG Tab tablet  Active   vitamin D, Ergocalciferol, (DRISDOL) 1.25 MG (92744 UT) Cap capsule  Active                    ALLERGIES  Allergies   Allergen Reactions    Morphine Vomiting and Nausea     \"I think\" \"I dunno what my reaction was, the nurse just said my oxygen levels were going way to low on it\" \"heart races\"       PHYSICAL EXAM  VITAL SIGNS: /62   Pulse 60   Temp 36.2 °C (97.1 °F) (Temporal)   Resp 17   Ht 1.626 m (5' 4\")   Wt 122 kg (270 lb)   SpO2 94%   BMI 46.35 kg/m²    Physical Exam  Vitals and nursing note reviewed.   Constitutional:       General: She is in acute distress " (due to pain).      Appearance: Normal appearance. She is obese.   HENT:      Right Ear: External ear normal.      Left Ear: External ear normal.      Nose: Nose normal.      Mouth/Throat:      Pharynx: No oropharyngeal exudate or posterior oropharyngeal erythema.   Eyes:      Extraocular Movements: Extraocular movements intact.      Pupils: Pupils are equal, round, and reactive to light.   Cardiovascular:      Rate and Rhythm: Normal rate and regular rhythm.      Heart sounds: No murmur heard.    No friction rub. No gallop.   Pulmonary:      Effort: Pulmonary effort is normal.      Breath sounds: Normal breath sounds.   Abdominal:      Comments: Obese habitus, no midline pulsatile mass, no rebound no guarding, no murphys sign, no right lower quadrant pain, no CVA tenderness   Musculoskeletal:         General: Tenderness (diffuse tenderness to upper back to midline spine and bilateral paraspinal muscles, no skin changes, no rash, no ecchymosis, no evidence of trauma, no bony stepoffs to spine) present.      Cervical back: Normal range of motion and neck supple. No rigidity.   Skin:     General: Skin is warm and dry.      Findings: No rash.   Neurological:      General: No focal deficit present.      Mental Status: She is alert and oriented to person, place, and time.         DIAGNOSTIC STUDIES / PROCEDURES  EKG  I have independently interpreted this EKG  sinus rhythm, rate 65, normal intervals, normal axis, no ST elevations or depressions, inverted T waves precordial leads    LABS  Labs Reviewed   CBC WITH DIFFERENTIAL - Abnormal; Notable for the following components:       Result Value    RBC 5.44 (*)     Hematocrit 48.5 (*)     MCHC 32.8 (*)     RDW 50.1 (*)     All other components within normal limits    Narrative:     Biotin intake of greater than 5 mg per day may interfere with  troponin levels, causing false low values.   COMP METABOLIC PANEL - Abnormal; Notable for the following components:    Glucose 158  (*)     Alkaline Phosphatase 118 (*)     Globulin 3.8 (*)     All other components within normal limits    Narrative:     Biotin intake of greater than 5 mg per day may interfere with  troponin levels, causing false low values.   URINALYSIS,CULTURE IF INDICATED - Abnormal; Notable for the following components:    Character Cloudy (*)     Leukocyte Esterase Trace (*)     Occult Blood Trace (*)     All other components within normal limits    Narrative:     Indication for culture:->Patient WITHOUT an indwelling Suggs  catheter in place with new onset of Dysuria, Frequency,  Urgency, and/or Suprapubic pain   TROPONIN    Narrative:     Biotin intake of greater than 5 mg per day may interfere with  troponin levels, causing false low values.   URINE MICROSCOPIC (W/UA)    Narrative:     Indication for culture:->Patient WITHOUT an indwelling Suggs  catheter in place with new onset of Dysuria, Frequency,  Urgency, and/or Suprapubic pain   CORRECTED CALCIUM    Narrative:     Biotin intake of greater than 5 mg per day may interfere with  troponin levels, causing false low values.   ESTIMATED GFR    Narrative:     Biotin intake of greater than 5 mg per day may interfere with  troponin levels, causing false low values.         RADIOLOGY  I have independently interpreted the diagnostic imaging associated with this visit and am waiting the final reading from the radiologist.   My preliminary interpretation is as follows: No pneumothorax, no obvious aortic aneurysm or dissection  Radiologist interpretation:   CT-CTA COMPLETE THORACOABDOMINAL AORTA   Final Result         1.  No aortic aneurysm or dissection identified   2.  Hepatomegaly and irregular hepatic contour favoring changes of cirrhosis.            COURSE & MEDICAL DECISION MAKING    ED Observation Status? Yes; I am placing the patient in to an observation status due to a diagnostic uncertainty as well as therapeutic intensity. Patient placed in observation status at 10:40  PM, 4/18/2023.     Observation plan is as follows: labs, CT aorta, pain medications, re-evaluation    Upon Reevaluation, the patient's condition has: Improved; and will be discharged.    Patient discharged from ED Observation status at 4/19/2023, 02:42 AM    INITIAL ASSESSMENT, COURSE AND PLAN  Care Narrative: Chanelle Ortiz is a 47 y.o. female who presents with two hours of sudden and severe upper back pain radiating to the chest. Patient is afebrile, mildly hypertensive, vitals otherwise reassuring. She is in mild distress due to pain. Clear lungs, regular heart rate, no JVD or peripheral edema, no evidence of DVT on exam, no midline pulsatile abdominal mass, however obese habitus limits exam. EKG no evidence of ACS. Concern for possible aortic dissection with back pain radiating to chest, PE, malignancy, muscle spasm, occult trauma, esophageal spasm. Will order labs, CT aorta, and treat with valium.       DISPOSITION AND DISCUSSIONS  EKG no evidence of ACS. Troponin normal after two days of symptoms, ACS unlikely. CT aorta without evidence of aortic abnormality, large PE, pneumonia, or trauma. Labs grossly within normal limits, no leukocytosis, no anemia. Symptoms likely due to muscle spasm. Patient informed of all results. Symptoms improved with valium and toradol. She is educated on care for back spasms and encouraged to follow up with her primary care provider. She ambulated well to and from the restroom, no focal neuro deficits. She is comfortable with the plan for outpatient follow up and discharged in good condition with strict ED return precautions, robaxin and lidocaine patch.     HEART score: 2    FINAL DIAGNOSIS  1. Pain, upper back Acute   2. Muscle spasm Acute   3. Pleuritic chest pain         DISPOSITION:  Patient will be discharged home in stable condition.    FOLLOW UP:  [facility], Emergency Dept  [address] [facility] [address] [phone]    As needed, If  symptoms worsen    [place] [id number] [facility] [address]  [phone]        [facility]  [address] [facility] [address]  [phone]          OUTPATIENT MEDICATIONS:  Discharge Medication List as of 4/19/2023  2:57 AM        START taking these medications    Details   lidocaine (LIDODERM) 5 % Patch Place 1 Patch on the skin every 24 hours., Disp-10 Patch, R-0, Normal      methocarbamol (ROBAXIN) 750 MG Tab Take 1 Tablet by mouth 4 times a day as needed (muscle spasm) for up to 5 days., Disp-20 Tablet, R-0, Normal             Electronically signed by: Jenny Gonzalez M.D., 4/18/2023 10:34 PM

## 2023-04-19 NOTE — ED NOTES
Discharge home. Discharge summary provided to patient and verbalized understanding.   Patient vitally stable upon discharge.  Discharge instructions provided on take home meds and follow up.  IV line removed and no sign of phlebitis seen.  Patient wheeled out by ED nurse.

## 2023-04-19 NOTE — ED NOTES
Pt medicated per MAR. Pt ambulated to bathroom with this RN as standby, urine sample collected and sent to lab.   PIV obtained, labs drawn and sent to lab. Pt placed back on all monitoring, call light within reach, updated on current POC, no other needs at this time.

## 2023-04-19 NOTE — ED TRIAGE NOTES
Chief Complaint   Patient presents with    Back Pain     Pt BIB [facility] reporting mid back pain. States had pain yesterday and it went away, pain returned approx 2 hours ago. States pain wraps around sides into abdomen. Denies any recent trauma. States the pain worsened after she drank some water tonight.      Pt BIB [facility] for above.     Arrives A&Ox4, GCS 15, VSS. Given 500mcg tylenol by EMS.     Hx DM, HTN.     Pt placed on monitor, chart up for ERP.

## 2023-04-19 NOTE — DISCHARGE INSTRUCTIONS
Your labs are reassuring do not show any concern for injury to your heart with your feeling of chest pain.  The CT scan of your chest did not show any pneumonia, or abnormality to your aorta that could be causing your back pain.  Your symptoms may be due to muscle spasm.  You been referred to physical therapy.  Please follow-up with a primary care provider for further management.  You may try Robaxin as needed for muscle relaxant, otherwise use Tylenol and ibuprofen as needed for pain.  Return to the emergency department if you develop any worsening symptoms or any further concerns.

## 2023-05-09 ENCOUNTER — PHYSICAL THERAPY (OUTPATIENT)
Dept: PHYSICAL THERAPY | Facility: REHABILITATION | Age: 48
End: 2023-05-09
Attending: EMERGENCY MEDICINE
Payer: COMMERCIAL

## 2023-05-09 DIAGNOSIS — M54.9 PAIN, UPPER BACK: ICD-10-CM

## 2023-05-09 PROCEDURE — 97162 PT EVAL MOD COMPLEX 30 MIN: CPT

## 2023-05-09 ASSESSMENT — ENCOUNTER SYMPTOMS
PAIN SCALE AT HIGHEST: 8
PAIN SCALE: 6
PAIN SCALE AT LOWEST: 3

## 2023-05-09 NOTE — OP THERAPY EVALUATION
Outpatient Physical Therapy  INITIAL EVALUATION    [facility] [address].  [address]  [place] [address]  Phone:  [phone]  Fax:  [phone]    Date of Evaluation: 2023    Patient: Chanelle Ortiz  YOB: 1975  MRN: [id number]     Referring Provider: Jenny Gonzalez M.D.  [address] [place] [address] Emergency Room  [address]  [place] [address]   Referring Diagnosis Pain, upper back [M54.9]     Time Calculation  Start time: 1315  Stop time: 1345 Time Calculation (min): 30 minutes         Chief Complaint: Back Problem    Visit Diagnoses     ICD-10-CM   1. Pain, upper back  M54.9       Date of onset of impairment: 2023    Subjective:   History of Present Illness:     Mechanism of injury:  Pt states that she used to work at [employer]. She used to work long shifts and walk a lot. When she's on her feet all day her whole body hurts. The next day it hurts bad still. When she gets up and moves it reduces. She states she just stopped working at [employer] this week. She states that she gets pn w/ a lot of bending, squatting mainly at work stocking shelves. She states her back bothers her very little when not at work. She states she is able to do her chores on her off days. She states that she has a lot of knee pn and will be seeing a specialist for that soon. She won't be working for awhile probably b/c she may need surgery for her knee. She states that pn in her back is in the upper l/s, lower t/s. Sometimes the pn travels around lateral hips but that's only if it's really bad. Pt denied n/t.    Aggs:   Working long shift. (8/10)  Walking- inc's after 45 min, can cont (6/10)    Eases: lay down, hot bath  Pain:     Current pain ratin    At best pain rating:  3    At worst pain ratin  Patient Goals:     Other patient goals:  Reduce pn, walk and dance w/ less pn    Past Medical History:   Diagnosis Date    Anxiety and depression 2016    after ex went to [facility]     Arrhythmia     notes occasional rapid or prominent heart beat, at night     Asthma     Back pain     Diabetes 2008    on and off meds (due to concern about heart beat changes)    Emphysematous gastritis 2021    H/O Hypertension - resolved     Patient states prior HTN, but resolved after changes in other medications (but off HTN meds).     Hx of Bronchitis     Hyperthyroidism     Treated with Radioactive iodine, at [facility], in     Hypothyroidism, after radioactive ablation of thyroid     POLLY on CPAP     gets headaches, if not using CPAP.     Pneumonia     Psychiatric problem - unspecified     pt notes anxiety and depression, with counseller - pt unclear on details.       Past Surgical History:   Procedure Laterality Date    TX UPPER GI ENDOSCOPY,DIAGNOSIS N/A 2021    Procedure: GASTROSCOPY;  Surgeon: Seven Castellon M.D.;  Location: SURGERY SAME DAY [facility];  Service: Gastroenterology    TX UPPER GI ENDOSCOPY,BIOPSY N/A 2021    Procedure: GASTROSCOPY, WITH BIOPSY;  Surgeon: Seven Castellon M.D.;  Location: SURGERY SAME DAY [facility];  Service: Gastroenterology    GYN SURGERY      tuboligation, bilateral, during past .     OTHER ABDOMINAL SURGERY      cholesystectomy    OVARIAN CYSTECTOMY      unknown details or laterality.    CHOLECYSTECTOMY      OTHER      tonsillectomy    PRIMARY C SECTION       - , ,     PRIMARY C SECTION       Social History     Tobacco Use    Smoking status: Never    Smokeless tobacco: Never   Substance Use Topics    Alcohol use: No     Family and Occupational History     Socioeconomic History    Marital status:      Spouse name: Not on file    Number of children: Not on file    Years of education: Not on file    Highest education level: Not on file   Occupational History    Not on file       Objective     General Comments     Spine Comments   L/s AROM:  Flex: to knees, relief in pn  Ext: 10% relief in pn  LLF: 20%  "inc'd pn  RLF: 10% inc'd pn    Repeated motions:   Flex: improved pn  Ext: improved pn     T/s AROM:  Flex: pn in mid t/s   Ext: 0%, no pn  LR: pn on L, 20% motion  RR: pn on L, 20% motion    Hip MMT:   Flex: L 3*, R 3+   Ext: NT  Abd:NT  IR: 4 bilat  ER:4 bilat    Bridge hold: 5\", inc'd pn to lat hips     Hip PROM:  WFL    Gait: ambulated w/ SPC d/t knee pn, slowed gait speed w/ wide FLAQUITA      Therapeutic Exercises (CPT 87909):     2. bridge, 5x5\"    3. seated l/s flex AROM, 5x5\"    4. seated t/s rot AROM, 5x5\"      Therapeutic Exercise Summary: Access Code: [email or web address]  URL: [email or web address]  Date: 05/09/2023  Prepared by: Faith Munoz    Exercises  - Supine Bridge  - 3 x daily - 7 x weekly - 5 reps - 5 seconds hold  - Seated Thoracic Flexion and Rotation with Arms Crossed  - 3 x daily - 7 x weekly - 5 reps - 5 seconds hold  - Seated Flexion Stretch  - 3 x daily - 7 x weekly - 5 reps - 5 seconds hold    Time-based treatments/modalities:           Assessment, Response and Plan:   Impairments: abnormal ADL function, abnormal or restricted ROM, activity intolerance, difficulty performing job, impaired functional mobility, impaired physical strength, lacks appropriate home exercise program, limited mobility and pain with function    Assessment details:  Ms. Ortiz is a 48 y/o female who presents in PT w/ s/s consistent w/ possible lumbar spine power coordination deficits and mobility deficits. She presents w/ deficits in dec'd end range l/s ROM in all direction w/ pn in bilat lateral flexion, global hip weakness, gait deviations, and poor endurance that are preventing her from standing/walking at work w/o sig pn by end of shift. Pt was provided HEP and HO and educated on prognosis and expectations of PT. Pt will cont to benefit from PT at this time in order to address her functional limitations and help her reach her functional goals.       Barriers to therapy:  Age and comorbidities  Prognosis: good  " "  Goals:   Short Term Goals:   Pt will demo good compliance to HEP  Pt will demo l/s AROM in all directions w/ pn 2/10 or less  Pt will demo bridge hold for 30\"  Short term goal time span:  4-6 weeks      Long Term Goals:    Pt will demo 4/5 global LE MMT  Pt will be able to manage pn w/ HEP while at work w/ pn 3/10 or less by EOS  Pt will improve ANDREAS to < 10  Long term goal time span:  1-2 months    Plan:   Therapy options:  Physical therapy treatment to continue  Planned therapy interventions:  Therapeutic Exercise (CPT 01573) and Neuromuscular Re-education (CPT 84647)  Frequency:  1x week  Duration in weeks:  8  Duration in visits:  8  Discussed with:  Patient  Plan details:  Cont repeated l/s AROM in both directions, restore t/s flexibility. Progress functional LE strength (mindful of knee pn)    Functional Assessment Used  Oswestry Low Back Pain Disability Total Score: 16     Referring provider co-signature:  I have reviewed this plan of care and my co-signature certifies the need for services.    Certification Period: 05/09/2023 to  07/04/23    Physician Signature: ________________________________ Date: ______________                 "

## 2023-05-12 ENCOUNTER — HOSPITAL ENCOUNTER (EMERGENCY)
Facility: MEDICAL CENTER | Age: 48
End: 2023-05-12
Attending: EMERGENCY MEDICINE
Payer: COMMERCIAL

## 2023-05-12 VITALS
TEMPERATURE: 97 F | OXYGEN SATURATION: 94 % | BODY MASS INDEX: 49.64 KG/M2 | RESPIRATION RATE: 16 BRPM | SYSTOLIC BLOOD PRESSURE: 143 MMHG | DIASTOLIC BLOOD PRESSURE: 71 MMHG | HEIGHT: 64 IN | WEIGHT: 290.79 LBS | HEART RATE: 71 BPM

## 2023-05-12 DIAGNOSIS — S00.03XA CONTUSION OF SCALP, INITIAL ENCOUNTER: ICD-10-CM

## 2023-05-12 PROCEDURE — 99284 EMERGENCY DEPT VISIT MOD MDM: CPT

## 2023-05-12 PROCEDURE — 96372 THER/PROPH/DIAG INJ SC/IM: CPT

## 2023-05-12 PROCEDURE — 700111 HCHG RX REV CODE 636 W/ 250 OVERRIDE (IP): Mod: UD | Performed by: EMERGENCY MEDICINE

## 2023-05-12 RX ORDER — KETOROLAC TROMETHAMINE 30 MG/ML
15 INJECTION, SOLUTION INTRAMUSCULAR; INTRAVENOUS ONCE
Status: COMPLETED | OUTPATIENT
Start: 2023-05-12 | End: 2023-05-12

## 2023-05-12 RX ADMIN — KETOROLAC TROMETHAMINE 15 MG: 30 INJECTION, SOLUTION INTRAMUSCULAR; INTRAVENOUS at 21:23

## 2023-05-12 ASSESSMENT — FIBROSIS 4 INDEX: FIB4 SCORE: 1.09

## 2023-05-13 NOTE — ED NOTES
Patient to purple 72 with all belongings. Patient placed on the monitor. Bed locked and in lowest position. Call light within reach. ERP to see.

## 2023-05-13 NOTE — ED PROVIDER NOTES
ED Provider Note    CHIEF COMPLAINT  Chief Complaint   Patient presents with    Headache     5/10 headache x 1 day. Pt reports she hit head on cabinet in her house. -LOC, -thinners.        EXTERNAL RECORDS REVIEWED  Outpatient Notes prior visits to cardiology October December 2022 for blood pressure, lisinopril metoprolol, no mention of aspirin or blood thinners    HPI/ROS  LIMITATION TO HISTORY   Select: : None  OUTSIDE HISTORIAN(S):  none    Chanelle Ortiz is a 47 y.o. female who presents after head injury.  Earlier this morning, patient was in her house, she had bent over and lifted her head up hitting it on a cabinet.  She did initially have some headache although this did improve.  She reports no LOC.  Subsequently after drinking some cold water the headache did return again.  She states primarily pain at the site of impact.  No visual symptoms, no nausea or vomiting, no focal weakness or numbness, no neck pain.  No other injuries and no recent illness.  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    SURGICAL HISTORY   has a past surgical history that includes other abdominal surgery (2002); gyn surgery (2004); other; primary c section; ovarian cystectomy (1992); cholecystectomy; primary c section; upper gi endoscopy,diagnosis (N/A, 6/14/2021); and upper gi endoscopy,biopsy (N/A, 6/14/2021).    FAMILY HISTORY  Family History   Problem Relation Age of Onset    Other Mother         sleep apnea    Heart Failure Sister     Diabetes Sister     Heart Disease Sister     Hypertension Sister     Stroke Sister     Hyperlipidemia Sister     Heart  "Attack Maternal Grandfather     Diabetes Daughter     Psychiatric Illness Daughter         bipolar    Psychiatric Illness Son         autism       SOCIAL HISTORY  Social History     Tobacco Use    Smoking status: Never    Smokeless tobacco: Never   Vaping Use    Vaping Use: Never used   Substance and Sexual Activity    Alcohol use: No    Drug use: No    Sexual activity: Not on file     Comment: .  Tuboligation in 2004.        CURRENT MEDICATIONS  Home Medications       Reviewed by Mishel Leyva R.N. (Registered Nurse) on 05/12/23 at 2014  Med List Status: Not Addressed     Medication Last Dose Status   albuterol 108 (90 Base) MCG/ACT Aero Soln inhalation aerosol  Active   dicyclomine (BENTYL) 20 MG Tab  Active   ferrous sulfate 325 (65 Fe) MG EC tablet  Active   fluconazole (DIFLUCAN) 150 MG tablet  Active   ibuprofen (MOTRIN) 600 MG Tab  Active   lidocaine (LIDODERM) 5 % Patch  Active   lisinopril (PRINIVIL) 5 MG Tab  Active   meclizine (ANTIVERT) 12.5 MG Tab  Active   metoprolol SR (TOPROL XL) 25 MG TABLET SR 24 HR  Active   omeprazole (PRILOSEC) 40 MG delayed-release capsule  Active   TRADJENTA 5 MG Tab tablet  Active   vitamin D, Ergocalciferol, (DRISDOL) 1.25 MG (82957 UT) Cap capsule  Active                    ALLERGIES  Allergies   Allergen Reactions    Morphine Vomiting and Nausea     \"I think\" \"I dunno what my reaction was, the nurse just said my oxygen levels were going way to low on it\" \"heart races\"       PHYSICAL EXAM  VITAL SIGNS: /81   Pulse 84   Temp 36.2 °C (97.2 °F) (Temporal)   Resp 18   Ht 1.626 m (5' 4\")   Wt (!) 132 kg (290 lb 12.6 oz)   SpO2 96%   BMI 49.91 kg/m²    Constitutional: Alert in no apparent distress.  HENT: No signs of trauma, no Gomez's, no raccoons, there is some mild tenderness to the superior aspect of her scalp without any hematoma or lacerations bilateral external ears normal, Nose normal.   Eyes: Pupils are equal and reactive 4MM, Conjunctiva normal, " Non-icteric.   Neck: Normal range of motion, No tenderness, Supple, No stridor.   Skin: Warm, Dry, No erythema, No rash.    Musculoskeletal: Good range of motion in all major joints. No major deformities noted.   Neurologic: Alert, cranial nerves intact, speech is appropriate or not slurred, upper extremities bilaterally exhibit no drift, no dysmetria, no nystagmus 5 out of 5 strength with bilateral bicep/tricep/, sensation intact to light touch throughout upper extremities.  No drift with lower extremities sensation intact to light touch.  No focal deficits noted.   Psychiatric: Affect normal, Judgment normal, Mood normal.       DIAGNOSTIC STUDIES / PROCEDURES      COURSE & MEDICAL DECISION MAKING        INITIAL ASSESSMENT, COURSE AND PLAN  Care Narrative: 845 pm  Patient is evaluated at bedside, at this point she is well-appearing, appears to be isolated scalp contusion without injuries trauma.  I did consider other pathology such as skull fracture, subarachnoid, subdural, epidural or concussion but she has no neurologic deficits or symptoms of these, would be low risk by [place] CT criteria for these injuries.  She is given Toradol, advised on ibuprofen or Tylenol as needed at home and return precautions        PROBLEM LIST  #Scalp contusion.  Additional differential as above.  Tylenol ibuprofen as needed at home ice to the area    ADDITIONAL PROBLEM LIST    #Hypertension, chronic, will continue to follow-up with primary care for treatment, no findings of acute pathology    #Diabetes, chronic, will follow-up through primary care for continued management  DISPOSITION AND DISCUSSIONS      Escalation of care considered, and ultimately not performed:diagnostic imaging patient is low risk by [place] CT criteria    Barriers to care at this time, including but not limited to: Patient does not have established PCP.  Given resources for [facility]    Decision tools and prescription drugs considered including, but  not limited to:  Low risk by [place] CT head criteria .    The patient will return for new or worsening symptoms and is stable at the time of discharge.    The patient is referred to a primary physician for blood pressure management, diabetic screening, and for all other preventative health concerns.        DISPOSITION:  Patient will be discharged home in stable condition.    FOLLOW UP:  [place] [address] [place] [id number]  [phone]  In 1 week        OUTPATIENT MEDICATIONS:  New Prescriptions    No medications on file         FINAL DIAGNOSIS  1. Contusion of scalp, initial encounter           Electronically signed by: Jose Blair M.D., 5/12/2023 8:51 PM

## 2023-05-13 NOTE — ED NOTES
Pt discharged, educated on discharge instructions.  Patient was informed about diagnosis, symptom management, risks, and home care instructions.  Patient verbalized understanding and signed discharge instructions. Copy of discharge instructions in chart.  Patient ambulated out with steady gait using cane.  Patient has personal belongings

## 2023-05-13 NOTE — DISCHARGE INSTRUCTIONS
You do not appear to have sustained any dangerous injury.  You can take Tylenol or ibuprofen as needed for your pain and apply ice to the area over the next few days.

## 2023-05-13 NOTE — ED TRIAGE NOTES
"Chief Complaint   Patient presents with    Headache     5/10 headache x 1 day. Pt reports she hit head on cabinet in her house. -LOC, -thinners.        46 yo F to triage for above complaint.      Pt placed in lobby. Pt educated on triage process. Pt encouraged to alert staff for any changes.     Patient and staff wearing appropriate PPE    /81   Pulse 84   Temp 36.2 °C (97.2 °F) (Temporal)   Resp 18   Ht 1.626 m (5' 4\")   Wt (!) 132 kg (290 lb 12.6 oz)   SpO2 96%   BMI 49.91 kg/m²     "

## 2023-06-05 NOTE — OP THERAPY DAILY TREATMENT
"  Outpatient Physical Therapy  DAILY TREATMENT     [facility] [address].  [address]  [place] [address]  Phone:  [phone]  Fax:  [phone]    Date: 06/06/2023    Patient: Chanelle Ortiz  YOB: 1975  MRN: [id number]     Time Calculation    Start time: 0800  Stop time: 0840 Time Calculation (min): 40 minutes         Chief Complaint: No chief complaint on file.    Visit #: 2    SUBJECTIVE:  Pt states that she has been doing well. She only has pn on the L side. She does her stretching 1x/day. She states she feels the L side of the back constantly (5/10). It's worse when she bends to the R    Aggs:   Working long shift. (8/10)  Walking- inc's after 45 min, can cont (6/10)     Eases: lay down, hot bath    OBJECTIVE:  Pn w/ l/s R LLF          Therapeutic Exercises (CPT 48110):     2. bridge, 10x10\"    3. seated l/s flex AROM, 5x5\"    5. R wall lateral correction, 2x10, reduced pn to 3/10    6. 3 way PB flex str, 10x5\"    7. LTR, 10x10\"    8. seated t/s rot AROM, 10x10\"    9. sit to stand, 2x10      Therapeutic Exercise Summary: Access Code: [email or web address]  URL: [email or web address]  Date: 06/06/2023  Prepared by: Faith Munoz    Exercises  - Supine Bridge  - 2 x daily - 7 x weekly - 10 reps - 10 seconds hold  - Seated Thoracic Flexion and Rotation with Arms Crossed  - 2 x daily - 7 x weekly - 5 reps - 5 seconds hold  - Right Standing Lateral Shift Correction at Wall - Hold  - 2 x daily - 7 x weekly - 2 sets - 10 reps  - Supine Lower Trunk Rotation  - 2 x daily - 7 x weekly - 10 reps - 10 seconds hold      Time-based treatments/modalities:    Physical Therapy Timed Treatment Charges  Therapeutic exercise minutes (CPT 87653): 40 minutes    ASSESSMENT:   Pt had sig relief in pn following lat wall correction and improved l/s AROM. She as advised to perform the lat wall shift throughout the day to manage pn. She had good relief in pn throughout the session. Pt " "will cont to benefit from PT at this time.     Goals:   Short Term Goals:   Pt will demo good compliance to HEP  Pt will demo l/s AROM in all directions w/ pn 2/10 or less  Pt will demo bridge hold for 30\"  Short term goal time span:  4-6 weeks      Long Term Goals:    Pt will demo 4/5 global LE MMT  Pt will be able to manage pn w/ HEP while at work w/ pn 3/10 or less by EOS  Pt will improve ANDREAS to < 10  Long term goal time span:  1-2 months    PLAN/RECOMMENDATIONS:   Cont repeated l/s AROM in both directions, restore t/s flexibility. Progress functional LE strength (mindful of knee pn)         "

## 2023-06-06 ENCOUNTER — PHYSICAL THERAPY (OUTPATIENT)
Dept: PHYSICAL THERAPY | Facility: REHABILITATION | Age: 48
End: 2023-06-06
Attending: EMERGENCY MEDICINE
Payer: COMMERCIAL

## 2023-06-06 DIAGNOSIS — M54.9 PAIN, UPPER BACK: ICD-10-CM

## 2023-06-06 PROCEDURE — 97110 THERAPEUTIC EXERCISES: CPT

## 2023-06-12 NOTE — OP THERAPY DAILY TREATMENT
"  Outpatient Physical Therapy  DAILY TREATMENT     [facility] [address].  [address]  [place] [address]  Phone:  [phone]  Fax:  [phone]    Date: 06/13/2023    Patient: Chanelle Ortiz  YOB: 1975  MRN: [id number]     Time Calculation    Start time: 1420  Stop time: 1500 Time Calculation (min): 40 minutes         Chief Complaint: No chief complaint on file.    Visit #: 3    SUBJECTIVE:  Pt states that she feels her pain is the same. She did her exercises a little. She feels a little better after the exercises. She still has pn w/ twisting.     Aggs:   Working long shift. (8/10)  Walking- inc's after 45 min, can cont (6/10)     Eases: lay down, hot bath    OBJECTIVE:  Pn w/ l/s R LLF          Therapeutic Exercises (CPT 69469):     2. bridge, 10x10\", cued to hold as long as able    3. seated l/s flex AROM, 5x5\"    5. R wall lateral correction, 2x10, reduced pn to 3/10    6. 3 way PB flex str, 10x5\"    7. LTR, 10x10\"    8. seated t/s rot AROM, 10x10\", w/ hand over pressure    9. sit to stand, 2x10    11. pall of press, GTB 2x10    12. shoulder ext, GTB 2x10    14. DKTC w/ PB, x20    15. bridge w/ PB, 10x10\"      Therapeutic Exercise Summary: Access Code: [email or web address]  URL: [email or web address]  Date: 06/13/2023  Prepared by: Faith Munoz    Exercises  - Seated Thoracic Flexion and Rotation with Arms Crossed  - 2 x daily - 7 x weekly - 5 reps - 5 seconds hold  - Right Standing Lateral Shift Correction at Wall - Hold  - 2 x daily - 7 x weekly - 2 sets - 10 reps  - Seated Flexion Stretch with Swiss Ball  - 7 x weekly - 5 reps - 10 seconds hold  - Supine Hip and Knee Flexion AROM with Swiss Ball  - 2 x daily - 7 x weekly - 10-20 reps - 10 seconds/ hold  - Bridge with Heels on Swiss Ball  - 2 x daily - 7 x weekly - 10 reps - 10 seconds hold      Time-based treatments/modalities:    Physical Therapy Timed Treatment Charges  Therapeutic exercise minutes (CPT " "00742): 40 minutes    ASSESSMENT:   Pt had reduced pn throughout session w/ reduced pn w/ rotational stretching and strengthening. She was advised to perform HEP when she has her back pn. She demo'd good progress w/ bridge holds. She had greatest relief from DKTC. Pt will cont ot benefit from PT at this time.  Pt was educated to let us know who her PCP is so that we can send eval to provider for signature. Pt states she will look when she gets home and call us back with the information.     Goals:   Short Term Goals:   Pt will demo good compliance to HEP  Pt will demo l/s AROM in all directions w/ pn 2/10 or less  Pt will demo bridge hold for 30\"  Short term goal time span:  4-6 weeks      Long Term Goals:    Pt will demo 4/5 global LE MMT  Pt will be able to manage pn w/ HEP while at work w/ pn 3/10 or less by EOS  Pt will improve ANDREAS to < 10  Long term goal time span:  1-2 months    PLAN/RECOMMENDATIONS:   Cont repeated l/s AROM in both directions, restore t/s flexibility. Progress functional LE strength (mindful of knee pn)         "

## 2023-06-13 ENCOUNTER — PHYSICAL THERAPY (OUTPATIENT)
Dept: PHYSICAL THERAPY | Facility: REHABILITATION | Age: 48
End: 2023-06-13
Attending: EMERGENCY MEDICINE
Payer: COMMERCIAL

## 2023-06-13 DIAGNOSIS — M54.9 PAIN, UPPER BACK: ICD-10-CM

## 2023-06-13 PROCEDURE — 97110 THERAPEUTIC EXERCISES: CPT

## 2023-06-19 NOTE — OP THERAPY DAILY TREATMENT
"  Outpatient Physical Therapy  DAILY TREATMENT     [facility] [address].  [address]  [place] [address]  Phone:  [phone]  Fax:  [phone]    Date: 06/20/2023    Patient: Chanelle Ortiz  YOB: 1975  MRN: [id number]     Time Calculation    Start time: 0800  Stop time: 0840 Time Calculation (min): 40 minutes         Chief Complaint: No chief complaint on file.    Visit #: 4    SUBJECTIVE:  Pt states that she feels a little better this week Once she gets up and going she's better. Mornings are the worst and lasts about 30 min.     Aggs:   Working long shift. (8/10)  Walking- inc's after 45 min, can cont (6/10)     Eases: lay down, hot bath    OBJECTIVE:  Pn w/ l/s R LLF          Therapeutic Exercises (CPT 13756):     3. seated l/s flex AROM, 5x5\"    5. R & L wall lateral correction, 2x10, reduced pn to 3/10    6. 3 way PB flex str, 5x10\"    8. seated t/s rot AROM, 10x10\", NT    9. sit to stand, 2xfat 10#    11. pall of press, GTB 2xfatigue    12. shoulder ext, GTB 2xfatigue    14. DKTC w/ PB, x20    15. bridge w/ PB, 10x10\"      Therapeutic Exercise Summary: Access Code: [email or web address]  URL: [email or web address]  Date: 06/20/2023  Prepared by: Faith Munoz    Exercises  - Right Standing Lateral Shift Correction at Wall - Hold  - 2 x daily - 7 x weekly - 2 sets - 10 reps  - Seated Flexion Stretch with Swiss Ball  - 7 x weekly - 5 reps - 10 seconds hold  - Supine Hip and Knee Flexion AROM with Swiss Ball  - 2 x daily - 7 x weekly - 10-20 reps - 10 seconds/ hold  - Bridge with Heels on Swiss Ball  - 2 x daily - 5 x weekly - 10 reps - 10-30 seconds hold  - Standing Anti-Rotation Press with Anchored Resistance  - 1 x daily - 5 x weekly - 2 sets - 15 reps  - Shoulder extension with resistance - Neutral  - 1 x daily - 5 x weekly - 2 sets - 15 reps      Time-based treatments/modalities:    Physical Therapy Timed Treatment Charges  Therapeutic exercise minutes " "(CPT 12468): 40 minutes    ASSESSMENT:   Pt cont's to have greatest relief from flexion bias stretching and lat wall shift. She is progressing well w/ endurance and strengthening but cont's to have dec'd glute strength. Pt had full alleviation of pn following exercise.     Goals:   Short Term Goals:   Pt will demo good compliance to HEP  Pt will demo l/s AROM in all directions w/ pn 2/10 or less  Pt will demo bridge hold for 30\"  Short term goal time span:  4-6 weeks      Long Term Goals:    Pt will demo 4/5 global LE MMT  Pt will be able to manage pn w/ HEP while at work w/ pn 3/10 or less by EOS  Pt will improve ANDREAS to < 10  Long term goal time span:  1-2 months    PLAN/RECOMMENDATIONS:   Cont repeated l/s AROM in both directions, restore t/s flexibility. Progress functional LE strength (mindful of knee pn)         "

## 2023-06-20 ENCOUNTER — PHYSICAL THERAPY (OUTPATIENT)
Dept: PHYSICAL THERAPY | Facility: REHABILITATION | Age: 48
End: 2023-06-20
Attending: EMERGENCY MEDICINE
Payer: COMMERCIAL

## 2023-06-20 DIAGNOSIS — M54.9 PAIN, UPPER BACK: ICD-10-CM

## 2023-06-20 PROCEDURE — 97110 THERAPEUTIC EXERCISES: CPT

## 2023-06-27 ENCOUNTER — APPOINTMENT (OUTPATIENT)
Dept: PHYSICAL THERAPY | Facility: REHABILITATION | Age: 48
End: 2023-06-27
Attending: EMERGENCY MEDICINE
Payer: COMMERCIAL

## 2023-07-07 ENCOUNTER — HOSPITAL ENCOUNTER (EMERGENCY)
Facility: MEDICAL CENTER | Age: 48
End: 2023-07-07
Attending: EMERGENCY MEDICINE
Payer: COMMERCIAL

## 2023-07-07 VITALS
HEART RATE: 75 BPM | HEIGHT: 64 IN | DIASTOLIC BLOOD PRESSURE: 79 MMHG | OXYGEN SATURATION: 91 % | WEIGHT: 288.36 LBS | SYSTOLIC BLOOD PRESSURE: 118 MMHG | BODY MASS INDEX: 49.23 KG/M2 | TEMPERATURE: 97.2 F | RESPIRATION RATE: 16 BRPM

## 2023-07-07 DIAGNOSIS — G47.33 OSA ON CPAP: ICD-10-CM

## 2023-07-07 DIAGNOSIS — N12 PYELONEPHRITIS: ICD-10-CM

## 2023-07-07 DIAGNOSIS — Z86.39 HISTORY OF DIABETES MELLITUS, TYPE II: ICD-10-CM

## 2023-07-07 LAB
ANION GAP SERPL CALC-SCNC: 11 MMOL/L (ref 7–16)
APPEARANCE UR: CLEAR
APPEARANCE UR: CLEAR
BACTERIA #/AREA URNS HPF: ABNORMAL /HPF
BACTERIA #/AREA URNS HPF: ABNORMAL /HPF
BASOPHILS # BLD AUTO: 0.7 % (ref 0–1.8)
BASOPHILS # BLD: 0.09 K/UL (ref 0–0.12)
BILIRUB UR QL STRIP.AUTO: NEGATIVE
BILIRUB UR QL STRIP.AUTO: NEGATIVE
BUN SERPL-MCNC: 12 MG/DL (ref 8–22)
CALCIUM SERPL-MCNC: 8.7 MG/DL (ref 8.5–10.5)
CHLORIDE SERPL-SCNC: 105 MMOL/L (ref 96–112)
CO2 SERPL-SCNC: 25 MMOL/L (ref 20–33)
COLOR UR: YELLOW
COLOR UR: YELLOW
CREAT SERPL-MCNC: 0.5 MG/DL (ref 0.5–1.4)
EOSINOPHIL # BLD AUTO: 0.3 K/UL (ref 0–0.51)
EOSINOPHIL NFR BLD: 2.3 % (ref 0–6.9)
EPI CELLS #/AREA URNS HPF: ABNORMAL /HPF
EPI CELLS #/AREA URNS HPF: ABNORMAL /HPF
ERYTHROCYTE [DISTWIDTH] IN BLOOD BY AUTOMATED COUNT: 43.8 FL (ref 35.9–50)
GFR SERPLBLD CREATININE-BSD FMLA CKD-EPI: 116 ML/MIN/1.73 M 2
GLUCOSE SERPL-MCNC: 155 MG/DL (ref 65–99)
GLUCOSE UR STRIP.AUTO-MCNC: NEGATIVE MG/DL
GLUCOSE UR STRIP.AUTO-MCNC: NEGATIVE MG/DL
HCT VFR BLD AUTO: 41.6 % (ref 37–47)
HGB BLD-MCNC: 13.3 G/DL (ref 12–16)
HYALINE CASTS #/AREA URNS LPF: ABNORMAL /LPF
HYALINE CASTS #/AREA URNS LPF: ABNORMAL /LPF
IMM GRANULOCYTES # BLD AUTO: 0.05 K/UL (ref 0–0.11)
IMM GRANULOCYTES NFR BLD AUTO: 0.4 % (ref 0–0.9)
KETONES UR STRIP.AUTO-MCNC: NEGATIVE MG/DL
KETONES UR STRIP.AUTO-MCNC: NEGATIVE MG/DL
LEUKOCYTE ESTERASE UR QL STRIP.AUTO: ABNORMAL
LEUKOCYTE ESTERASE UR QL STRIP.AUTO: NEGATIVE
LYMPHOCYTES # BLD AUTO: 1.88 K/UL (ref 1–4.8)
LYMPHOCYTES NFR BLD: 14.4 % (ref 22–41)
MCH RBC QN AUTO: 30 PG (ref 27–33)
MCHC RBC AUTO-ENTMCNC: 32 G/DL (ref 32.2–35.5)
MCV RBC AUTO: 93.7 FL (ref 81.4–97.8)
MICRO URNS: ABNORMAL
MICRO URNS: ABNORMAL
MONOCYTES # BLD AUTO: 0.74 K/UL (ref 0–0.85)
MONOCYTES NFR BLD AUTO: 5.7 % (ref 0–13.4)
NEUTROPHILS # BLD AUTO: 10.02 K/UL (ref 1.82–7.42)
NEUTROPHILS NFR BLD: 76.5 % (ref 44–72)
NITRITE UR QL STRIP.AUTO: NEGATIVE
NITRITE UR QL STRIP.AUTO: NEGATIVE
NRBC # BLD AUTO: 0 K/UL
NRBC BLD-RTO: 0 /100 WBC (ref 0–0.2)
PH UR STRIP.AUTO: 6.5 [PH] (ref 5–8)
PH UR STRIP.AUTO: 6.5 [PH] (ref 5–8)
PLATELET # BLD AUTO: 188 K/UL (ref 164–446)
PMV BLD AUTO: 11.4 FL (ref 9–12.9)
POTASSIUM SERPL-SCNC: 4 MMOL/L (ref 3.6–5.5)
PROT UR QL STRIP: NEGATIVE MG/DL
PROT UR QL STRIP: NEGATIVE MG/DL
RBC # BLD AUTO: 4.44 M/UL (ref 4.2–5.4)
RBC # URNS HPF: ABNORMAL /HPF
RBC # URNS HPF: ABNORMAL /HPF
RBC UR QL AUTO: ABNORMAL
RBC UR QL AUTO: ABNORMAL
SODIUM SERPL-SCNC: 141 MMOL/L (ref 135–145)
SP GR UR STRIP.AUTO: 1.02
SP GR UR STRIP.AUTO: 1.03
UROBILINOGEN UR STRIP.AUTO-MCNC: 1 MG/DL
UROBILINOGEN UR STRIP.AUTO-MCNC: 1 MG/DL
WBC # BLD AUTO: 13.1 K/UL (ref 4.8–10.8)
WBC #/AREA URNS HPF: ABNORMAL /HPF
WBC #/AREA URNS HPF: ABNORMAL /HPF

## 2023-07-07 PROCEDURE — 80048 BASIC METABOLIC PNL TOTAL CA: CPT

## 2023-07-07 PROCEDURE — 85025 COMPLETE CBC W/AUTO DIFF WBC: CPT

## 2023-07-07 PROCEDURE — 87077 CULTURE AEROBIC IDENTIFY: CPT | Mod: 91

## 2023-07-07 PROCEDURE — 700111 HCHG RX REV CODE 636 W/ 250 OVERRIDE (IP): Mod: JZ,UD | Performed by: EMERGENCY MEDICINE

## 2023-07-07 PROCEDURE — 81001 URINALYSIS AUTO W/SCOPE: CPT

## 2023-07-07 PROCEDURE — 36415 COLL VENOUS BLD VENIPUNCTURE: CPT

## 2023-07-07 PROCEDURE — 99285 EMERGENCY DEPT VISIT HI MDM: CPT

## 2023-07-07 PROCEDURE — 96374 THER/PROPH/DIAG INJ IV PUSH: CPT

## 2023-07-07 PROCEDURE — 87086 URINE CULTURE/COLONY COUNT: CPT

## 2023-07-07 RX ORDER — FLUCONAZOLE 100 MG/1
100 TABLET ORAL DAILY
Qty: 2 TABLET | Refills: 0 | Status: ACTIVE | OUTPATIENT
Start: 2023-07-07

## 2023-07-07 RX ORDER — CEFDINIR 300 MG/1
300 CAPSULE ORAL 2 TIMES DAILY
Qty: 14 CAPSULE | Refills: 0 | Status: ACTIVE | OUTPATIENT
Start: 2023-07-07

## 2023-07-07 RX ORDER — CEFTRIAXONE 2 G/1
2000 INJECTION, POWDER, FOR SOLUTION INTRAMUSCULAR; INTRAVENOUS ONCE
Status: COMPLETED | OUTPATIENT
Start: 2023-07-07 | End: 2023-07-07

## 2023-07-07 RX ADMIN — CEFTRIAXONE SODIUM 2000 MG: 2 INJECTION, POWDER, FOR SOLUTION INTRAMUSCULAR; INTRAVENOUS at 08:32

## 2023-07-07 ASSESSMENT — FIBROSIS 4 INDEX: FIB4 SCORE: 1.09

## 2023-07-07 ASSESSMENT — PAIN DESCRIPTION - PAIN TYPE: TYPE: ACUTE PAIN

## 2023-07-07 NOTE — ED PROVIDER NOTES
ED Provider Note    Scribed for Paul Griffin M.D., by Kirill Redman. 7/7/2023  6:01 AM    Primary care provider: Patient reports no primary care provider.   Means of arrival: Walk-In    CHIEF COMPLAINT  Chief Complaint   Patient presents with    Back Pain     C/o mid lower back pain x 1 wk, pain rate at 7/10 burning sensation. Denies fall.    Vaginal Itching     C/o vaginal itching x 1 wk. Denies discharge from affected area.      LIMITATION TO HISTORY   None    HPI    Chanelle Ortiz is a 47 y.o. female who presents to the Emergency Department for mid lower bilateral back pain onset multiple weeks ago. She states that the pain is constant but does not radiate. She reports associated increased urinary frequency, dysuria, mild cough, and nausea, but denies any abdominal pain, vomiting, fever, hematuria, or recent falls or trauma. She reports a history of UTIs, kidney stones, and yeast infections saying that the pain when urinating feels consistent with previous UTIs but itching in the area feels consistent with previous yeast infections. She reports a history of diabetes on insulin stating that her sugars had recently been in the 200s and was switched to ozempic. She denies any history of bacterial vaginosis. She reports not being sexually active with no history of STIs. Her last menstrual period was yesterday.      OUTSIDE HISTORIAN(S):  None    EXTERNAL RECORDS REVIEWED  Records were reviewed which showed a hospitalist discharge summary June 21st for nausea, vomiting, and abdominal pain. Patient has a history of sleep apnea and emphasematous gastritis. Last ER visit in May for headache after head trauma, patient is seen in ER frequently.     REVIEW OF SYSTEMS  Pertinent positives include: back pain, increased urinary frequency, dysuria, mild cough, and nausea.  Pertinent negatives include: abdominal pain, vomiting, fever, hematuria, or recent falls or trauma.      PAST MEDICAL HISTORY  Past Medical History:    Diagnosis Date    Anxiety and depression 2016    after ex went to [facility]    Arrhythmia     notes occasional rapid or prominent heart beat, at night     Asthma     Back pain     Diabetes 2008    on and off meds (due to concern about heart beat changes)    Emphysematous gastritis 2021    H/O Hypertension - resolved     Patient states prior HTN, but resolved after changes in other medications (but off HTN meds).     Hx of Bronchitis     Hyperthyroidism     Treated with Radioactive iodine, at [facility] in     Hypothyroidism, after radioactive ablation of thyroid     POLLY on CPAP 2018    gets headaches, if not using CPAP.     Pneumonia     Psychiatric problem - unspecified     pt notes anxiety and depression, with counseller - pt unclear on details.       FAMILY HISTORY  Family History   Problem Relation Age of Onset    Other Mother         sleep apnea    Heart Failure Sister     Diabetes Sister     Heart Disease Sister     Hypertension Sister     Stroke Sister     Hyperlipidemia Sister     Heart Attack Maternal Grandfather     Diabetes Daughter     Psychiatric Illness Daughter         bipolar    Psychiatric Illness Son         autism     SOCIAL HISTORY  Social History     Tobacco Use    Smoking status: Never    Smokeless tobacco: Never   Vaping Use    Vaping Use: Never used   Substance Use Topics    Alcohol use: No    Drug use: No     Social History     Substance and Sexual Activity   Drug Use No     SURGICAL HISTORY  Past Surgical History:   Procedure Laterality Date    [place] UPPER GI ENDOSCOPY,DIAGNOSIS N/A 2021    Procedure: GASTROSCOPY;  Surgeon: Seven Castellon M.D.;  Location: SURGERY SAME DAY [facility];  Service: Gastroenterology    [place] UPPER GI ENDOSCOPY,BIOPSY N/A 2021    Procedure: GASTROSCOPY, WITH BIOPSY;  Surgeon: Seven Castellon M.D.;  Location: SURGERY SAME DAY [facility];  Service: Gastroenterology    GYN SURGERY  2004    tuboligation, bilateral, during past .     OTHER  "ABDOMINAL SURGERY      cholesystectomy    OVARIAN CYSTECTOMY  1992    unknown details or laterality.    CHOLECYSTECTOMY      OTHER      tonsillectomy    PRIMARY C SECTION       - , ,     PRIMARY C SECTION       CURRENT MEDICATIONS  No current facility-administered medications for this encounter.    Current Outpatient Medications:     lidocaine (LIDODERM) 5 % Patch, Place 1 Patch on the skin every 24 hours., Disp: 10 Patch, Rfl: 0    lisinopril (PRINIVIL) 5 MG Tab, TAKE 1 TABLET BY MOUTH EVERY DAY, Disp: 30 Tablet, Rfl: 0    metoprolol SR (TOPROL XL) 25 MG TABLET SR 24 HR, TAKE 1/2 TABLET BY MOUTH EVERY EVENING, Disp: 15 Tablet, Rfl: 0    fluconazole (DIFLUCAN) 150 MG tablet, Take 1 Tablet by mouth every 72 hours., Disp: 2 Tablet, Rfl: 0    omeprazole (PRILOSEC) 40 MG delayed-release capsule, Take 1 Capsule by mouth every day., Disp: 30 Capsule, Rfl: 0    dicyclomine (BENTYL) 20 MG Tab, Take 1 Tablet by mouth 4 times a day as needed., Disp: 30 Tablet, Rfl: 0    ibuprofen (MOTRIN) 600 MG Tab, Take 1 Tablet by mouth every 8 hours as needed for Fever, Headache or Inflammation., Disp: 30 Tablet, Rfl: 0    meclizine (ANTIVERT) 12.5 MG Tab, Take 1 tablet by mouth 3 times a day as needed for Dizziness. (Patient not taking: Reported on 6/10/2021), Disp: 30 tablet, Rfl: 0    ferrous sulfate 325 (65 Fe) MG EC tablet, Take 325 mg by mouth every day., Disp: , Rfl:     TRADJENTA 5 MG Tab tablet, Take 5 mg by mouth every day., Disp: , Rfl:     vitamin D, Ergocalciferol, (DRISDOL) 1.25 MG (59457 UT) Cap capsule, Take 50,000 Units by mouth every ., Disp: , Rfl:     albuterol 108 (90 Base) MCG/ACT Aero Soln inhalation aerosol, Inhale 2 Puffs by mouth every 6 hours as needed for Shortness of Breath., Disp: 8.5 g, Rfl: 0    ALLERGIES  Allergies   Allergen Reactions    Morphine Vomiting and Nausea     \"I think\" \"I dunno what my reaction was, the nurse just said my oxygen levels were going way to low on it\" " "\"heart races\"     PHYSICAL EXAM  VITAL SIGNS: /80   Pulse 81   Temp 36.4 °C (97.5 °F) (Temporal)   Resp 16   Ht 1.626 m (5' 4\")   Wt (!) 131 kg (288 lb 5.8 oz)   LMP 06/29/2023 (Approximate)   SpO2 94%   BMI 49.50 kg/m²   Reviewed and were unremarkable  Constitutional: Well developed, Well nourished, Elevated BMI, Appears comfortable.  HENT: Normocephalic, atraumatic, bilateral external ears normal, No intraoral erythema, edema, exudate  Eyes: PERRLA, conjunctiva pink, no scleral icterus.   Cardiovascular: Regular rate and rhythm. No murmurs, rubs or gallops.  No dependent edema or calf tenderness  Respiratory: Lungs clear to auscultation bilaterally. No wheezes, rales, or rhonchi.  Abdominal:  Abdomen soft, non-tender, non distended. No rebound, or guarding.    Skin: No erythema, no rash. No wounds or bruising.  Genitourinary: No costovertebral angle tenderness.   Musculoskeletal: no deformities.   Neurologic: Alert & oriented x 3, cranial nerves 2-12 intact by passive exam.  No focal deficit noted.  Psychiatric: Affect normal, Judgment normal, Mood normal.     LABS Ordered and Reviewed by Me:  Results for orders placed or performed during the hospital encounter of 07/07/23   Urinalysis, Culture if Indicated    Specimen: Urine, Clean Catch   Result Value Ref Range    Color Yellow     Character Clear     Specific Gravity 1.027 <1.035    Ph 6.5 5.0 - 8.0    Glucose Negative Negative mg/dL    Ketones Negative Negative mg/dL    Protein Negative Negative mg/dL    Bilirubin Negative Negative    Urobilinogen, Urine 1.0 Negative    Nitrite Negative Negative    Leukocyte Esterase Small (A) Negative    Occult Blood Large (A) Negative    Micro Urine Req Microscopic    URINE MICROSCOPIC (W/UA)   Result Value Ref Range    WBC 10-20 (A) /hpf    RBC 5-10 (A) /hpf    Bacteria Few (A) None /hpf    Epithelial Cells Many (A) /hpf    Hyaline Cast 3-5 (A) /lpf   CBC WITH DIFFERENTIAL   Result Value Ref Range    WBC 13.1 " (H) 4.8 - 10.8 K/uL    RBC 4.44 4.20 - 5.40 M/uL    Hemoglobin 13.3 12.0 - 16.0 g/dL    Hematocrit 41.6 37.0 - 47.0 %    MCV 93.7 81.4 - 97.8 fL    MCH 30.0 27.0 - 33.0 pg    MCHC 32.0 (L) 32.2 - 35.5 g/dL    RDW 43.8 35.9 - 50.0 fL    Platelet Count 188 164 - 446 K/uL    MPV 11.4 9.0 - 12.9 fL    Neutrophils-Polys 76.50 (H) 44.00 - 72.00 %    Lymphocytes 14.40 (L) 22.00 - 41.00 %    Monocytes 5.70 0.00 - 13.40 %    Eosinophils 2.30 0.00 - 6.90 %    Basophils 0.70 0.00 - 1.80 %    Immature Granulocytes 0.40 0.00 - 0.90 %    Nucleated RBC 0.00 0.00 - 0.20 /100 WBC    Neutrophils (Absolute) 10.02 (H) 1.82 - 7.42 K/uL    Lymphs (Absolute) 1.88 1.00 - 4.80 K/uL    Monos (Absolute) 0.74 0.00 - 0.85 K/uL    Eos (Absolute) 0.30 0.00 - 0.51 K/uL    Baso (Absolute) 0.09 0.00 - 0.12 K/uL    Immature Granulocytes (abs) 0.05 0.00 - 0.11 K/uL    NRBC (Absolute) 0.00 K/uL   Basic Metabolic Panel   Result Value Ref Range    Sodium 141 135 - 145 mmol/L    Potassium 4.0 3.6 - 5.5 mmol/L    Chloride 105 96 - 112 mmol/L    Co2 25 20 - 33 mmol/L    Glucose 155 (H) 65 - 99 mg/dL    Bun 12 8 - 22 mg/dL    Creatinine 0.50 0.50 - 1.40 mg/dL    Calcium 8.7 8.5 - 10.5 mg/dL    Anion Gap 11.0 7.0 - 16.0   URINALYSIS    Specimen: Urine   Result Value Ref Range    Color Yellow     Character Clear     Specific Gravity 1.024 <1.035    Ph 6.5 5.0 - 8.0    Glucose Negative Negative mg/dL    Ketones Negative Negative mg/dL    Protein Negative Negative mg/dL    Bilirubin Negative Negative    Urobilinogen, Urine 1.0 Negative    Nitrite Negative Negative    Leukocyte Esterase Negative Negative    Occult Blood Moderate (A) Negative    Micro Urine Req Microscopic    ESTIMATED GFR   Result Value Ref Range    GFR (CKD-EPI) 116 >60 mL/min/1.73 m 2   URINE MICROSCOPIC (W/UA)   Result Value Ref Range    WBC 0-2 /hpf    RBC 2-5 (A) /hpf    Bacteria Few (A) None /hpf    Epithelial Cells Few /hpf    Hyaline Cast 0-2 /lpf     ED COURSE:  6:01 AM - Patient seen  and examined at bedside.  Patient was given an opportunity to ask questions. Ordered labs: Basic Metabolic Panel, CBC with differentials, and UA with culture if indicated to evaluate their symptoms. Patient verbalizes understanding and agreement to this plan of care.     ED Observation Status? Yes; Patient placed in observation status at 6:01 AM 07/07/23 for serum studies and UA.     INTERVENTIONS BY ME:  Medications   cefTRIAXone (Rocephin) injection 2,000 mg (2,000 mg Intravenous Given 7/7/23 0832)     7:20 AM - On reassessment response to intervention patient is still on oxygen and has untreated sleep apnea. Still waiting on UA but patient was updated on resulted labs.     7:26 AM - Patient will be treated with Rocephin 2g injection.    8:33 AM - Patient was seen at bedside. Patient is conformable with discharge and plan for antibiotics. I advised the patient to follow-up with their primary care provider to plan for an updated sleep study. Patient verbalizes understanding and agreement to this plan of care.      I have discussed management of the patient with the following physicians and sources:    Jere WorkAlly, for case management.    8:33 AM - DC from ED observation.    MEDICAL DECISION MAKING:  PROBLEMS EVALUATED THIS VISIT:    This patient presents with acute new back pain and dysuria and may have pyelonephritis.  She has leukocytosis but no other evidence of systemic bacteremia or sepsis.  Initial UA was contaminated and repeat is borderline.  Given that she is higher risk from infection given her diabetes I will treat her anyway presumptively for pyelonephritis.  She is also at risk for candidal vaginitis.  She is not sexually active.  There is no significant risk for gonorrhea or chlamydia.  She denies having ever had bacterial vaginosis.  Patient also has chronic sleep apnea and has been off CPAP for 2 to 3 years after her unit was recalled.  She was hypoxic here when sleeping.  She will be referred  to pulmonary medicine for repeat sleep study.  We are not able to assist her in obtaining a CPAP at this time.  Patient has type 2 diabetes which is relatively well controlled at this time and stable    RISK:  Moderate given need for prescription antibiotics       PLAN:  New Prescriptions    CEFDINIR (OMNICEF) 300 MG CAP    Take 1 Capsule by mouth 2 times a day.    FLUCONAZOLE (DIFLUCAN) 100 MG TAB    Take 1 Tablet by mouth every day.     Follow-up for sleep study    Pyelonephritis handout given    Return for fever beyond 2 days, dizziness, or vomiting.     Followup:  [facility]  [address]  [place] [address]  Schedule an appointment as soon as possible for a visit   for referral for sleep study    [facility] - PULMONARY MEDICINE  [address]  [place] [facility] [address]  [phone]  Schedule an appointment as soon as possible for a visit   for sleep study    CONDITION: Stable.     FINAL IMPRESSION  1. Pyelonephritis    2. History of diabetes mellitus, type II    3. POLLY on CPAP    ED Observation Care    Kirill FRANCO (Sarahibana lilia), am scribing for, and in the presence of, Paul Griffin M.D..    Electronically signed by: Kirill Redman (Scribe), 7/7/2023    Paul FRANCO M.D. personally performed the services described in this documentation, as scribed by Kirill Redman in my presence, and it is both accurate and complete.    The note accurately reflects work and decisions made by me.  Paul Griffin M.D.  7/7/2023  9:19 AM

## 2023-07-07 NOTE — DISCHARGE PLANNING
Dr Griffin asked CM to speak with pt regarding CPAP machine. Pt is not on daily oxygen.     Pt states she has a CPAP machine that was on recall but claims she did not do what Andrez asked so she did not receive a new one. Her last sleep study was 4 years ago and last pulmonary visit was in 2019 ([facility] in [place]).    CM told pt that I would be unable to assist with obtaining new machine as she will likely need a new sleep study. Instructed to follow up with PCP (JOSH) and pulmonary.

## 2023-07-07 NOTE — ED NOTES
Patient escorted to the bathroom without issue. Urine sample obtained and sent to lab. Patient returned to bed and placed back on monitors.

## 2023-07-07 NOTE — ED NOTES
Pt discharged, all appropriate hospital equipment removed (IV, monitor, pulse ox, etc.). Pt left unit via self with stable gait to ED lobby for transport home. Personal belongings with pt when leaving unit. Pt given discharge instructions prior to leaving unit including where to  prescriptions and when to follow-up; verbalizes understanding. Pt informed to return to ED if symptoms worsen/return or altered status develop. Copy of discharge instructions signed and turned into DC basket and copy sent with pt. Work note provided for hospital visit today.

## 2023-07-07 NOTE — ED NOTES
Pt from triage to Room 8 ambulatory. GCS 15, alert and orientedx4. Currently connected to cardiac monitor. Call light within reach.

## 2023-07-07 NOTE — DISCHARGE INSTRUCTIONS
Pyelonephritis  (Urinary Tract Infection Involving the Kidney)    Start antibiotics tomorrow.  Take ibuprofen for fever and Tylenol for persistent fever pain.  Return for uncontrolled vomiting, ill appearance or dizziness with standing.  See your doctor or return if not better in 2 days.     Follow-up with your doctor for referral for a new sleep study.      Pyelonephritis is an inflammation (soreness) of the kidney. It is generally caused by infection. It usually affects only one kidney. It may affect both. Usually these kidney infections have spread from the lower urinary tract. Because the urethra (the opening to the bladder) is much shorter in females than in males, urinary tract infections are much more common in females.    SYMPTOMS (what seems to be the problem)  Usually infections of the kidney have an abrupt onset of chills and fever. There is often an ache in the flank (the back near the lower ribs). There is often a generalized malaise. There may be nausea (feeling sick to your stomach) and vomiting. Some times there are symptoms (problems) of cystitis (bladder infection and inflammation). These symptoms often include urgency, increased frequency of urination, and burning with urination.    Pyelonephritis will usually respond to antibiotics (medications that kill bacteria). When this illness is recognized and treated promptly, complications are not common. Hospitalization may be required. If treated at home, take all the medicine given to you until finished. You may feel better in a few days, but TAKE ALL THE MEDICINE or the infection may not respond. It can become more difficult to treat. Response can generally be expected in 7 to 10 days.    Drink lots of fluid, 3 to 4 quarts a day. Cranberry juice is especially recommended, in addition to large amounts of water. Avoid caffeine, tea and carbonated beverages (Coke®, 7-Up®, etc.), because they tend to irritate the bladder. Males should avoid alcohol as  this may irritate the prostate. Aspirin, ibuprofen (Advil® or Motrin®) or acetaminophen (Tylenol®) may be used for temperature and/or discomfort. Only use these if your caregiver has not given medications that interfere.    TO PREVENT FURTHER INFECTIONS:  Empty the bladder often. Avoid holding urine for long periods of time.  After a bowel movement, women should cleanse front to back. Only use each tissue once.  Empty the bladder before and after sexual intercourse.    If you develop an increase of back pain, fever, nausea, vomiting, or your symptoms are no better in three (3) days, seek medical attention. Seek medical attention sooner if you are getting worse.    Document Released: 12/18/2006    [employer]® Patient Information ©2008 [employer].

## 2023-07-07 NOTE — ED TRIAGE NOTES
"Chief Complaint   Patient presents with    Back Pain     C/o mid lower back pain x 1 wk, pain rate at 7/10 burning sensation. Denies fall.    Vaginal Itching     C/o vaginal itching x 1 wk. Denies discharge from affected area.        48 y/o female, ambulatory to triage for above complaint. Aaox4. UA/UC protocol order in place.    Pt place in waiting area. Educated on triage process. Pt will inform staff of any medical changes.    /80   Pulse 81   Temp 36.4 °C (97.5 °F) (Temporal)   Resp 16   Ht 1.626 m (5' 4\")   Wt (!) 131 kg (288 lb 5.8 oz)   LMP 06/29/2023 (Approximate)   SpO2 94%   BMI 49.50 kg/m²     "

## 2023-07-07 NOTE — ED NOTES
Bedside report received from SANGEETA Mandujano. Patient currently resting in bed and remains on monitors and 1L O2 nasal canula to maintain spo2 >90% while sleeping. Patient aware that new urine sample is needed. Educations and supplies provided.

## 2023-07-11 ENCOUNTER — TELEPHONE (OUTPATIENT)
Dept: PHYSICAL THERAPY | Facility: REHABILITATION | Age: 48
End: 2023-07-11
Payer: COMMERCIAL

## 2023-07-11 NOTE — OP THERAPY DISCHARGE SUMMARY
Outpatient Physical Therapy  DISCHARGE SUMMARY NOTE      [address]  [address]  [place] [address]  Phone:  [phone]  Fax:  [phone]    Date of Visit: 07/11/2023    Patient: Chanelle Ortiz  YOB: 1975  MRN: [id number]     Referring Provider: Jenny Gonzalez M.D.   Referring Diagnosis Pain, upper back [M54.9]       Your patient is being discharged from Physical Therapy with the following comments:   Patient cancelled or missed more than 2 scheduled appointments/non-compliant    Comments:  Pt is self discharged at this time d/t our late cancel or no show policy. They were advised in previous sessions to cont compliance to HEP and contact PT as needed w/ any questions or concerns. Pt may return to PT in future as needed w/ new referral and check in w/referring provider.     Faith Munoz, PT    Date: 7/11/2023

## 2023-07-14 NOTE — ED NOTES
ED Positive Culture Follow-up/Notification Note:    Date: 7/14/23     Patient seen in the ED on 7/7/2023 for mid lower back pain and increased urinary frequency, dysuria, mild cough and nausea. Also notes vaginal itching consistent with previous yeast infections.   1. Pyelonephritis    2. History of diabetes mellitus, type II    3. POLLY on CPAP       Discharge Medication List as of 7/7/2023  8:51 AM        START taking these medications    Details   cefdinir (OMNICEF) 300 MG Cap Take 1 Capsule by mouth 2 times a day., Disp-14 Capsule, R-0, Normal      !! fluconazole (DIFLUCAN) 100 MG Tab Take 1 Tablet by mouth every day., Disp-2 Tablet, R-0, Normal       !! - Potential duplicate medications found. Please discuss with provider.          Allergies: Morphine     Vitals:    07/07/23 0634 07/07/23 0703 07/07/23 0730 07/07/23 0846   BP: 134/84 131/74 139/85 118/79   Pulse: 65 70 71 75   Resp:  20 16 16   Temp:    36.2 °C (97.2 °F)   TempSrc:    Temporal   SpO2: 94% 95% 94% 91%   Weight:       Height:           Final cultures:   Results       Procedure Component Value Units Date/Time    URINE CULTURE(NEW) [[id number]]  (Abnormal) Collected: 07/07/23 0538    Order Status: Completed Specimen: Urine, Clean Catch Updated: 07/09/23 1130     Significant Indicator POS     Source UR     Site URINE, CLEAN CATCH     Culture Result Usual urogenital elsy >100,000 cfu/mL      Streptococcus agalactiae (Group B)  10-50,000 cfu/mL      Narrative:      Indication for culture:->Patient WITHOUT an indwelling Suggs  catheter in place with new onset of Dysuria, Frequency,  Urgency, and/or Suprapubic pain  Indication for culture:->Patient WITHOUT an indwelling Suggs    URINALYSIS [[id number]]  (Abnormal) Collected: 07/07/23 0800    Order Status: Completed Specimen: Urine Updated: 07/07/23 0822     Color Yellow     Character Clear     Specific Gravity 1.024     Ph 6.5     Glucose Negative mg/dL      Ketones Negative mg/dL      Protein Negative  mg/dL      Bilirubin Negative     Urobilinogen, Urine 1.0     Nitrite Negative     Leukocyte Esterase Negative     Occult Blood Moderate     Micro Urine Req Microscopic    Narrative:      Indication for culture:->Patient WITHOUT an indwelling Suggs  catheter in place with new onset of Dysuria, Frequency,  Urgency, and/or Suprapubic pain            Plan:   Appropriate antibiotic therapy prescribed. No changes required based upon culture result.      Antonieta Cunningham, PharmD

## 2023-07-18 ENCOUNTER — APPOINTMENT (OUTPATIENT)
Dept: PHYSICAL THERAPY | Facility: REHABILITATION | Age: 48
End: 2023-07-18
Attending: EMERGENCY MEDICINE
Payer: COMMERCIAL

## 2023-07-25 ENCOUNTER — APPOINTMENT (OUTPATIENT)
Dept: PHYSICAL THERAPY | Facility: REHABILITATION | Age: 48
End: 2023-07-25
Attending: EMERGENCY MEDICINE
Payer: COMMERCIAL

## 2023-12-20 ENCOUNTER — HOSPITAL ENCOUNTER (EMERGENCY)
Facility: MEDICAL CENTER | Age: 48
End: 2023-12-20
Attending: EMERGENCY MEDICINE
Payer: COMMERCIAL

## 2023-12-20 VITALS
HEIGHT: 64 IN | DIASTOLIC BLOOD PRESSURE: 76 MMHG | OXYGEN SATURATION: 94 % | SYSTOLIC BLOOD PRESSURE: 117 MMHG | TEMPERATURE: 96.8 F | HEART RATE: 77 BPM | RESPIRATION RATE: 18 BRPM | WEIGHT: 293 LBS | BODY MASS INDEX: 50.02 KG/M2

## 2023-12-20 DIAGNOSIS — H60.501 ACUTE OTITIS EXTERNA OF RIGHT EAR, UNSPECIFIED TYPE: ICD-10-CM

## 2023-12-20 DIAGNOSIS — T16.1XXA FOREIGN BODY OF RIGHT EAR, INITIAL ENCOUNTER: ICD-10-CM

## 2023-12-20 PROCEDURE — 69209 REMOVE IMPACTED EAR WAX UNI: CPT

## 2023-12-20 PROCEDURE — A9270 NON-COVERED ITEM OR SERVICE: HCPCS | Mod: UD | Performed by: EMERGENCY MEDICINE

## 2023-12-20 PROCEDURE — 99283 EMERGENCY DEPT VISIT LOW MDM: CPT

## 2023-12-20 PROCEDURE — 700102 HCHG RX REV CODE 250 W/ 637 OVERRIDE(OP): Mod: UD | Performed by: EMERGENCY MEDICINE

## 2023-12-20 RX ORDER — DOCUSATE SODIUM 50 MG/5ML
100 LIQUID ORAL ONCE
Status: COMPLETED | OUTPATIENT
Start: 2023-12-20 | End: 2023-12-20

## 2023-12-20 RX ORDER — MECLIZINE HYDROCHLORIDE 25 MG/1
25 TABLET ORAL ONCE
Status: COMPLETED | OUTPATIENT
Start: 2023-12-20 | End: 2023-12-20

## 2023-12-20 RX ORDER — CIPROFLOXACIN AND DEXAMETHASONE 3; 1 MG/ML; MG/ML
4 SUSPENSION/ DROPS AURICULAR (OTIC) 2 TIMES DAILY
Qty: 7.5 ML | Refills: 0 | Status: ACTIVE | OUTPATIENT
Start: 2023-12-20

## 2023-12-20 RX ORDER — AMOXICILLIN AND CLAVULANATE POTASSIUM 875; 125 MG/1; MG/1
1 TABLET, FILM COATED ORAL ONCE
Status: COMPLETED | OUTPATIENT
Start: 2023-12-20 | End: 2023-12-20

## 2023-12-20 RX ADMIN — MECLIZINE HYDROCHLORIDE 25 MG: 25 TABLET ORAL at 09:40

## 2023-12-20 RX ADMIN — DOCUSATE SODIUM 100 MG: 50 LIQUID ORAL at 08:30

## 2023-12-20 RX ADMIN — AMOXICILLIN AND CLAVULANATE POTASSIUM 1 TABLET: 875; 125 TABLET, FILM COATED ORAL at 10:06

## 2023-12-20 ASSESSMENT — FIBROSIS 4 INDEX: FIB4 SCORE: 1.15

## 2023-12-20 NOTE — ED PROVIDER NOTES
ED Provider Note    CHIEF COMPLAINT  Chief Complaint   Patient presents with    Headache     For a couple weeks    Foreign Body in Ear     Thinks that something crawled in her ear         HPI/ROS      Chanelle Ortiz is a 48 y.o. female who presents with chief complaint of worrying that something is in her ear.  Patient reports that she has had an itchy ear, and folic something was crawling in it.  This is her right ear.  She reports that she has had a few months of some headaches that come and go.  Headaches are typically frontal, they are mild, they are gradual in onset.  She denies any associated nausea or vomiting.  She denies any changes in vision or new focal weakness or numbness.  She does not currently have a headache but does complain of some mild ear pain.  She also reports some mild decreased hearing in the affected ear.  Patient has a history of diabetes.     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    SURGICAL HISTORY   has a past surgical history that includes other abdominal surgery (2002); gyn surgery (2004); other; primary c section; ovarian cystectomy (1992); cholecystectomy; primary c section; upper gi endoscopy,diagnosis (N/A, 6/14/2021); and upper gi endoscopy,biopsy (N/A, 6/14/2021).    FAMILY HISTORY  Family History   Problem Relation Age of Onset    Other Mother         sleep apnea    Heart Failure Sister     Diabetes Sister     Heart Disease Sister     Hypertension Sister     Stroke Sister     Hyperlipidemia Sister     Heart Attack Maternal Grandfather     Diabetes Daughter     Psychiatric Illness Daughter         bipolar    Psychiatric Illness Son          "autism       SOCIAL HISTORY  Social History     Tobacco Use    Smoking status: Never    Smokeless tobacco: Never   Vaping Use    Vaping Use: Never used   Substance and Sexual Activity    Alcohol use: No    Drug use: No    Sexual activity: Not on file     Comment: .  Tuboligation in 2004.        CURRENT MEDICATIONS  Home Medications       Reviewed by Uche Harley R.N. (Registered Nurse) on 12/20/23 at 0339  Med List Status: Not Addressed     Medication Last Dose Status   albuterol 108 (90 Base) MCG/ACT Aero Soln inhalation aerosol  Active   cefdinir (OMNICEF) 300 MG Cap  Active   dicyclomine (BENTYL) 20 MG Tab  Active   ferrous sulfate 325 (65 Fe) MG EC tablet  Active   fluconazole (DIFLUCAN) 100 MG Tab  Active   fluconazole (DIFLUCAN) 150 MG tablet  Active   ibuprofen (MOTRIN) 600 MG Tab  Active   lidocaine (LIDODERM) 5 % Patch  Active   lisinopril (PRINIVIL) 5 MG Tab  Active   meclizine (ANTIVERT) 12.5 MG Tab  Active   metoprolol SR (TOPROL XL) 25 MG TABLET SR 24 HR  Active   omeprazole (PRILOSEC) 40 MG delayed-release capsule  Active   TRADJENTA 5 MG Tab tablet  Active   vitamin D, Ergocalciferol, (DRISDOL) 1.25 MG (19349 UT) Cap capsule  Active                    ALLERGIES  Allergies   Allergen Reactions    Morphine Vomiting and Nausea     \"I think\" \"I dunno what my reaction was, the nurse just said my oxygen levels were going way to low on it\" \"heart races\"       PHYSICAL EXAM  VITAL SIGNS: BP (!) 153/94   Pulse 91   Temp (!) 35.6 °C (96.1 °F) (Oral)   Resp 16   Ht 1.626 m (5' 4\")   Wt (!) 140 kg (309 lb 4.9 oz)   SpO2 96%   BMI 53.09 kg/m²    Physical Exam  Constitutional:       Appearance: She is well-developed.   HENT:      Head: Normocephalic and atraumatic.      Comments: Left tympanic membrane is unremarkable.  Right tympanic membrane with significant cerumen impaction, unable to visualize entire canal and TM secondary to the cerumen impaction.  Normal lie of the ear, no mastoid " tenderness  Eyes:      Pupils: Pupils are equal, round, and reactive to light.   Cardiovascular:      Rate and Rhythm: Normal rate and regular rhythm.   Pulmonary:      Effort: Pulmonary effort is normal. No accessory muscle usage or respiratory distress.      Breath sounds: Normal breath sounds.   Abdominal:      General: Bowel sounds are normal.      Palpations: Abdomen is soft. There is no mass.      Tenderness: There is no abdominal tenderness.   Musculoskeletal:         General: Normal range of motion.      Cervical back: Neck supple. No rigidity.   Skin:     General: Skin is warm.      Capillary Refill: Capillary refill takes less than 2 seconds.   Neurological:      General: No focal deficit present.      Mental Status: She is alert and oriented to person, place, and time.      Comments: Distal and proximal strength 5/5 in upper and lower extremities. Normal gait. No dysmetria. No sensation deficits. No visual field deficits. Cranial nerves intact.        Psychiatric:         Mood and Affect: Mood normal. Mood is not anxious.           DIAGNOSTIC STUDIES / PROCEDURES        COURSE & MEDICAL DECISION MAKING        INITIAL ASSESSMENT, COURSE AND PLAN  Care Narrative: Patient here with cerumen impaction.  She is otherwise very well-appearing.  Patient's neurologic exam is entirely unremarkable.  Headaches are coming and going, no current headache, this to be highly typical of concerning emergent headache especially given the chronicity, patient headaches are gradual in onset and also she has no associated neck pain or neck stiffness.  She has full range of motion of neck on exam.  Given my low clinical suspicion of emergent central neurologic issue CT, MRI deferred.  Will perform cerumen disimpaction and reevaluate.  After cerumen disimpaction, patient has what appears to be a relatively large and dead bug in her ear, the eardrum still has a significant of wax overlying but the area that I can identify is very  macerated, and erythematous.  I attempted to grab the bug with alligator forceps however unable to effectively grab this, as I do not have direct visualization and I am concerned about damaging her already very macerated eardrum.  Will discuss the case with ENT as I do believe that she may need further debridement and removal in their office.  Patient does not have evidence of any extension into the pinna, she has normal lie of the ear, she does not have a fever, the patient does have a history of diabetes I do not believe patient's presentation is consistent with malignant otitis externa.  Will start patient on Ciprodex.  Patient will follow-up with ENT, I have discussed the case with them and they will see her early next week.  I have discussed return precautions.        DISPOSITION AND DISCUSSIONS  I have discussed management of the patient with the following physicians and ANIBAL's:  Dr. Villeda of ENT      Escalation of care considered, and ultimately not performed: Given my very low clinical suspicion of malignant otitis externa IV antibiotics, labs were deferred    Barriers to care at this time, including but not limited to: Patient does not have established PCP.         FINAL DIAGNOSIS  1. Foreign body of right ear, initial encounter    2. Acute otitis externa of right ear, unspecified type

## 2023-12-20 NOTE — ED NOTES
Patient ambulated with steady gait and stand by assistance from lobby to assigned room. Chart up for ERP to see. Agree with triage note.

## 2023-12-20 NOTE — ED NOTES
Patient informed on what she is waiting on at this time and was apologized to for wait times. Patient was provided with a warm blanket. All needs were addressed at this time.

## 2023-12-20 NOTE — ED TRIAGE NOTES
"Chief Complaint   Patient presents with    Headache     For a couple weeks    Foreign Body in Ear     Thinks that something crawled in her ear     Patient ambulatory to triage for the above complaints. Patient states that she ahs had a headache and sore throat for a couple of weeks not and she has not taken any OTC medications for either ailment. Patient is also concerned that something crawled in her ear as she feels a scratching sensation in her ear.    Pt is alert and oriented, speaking in full sentences, follows commands and responds appropriately to questions. Resp are even and unlabored.      Pt placed in lobby. Pt educated on triage process. Pt encouraged to alert staff for any changes.     Patient and staff wearing appropriate PPE.    BP (!) 153/94   Pulse 91   Temp (!) 35.6 °C (96.1 °F) (Oral)   Resp 16   Ht 1.626 m (5' 4\")   Wt (!) 140 kg (309 lb 4.9 oz)   SpO2 96%     "

## 2023-12-20 NOTE — ED NOTES
Patient provided with discharge instructions. Patient AxO4 and GCS 15. Patient is respirating without distress and vitals are within normal ranges. Patient has equal and adequate chest rise and fall bilaterally with respirations. Patient verbalized understanding of discharge instructions. Patient wheeled out of lobby in wheelchair.

## 2023-12-20 NOTE — DISCHARGE INSTRUCTIONS
Take the Ciprodex eardrops for the next few days.  If you develop a fever, pain worsen significantly, you have any other concerns please return to the emergency department.  Please follow-up with ENT, they will see you early next week.

## 2024-07-25 ENCOUNTER — APPOINTMENT (OUTPATIENT)
Dept: RADIOLOGY | Facility: MEDICAL CENTER | Age: 49
End: 2024-07-25
Attending: EMERGENCY MEDICINE
Payer: COMMERCIAL

## 2024-07-25 ENCOUNTER — HOSPITAL ENCOUNTER (EMERGENCY)
Facility: MEDICAL CENTER | Age: 49
End: 2024-07-25
Attending: EMERGENCY MEDICINE
Payer: COMMERCIAL

## 2024-07-25 VITALS
OXYGEN SATURATION: 91 % | WEIGHT: 293 LBS | HEART RATE: 79 BPM | SYSTOLIC BLOOD PRESSURE: 103 MMHG | BODY MASS INDEX: 50.02 KG/M2 | RESPIRATION RATE: 20 BRPM | DIASTOLIC BLOOD PRESSURE: 55 MMHG | TEMPERATURE: 96.8 F | HEIGHT: 64 IN

## 2024-07-25 DIAGNOSIS — R07.9 CHEST PAIN, UNSPECIFIED TYPE: ICD-10-CM

## 2024-07-25 DIAGNOSIS — R42 LIGHTHEADEDNESS: ICD-10-CM

## 2024-07-25 LAB
ALBUMIN SERPL BCP-MCNC: 3.8 G/DL (ref 3.2–4.9)
ALBUMIN/GLOB SERPL: 1.1 G/DL
ALP SERPL-CCNC: 98 U/L (ref 30–99)
ALT SERPL-CCNC: 19 U/L (ref 2–50)
ANION GAP SERPL CALC-SCNC: 12 MMOL/L (ref 7–16)
AST SERPL-CCNC: 17 U/L (ref 12–45)
BASOPHILS # BLD AUTO: 0.8 % (ref 0–1.8)
BASOPHILS # BLD: 0.08 K/UL (ref 0–0.12)
BILIRUB SERPL-MCNC: 0.3 MG/DL (ref 0.1–1.5)
BUN SERPL-MCNC: 18 MG/DL (ref 8–22)
CALCIUM ALBUM COR SERPL-MCNC: 9.3 MG/DL (ref 8.5–10.5)
CALCIUM SERPL-MCNC: 9.1 MG/DL (ref 8.5–10.5)
CHLORIDE SERPL-SCNC: 103 MMOL/L (ref 96–112)
CO2 SERPL-SCNC: 23 MMOL/L (ref 20–33)
CREAT SERPL-MCNC: 0.59 MG/DL (ref 0.5–1.4)
D DIMER PPP IA.FEU-MCNC: 0.29 UG/ML (FEU) (ref 0–0.5)
EKG IMPRESSION: NORMAL
EOSINOPHIL # BLD AUTO: 0.25 K/UL (ref 0–0.51)
EOSINOPHIL NFR BLD: 2.6 % (ref 0–6.9)
ERYTHROCYTE [DISTWIDTH] IN BLOOD BY AUTOMATED COUNT: 41.9 FL (ref 35.9–50)
GFR SERPLBLD CREATININE-BSD FMLA CKD-EPI: 111 ML/MIN/1.73 M 2
GLOBULIN SER CALC-MCNC: 3.6 G/DL (ref 1.9–3.5)
GLUCOSE BLD STRIP.AUTO-MCNC: 139 MG/DL (ref 65–99)
GLUCOSE SERPL-MCNC: 216 MG/DL (ref 65–99)
HCG SERPL QL: NEGATIVE
HCT VFR BLD AUTO: 45.1 % (ref 37–47)
HGB BLD-MCNC: 14.7 G/DL (ref 12–16)
IMM GRANULOCYTES # BLD AUTO: 0.07 K/UL (ref 0–0.11)
IMM GRANULOCYTES NFR BLD AUTO: 0.7 % (ref 0–0.9)
LYMPHOCYTES # BLD AUTO: 2.41 K/UL (ref 1–4.8)
LYMPHOCYTES NFR BLD: 25.4 % (ref 22–41)
MCH RBC QN AUTO: 30.5 PG (ref 27–33)
MCHC RBC AUTO-ENTMCNC: 32.6 G/DL (ref 32.2–35.5)
MCV RBC AUTO: 93.6 FL (ref 81.4–97.8)
MONOCYTES # BLD AUTO: 0.64 K/UL (ref 0–0.85)
MONOCYTES NFR BLD AUTO: 6.8 % (ref 0–13.4)
NEUTROPHILS # BLD AUTO: 6.02 K/UL (ref 1.82–7.42)
NEUTROPHILS NFR BLD: 63.7 % (ref 44–72)
NRBC # BLD AUTO: 0 K/UL
NRBC BLD-RTO: 0 /100 WBC (ref 0–0.2)
PLATELET # BLD AUTO: 179 K/UL (ref 164–446)
PMV BLD AUTO: 11.2 FL (ref 9–12.9)
POTASSIUM SERPL-SCNC: 4 MMOL/L (ref 3.6–5.5)
PROT SERPL-MCNC: 7.4 G/DL (ref 6–8.2)
RBC # BLD AUTO: 4.82 M/UL (ref 4.2–5.4)
SODIUM SERPL-SCNC: 138 MMOL/L (ref 135–145)
TROPONIN T SERPL-MCNC: <6 NG/L (ref 6–19)
TROPONIN T SERPL-MCNC: <6 NG/L (ref 6–19)
WBC # BLD AUTO: 9.5 K/UL (ref 4.8–10.8)

## 2024-07-25 PROCEDURE — 84703 CHORIONIC GONADOTROPIN ASSAY: CPT

## 2024-07-25 PROCEDURE — 36415 COLL VENOUS BLD VENIPUNCTURE: CPT

## 2024-07-25 PROCEDURE — 71045 X-RAY EXAM CHEST 1 VIEW: CPT

## 2024-07-25 PROCEDURE — 93005 ELECTROCARDIOGRAM TRACING: CPT | Performed by: EMERGENCY MEDICINE

## 2024-07-25 PROCEDURE — 84484 ASSAY OF TROPONIN QUANT: CPT

## 2024-07-25 PROCEDURE — 82962 GLUCOSE BLOOD TEST: CPT

## 2024-07-25 PROCEDURE — 80053 COMPREHEN METABOLIC PANEL: CPT

## 2024-07-25 PROCEDURE — 700105 HCHG RX REV CODE 258: Mod: UD | Performed by: EMERGENCY MEDICINE

## 2024-07-25 PROCEDURE — 93005 ELECTROCARDIOGRAM TRACING: CPT

## 2024-07-25 PROCEDURE — 99285 EMERGENCY DEPT VISIT HI MDM: CPT

## 2024-07-25 PROCEDURE — 85025 COMPLETE CBC W/AUTO DIFF WBC: CPT

## 2024-07-25 PROCEDURE — 85379 FIBRIN DEGRADATION QUANT: CPT

## 2024-07-25 RX ORDER — SODIUM CHLORIDE, SODIUM LACTATE, POTASSIUM CHLORIDE, CALCIUM CHLORIDE 600; 310; 30; 20 MG/100ML; MG/100ML; MG/100ML; MG/100ML
1000 INJECTION, SOLUTION INTRAVENOUS ONCE
Status: COMPLETED | OUTPATIENT
Start: 2024-07-25 | End: 2024-07-25

## 2024-07-25 RX ADMIN — SODIUM CHLORIDE, POTASSIUM CHLORIDE, SODIUM LACTATE AND CALCIUM CHLORIDE 1000 ML: 600; 310; 30; 20 INJECTION, SOLUTION INTRAVENOUS at 16:11

## 2024-07-25 ASSESSMENT — PAIN DESCRIPTION - PAIN TYPE: TYPE: ACUTE PAIN

## 2024-07-25 ASSESSMENT — HEART SCORE
AGE: 45-64
TROPONIN: LESS THAN OR EQUAL TO NORMAL LIMIT
ECG: NORMAL
HISTORY: SLIGHTLY SUSPICIOUS
RISK FACTORS: 1-2 RISK FACTORS
HEART SCORE: 2
RISK FACTORS: 1-2 RISK FACTORS
TROPONIN: LESS THAN OR EQUAL TO NORMAL LIMIT
HEART SCORE: 2
AGE: 45-64
HISTORY: SLIGHTLY SUSPICIOUS
ECG: NORMAL

## 2024-07-25 ASSESSMENT — FIBROSIS 4 INDEX: FIB4 SCORE: 1.15

## 2024-08-05 ENCOUNTER — NON-PROVIDER VISIT (OUTPATIENT)
Dept: OCCUPATIONAL MEDICINE | Facility: CLINIC | Age: 49
End: 2024-08-05

## 2024-08-05 DIAGNOSIS — Z11.1 ENCOUNTER FOR PPD TEST: Primary | ICD-10-CM

## 2024-08-06 PROCEDURE — 86580 TB INTRADERMAL TEST: CPT | Performed by: PREVENTIVE MEDICINE

## 2024-08-07 ENCOUNTER — NON-PROVIDER VISIT (OUTPATIENT)
Dept: OCCUPATIONAL MEDICINE | Facility: CLINIC | Age: 49
End: 2024-08-07

## 2024-08-07 DIAGNOSIS — Z11.1 ENCOUNTER FOR PPD SKIN TEST READING: ICD-10-CM

## 2024-08-07 LAB — TB WHEAL 3D P 5 TU DIAM: NORMAL MM

## 2024-08-12 ENCOUNTER — NON-PROVIDER VISIT (OUTPATIENT)
Dept: OCCUPATIONAL MEDICINE | Facility: CLINIC | Age: 49
End: 2024-08-12

## 2024-08-12 DIAGNOSIS — Z11.1 ENCOUNTER FOR PPD TEST: Primary | ICD-10-CM

## 2024-08-12 PROCEDURE — 86580 TB INTRADERMAL TEST: CPT | Performed by: PREVENTIVE MEDICINE

## 2024-08-14 ENCOUNTER — NON-PROVIDER VISIT (OUTPATIENT)
Dept: OCCUPATIONAL MEDICINE | Facility: CLINIC | Age: 49
End: 2024-08-14

## 2024-08-14 LAB — TB WHEAL 3D P 5 TU DIAM: NORMAL MM

## 2024-11-18 NOTE — DISCHARGE INSTRUCTIONS
Last ordered: 1 week ago (11/6/2024) by Reported External/Patient     Last refill: 10/15/2024      Follow up with a dentist is mandatory.  Take all of the antibiotics.  OTC Ibuprofen.  Return to ER for fever over 101.5, significant facial swelling, difficulty breathing or swallowing or inability to open the mouth fully.

## 2025-02-12 ENCOUNTER — APPOINTMENT (OUTPATIENT)
Dept: ADMISSIONS | Facility: MEDICAL CENTER | Age: 50
End: 2025-02-12
Attending: INTERNAL MEDICINE
Payer: COMMERCIAL

## 2025-02-18 ENCOUNTER — PRE-ADMISSION TESTING (OUTPATIENT)
Dept: ADMISSIONS | Facility: MEDICAL CENTER | Age: 50
End: 2025-02-18
Attending: INTERNAL MEDICINE
Payer: COMMERCIAL

## 2025-02-18 VITALS — HEIGHT: 64 IN | BODY MASS INDEX: 53.21 KG/M2

## 2025-02-18 DIAGNOSIS — Z01.812 PRE-OPERATIVE LABORATORY EXAMINATION: ICD-10-CM

## 2025-02-18 DIAGNOSIS — Z01.810 PRE-OPERATIVE CARDIOVASCULAR EXAMINATION: ICD-10-CM

## 2025-02-18 RX ORDER — SEMAGLUTIDE 2.68 MG/ML
INJECTION, SOLUTION SUBCUTANEOUS
COMMUNITY
Start: 2025-01-28

## 2025-02-18 RX ORDER — FENOFIBRATE 160 MG/1
160 TABLET ORAL DAILY
COMMUNITY

## 2025-02-18 RX ORDER — DAPAGLIFLOZIN 5 MG/1
5 TABLET, FILM COATED ORAL EVERY EVENING
COMMUNITY

## 2025-02-18 RX ORDER — LEVOTHYROXINE SODIUM 175 UG/1
175 TABLET ORAL
COMMUNITY

## 2025-02-18 RX ORDER — ROSUVASTATIN CALCIUM 5 MG/1
5 TABLET, COATED ORAL EVERY EVENING
COMMUNITY
Start: 2024-09-12

## 2025-02-18 NOTE — PREPROCEDURE INSTRUCTIONS
PreAdmit Telephone Appointment: Reviewed the Preparing for your procedure handout with patient over the phone. Patient instructed per pharmacy guidelines regarding taking, holding or contacting provider for instructions on regularly prescribed medications before surgery. Instructed to take the following medications the day of surgery with a sip of water per pharmacy medication guidelines:  Levothyroxine, albuterol inhaler    Confirmed with patient where to check in morning of surgery. Handouts/Brochure/Video emailed to patient.

## 2025-02-18 NOTE — PREADMIT AVS NOTE
Current Medications   Medication Instructions    Multivitamin   Stop 7 days prior to surgery    OZEMPIC, 2 MG/DOSE, 8 MG/3ML Solution Pen-injector Stop 7 days before surgery    rosuvastatin (CRESTOR) 5 MG Tab Continue until night before surgery    Inulin (FIBER CHOICE) 1.5 g Chew Tab Stop 7 days before surgery    dapagliflozin propanediol (FARXIGA) 5 MG Tab Stop 4 days before surgery    fenofibrate (TRIGLIDE) 160 MG tablet Stop 24 hours before surgery    levothyroxine (SYNTHROID) 175 MCG Tab Continue taking medication as prescribed, including morning of procedure     albuterol 108 (90 Base) MCG/ACT Aero Soln inhalation aerosol Continue taking medication as prescribed, including morning of procedure

## 2025-02-27 ENCOUNTER — HOSPITAL ENCOUNTER (OUTPATIENT)
Facility: MEDICAL CENTER | Age: 50
End: 2025-02-27
Attending: INTERNAL MEDICINE
Payer: COMMERCIAL

## 2025-02-27 ENCOUNTER — PRE-ADMISSION TESTING (OUTPATIENT)
Dept: ADMISSIONS | Facility: MEDICAL CENTER | Age: 50
End: 2025-02-27
Attending: INTERNAL MEDICINE
Payer: COMMERCIAL

## 2025-02-27 DIAGNOSIS — Z01.812 PRE-OPERATIVE LABORATORY EXAMINATION: ICD-10-CM

## 2025-02-27 DIAGNOSIS — Z01.810 PRE-OPERATIVE CARDIOVASCULAR EXAMINATION: ICD-10-CM

## 2025-02-27 LAB
ALBUMIN SERPL BCP-MCNC: 4 G/DL (ref 3.2–4.9)
ALBUMIN SERPL BCP-MCNC: 4.2 G/DL (ref 3.2–4.9)
ALBUMIN/GLOB SERPL: 1.3 G/DL
ALBUMIN/GLOB SERPL: 1.3 G/DL
ALP SERPL-CCNC: 83 U/L (ref 30–99)
ALP SERPL-CCNC: 87 U/L (ref 30–99)
ALT SERPL-CCNC: 16 U/L (ref 2–50)
ALT SERPL-CCNC: 17 U/L (ref 2–50)
ANION GAP SERPL CALC-SCNC: 13 MMOL/L (ref 7–16)
ANION GAP SERPL CALC-SCNC: 13 MMOL/L (ref 7–16)
AST SERPL-CCNC: 19 U/L (ref 12–45)
AST SERPL-CCNC: 19 U/L (ref 12–45)
BILIRUB SERPL-MCNC: 0.3 MG/DL (ref 0.1–1.5)
BILIRUB SERPL-MCNC: 0.4 MG/DL (ref 0.1–1.5)
BUN SERPL-MCNC: 15 MG/DL (ref 8–22)
BUN SERPL-MCNC: 16 MG/DL (ref 8–22)
CALCIUM ALBUM COR SERPL-MCNC: 9.1 MG/DL (ref 8.5–10.5)
CALCIUM ALBUM COR SERPL-MCNC: 9.3 MG/DL (ref 8.5–10.5)
CALCIUM SERPL-MCNC: 9.3 MG/DL (ref 8.4–10.2)
CALCIUM SERPL-MCNC: 9.3 MG/DL (ref 8.4–10.2)
CHLORIDE SERPL-SCNC: 102 MMOL/L (ref 96–112)
CHLORIDE SERPL-SCNC: 105 MMOL/L (ref 96–112)
CHOLEST SERPL-MCNC: 66 MG/DL (ref 100–199)
CO2 SERPL-SCNC: 23 MMOL/L (ref 20–33)
CO2 SERPL-SCNC: 23 MMOL/L (ref 20–33)
CREAT SERPL-MCNC: 0.62 MG/DL (ref 0.5–1.4)
CREAT SERPL-MCNC: 0.62 MG/DL (ref 0.5–1.4)
EKG IMPRESSION: NORMAL
ERYTHROCYTE [DISTWIDTH] IN BLOOD BY AUTOMATED COUNT: 44.4 FL (ref 35.9–50)
EST. AVERAGE GLUCOSE BLD GHB EST-MCNC: 157 MG/DL
EST. AVERAGE GLUCOSE BLD GHB EST-MCNC: 157 MG/DL
FASTING STATUS PATIENT QL REPORTED: NORMAL
GFR SERPLBLD CREATININE-BSD FMLA CKD-EPI: 109 ML/MIN/1.73 M 2
GFR SERPLBLD CREATININE-BSD FMLA CKD-EPI: 109 ML/MIN/1.73 M 2
GLOBULIN SER CALC-MCNC: 3.2 G/DL (ref 1.9–3.5)
GLOBULIN SER CALC-MCNC: 3.3 G/DL (ref 1.9–3.5)
GLUCOSE SERPL-MCNC: 131 MG/DL (ref 65–99)
GLUCOSE SERPL-MCNC: 133 MG/DL (ref 65–99)
HBA1C MFR BLD: 7.1 % (ref 4–5.6)
HBA1C MFR BLD: 7.1 % (ref 4–5.6)
HCT VFR BLD AUTO: 50.6 % (ref 37–47)
HDLC SERPL-MCNC: 34 MG/DL
HGB BLD-MCNC: 16.1 G/DL (ref 12–16)
LDLC SERPL CALC-MCNC: 15 MG/DL
MCH RBC QN AUTO: 29.4 PG (ref 27–33)
MCHC RBC AUTO-ENTMCNC: 31.8 G/DL (ref 32.2–35.5)
MCV RBC AUTO: 92.5 FL (ref 81.4–97.8)
PLATELET # BLD AUTO: 243 K/UL (ref 164–446)
PMV BLD AUTO: 11.3 FL (ref 9–12.9)
POTASSIUM SERPL-SCNC: 3.8 MMOL/L (ref 3.6–5.5)
POTASSIUM SERPL-SCNC: 3.9 MMOL/L (ref 3.6–5.5)
PROT SERPL-MCNC: 7.2 G/DL (ref 6–8.2)
PROT SERPL-MCNC: 7.5 G/DL (ref 6–8.2)
RBC # BLD AUTO: 5.47 M/UL (ref 4.2–5.4)
SODIUM SERPL-SCNC: 138 MMOL/L (ref 135–145)
SODIUM SERPL-SCNC: 141 MMOL/L (ref 135–145)
TRIGL SERPL-MCNC: 87 MG/DL (ref 0–149)
TSH SERPL DL<=0.005 MIU/L-ACNC: 7.96 UIU/ML (ref 0.38–5.33)
WBC # BLD AUTO: 9.9 K/UL (ref 4.8–10.8)

## 2025-02-27 PROCEDURE — 83036 HEMOGLOBIN GLYCOSYLATED A1C: CPT | Mod: 91

## 2025-02-27 PROCEDURE — 83036 HEMOGLOBIN GLYCOSYLATED A1C: CPT

## 2025-02-27 PROCEDURE — 80061 LIPID PANEL: CPT

## 2025-02-27 PROCEDURE — 36415 COLL VENOUS BLD VENIPUNCTURE: CPT

## 2025-02-27 PROCEDURE — 80053 COMPREHEN METABOLIC PANEL: CPT | Mod: 91

## 2025-02-27 PROCEDURE — 84443 ASSAY THYROID STIM HORMONE: CPT

## 2025-02-27 PROCEDURE — 93010 ELECTROCARDIOGRAM REPORT: CPT | Performed by: INTERNAL MEDICINE

## 2025-02-27 PROCEDURE — 93005 ELECTROCARDIOGRAM TRACING: CPT | Mod: TC

## 2025-02-27 PROCEDURE — 85027 COMPLETE CBC AUTOMATED: CPT

## 2025-02-27 PROCEDURE — 80053 COMPREHEN METABOLIC PANEL: CPT

## 2025-03-10 ENCOUNTER — ANESTHESIA EVENT (OUTPATIENT)
Dept: SURGERY | Facility: MEDICAL CENTER | Age: 50
End: 2025-03-10
Payer: COMMERCIAL

## 2025-03-11 ENCOUNTER — ANESTHESIA (OUTPATIENT)
Dept: SURGERY | Facility: MEDICAL CENTER | Age: 50
End: 2025-03-11
Payer: COMMERCIAL

## 2025-03-11 ENCOUNTER — HOSPITAL ENCOUNTER (OUTPATIENT)
Facility: MEDICAL CENTER | Age: 50
End: 2025-03-11
Attending: INTERNAL MEDICINE | Admitting: INTERNAL MEDICINE
Payer: COMMERCIAL

## 2025-03-11 VITALS
WEIGHT: 293 LBS | OXYGEN SATURATION: 92 % | HEART RATE: 67 BPM | DIASTOLIC BLOOD PRESSURE: 63 MMHG | SYSTOLIC BLOOD PRESSURE: 117 MMHG | TEMPERATURE: 97.2 F | BODY MASS INDEX: 50.02 KG/M2 | HEIGHT: 64 IN | RESPIRATION RATE: 18 BRPM

## 2025-03-11 LAB
GLUCOSE BLD STRIP.AUTO-MCNC: 119 MG/DL (ref 65–99)
HCG UR QL: NEGATIVE
PATHOLOGY CONSULT NOTE: NORMAL

## 2025-03-11 PROCEDURE — 160015 HCHG STAT PREOP MINUTES: Performed by: INTERNAL MEDICINE

## 2025-03-11 PROCEDURE — 700101 HCHG RX REV CODE 250: Performed by: STUDENT IN AN ORGANIZED HEALTH CARE EDUCATION/TRAINING PROGRAM

## 2025-03-11 PROCEDURE — 81025 URINE PREGNANCY TEST: CPT

## 2025-03-11 PROCEDURE — 88312 SPECIAL STAINS GROUP 1: CPT | Mod: 26 | Performed by: PATHOLOGY

## 2025-03-11 PROCEDURE — 160035 HCHG PACU - 1ST 60 MINS PHASE I: Performed by: INTERNAL MEDICINE

## 2025-03-11 PROCEDURE — 88305 TISSUE EXAM BY PATHOLOGIST: CPT | Performed by: PATHOLOGY

## 2025-03-11 PROCEDURE — 160203 HCHG ENDO MINUTES - 1ST 30 MINS LEVEL 4: Performed by: INTERNAL MEDICINE

## 2025-03-11 PROCEDURE — 160009 HCHG ANES TIME/MIN: Performed by: INTERNAL MEDICINE

## 2025-03-11 PROCEDURE — 88305 TISSUE EXAM BY PATHOLOGIST: CPT | Mod: 26 | Performed by: PATHOLOGY

## 2025-03-11 PROCEDURE — 160002 HCHG RECOVERY MINUTES (STAT): Performed by: INTERNAL MEDICINE

## 2025-03-11 PROCEDURE — 82962 GLUCOSE BLOOD TEST: CPT

## 2025-03-11 PROCEDURE — 160048 HCHG OR STATISTICAL LEVEL 1-5: Performed by: INTERNAL MEDICINE

## 2025-03-11 PROCEDURE — 700111 HCHG RX REV CODE 636 W/ 250 OVERRIDE (IP): Performed by: STUDENT IN AN ORGANIZED HEALTH CARE EDUCATION/TRAINING PROGRAM

## 2025-03-11 PROCEDURE — 160025 RECOVERY II MINUTES (STATS): Performed by: INTERNAL MEDICINE

## 2025-03-11 PROCEDURE — 700105 HCHG RX REV CODE 258: Performed by: INTERNAL MEDICINE

## 2025-03-11 PROCEDURE — 160046 HCHG PACU - 1ST 60 MINS PHASE II: Performed by: INTERNAL MEDICINE

## 2025-03-11 PROCEDURE — 88312 SPECIAL STAINS GROUP 1: CPT | Performed by: PATHOLOGY

## 2025-03-11 RX ORDER — DIPHENHYDRAMINE HYDROCHLORIDE 50 MG/ML
12.5 INJECTION, SOLUTION INTRAMUSCULAR; INTRAVENOUS
Status: DISCONTINUED | OUTPATIENT
Start: 2025-03-11 | End: 2025-03-11 | Stop reason: HOSPADM

## 2025-03-11 RX ORDER — LABETALOL HYDROCHLORIDE 5 MG/ML
5 INJECTION, SOLUTION INTRAVENOUS
Status: DISCONTINUED | OUTPATIENT
Start: 2025-03-11 | End: 2025-03-11 | Stop reason: HOSPADM

## 2025-03-11 RX ORDER — EPHEDRINE SULFATE 50 MG/ML
5 INJECTION, SOLUTION INTRAVENOUS
Status: DISCONTINUED | OUTPATIENT
Start: 2025-03-11 | End: 2025-03-11 | Stop reason: HOSPADM

## 2025-03-11 RX ORDER — HYDRALAZINE HYDROCHLORIDE 20 MG/ML
5 INJECTION INTRAMUSCULAR; INTRAVENOUS
Status: DISCONTINUED | OUTPATIENT
Start: 2025-03-11 | End: 2025-03-11 | Stop reason: HOSPADM

## 2025-03-11 RX ORDER — METOPROLOL TARTRATE 1 MG/ML
1 INJECTION, SOLUTION INTRAVENOUS
Status: DISCONTINUED | OUTPATIENT
Start: 2025-03-11 | End: 2025-03-11 | Stop reason: HOSPADM

## 2025-03-11 RX ORDER — ONDANSETRON 2 MG/ML
4 INJECTION INTRAMUSCULAR; INTRAVENOUS
Status: DISCONTINUED | OUTPATIENT
Start: 2025-03-11 | End: 2025-03-11 | Stop reason: HOSPADM

## 2025-03-11 RX ORDER — ALBUTEROL SULFATE 5 MG/ML
2.5 SOLUTION RESPIRATORY (INHALATION)
Status: DISCONTINUED | OUTPATIENT
Start: 2025-03-11 | End: 2025-03-11 | Stop reason: HOSPADM

## 2025-03-11 RX ORDER — SODIUM CHLORIDE, SODIUM LACTATE, POTASSIUM CHLORIDE, CALCIUM CHLORIDE 600; 310; 30; 20 MG/100ML; MG/100ML; MG/100ML; MG/100ML
INJECTION, SOLUTION INTRAVENOUS CONTINUOUS
Status: DISCONTINUED | OUTPATIENT
Start: 2025-03-11 | End: 2025-03-11 | Stop reason: HOSPADM

## 2025-03-11 RX ORDER — LIDOCAINE HYDROCHLORIDE 20 MG/ML
INJECTION, SOLUTION EPIDURAL; INFILTRATION; INTRACAUDAL; PERINEURAL PRN
Status: DISCONTINUED | OUTPATIENT
Start: 2025-03-11 | End: 2025-03-11 | Stop reason: SURG

## 2025-03-11 RX ADMIN — SODIUM CHLORIDE, POTASSIUM CHLORIDE, SODIUM LACTATE AND CALCIUM CHLORIDE: 600; 310; 30; 20 INJECTION, SOLUTION INTRAVENOUS at 07:54

## 2025-03-11 RX ADMIN — PROPOFOL 200 MCG/KG/MIN: 10 INJECTION, EMULSION INTRAVENOUS at 07:59

## 2025-03-11 RX ADMIN — LIDOCAINE HYDROCHLORIDE 100 MG: 20 INJECTION, SOLUTION EPIDURAL; INFILTRATION; INTRACAUDAL; PERINEURAL at 07:58

## 2025-03-11 RX ADMIN — PROPOFOL 50 MG: 10 INJECTION, EMULSION INTRAVENOUS at 07:58

## 2025-03-11 ASSESSMENT — FIBROSIS 4 INDEX: FIB4 SCORE: 0.96

## 2025-03-11 NOTE — PROGRESS NOTES
0848: Patient arrived to phase II from pacu 1 via gurney. Report received from RN. Respirations spontaneous and non-labored, VSS.     0903: Family at bedside.     0915: Patient and family presented discharge instructions, all questions answered. PIV removed and tip intact. Patient discharged with family and all belongings post uneventful stay in PACU 2.  Pt to vehicle via wheelchair with CNA.

## 2025-03-11 NOTE — OR NURSING
0815: Patient arrived to PACU from OR via gurney. Report received from anesthesia and RN. Cold pack applied.     0825: family called. No answer.     0829: family called again and updated.     0845: pt meets criteria for transfer to stage II. Report called to receiving RN

## 2025-03-11 NOTE — OP REPORT
DATE OF SERVICE:  03/11/2025     PROCEDURE PERFORMED:  1.  Esophagogastroduodenoscopy with biopsy.  2.  Colonoscopy.     PHYSICIAN:  Seven Castellon MD     ANESTHESIOLOGIST:  Jeff Goodman MD     PREOPERATIVE DIAGNOSES:  1.  Abdominal pain.  2.  Family history of colon cancer in her father.     INDICATIONS:  Procedure risks and benefits reviewed thoroughly with the   patient.  Risks including but not limited to bleeding, perforation, side   effects of medication were all informed.  All questions were answered to her   satisfaction.     MEDICATION:  Deep sedation.     DESCRIPTION OF PROCEDURE:  Esophagogastroduodenoscopy:  A forward viewing gastroscope was passed   carefully and easily under direct visualization in the esophagus.  All   esophageal views proximal, middle, distal as well as GE junction, which was   regular, appeared normal.  Forward views of the stomach were normal.    Retroflex of the stomach were normal.  Forward views of the duodenum, the   duodenal bulb, duodenal sweep, second and third portions were otherwise   normal.  Scope was retracted and then biopsies of the proximal and distal   stomach were obtained to rule out Helicobacter pylori.  The stomach was   suctioned, desufflated, the scope was removed.     Colonoscopy: Rectal examination revealed no palpable masses and colonoscope   was inserted into the rectum and advanced to cecum in a well-prepped colon.    Appendiceal orifice and ileocecal valve were well visualized and imaged.  Upon   careful retraction, examination revealed no evidence of polyp, mass or other   lesion.  Retroflex views of the rectum otherwise normal.  Rectum desufflated.    Scope was removed.     COMPLICATIONS:  None.     BLOOD LOSS:  None.     SPECIMENS:  Obtained.     RECOMMENDATIONS:  1.  Review results of histopathology from biopsies if positive for H. pylori,   then the patient will be treated appropriately.  2.  Normal colonoscopy in the setting of a family history  of colon cancer.    Repeat colonoscopy in 5 years' time.  The above was reviewed with the patient,   who voiced understanding and agreed to proceed.        ______________________________  MD ROLF Ferrera/AZM    DD:  03/11/2025 09:29  DT:  03/11/2025 10:45    Job#:  [id number]

## 2025-03-11 NOTE — ANESTHESIA TIME REPORT
Anesthesia Start and Stop Event Times       Date Time Event    3/11/2025 0705 Ready for Procedure     0754 Anesthesia Start     0816 Anesthesia Stop          Responsible Staff  03/11/25      Name Role Begin End    Jeff Goodman M.D. Anesth 0754 0816          Overtime Reason:  no overtime (within assigned shift)    Comments:

## 2025-03-11 NOTE — ANESTHESIA POSTPROCEDURE EVALUATION
Patient: Chanelle Ortiz    Procedure Summary       Date: 03/11/25 Room / Location:  ENDOSCOPIC ULTRASOUND ROOM / SURGERY [facility]    Anesthesia Start: 0754 Anesthesia Stop: 0816    Procedures:       ESOPHAGOGASTRODUODENOSCOPY AND COLONOSCOPY WITH BIOPSIES (Esophagus)      GASTROSCOPY (Esophagus) Diagnosis:       Epigastric pain      (EPIGASTRIC PAIN)    Surgeons: Seven Castellon M.D. Responsible Provider: Jeff Goodman M.D.    Anesthesia Type: general, MAC ASA Status: 3            Final Anesthesia Type: general, MAC  Last vitals  BP   Blood Pressure: 117/63    Temp   36.2 °C (97.2 °F)    Pulse   67   Resp   18    SpO2   92 %      Anesthesia Post Evaluation    Patient location during evaluation: PACU  Patient participation: complete - patient participated  Level of consciousness: awake and alert    Airway patency: patent  Anesthetic complications: no  Cardiovascular status: hemodynamically stable  Respiratory status: acceptable  Hydration status: euvolemic    PONV: none          No notable events documented.     Nurse Pain Score: 0 (NPRS)

## 2025-03-11 NOTE — ANESTHESIA PREPROCEDURE EVALUATION
Case: [id number] Date/Time: 03/11/25 0715    Procedures:       ESOPHAGOGASTRODUODENOSCOPY AND COLONOSCOPY WITH BIOPSIES      GASTROSCOPY    Pre-op diagnosis: EPIGASTRIC PAIN, COLON CANCER SCREENING    Location:  ENDOSCOPIC ULTRASOUND ROOM / SURGERY [facility]    Surgeons: Seven Castellon M.D.          49F PMHx BMI 52    Relevant Problems   ANESTHESIA   (positive) POLLY on CPAP      NEURO   (positive) Headache      CARDIAC   (positive) Hypertension      ENDO   (positive) Hypothyroidism       Physical Exam    Airway   Mallampati: II  TM distance: >3 FB  Neck ROM: full       Cardiovascular - normal exam  Rhythm: regular  Rate: normal  (-) murmur     Dental    Pulmonary - normal exam     Abdominal   (+) obese     Neurological - normal exam                   Anesthesia Plan    ASA 3   ASA physical status 3 criteria: morbid obesity - BMI greater than or equal to 40    Plan - general and MAC       Airway plan will be mask          Induction: intravenous    Postoperative Plan: Postoperative administration of opioids is intended.    Pertinent diagnostic labs and testing reviewed    Informed Consent:    Anesthetic plan and risks discussed with patient.    Use of blood products discussed with: patient whom consented to blood products.

## 2025-03-11 NOTE — DISCHARGE INSTRUCTIONS
ENDOSCOPY HOME CARE INSTRUCTIONS    GASTROSCOPY OR ERCP  1. Don't eat or drink anything for about an hour after the test. You can then resume your regular diet.  2. Don't drive or drink alcohol for 24 hours. The medication you received will make you too drowsy.  3. Don't take any coffee, tea, or aspirin products until after you see your doctor. These can harm the lining of your stomach.  4. If you begin to vomit bloody material, or develop black or bloody stools, call your doctor as soon as possible.  5. If you have any neck, chest, abdominal pain or temp of 100 degrees, call your doctor.    COLONOSCOPY OR FLEXIBLE SIGMOIDOSCOPY  1. If you received a barium enema, take a mild laxative such as dulcolax, Ruthie's M.O., or Milk of Magnesia to clean out the barium.  2. Drink plenty of fluids. Eat a diet high in fiber (whole-grain breads, fresh fruit and vegetables).  3. You may notice a few drops of blood with your first bowel movement. If you develop any large amount of bleeding, black stools, a fever, or abdominal pain, call your doctor right away.  4. Call your doctor for test results.  5. Don't drive or drink alcohol for 24 hours. The medication you received will make you too drowsy.  6. No heavy lifting, no Aspirin products or Aspirin for 5 days     You should call 911 if you develop problems with breathing or chest pain.  If any questions arise, call your doctor. If your doctor is not available, please feel free to call [phone]. You can also call the [facility] open 24 hours/day, 7 days/week and speak to a nurse at [phone], or toll free [phone].    If any questions arise, call your provider.  If your provider is not available, please feel free to call the [facility] at [phone].    MEDICATIONS: Resume taking daily medication.  Take prescribed pain medication with food.  If no medication is prescribed, you may take non-aspirin pain medication if needed.  PAIN MEDICATION CAN BE VERY  CONSTIPATING.  Take a stool softener or laxative such as senokot, pericolace, or milk of magnesia if needed.    Last pain medication given at     What to Expect Post Anesthesia    Rest and take it easy for the first 24 hours.  A responsible adult is recommended to remain with you during that time.  It is normal to feel sleepy.  We encourage you to not do anything that requires balance, judgment or coordination.    FOR 24 HOURS DO NOT:  Drive, operate machinery or run household appliances.  Drink beer or alcoholic beverages.  Make important decisions or sign legal documents.    To avoid nausea, slowly advance diet as tolerated, avoiding spicy or greasy foods for the first day.  Add more substantial food to your diet according to your provider's instructions.  Babies can be fed formula or breast milk as soon as they are hungry.  INCREASE FLUIDS AND FIBER TO AVOID CONSTIPATION.    MILD FLU-LIKE SYMPTOMS ARE NORMAL.  YOU MAY EXPERIENCE GENERALIZED MUSCLE ACHES, THROAT IRRITATION, HEADACHE AND/OR SOME NAUSEA.

## 2025-05-07 NOTE — NUR
Patient given discharge instructions and they have confirmed that they 
understand the instructions.  Patient ambulatory with steady gait. 108

## (undated) DEVICE — TUBE SUCTION YANKAUER  1/4 X 6FT (20EA/CA)"

## (undated) DEVICE — KIT CUSTOM PROCEDURE SINGLE FOR ENDO  (15/CA)

## (undated) DEVICE — BLOCK BITE ENDOSCOPIC 2809 - (100/BX) INTERMEDIATE

## (undated) DEVICE — KIT  I.V. START (100EA/CA)

## (undated) DEVICE — CANISTER SUCTION RIGID RED 1500CC (40EA/CA)

## (undated) DEVICE — SET EXTENSION WITH 2 PORTS (48EA/CA) ***PART #2C8610 IS A SUBSTITUTE*****

## (undated) DEVICE — CANNULA O2 COMFORT SOFT EAR ADULT 7 FT TUBING (50/CA)

## (undated) DEVICE — ELECTRODE 850 FOAM ADHESIVE - HYDROGEL RADIOTRNSPRNT (50/PK)

## (undated) DEVICE — CUFF BP ADULT LARGE DISPOSABLE (20EA/CA)

## (undated) DEVICE — SYRINGE DISP. 60 CC LL - (30/BX, 12BX/CA)**WHEN THESE ARE GONE ORDER #500206**

## (undated) DEVICE — NEPTUNE 4 PORT MANIFOLD - (20/PK)

## (undated) DEVICE — SET LEADWIRE 5 LEAD BEDSIDE DISPOSABLE ECG (1SET OF 5/EA)

## (undated) DEVICE — TUBING CLEARLINK DUO-VENT - C-FLO (48EA/CA)

## (undated) DEVICE — FORCEP RADIAL JAW 4 STANDARD CAPACITY W/NEEDLE 240CM (40EA/BX)

## (undated) DEVICE — MASK O2 VNYL ADLT RBRTH HI - (50/CS)

## (undated) DEVICE — SYRINGE SAFETY 10 ML 18 GA X 1 1/2 BLUNT LL (100/BX 4BX/CA)

## (undated) DEVICE — KIT CUSTOM PROCEDURE SINGLE FOR ENDO (15/CA)

## (undated) DEVICE — TOWEL STOP TIMEOUT SAFETY FLAG (40EA/CA)

## (undated) DEVICE — SENSOR OXIMETER ADULT SPO2 RD SET (20EA/BX)

## (undated) DEVICE — ELECTRODE DUAL RETURN W/ CORD - (50/PK)

## (undated) DEVICE — SYRINGE SAFETY 3 ML 18 GA X 1 1/2 BLUNT LL (100/BX 8BX/CA)

## (undated) DEVICE — CONTAINER, SPECIMEN, STERILE

## (undated) DEVICE — WATER IRRIGATION STERILE 1000ML (12EA/CA)

## (undated) DEVICE — MASK WITH FACE SHIELD (25/BX 4BX/CA)

## (undated) DEVICE — CATHETER IV SAFETY 20 GA X 1-1/4 (50/BX)

## (undated) DEVICE — FILM CASSETTE ENDO

## (undated) DEVICE — TUBE SUCTION YANKAUER 1/4 X 6FT (50EA/CA)"

## (undated) DEVICE — SPONGE GAUZE NON-STERILE 4X4 - (2000/CA 10PK/CA)

## (undated) DEVICE — SOD. CHL. INJ. 0.9% 1000 ML - (14EA/CA 60CA/PF)

## (undated) DEVICE — PAD PREP 24 X 48 CUFFED - (100/CA)

## (undated) DEVICE — TUBE CONNECTING SUCTION - CLEAR PLASTIC STERILE 72 IN (50EA/CA)

## (undated) DEVICE — SYRINGE SAFETY 5 ML 18 GA X 1-1/2 BLUNT LL (100/BX 4BX/CA)

## (undated) DEVICE — LACTATED RINGERS INJ 1000 ML - (14EA/CA 60CA/PF)

## (undated) DEVICE — GOWN SURGEONS LARGE - (32/CA)

## (undated) DEVICE — MANIFOLD NEPTUNE 1 PORT (20/PK)